# Patient Record
Sex: FEMALE | Race: WHITE | NOT HISPANIC OR LATINO | Employment: OTHER | URBAN - METROPOLITAN AREA
[De-identification: names, ages, dates, MRNs, and addresses within clinical notes are randomized per-mention and may not be internally consistent; named-entity substitution may affect disease eponyms.]

---

## 2020-07-19 ENCOUNTER — APPOINTMENT (OUTPATIENT)
Dept: CT IMAGING | Facility: HOSPITAL | Age: 75
End: 2020-07-19

## 2020-07-19 ENCOUNTER — HOSPITAL ENCOUNTER (EMERGENCY)
Facility: HOSPITAL | Age: 75
Discharge: HOME | End: 2020-07-19
Attending: EMERGENCY MEDICINE | Admitting: EMERGENCY MEDICINE

## 2020-07-19 ENCOUNTER — APPOINTMENT (OUTPATIENT)
Dept: GENERAL RADIOLOGY | Facility: HOSPITAL | Age: 75
End: 2020-07-19

## 2020-07-19 VITALS
OXYGEN SATURATION: 99 % | BODY MASS INDEX: 53.99 KG/M2 | RESPIRATION RATE: 18 BRPM | WEIGHT: 240 LBS | HEART RATE: 75 BPM | DIASTOLIC BLOOD PRESSURE: 61 MMHG | SYSTOLIC BLOOD PRESSURE: 123 MMHG | HEIGHT: 56 IN | TEMPERATURE: 97.6 F
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
## 2020-07-19 DIAGNOSIS — S40.011A CONTUSION OF RIGHT SHOULDER, INITIAL ENCOUNTER: ICD-10-CM

## 2020-07-19 DIAGNOSIS — S16.1XXA CERVICAL STRAIN, ACUTE, INITIAL ENCOUNTER: ICD-10-CM

## 2020-07-19 PROCEDURE — 72125 CT NECK SPINE W/O DYE: CPT

## 2020-07-19 PROCEDURE — 99283 EMERGENCY DEPT VISIT LOW MDM: CPT

## 2020-07-19 PROCEDURE — 73030 X-RAY EXAM OF SHOULDER: CPT

## 2020-07-19 RX ORDER — LIDOCAINE 50 MG/G
1 PATCH TOPICAL EVERY 24 HOURS
Qty: 15 EACH | Refills: 0 | Status: SHIPPED | OUTPATIENT
Start: 2020-07-19 | End: 2021-06-25

## 2020-07-19 NOTE — ED PROVIDER NOTES
EMERGENCY DEPARTMENT ENCOUNTER    Room Number:  22/22  Date of encounter:  7/19/2020  PCP: Kary Wu MD  Historian: Patient      HPI:  Chief Complaint: Fall out of bed right shoulder pain neck pain    A complete HPI/ROS/PMH/PSH/SH/FH are unobtainable due to: N/A    Context: Masha Chou is a 75 y.o. female who presents to the ED c/o states she was having really good dreams that she went to roll over in bed rolled out of the bed landed on the floor.  She struck her head but did not really have a headache but she does have some pain in the lower part of her neck and her right shoulder.  She reports that she is able to use the right arm.  She denies any numbness or tingling.  States she has had no loss of consciousness.  She had no blood from her ears or nose.  She has really no other complaints.  Palpation in the right trapezius area does seem to increase her pain.      PAST MEDICAL HISTORY  Active Ambulatory Problems     Diagnosis Date Noted   • No Active Ambulatory Problems     Resolved Ambulatory Problems     Diagnosis Date Noted   • No Resolved Ambulatory Problems     Past Medical History:   Diagnosis Date   • Asthma    • Cancer (CMS/HCC)    • Diabetes mellitus (CMS/HCC)    • Hypertension    • Pulmonary hypertension (CMS/HCC)          PAST SURGICAL HISTORY  Past Surgical History:   Procedure Laterality Date   • BREAST SURGERY     • CHOLECYSTECTOMY     • HYSTERECTOMY     • JOINT REPLACEMENT      RIGHT KNEE   • KELOID EXCISION           FAMILY HISTORY  History reviewed. No pertinent family history.      SOCIAL HISTORY  Social History     Socioeconomic History   • Marital status:      Spouse name: Not on file   • Number of children: Not on file   • Years of education: Not on file   • Highest education level: Not on file   Tobacco Use   • Smoking status: Never Smoker   • Smokeless tobacco: Never Used   Substance and Sexual Activity   • Alcohol use: Yes     Comment: SOCIALLY   • Drug use: Never          ALLERGIES  Penicillins        REVIEW OF SYSTEMS  Review of Systems     All systems reviewed and negative except for those discussed in HPI.       PHYSICAL EXAM    I have reviewed the triage vital signs and nursing notes.    ED Triage Vitals [07/19/20 0826]   Temp Heart Rate Resp BP SpO2   97.6 °F (36.4 °C) 75 18 105/84 99 %      Temp src Heart Rate Source Patient Position BP Location FiO2 (%)   Infrared Monitor Sitting Left arm --       Physical Exam  GENERAL: Warm pink dry well developed.  Appears in no acute distress.  HENT: Nares patent  EYES: No scleral icterus  CV: Regular rhythm, regular rate  RESPIRATORY: Normal effort.  No audible wheezes, rales or rhonchi  ABDOMEN: Soft, nontender  MUSCULOSKELETAL: No deformities.  Tenderness in the right posterior shoulder.  Clavicle is not tender.  There is no ecchymosis abrasions or evidence of direct trauma.  She had mild tenderness at the base of the C-spine there is no step-off no crepitus.  She is able to get up turn around and sit on the bed by herself  NEURO: Alert, moves all extremities, follows commands  SKIN: Warm, dry, no rash visualized        LAB RESULTS  No results found for this or any previous visit (from the past 24 hour(s)).    Ordered the above labs and independently reviewed the results.        RADIOLOGY  Xr Shoulder 2+ View Right    Result Date: 7/19/2020   EXAMINATION: XR SHOULDER 2 VIEWS RIGHT - 07/19/2020  INDICATION: Fall from bed this morning, right shoulder pain.  COMPARISON: NONE  FINDINGS: 2 views right shoulder reveal extensive osteophyte formation seen of the humeral head. There are degenerative changes seen in the acromioclavicular joint. Cortex is intact. Joint spaces are preserved. No fracture or dislocation.         Degenerative changes seen within the right shoulder with no evidence of fracture or dislocation.  DICTATED:   07/19/2020 EDITED/ls :   07/19/2020  This report was finalized on 7/19/2020 3:42 PM by Dr. Shay  MD [place].      Ct Cervical Spine Without Contrast    Result Date: 7/19/2020  EXAMINATION: CT CERVICAL SPINE WO CONTRAST - 07/19/2020  INDICATION: Fall from bed this morning, neck pain.  TECHNIQUE: Multiple axial CT imaging is obtained of the cervical spine without the administration of intravenous contrast.  The radiation dose reduction device was turned on for each scan per the ALARA (As Low as Reasonably Achievable) protocol.  COMPARISON: NONE  FINDINGS: There are multilevel degenerative changes identified throughout the cervical spine with some pleural thickening identified posteriorly and medially within the right lung apex. The vertebral body height is preserved. Odontoid is intact. Lateral masses are well aligned. Facets are well aligned. Mild anterior spurring and some disc space narrowing at the C5/C6 and C6/C7 levels. No fracture or dislocation.      Mild multilevel degenerative changes seen within the cervical spine with no evidence of fracture or dislocation.  DICTATED:   07/19/2020 EDITED/ls :   07/19/2020  This report was finalized on 7/19/2020 3:42 PM by Dr. Laurita Gurrola MD.        I ordered and reviewed the above noted radiographic studies.    I viewed images of right shoulder x-ray which showed no acute findings per my independent interpretation.  See radiologist's dictation for official interpretation.        PROCEDURES    Procedures      MEDICATIONS GIVEN IN ER    Medications - No data to display      PROGRESS, DATA ANALYSIS, CONSULTS, AND MEDICAL DECISION MAKING    All labs have been independently reviewed by me.  All radiology studies have been reviewed by me and the radiologist dictating the report.   EKG's have been independently viewed and interpreted by me.      Differential diagnoses: Contusion, fracture           X-rays and CT were negative.  Discussed with the patient.  Should be given some medications for discomfort.  Return here for problems.      AS OF 17:18 VITALS:    BP -  123/61  HR - 75  TEMP - 97.6 °F (36.4 °C) (Infrared)  O2 SATS - 99%        DIAGNOSIS  Final diagnoses:   Fall, initial encounter   Cervical strain, acute, initial encounter   Contusion of right shoulder, initial encounter         DISPOSITION  DISCHARGE    Patient discharged in stable condition.    Reviewed implications of results, diagnosis, meds, responsibility to follow up, warning signs and symptoms of possible worsening, potential complications and reasons to return to ER.    Patient/Family voiced understanding of above instructions.    Discussed plan for discharge, as there is no emergent indication for admission.  Pt/family is agreeable and understands need for follow up and possible repeat testing.  Pt/family is aware that discharge does not mean that nothing is wrong but that it indicates no emergency is currently present that requires admission and they must continue care with follow-up as given below or with a physician of their choice.     FOLLOW-UP  Kary Wu MD  [address] [facility] [address] [phone]               Medication List      New Prescriptions    diclofenac 50 MG EC tablet  Commonly known as:  VOLTAREN  Take 1 tablet by mouth 3 (Three) Times a Day.     lidocaine 5 %  Commonly known as:  LIDODERM  Place 1 patch on the skin as directed by provider Daily. Remove & Discard   patch within 12 hours or as directed by Eugene Crews PA  07/19/20 6582

## 2021-03-11 ENCOUNTER — APPOINTMENT (OUTPATIENT)
Dept: GENERAL RADIOLOGY | Facility: HOSPITAL | Age: 76
End: 2021-03-11

## 2021-03-11 ENCOUNTER — HOSPITAL ENCOUNTER (EMERGENCY)
Facility: HOSPITAL | Age: 76
Discharge: HOME | End: 2021-03-11
Attending: EMERGENCY MEDICINE | Admitting: EMERGENCY MEDICINE

## 2021-03-11 VITALS
TEMPERATURE: 98.1 F | SYSTOLIC BLOOD PRESSURE: 146 MMHG | BODY MASS INDEX: 52.19 KG/M2 | RESPIRATION RATE: 18 BRPM | DIASTOLIC BLOOD PRESSURE: 60 MMHG | HEART RATE: 65 BPM | HEIGHT: 56 IN | OXYGEN SATURATION: 100 % | WEIGHT: 232 LBS

## 2021-03-11 DIAGNOSIS — R06.00 DYSPNEA, UNSPECIFIED TYPE: Primary | ICD-10-CM

## 2021-03-11 DIAGNOSIS — J31.0 RHINITIS, UNSPECIFIED TYPE: ICD-10-CM

## 2021-03-11 DIAGNOSIS — D72.829 LEUKOCYTOSIS, UNSPECIFIED TYPE: ICD-10-CM

## 2021-03-11 LAB
ALBUMIN SERPL-MCNC: 4 G/DL (ref 3.5–5.2)
ALBUMIN/GLOB SERPL: 1.5 G/DL
ALP SERPL-CCNC: 131 U/L (ref 39–117)
ALT SERPL W P-5'-P-CCNC: 31 U/L (ref 1–33)
ANION GAP SERPL CALCULATED.3IONS-SCNC: 6 MMOL/L (ref 5–15)
AST SERPL-CCNC: 15 U/L (ref 1–32)
BASOPHILS # BLD AUTO: 0.06 10*3/MM3 (ref 0–0.2)
BASOPHILS NFR BLD AUTO: 0.5 % (ref 0–1.5)
BILIRUB SERPL-MCNC: 0.3 MG/DL (ref 0–1.2)
BILIRUB UR QL STRIP: NEGATIVE
BUN SERPL-MCNC: 29 MG/DL (ref 8–23)
BUN/CREAT SERPL: 27.6 (ref 7–25)
CALCIUM SPEC-SCNC: 9.3 MG/DL (ref 8.6–10.5)
CHLORIDE SERPL-SCNC: 101 MMOL/L (ref 98–107)
CLARITY UR: CLEAR
CO2 SERPL-SCNC: 33 MMOL/L (ref 22–29)
COLOR UR: YELLOW
CREAT SERPL-MCNC: 1.05 MG/DL (ref 0.57–1)
DEPRECATED RDW RBC AUTO: 46.5 FL (ref 37–54)
EOSINOPHIL # BLD AUTO: 0.07 10*3/MM3 (ref 0–0.4)
EOSINOPHIL NFR BLD AUTO: 0.6 % (ref 0.3–6.2)
ERYTHROCYTE [DISTWIDTH] IN BLOOD BY AUTOMATED COUNT: 13.6 % (ref 12.3–15.4)
GFR SERPL CREATININE-BSD FRML MDRD: 51 ML/MIN/1.73
GLOBULIN UR ELPH-MCNC: 2.6 GM/DL
GLUCOSE SERPL-MCNC: 187 MG/DL (ref 65–99)
GLUCOSE UR STRIP-MCNC: ABNORMAL MG/DL
HCT VFR BLD AUTO: 41.8 % (ref 34–46.6)
HGB BLD-MCNC: 12.6 G/DL (ref 12–15.9)
HGB UR QL STRIP.AUTO: NEGATIVE
HOLD SPECIMEN: NORMAL
IMM GRANULOCYTES # BLD AUTO: 0.16 10*3/MM3 (ref 0–0.05)
IMM GRANULOCYTES NFR BLD AUTO: 1.4 % (ref 0–0.5)
KETONES UR QL STRIP: NEGATIVE
LEUKOCYTE ESTERASE UR QL STRIP.AUTO: NEGATIVE
LYMPHOCYTES # BLD AUTO: 2.29 10*3/MM3 (ref 0.7–3.1)
LYMPHOCYTES NFR BLD AUTO: 20.7 % (ref 19.6–45.3)
MCH RBC QN AUTO: 28.1 PG (ref 26.6–33)
MCHC RBC AUTO-ENTMCNC: 30.1 G/DL (ref 31.5–35.7)
MCV RBC AUTO: 93.1 FL (ref 79–97)
MONOCYTES # BLD AUTO: 0.87 10*3/MM3 (ref 0.1–0.9)
MONOCYTES NFR BLD AUTO: 7.9 % (ref 5–12)
NEUTROPHILS NFR BLD AUTO: 68.9 % (ref 42.7–76)
NEUTROPHILS NFR BLD AUTO: 7.61 10*3/MM3 (ref 1.7–7)
NITRITE UR QL STRIP: NEGATIVE
NRBC BLD AUTO-RTO: 0 /100 WBC (ref 0–0.2)
NT-PROBNP SERPL-MCNC: 254.5 PG/ML (ref 0–900)
PH UR STRIP.AUTO: <=5 [PH] (ref 5–8)
PLATELET # BLD AUTO: 192 10*3/MM3 (ref 140–450)
PMV BLD AUTO: 12.1 FL (ref 6–12)
POTASSIUM SERPL-SCNC: 5.1 MMOL/L (ref 3.5–5.2)
PROCALCITONIN SERPL-MCNC: 0.07 NG/ML (ref 0–0.25)
PROT SERPL-MCNC: 6.6 G/DL (ref 6–8.5)
PROT UR QL STRIP: NEGATIVE
QT INTERVAL: 410 MS
QTC INTERVAL: 419 MS
RBC # BLD AUTO: 4.49 10*6/MM3 (ref 3.77–5.28)
SODIUM SERPL-SCNC: 140 MMOL/L (ref 136–145)
SP GR UR STRIP: 1.02 (ref 1–1.03)
TROPONIN T SERPL-MCNC: <0.01 NG/ML (ref 0–0.03)
UROBILINOGEN UR QL STRIP: ABNORMAL
WBC # BLD AUTO: 11.06 10*3/MM3 (ref 3.4–10.8)
WHOLE BLOOD HOLD SPECIMEN: NORMAL
WHOLE BLOOD HOLD SPECIMEN: NORMAL

## 2021-03-11 PROCEDURE — 71045 X-RAY EXAM CHEST 1 VIEW: CPT

## 2021-03-11 PROCEDURE — 93005 ELECTROCARDIOGRAM TRACING: CPT | Performed by: EMERGENCY MEDICINE

## 2021-03-11 PROCEDURE — 99284 EMERGENCY DEPT VISIT MOD MDM: CPT

## 2021-03-11 PROCEDURE — 84145 PROCALCITONIN (PCT): CPT | Performed by: EMERGENCY MEDICINE

## 2021-03-11 PROCEDURE — 80053 COMPREHEN METABOLIC PANEL: CPT | Performed by: EMERGENCY MEDICINE

## 2021-03-11 PROCEDURE — 84484 ASSAY OF TROPONIN QUANT: CPT | Performed by: EMERGENCY MEDICINE

## 2021-03-11 PROCEDURE — 85025 COMPLETE CBC W/AUTO DIFF WBC: CPT | Performed by: EMERGENCY MEDICINE

## 2021-03-11 PROCEDURE — 83880 ASSAY OF NATRIURETIC PEPTIDE: CPT | Performed by: EMERGENCY MEDICINE

## 2021-03-11 PROCEDURE — 81003 URINALYSIS AUTO W/O SCOPE: CPT | Performed by: EMERGENCY MEDICINE

## 2021-03-11 RX ORDER — LOPERAMIDE HYDROCHLORIDE 2 MG/1
1 TABLET ORAL EVERY 4 HOURS PRN
COMMUNITY

## 2021-03-11 RX ORDER — CEFDINIR 300 MG/1
300 CAPSULE ORAL 2 TIMES DAILY
COMMUNITY
End: 2021-06-25

## 2021-03-11 RX ORDER — SODIUM CHLORIDE 0.9 % (FLUSH) 0.9 %
10 SYRINGE (ML) INJECTION AS NEEDED
Status: DISCONTINUED | OUTPATIENT
Start: 2021-03-11 | End: 2021-03-11 | Stop reason: HOSPADM

## 2021-03-11 RX ORDER — ASPIRIN 81 MG/1
81 TABLET ORAL DAILY
COMMUNITY

## 2021-03-11 RX ORDER — ESCITALOPRAM OXALATE 20 MG/1
20 TABLET ORAL DAILY
COMMUNITY
End: 2021-06-25

## 2021-03-11 RX ORDER — INSULIN GLARGINE 100 [IU]/ML
40 INJECTION, SOLUTION SUBCUTANEOUS NIGHTLY
Status: ON HOLD | COMMUNITY
End: 2023-03-06 | Stop reason: SDUPTHER

## 2021-03-11 RX ORDER — VERAPAMIL HYDROCHLORIDE 240 MG/1
240 TABLET, FILM COATED, EXTENDED RELEASE ORAL DAILY
COMMUNITY

## 2021-03-11 NOTE — ED PROVIDER NOTES
Subjective   This is a pleasant 75-year-old female who is followed by Ghazala Wu at [facility] and her cardiologist at [facility].  Her diabetologist is at .  She is accompanied by her daughter who she lives with.  Their baseline she has fairly well-controlled diabetes.  She gets around generally at her daughter's house with a cane or walker or wheelchair or Rollator depending on how she is feeling.  She has been on chronic home oxygen for many years and uses CPAP at night and has underlying pulmonary hypertension but no history of heart failure or coronary artery disease, though her daughter says she has been borderline for congestive heart failure in the past.  She just moved in with her daughter about 3 weeks ago as her health is generally been declining.  She just had a steroid injection in her right thumb about 3 days ago and her sugars are starting to elevate.  She was started on cefdinir as well as over-the-counter oral antihistamine and Flonase nasal spray about 3 to 4 days ago for upper respiratory congestion.  She has had her first Covid shot.  She has had no known Covid exposure.    She presents today to the ER here with her daughter for a dry cough that is been present for several days as well as runny nose intermittently mixed with congestion.  She has a history of what sounds like seasonal allergies in the past.  Besides the above-mentioned changes she has had no other changes in her medications.  She does have modest shortness of breath but most of this is chronic is if she goes more than a few steps she generally gets short of breath.    She has had no fever but occasionally has chills.  She has had no vomiting or diarrhea.  She has had increased urinary frequency and occasional incontinence which she has had in the past but is becoming more frequent.  The patient herself is a fair historian but defers to the daughter for answering many questions.        Other systems reviewed and are  negative except as noted above.          Review of Systems   All other systems reviewed and are negative.      Past Medical History:   Diagnosis Date   • Asthma    • Cancer (CMS/HCC)     BILATERAL BREAST    • Diabetes mellitus (CMS/HCC)    • Hypertension    • Pulmonary hypertension (CMS/HCC)        Allergies   Allergen Reactions   • Penicillins Rash       Past Surgical History:   Procedure Laterality Date   • BREAST SURGERY     • CHOLECYSTECTOMY     • HYSTERECTOMY     • JOINT REPLACEMENT      RIGHT KNEE   • KELOID EXCISION         History reviewed. No pertinent family history.    Social History     Socioeconomic History   • Marital status:      Spouse name: Not on file   • Number of children: Not on file   • Years of education: Not on file   • Highest education level: Not on file   Tobacco Use   • Smoking status: Never Smoker   • Smokeless tobacco: Never Used   Substance and Sexual Activity   • Alcohol use: Yes     Comment: SOCIALLY   • Drug use: Never           Objective   Physical Exam  Vitals and nursing note reviewed. Exam conducted with a chaperone present.   Constitutional:       Comments: This is an older female in no acute distress she is alert and oriented speaks in complete sentences has no respiratory distress.  She is obese.  She is a fair historian.   HENT:      Head: Normocephalic and atraumatic.      Right Ear: External ear normal.      Left Ear: External ear normal.      Nose: Nose normal.      Mouth/Throat:      Mouth: Mucous membranes are moist.      Pharynx: Oropharynx is clear.   Eyes:      Extraocular Movements: Extraocular movements intact.      Conjunctiva/sclera: Conjunctivae normal.      Pupils: Pupils are equal, round, and reactive to light.   Cardiovascular:      Rate and Rhythm: Normal rate and regular rhythm.      Pulses: Normal pulses.      Heart sounds: Normal heart sounds.   Pulmonary:      Effort: Pulmonary effort is normal.      Breath sounds: Normal breath sounds.    Abdominal:      Comments: Obese soft and nontender without organomegaly, masses, or guarding.   Musculoskeletal:         General: Normal range of motion.      Cervical back: Normal range of motion.      Comments: She is a thumb spica Velcro on the right hand.  She has good pulses neurovascular intact in both upper and lower extremities she has no peripheral edema.   Skin:     General: Skin is warm and dry.      Capillary Refill: Capillary refill takes less than 2 seconds.   Neurological:      General: No focal deficit present.      Mental Status: She is oriented to person, place, and time.      Comments: Mild memory issues.  Face is symmetric, voice is strong, tongue is midline.  Vision, hearing, and speech are preserved.  She moves upper and lower extremities symmetrically.  She has just mild generalized weakness.         Procedures           ED Course           Recent Results (from the past 24 hour(s))   ECG 12 Lead    Collection Time: 03/11/21  3:06 PM   Result Value Ref Range    QT Interval 410 ms    QTC Interval 419 ms   Comprehensive Metabolic Panel    Collection Time: 03/11/21  3:47 PM    Specimen: Blood   Result Value Ref Range    Glucose 187 (H) 65 - 99 mg/dL    BUN 29 (H) 8 - 23 mg/dL    Creatinine 1.05 (H) 0.57 - 1.00 mg/dL    Sodium 140 136 - 145 mmol/L    Potassium 5.1 3.5 - 5.2 mmol/L    Chloride 101 98 - 107 mmol/L    CO2 33.0 (H) 22.0 - 29.0 mmol/L    Calcium 9.3 8.6 - 10.5 mg/dL    Total Protein 6.6 6.0 - 8.5 g/dL    Albumin 4.00 3.50 - 5.20 g/dL    ALT (SGPT) 31 1 - 33 U/L    AST (SGOT) 15 1 - 32 U/L    Alkaline Phosphatase 131 (H) 39 - 117 U/L    Total Bilirubin 0.3 0.0 - 1.2 mg/dL    eGFR Non African Amer 51 (L) >60 mL/min/1.73    Globulin 2.6 gm/dL    A/G Ratio 1.5 g/dL    BUN/Creatinine Ratio 27.6 (H) 7.0 - 25.0    Anion Gap 6.0 5.0 - 15.0 mmol/L   BNP    Collection Time: 03/11/21  3:47 PM    Specimen: Blood   Result Value Ref Range    proBNP 254.5 0.0 - 900.0 pg/mL   Troponin    Collection  Time: 03/11/21  3:47 PM    Specimen: Blood   Result Value Ref Range    Troponin T <0.010 0.000 - 0.030 ng/mL   Light Blue Top    Collection Time: 03/11/21  3:47 PM   Result Value Ref Range    Extra Tube hold for add-on    Green Top (Gel)    Collection Time: 03/11/21  3:47 PM   Result Value Ref Range    Extra Tube Hold for add-ons.    Lavender Top    Collection Time: 03/11/21  3:47 PM   Result Value Ref Range    Extra Tube hold for add-on    Gold Top - SST    Collection Time: 03/11/21  3:47 PM   Result Value Ref Range    Extra Tube Hold for add-ons.    Gray Top - Ice    Collection Time: 03/11/21  3:47 PM   Result Value Ref Range    Extra Tube Hold for add-ons.    CBC Auto Differential    Collection Time: 03/11/21  3:47 PM    Specimen: Blood   Result Value Ref Range    WBC 11.06 (H) 3.40 - 10.80 10*3/mm3    RBC 4.49 3.77 - 5.28 10*6/mm3    Hemoglobin 12.6 12.0 - 15.9 g/dL    Hematocrit 41.8 34.0 - 46.6 %    MCV 93.1 79.0 - 97.0 fL    MCH 28.1 26.6 - 33.0 pg    MCHC 30.1 (L) 31.5 - 35.7 g/dL    RDW 13.6 12.3 - 15.4 %    RDW-SD 46.5 37.0 - 54.0 fl    MPV 12.1 (H) 6.0 - 12.0 fL    Platelets 192 140 - 450 10*3/mm3    Neutrophil % 68.9 42.7 - 76.0 %    Lymphocyte % 20.7 19.6 - 45.3 %    Monocyte % 7.9 5.0 - 12.0 %    Eosinophil % 0.6 0.3 - 6.2 %    Basophil % 0.5 0.0 - 1.5 %    Immature Grans % 1.4 (H) 0.0 - 0.5 %    Neutrophils, Absolute 7.61 (H) 1.70 - 7.00 10*3/mm3    Lymphocytes, Absolute 2.29 0.70 - 3.10 10*3/mm3    Monocytes, Absolute 0.87 0.10 - 0.90 10*3/mm3    Eosinophils, Absolute 0.07 0.00 - 0.40 10*3/mm3    Basophils, Absolute 0.06 0.00 - 0.20 10*3/mm3    Immature Grans, Absolute 0.16 (H) 0.00 - 0.05 10*3/mm3    nRBC 0.0 0.0 - 0.2 /100 WBC   Procalcitonin    Collection Time: 03/11/21  3:47 PM    Specimen: Blood   Result Value Ref Range    Procalcitonin 0.07 0.00 - 0.25 ng/mL   Urinalysis With Culture If Indicated - Urine, Clean Catch    Collection Time: 03/11/21  4:43 PM    Specimen: Urine, Clean Catch   Result  "Value Ref Range    Color, UA Yellow Yellow, Straw    Appearance, UA Clear Clear    pH, UA <=5.0 5.0 - 8.0    Specific Gravity, UA 1.020 1.001 - 1.030    Glucose,  mg/dL (Trace) (A) Negative    Ketones, UA Negative Negative    Bilirubin, UA Negative Negative    Blood, UA Negative Negative    Protein, UA Negative Negative    Leuk Esterase, UA Negative Negative    Nitrite, UA Negative Negative    Urobilinogen, UA 0.2 E.U./dL 0.2 - 1.0 E.U./dL     Note: In addition to lab results from this visit, the labs listed above may include labs taken at another facility or during a different encounter within the last 24 hours. Please correlate lab times with ED admission and discharge times for further clarification of the services performed during this visit.    XR Chest 1 View   Final Result   Mild cardiac enlargement, otherwise without evidence of   acute cardiopulmonary abnormality.       This report was finalized on 3/11/2021 4:16 PM by Sd Townsend.            Vitals:    03/11/21 1459 03/11/21 1530   BP: 122/65 111/41   Pulse: 64 63   Resp: 18    Temp: 98.2 °F (36.8 °C)    TempSrc: Oral    SpO2: 94% 100%   Weight: 105 kg (232 lb)    Height: 142.2 cm (56\")      Medications   sodium chloride 0.9 % flush 10 mL (has no administration in time range)     ECG/EMG Results (last 24 hours)     Procedure Component Value Units Date/Time    ECG 12 Lead [[id number]] Collected: 03/11/21 1506     Updated: 03/11/21 1532     QT Interval 410 ms      QTC Interval 419 ms     Narrative:      Test Reason : SOA Protocol  Blood Pressure : **/** mmHG  Vent. Rate : 063 BPM     Atrial Rate : 063 BPM     P-R Int : 174 ms          QRS Dur : 078 ms      QT Int : 410 ms       P-R-T Axes : 035 020 100 degrees     QTc Int : 419 ms    Normal sinus rhythm  Low voltage QRS  Abnormal QRS-T angle, consider primary T wave abnormality  Abnormal ECG  When compared with ECG of 18-MAR-2016 16:27,c/w previous 2016 ekg   tw amplututeds less o/w nsc  Confirmed " by FORREST MEJIA MD ([id number]) on 3/11/2021 3:32:31 PM    Referred By:  LUDA           Confirmed By:FORREST MEJIA MD        ECG 12 Lead   Final Result   Test Reason : SOA Protocol   Blood Pressure : **/** mmHG   Vent. Rate : 063 BPM     Atrial Rate : 063 BPM      P-R Int : 174 ms          QRS Dur : 078 ms       QT Int : 410 ms       P-R-T Axes : 035 020 100 degrees      QTc Int : 419 ms      Normal sinus rhythm   Low voltage QRS   Abnormal QRS-T angle, consider primary T wave abnormality   Abnormal ECG   When compared with ECG of 18-MAR-2016 16:27,c/w previous 2016 ekg    tw amplututeds less o/w nsc   Confirmed by FORREST MEJIA MD ([id number]) on 3/11/2021 3:32:31 PM      Referred By:  LUDA           Confirmed By:FORREST MEJIA MD                                        MDM  Number of Diagnoses or Management Options  Dyspnea, unspecified type  Leukocytosis, unspecified type  Rhinitis, unspecified type  Diagnosis management comments:       I reviewed all available studies at the bedside with the patient and her daughter.  She has had an uneventful ER course her labs are quite reassuring.  Cardiac markers are bland.  Her white count is little bit elevated but she had a recent site of steroids in her procalcitonin levels normal.  I suspect her dyspnea may be related to her size as well as underlying lung problems probably little pulmonary hypertension.  She also has some rhinitis and that may be allergic in nature as well.    At this point I do not think there is any other intervention that she needs and have her follow-up with her PCP for recheck and certainly return to the ER if worse in any way.    All are agreeable with the plan       Amount and/or Complexity of Data Reviewed  Clinical lab tests: reviewed  Tests in the radiology section of CPT®: reviewed  Tests in the medicine section of CPT®: reviewed  Decide to obtain previous medical records or to obtain history from someone other than the patient: yes        Final  diagnoses:   Dyspnea, unspecified type   Leukocytosis, unspecified type   Rhinitis, unspecified type            Brian Sheehan MD  03/11/21 1224

## 2021-04-01 ENCOUNTER — HOSPITAL ENCOUNTER (EMERGENCY)
Facility: HOSPITAL | Age: 76
Discharge: HOME | End: 2021-04-01
Attending: EMERGENCY MEDICINE | Admitting: EMERGENCY MEDICINE

## 2021-04-01 ENCOUNTER — APPOINTMENT (OUTPATIENT)
Dept: GENERAL RADIOLOGY | Facility: HOSPITAL | Age: 76
End: 2021-04-01

## 2021-04-01 ENCOUNTER — APPOINTMENT (OUTPATIENT)
Dept: CT IMAGING | Facility: HOSPITAL | Age: 76
End: 2021-04-01

## 2021-04-01 VITALS
DIASTOLIC BLOOD PRESSURE: 41 MMHG | SYSTOLIC BLOOD PRESSURE: 135 MMHG | BODY MASS INDEX: 55.56 KG/M2 | HEART RATE: 75 BPM | HEIGHT: 56 IN | RESPIRATION RATE: 20 BRPM | TEMPERATURE: 99 F | OXYGEN SATURATION: 100 % | WEIGHT: 247 LBS

## 2021-04-01 DIAGNOSIS — I27.21 PULMONARY ARTERIAL HYPERTENSION (HCC): ICD-10-CM

## 2021-04-01 DIAGNOSIS — J96.11 CHRONIC RESPIRATORY FAILURE WITH HYPOXIA, ON HOME OXYGEN THERAPY (HCC): ICD-10-CM

## 2021-04-01 DIAGNOSIS — J18.9 PNEUMONIA OF LEFT LOWER LOBE DUE TO INFECTIOUS ORGANISM: Primary | ICD-10-CM

## 2021-04-01 DIAGNOSIS — Z99.81 CHRONIC RESPIRATORY FAILURE WITH HYPOXIA, ON HOME OXYGEN THERAPY (HCC): ICD-10-CM

## 2021-04-01 DIAGNOSIS — Z86.39 HISTORY OF DIABETES MELLITUS: ICD-10-CM

## 2021-04-01 LAB
ALBUMIN SERPL-MCNC: 3.9 G/DL (ref 3.5–5.2)
ALBUMIN/GLOB SERPL: 1.4 G/DL
ALP SERPL-CCNC: 119 U/L (ref 39–117)
ALT SERPL W P-5'-P-CCNC: 17 U/L (ref 1–33)
ANION GAP SERPL CALCULATED.3IONS-SCNC: 8 MMOL/L (ref 5–15)
AST SERPL-CCNC: 15 U/L (ref 1–32)
BASOPHILS # BLD AUTO: 0.05 10*3/MM3 (ref 0–0.2)
BASOPHILS NFR BLD AUTO: 0.6 % (ref 0–1.5)
BILIRUB SERPL-MCNC: 0.5 MG/DL (ref 0–1.2)
BUN SERPL-MCNC: 21 MG/DL (ref 8–23)
BUN/CREAT SERPL: 17.2 (ref 7–25)
CALCIUM SPEC-SCNC: 8.7 MG/DL (ref 8.6–10.5)
CHLORIDE SERPL-SCNC: 101 MMOL/L (ref 98–107)
CO2 SERPL-SCNC: 31 MMOL/L (ref 22–29)
CREAT SERPL-MCNC: 1.22 MG/DL (ref 0.57–1)
DEPRECATED RDW RBC AUTO: 47.1 FL (ref 37–54)
EOSINOPHIL # BLD AUTO: 0.12 10*3/MM3 (ref 0–0.4)
EOSINOPHIL NFR BLD AUTO: 1.3 % (ref 0.3–6.2)
ERYTHROCYTE [DISTWIDTH] IN BLOOD BY AUTOMATED COUNT: 14.2 % (ref 12.3–15.4)
GFR SERPL CREATININE-BSD FRML MDRD: 43 ML/MIN/1.73
GLOBULIN UR ELPH-MCNC: 2.8 GM/DL
GLUCOSE SERPL-MCNC: 173 MG/DL (ref 65–99)
HCT VFR BLD AUTO: 41.3 % (ref 34–46.6)
HGB BLD-MCNC: 12.6 G/DL (ref 12–15.9)
HOLD SPECIMEN: NORMAL
IMM GRANULOCYTES # BLD AUTO: 0.05 10*3/MM3 (ref 0–0.05)
IMM GRANULOCYTES NFR BLD AUTO: 0.6 % (ref 0–0.5)
LIPASE SERPL-CCNC: 12 U/L (ref 13–60)
LYMPHOCYTES # BLD AUTO: 1.75 10*3/MM3 (ref 0.7–3.1)
LYMPHOCYTES NFR BLD AUTO: 19.5 % (ref 19.6–45.3)
MCH RBC QN AUTO: 27.8 PG (ref 26.6–33)
MCHC RBC AUTO-ENTMCNC: 30.5 G/DL (ref 31.5–35.7)
MCV RBC AUTO: 91.2 FL (ref 79–97)
MONOCYTES # BLD AUTO: 0.58 10*3/MM3 (ref 0.1–0.9)
MONOCYTES NFR BLD AUTO: 6.5 % (ref 5–12)
NEUTROPHILS NFR BLD AUTO: 6.42 10*3/MM3 (ref 1.7–7)
NEUTROPHILS NFR BLD AUTO: 71.5 % (ref 42.7–76)
NRBC BLD AUTO-RTO: 0 /100 WBC (ref 0–0.2)
NT-PROBNP SERPL-MCNC: 110.2 PG/ML (ref 0–900)
PLATELET # BLD AUTO: 172 10*3/MM3 (ref 140–450)
PMV BLD AUTO: 12.1 FL (ref 6–12)
POTASSIUM SERPL-SCNC: 4.7 MMOL/L (ref 3.5–5.2)
PROT SERPL-MCNC: 6.7 G/DL (ref 6–8.5)
RBC # BLD AUTO: 4.53 10*6/MM3 (ref 3.77–5.28)
SODIUM SERPL-SCNC: 140 MMOL/L (ref 136–145)
TROPONIN T SERPL-MCNC: <0.01 NG/ML (ref 0–0.03)
WBC # BLD AUTO: 8.97 10*3/MM3 (ref 3.4–10.8)
WHOLE BLOOD HOLD SPECIMEN: NORMAL
WHOLE BLOOD HOLD SPECIMEN: NORMAL

## 2021-04-01 PROCEDURE — 80053 COMPREHEN METABOLIC PANEL: CPT

## 2021-04-01 PROCEDURE — 83880 ASSAY OF NATRIURETIC PEPTIDE: CPT

## 2021-04-01 PROCEDURE — 99284 EMERGENCY DEPT VISIT MOD MDM: CPT

## 2021-04-01 PROCEDURE — 83690 ASSAY OF LIPASE: CPT

## 2021-04-01 PROCEDURE — U0004 COV-19 TEST NON-CDC HGH THRU: HCPCS | Performed by: EMERGENCY MEDICINE

## 2021-04-01 PROCEDURE — 93005 ELECTROCARDIOGRAM TRACING: CPT | Performed by: EMERGENCY MEDICINE

## 2021-04-01 PROCEDURE — 84484 ASSAY OF TROPONIN QUANT: CPT

## 2021-04-01 PROCEDURE — 71275 CT ANGIOGRAPHY CHEST: CPT

## 2021-04-01 PROCEDURE — 71045 X-RAY EXAM CHEST 1 VIEW: CPT

## 2021-04-01 PROCEDURE — 85025 COMPLETE CBC W/AUTO DIFF WBC: CPT

## 2021-04-01 PROCEDURE — 93005 ELECTROCARDIOGRAM TRACING: CPT

## 2021-04-01 PROCEDURE — 0 IOPAMIDOL PER 1 ML: Performed by: EMERGENCY MEDICINE

## 2021-04-01 PROCEDURE — C9803 HOPD COVID-19 SPEC COLLECT: HCPCS | Performed by: EMERGENCY MEDICINE

## 2021-04-01 RX ORDER — SODIUM CHLORIDE 0.9 % (FLUSH) 0.9 %
10 SYRINGE (ML) INJECTION AS NEEDED
Status: DISCONTINUED | OUTPATIENT
Start: 2021-04-01 | End: 2021-04-02 | Stop reason: HOSPADM

## 2021-04-01 RX ORDER — DOXYCYCLINE 100 MG/1
100 CAPSULE ORAL ONCE
Status: COMPLETED | OUTPATIENT
Start: 2021-04-01 | End: 2021-04-01

## 2021-04-01 RX ORDER — CEFDINIR 300 MG/1
300 CAPSULE ORAL EVERY 12 HOURS SCHEDULED
Status: COMPLETED | OUTPATIENT
Start: 2021-04-01 | End: 2021-04-01

## 2021-04-01 RX ORDER — CEFDINIR 300 MG/1
300 CAPSULE ORAL 2 TIMES DAILY
Qty: 14 CAPSULE | Refills: 0 | Status: SHIPPED | OUTPATIENT
Start: 2021-04-01 | End: 2021-04-08

## 2021-04-01 RX ORDER — DOXYCYCLINE 100 MG/1
100 CAPSULE ORAL 2 TIMES DAILY
Qty: 14 CAPSULE | Refills: 0 | Status: SHIPPED | OUTPATIENT
Start: 2021-04-01 | End: 2021-04-08

## 2021-04-01 RX ORDER — ASPIRIN 81 MG/1
324 TABLET, CHEWABLE ORAL ONCE
Status: DISCONTINUED | OUTPATIENT
Start: 2021-04-01 | End: 2021-04-02 | Stop reason: HOSPADM

## 2021-04-01 RX ADMIN — CEFDINIR 300 MG: 300 CAPSULE ORAL at 23:03

## 2021-04-01 RX ADMIN — DOXYCYCLINE 100 MG: 100 CAPSULE ORAL at 23:03

## 2021-04-01 RX ADMIN — IOPAMIDOL 70 ML: 755 INJECTION, SOLUTION INTRAVENOUS at 22:03

## 2021-04-02 LAB
QT INTERVAL: 378 MS
QT INTERVAL: 416 MS
QTC INTERVAL: 413 MS
QTC INTERVAL: 458 MS
SARS-COV-2 RNA NOSE QL NAA+PROBE: NOT DETECTED

## 2021-04-02 NOTE — ED PROVIDER NOTES
EMERGENCY DEPARTMENT ENCOUNTER      Pt Name: Masha Chou  MRN: [id number]  YOB: 1945  Date of evaluation: 4/1/2021  Provider: Brenden Mckeon MD    CHIEF COMPLAINT       Chief Complaint   Patient presents with   • Chest Pain   • Shortness of Breath         HISTORY OF PRESENT ILLNESS  (Location/Symptom, Timing/Onset, Context/Setting, Quality, Duration, Modifying Factors, Severity.)   Masha Chou is a 75 y.o. female who presents to the emergency department patient presents with constant sharp left lower chest wall pain that is worse with exertion along with dry cough and worsened from baseline shortness of breath over the course the past 3 days that is moderate in severity and worse with exertion without any associated fever or chills.  Patient wears 2 to 3 L of oxygen chronically at home secondary to history of pulmonary hypertension.  Denies any previous history of VTE.      Nursing notes were reviewed.    REVIEW OF SYSTEMS    (2-9 systems for level 4, 10 or more for level 5)   ROS:  General:  No fevers, no chills, no weakness  Cardiovascular: Left lower chest pain  Respiratory: Shortness of breath, cough  Gastrointestinal:  No pain, no nausea, no vomiting, no diarrhea  Musculoskeletal:  No muscle pain, no joint pain  Skin:  No rash, no easy bruising  Neurologic:  No speech problems, no headache, no extremity numbness, no extremity tingling, no extremity weakness  Psychiatric:  No anxiety  Genitourinary:  No dysuria, no hematuria    Except as noted above the remainder of the review of systems was reviewed and negative.       PAST MEDICAL HISTORY     Past Medical History:   Diagnosis Date   • Asthma    • Cancer (CMS/HCC)     BILATERAL BREAST    • Diabetes mellitus (CMS/HCC)    • Hypertension    • Pulmonary hypertension (CMS/HCC)          SURGICAL HISTORY       Past Surgical History:   Procedure Laterality Date   • BREAST SURGERY     • CHOLECYSTECTOMY     • HYSTERECTOMY     • JOINT REPLACEMENT       RIGHT KNEE   • KELOID EXCISION           CURRENT MEDICATIONS     No current facility-administered medications for this encounter.    Current Outpatient Medications:   •  ALBUTEROL IN, Inhale., Disp: , Rfl:   •  aspirin 81 MG EC tablet, Take 81 mg by mouth Daily., Disp: , Rfl:   •  cefdinir (OMNICEF) 300 MG capsule, Take 300 mg by mouth 2 (Two) Times a Day., Disp: , Rfl:   •  cefdinir (OMNICEF) 300 MG capsule, Take 1 capsule by mouth 2 (Two) Times a Day for 7 days., Disp: 14 capsule, Rfl: 0  •  diclofenac (VOLTAREN) 50 MG EC tablet, Take 1 tablet by mouth 3 (Three) Times a Day., Disp: 9 tablet, Rfl: 0  •  doxycycline (MONODOX) 100 MG capsule, Take 1 capsule by mouth 2 (Two) Times a Day for 7 days., Disp: 14 capsule, Rfl: 0  •  escitalopram (LEXAPRO) 20 MG tablet, Take 20 mg by mouth Daily., Disp: , Rfl:   •  FLUTICASONE PROPIONATE EX, Apply  topically., Disp: , Rfl:   •  insulin glargine (LANTUS, SEMGLEE) 100 UNIT/ML injection, Inject 40 Units under the skin into the appropriate area as directed Daily., Disp: , Rfl:   •  insulin lispro (humaLOG) 100 UNIT/ML injection, Inject  under the skin into the appropriate area as directed 3 (Three) Times a Day Before Meals., Disp: , Rfl:   •  lidocaine (LIDODERM) 5 %, Place 1 patch on the skin as directed by provider Daily. Remove & Discard patch within 12 hours or as directed by MD, Disp: 15 each, Rfl: 0  •  Loperamide HCl (IMODIUM A-D PO), Take  by mouth., Disp: , Rfl:   •  verapamil SR (CALAN-SR) 240 MG CR tablet, Take 240 mg by mouth Every Night., Disp: , Rfl:     ALLERGIES     Penicillins    FAMILY HISTORY     History reviewed. No pertinent family history.       SOCIAL HISTORY       Social History     Socioeconomic History   • Marital status:      Spouse name: Not on file   • Number of children: Not on file   • Years of education: Not on file   • Highest education level: Not on file   Tobacco Use   • Smoking status: Never Smoker   • Smokeless tobacco: Never Used    Substance and Sexual Activity   • Alcohol use: Yes     Comment: SOCIALLY   • Drug use: Never         PHYSICAL EXAM    (up to 7 for level 4, 8 or more for level 5)     Vitals:    04/01/21 2045 04/01/21 2100 04/01/21 2115 04/01/21 2230   BP:  140/54  135/41   BP Location:       Patient Position:       Pulse: 71 69 69 75   Resp:       Temp:       TempSrc:       SpO2: 100% 100% 94% 100%   Weight:       Height:           Physical Exam  General: Awake, alert, no acute distress.  HEENT: Conjunctiva normal.  Neck: Trachea midline.  Cardiac: Heart regular rate, rhythm, no murmurs, rubs, or gallops  Lungs: Lungs are clear to auscultation, there is no wheezing, rhonchi, or rales. There is no use of accessory muscles.  Chest wall: There is no tenderness to palpation over the chest wall or over ribs  Abdomen: Abdomen is soft, nontender, nondistended. There are no firm or pulsatile masses, no rebound rigidity or guarding.   Musculoskeletal: No deformity.  Neuro: Alert and oriented x 4.  Dermatology: Skin is warm and dry  Psych: Mentation is grossly normal, cognition is grossly normal. Affect is appropriate.        DIAGNOSTIC RESULTS     EKG: All EKG's are interpreted by the Emergency Department Physician who either signs or Co-signs this chart in the absence of a cardiologist.    ECG 1: Sinus rhythm rate of 72, no acute ST segment or T wave changes, normal intervals, no ectopy    ECG 2: Sinus rhythm rate of 73, no acute ST segment or T wave changes, normal intervals, no ectopy    RADIOLOGY:   Non-plain film images such as CT, Ultrasound and MRI are read by the radiologist. Plain radiographic images are visualized and preliminarily interpreted by the emergency physician with the below findings:      [x] Radiologist's Report Reviewed:  CT Angiogram Chest   Final Result   1. Negative for pulmonary embolus.      2. Patchy areas of consolidation/atelectasis in the lingula may represent pneumonia.      Signer Name: Mavis Manrique MD     Signed: 4/1/2021 10:16 PM    Workstation Name: [id number]     [facility]      XR Chest 1 View   Final Result   Mild cardiomegaly without acute cardiopulmonary process.      Signer Name: FORREST POP MD    Signed: 4/1/2021 8:43 PM    Workstation Name: [id number]     [facility]            LABS:    I have reviewed and interpreted all of the currently available lab results from this visit (if applicable):  Results for orders placed or performed during the hospital encounter of 04/01/21   Troponin    Specimen: Blood   Result Value Ref Range    Troponin T <0.010 0.000 - 0.030 ng/mL   Comprehensive Metabolic Panel    Specimen: Blood   Result Value Ref Range    Glucose 173 (H) 65 - 99 mg/dL    BUN 21 8 - 23 mg/dL    Creatinine 1.22 (H) 0.57 - 1.00 mg/dL    Sodium 140 136 - 145 mmol/L    Potassium 4.7 3.5 - 5.2 mmol/L    Chloride 101 98 - 107 mmol/L    CO2 31.0 (H) 22.0 - 29.0 mmol/L    Calcium 8.7 8.6 - 10.5 mg/dL    Total Protein 6.7 6.0 - 8.5 g/dL    Albumin 3.90 3.50 - 5.20 g/dL    ALT (SGPT) 17 1 - 33 U/L    AST (SGOT) 15 1 - 32 U/L    Alkaline Phosphatase 119 (H) 39 - 117 U/L    Total Bilirubin 0.5 0.0 - 1.2 mg/dL    eGFR Non African Amer 43 (L) >60 mL/min/1.73    Globulin 2.8 gm/dL    A/G Ratio 1.4 g/dL    BUN/Creatinine Ratio 17.2 7.0 - 25.0    Anion Gap 8.0 5.0 - 15.0 mmol/L   Lipase    Specimen: Blood   Result Value Ref Range    Lipase 12 (L) 13 - 60 U/L   BNP    Specimen: Blood   Result Value Ref Range    proBNP 110.2 0.0 - 900.0 pg/mL   Gray Top - Ice   Result Value Ref Range    Extra Tube Hold for add-ons.    CBC Auto Differential    Specimen: Blood   Result Value Ref Range    WBC 8.97 3.40 - 10.80 10*3/mm3    RBC 4.53 3.77 - 5.28 10*6/mm3    Hemoglobin 12.6 12.0 - 15.9 g/dL    Hematocrit 41.3 34.0 - 46.6 %    MCV 91.2 79.0 - 97.0 fL    MCH 27.8 26.6 - 33.0 pg    MCHC 30.5 (L) 31.5 - 35.7 g/dL    RDW 14.2 12.3 - 15.4 %    RDW-SD 47.1 37.0 - 54.0 fl    MPV 12.1 (H)  6.0 - 12.0 fL    Platelets 172 140 - 450 10*3/mm3    Neutrophil % 71.5 42.7 - 76.0 %    Lymphocyte % 19.5 (L) 19.6 - 45.3 %    Monocyte % 6.5 5.0 - 12.0 %    Eosinophil % 1.3 0.3 - 6.2 %    Basophil % 0.6 0.0 - 1.5 %    Immature Grans % 0.6 (H) 0.0 - 0.5 %    Neutrophils, Absolute 6.42 1.70 - 7.00 10*3/mm3    Lymphocytes, Absolute 1.75 0.70 - 3.10 10*3/mm3    Monocytes, Absolute 0.58 0.10 - 0.90 10*3/mm3    Eosinophils, Absolute 0.12 0.00 - 0.40 10*3/mm3    Basophils, Absolute 0.05 0.00 - 0.20 10*3/mm3    Immature Grans, Absolute 0.05 0.00 - 0.05 10*3/mm3    nRBC 0.0 0.0 - 0.2 /100 WBC   ECG 12 Lead   Result Value Ref Range    QT Interval 378 ms    QTC Interval 413 ms   ECG 12 Lead   Result Value Ref Range    QT Interval 416 ms    QTC Interval 458 ms   Light Blue Top   Result Value Ref Range    Extra Tube hold for add-on    Green Top (Gel)   Result Value Ref Range    Extra Tube Hold for add-ons.    Lavender Top   Result Value Ref Range    Extra Tube hold for add-on    Gold Top - SST   Result Value Ref Range    Extra Tube Hold for add-ons.         All other labs were within normal range or not returned as of this dictation.      EMERGENCY DEPARTMENT COURSE and DIFFERENTIAL DIAGNOSIS/MDM:   Vitals:    Vitals:    04/01/21 2045 04/01/21 2100 04/01/21 2115 04/01/21 2230   BP:  140/54  135/41   BP Location:       Patient Position:       Pulse: 71 69 69 75   Resp:       Temp:       TempSrc:       SpO2: 100% 100% 94% 100%   Weight:       Height:           ED Course as of Apr 02 0514   Thu Apr 01, 2021 2222 CT is consistent with PNA. Negative for PE, dissection, effusion, PTX. Will test for COVID and DC on abx. Pt stable on home O2.    [NS]      ED Course User Index  [NS] Brenden Mckeon MD       Patient well-appearing, stable, nontoxic throughout the duration of her emergency department stay.  CTA chest demonstrates no evidence of PE, pneumothorax, pericardial effusion, dissection.  There is evidence for pneumonia.   Patient is not systemically ill/septic.  Patient has had constant symptoms for 3 days and feel that her chest pain is explained by her pneumonia and not ACS.  ECG and troponin demonstrate no evidence of myocardial ischemia or injury but which I would expect to be abnormal with 3 days of symptoms.  Remainder of laboratory evaluation is unremarkable and patient will be discharged with a course of antibiotics.    I had a discussion with the patient/family regarding diagnosis, diagnostic results, treatment plan, and medications.  The patient/family indicated understanding of these instructions.  I spent adequate time at the bedside preceding discharge necessary to personally discuss the aftercare instructions, giving patient education, providing explanations of the results of our evaluations/findings, and my decision making to assure that the patient/family understand the plan of care.  Time was allotted to answer questions at that time and throughout the ED course.  Emphasis was placed on timely follow-up after discharge.  I also discussed the potential for the development of an acute emergent condition requiring further evaluation, admission, or even surgical intervention. I discussed that we found nothing during the visit today indicating the need for further workup, admission, or the presence of an unstable medical condition.  I encouraged the patient to return to the emergency department immediately for ANY concerns, worsening, new complaints, or if symptoms persist and unable to seek follow-up in a timely fashion.  The patient/family expressed understanding and agreement with this plan.  The patient will follow-up with their PCP in 1-2 days for reevaluation.       MEDICATIONS ADMINISTERED IN ED:  Medications   iopamidol (ISOVUE-370) 76 % injection 100 mL (70 mL Intravenous Given 4/1/21 2203)   cefdinir (OMNICEF) capsule 300 mg (300 mg Oral Given 4/1/21 2303)   doxycycline (MONODOX) capsule 100 mg (100 mg Oral  Given 4/1/21 4701)         FINAL IMPRESSION      1. Pneumonia of left lower lobe due to infectious organism    2. Chronic respiratory failure with hypoxia, on home oxygen therapy (CMS/[facility])    3. Pulmonary arterial hypertension (CMS/[facility])    4. History of diabetes mellitus          DISPOSITION/PLAN     ED Disposition     ED Disposition Condition Comment    Discharge Stable           Brenden Mckeon MD  Attending Emergency Physician               Brenden Mckeon MD  04/02/21 1898

## 2021-06-25 ENCOUNTER — APPOINTMENT (OUTPATIENT)
Dept: CARDIOLOGY | Facility: HOSPITAL | Age: 76
End: 2021-06-25

## 2021-06-25 ENCOUNTER — HOSPITAL ENCOUNTER (OUTPATIENT)
Facility: HOSPITAL | Age: 76
Setting detail: OBSERVATION
Discharge: HOME | End: 2021-06-27
Attending: EMERGENCY MEDICINE | Admitting: INTERNAL MEDICINE

## 2021-06-25 ENCOUNTER — APPOINTMENT (OUTPATIENT)
Dept: GENERAL RADIOLOGY | Facility: HOSPITAL | Age: 76
End: 2021-06-25

## 2021-06-25 DIAGNOSIS — E66.01 MORBID OBESITY (HCC): ICD-10-CM

## 2021-06-25 DIAGNOSIS — Z74.09 IMPAIRED FUNCTIONAL MOBILITY, BALANCE, GAIT, AND ENDURANCE: ICD-10-CM

## 2021-06-25 DIAGNOSIS — Z86.79 HISTORY OF PULMONARY HYPERTENSION: ICD-10-CM

## 2021-06-25 DIAGNOSIS — I20.0 UNSTABLE ANGINA (HCC): Primary | ICD-10-CM

## 2021-06-25 DIAGNOSIS — Z86.79 HISTORY OF HYPERTENSION: ICD-10-CM

## 2021-06-25 DIAGNOSIS — Z86.39 HISTORY OF DIABETES MELLITUS: ICD-10-CM

## 2021-06-25 PROBLEM — I50.9 HEART FAILURE (HCC): Status: ACTIVE | Noted: 2021-06-25

## 2021-06-25 PROBLEM — I27.20 PULMONARY HTN (HCC): Status: ACTIVE | Noted: 2021-06-25

## 2021-06-25 PROBLEM — E11.9 TYPE 2 DIABETES MELLITUS: Status: ACTIVE | Noted: 2021-06-25

## 2021-06-25 PROBLEM — R07.9 CHEST PAIN: Status: ACTIVE | Noted: 2021-06-25

## 2021-06-25 LAB
ALBUMIN SERPL-MCNC: 4 G/DL (ref 3.5–5.2)
ALBUMIN/GLOB SERPL: 1.4 G/DL
ALP SERPL-CCNC: 111 U/L (ref 39–117)
ALT SERPL W P-5'-P-CCNC: 14 U/L (ref 1–33)
ANION GAP SERPL CALCULATED.3IONS-SCNC: 9 MMOL/L (ref 5–15)
APTT PPP: 27.8 SECONDS (ref 50–95)
AST SERPL-CCNC: 19 U/L (ref 1–32)
BASOPHILS # BLD AUTO: 0.05 10*3/MM3 (ref 0–0.2)
BASOPHILS NFR BLD AUTO: 0.6 % (ref 0–1.5)
BILIRUB SERPL-MCNC: 0.6 MG/DL (ref 0–1.2)
BUN SERPL-MCNC: 23 MG/DL (ref 8–23)
BUN/CREAT SERPL: 21.1 (ref 7–25)
CALCIUM SPEC-SCNC: 9.3 MG/DL (ref 8.6–10.5)
CHLORIDE SERPL-SCNC: 100 MMOL/L (ref 98–107)
CO2 SERPL-SCNC: 31 MMOL/L (ref 22–29)
CREAT SERPL-MCNC: 1.09 MG/DL (ref 0.57–1)
DEPRECATED RDW RBC AUTO: 47.4 FL (ref 37–54)
EOSINOPHIL # BLD AUTO: 0.16 10*3/MM3 (ref 0–0.4)
EOSINOPHIL NFR BLD AUTO: 1.9 % (ref 0.3–6.2)
ERYTHROCYTE [DISTWIDTH] IN BLOOD BY AUTOMATED COUNT: 13.8 % (ref 12.3–15.4)
GFR SERPL CREATININE-BSD FRML MDRD: 49 ML/MIN/1.73
GLOBULIN UR ELPH-MCNC: 2.9 GM/DL
GLUCOSE BLDC GLUCOMTR-MCNC: 113 MG/DL (ref 70–130)
GLUCOSE BLDC GLUCOMTR-MCNC: 233 MG/DL (ref 70–130)
GLUCOSE BLDC GLUCOMTR-MCNC: 90 MG/DL (ref 70–130)
GLUCOSE SERPL-MCNC: 190 MG/DL (ref 65–99)
HCT VFR BLD AUTO: 42.8 % (ref 34–46.6)
HGB BLD-MCNC: 13.2 G/DL (ref 12–15.9)
HOLD SPECIMEN: NORMAL
IMM GRANULOCYTES # BLD AUTO: 0.09 10*3/MM3 (ref 0–0.05)
IMM GRANULOCYTES NFR BLD AUTO: 1.1 % (ref 0–0.5)
INR PPP: 0.97 (ref 0.85–1.16)
LIPASE SERPL-CCNC: 14 U/L (ref 13–60)
LYMPHOCYTES # BLD AUTO: 1.79 10*3/MM3 (ref 0.7–3.1)
LYMPHOCYTES NFR BLD AUTO: 21.7 % (ref 19.6–45.3)
MCH RBC QN AUTO: 28.9 PG (ref 26.6–33)
MCHC RBC AUTO-ENTMCNC: 30.8 G/DL (ref 31.5–35.7)
MCV RBC AUTO: 93.9 FL (ref 79–97)
MONOCYTES # BLD AUTO: 0.61 10*3/MM3 (ref 0.1–0.9)
MONOCYTES NFR BLD AUTO: 7.4 % (ref 5–12)
NEUTROPHILS NFR BLD AUTO: 5.53 10*3/MM3 (ref 1.7–7)
NEUTROPHILS NFR BLD AUTO: 67.3 % (ref 42.7–76)
NRBC BLD AUTO-RTO: 0 /100 WBC (ref 0–0.2)
NT-PROBNP SERPL-MCNC: 115.9 PG/ML (ref 0–1800)
PLATELET # BLD AUTO: 178 10*3/MM3 (ref 140–450)
PMV BLD AUTO: 11.6 FL (ref 6–12)
POTASSIUM SERPL-SCNC: 4.5 MMOL/L (ref 3.5–5.2)
PROT SERPL-MCNC: 6.9 G/DL (ref 6–8.5)
PROTHROMBIN TIME: 12.6 SECONDS (ref 11.4–14.4)
QT INTERVAL: 404 MS
QT INTERVAL: 432 MS
QT INTERVAL: 442 MS
QTC INTERVAL: 448 MS
QTC INTERVAL: 449 MS
QTC INTERVAL: 455 MS
RBC # BLD AUTO: 4.56 10*6/MM3 (ref 3.77–5.28)
SARS-COV-2 RNA RESP QL NAA+PROBE: NOT DETECTED
SODIUM SERPL-SCNC: 140 MMOL/L (ref 136–145)
TROPONIN T SERPL-MCNC: <0.01 NG/ML (ref 0–0.03)
UFH PPP CHRO-ACNC: 0.1 IU/ML (ref 0.3–0.7)
WBC # BLD AUTO: 8.23 10*3/MM3 (ref 3.4–10.8)
WHOLE BLOOD HOLD SPECIMEN: NORMAL

## 2021-06-25 PROCEDURE — 84484 ASSAY OF TROPONIN QUANT: CPT | Performed by: INTERNAL MEDICINE

## 2021-06-25 PROCEDURE — G0378 HOSPITAL OBSERVATION PER HR: HCPCS

## 2021-06-25 PROCEDURE — 96374 THER/PROPH/DIAG INJ IV PUSH: CPT

## 2021-06-25 PROCEDURE — 85730 THROMBOPLASTIN TIME PARTIAL: CPT | Performed by: EMERGENCY MEDICINE

## 2021-06-25 PROCEDURE — 25010000002 ENOXAPARIN PER 10 MG: Performed by: INTERNAL MEDICINE

## 2021-06-25 PROCEDURE — 94799 UNLISTED PULMONARY SVC/PX: CPT

## 2021-06-25 PROCEDURE — 82962 GLUCOSE BLOOD TEST: CPT

## 2021-06-25 PROCEDURE — 83690 ASSAY OF LIPASE: CPT

## 2021-06-25 PROCEDURE — 93005 ELECTROCARDIOGRAM TRACING: CPT | Performed by: EMERGENCY MEDICINE

## 2021-06-25 PROCEDURE — 85610 PROTHROMBIN TIME: CPT | Performed by: EMERGENCY MEDICINE

## 2021-06-25 PROCEDURE — 85520 HEPARIN ASSAY: CPT | Performed by: EMERGENCY MEDICINE

## 2021-06-25 PROCEDURE — 25010000002 SULFUR HEXAFLUORIDE MICROSPH 60.7-25 MG RECONSTITUTED SUSPENSION: Performed by: INTERNAL MEDICINE

## 2021-06-25 PROCEDURE — 93306 TTE W/DOPPLER COMPLETE: CPT | Performed by: INTERNAL MEDICINE

## 2021-06-25 PROCEDURE — 80053 COMPREHEN METABOLIC PANEL: CPT

## 2021-06-25 PROCEDURE — 71045 X-RAY EXAM CHEST 1 VIEW: CPT

## 2021-06-25 PROCEDURE — 93010 ELECTROCARDIOGRAM REPORT: CPT | Performed by: INTERNAL MEDICINE

## 2021-06-25 PROCEDURE — 99284 EMERGENCY DEPT VISIT MOD MDM: CPT

## 2021-06-25 PROCEDURE — 84484 ASSAY OF TROPONIN QUANT: CPT

## 2021-06-25 PROCEDURE — U0003 INFECTIOUS AGENT DETECTION BY NUCLEIC ACID (DNA OR RNA); SEVERE ACUTE RESPIRATORY SYNDROME CORONAVIRUS 2 (SARS-COV-2) (CORONAVIRUS DISEASE [COVID-19]), AMPLIFIED PROBE TECHNIQUE, MAKING USE OF HIGH THROUGHPUT TECHNOLOGIES AS DESCRIBED BY CMS-2020-01-R: HCPCS | Performed by: INTERNAL MEDICINE

## 2021-06-25 PROCEDURE — 94660 CPAP INITIATION&MGMT: CPT

## 2021-06-25 PROCEDURE — 84484 ASSAY OF TROPONIN QUANT: CPT | Performed by: EMERGENCY MEDICINE

## 2021-06-25 PROCEDURE — 93005 ELECTROCARDIOGRAM TRACING: CPT

## 2021-06-25 PROCEDURE — 25010000002 FUROSEMIDE PER 20 MG: Performed by: INTERNAL MEDICINE

## 2021-06-25 PROCEDURE — 96372 THER/PROPH/DIAG INJ SC/IM: CPT

## 2021-06-25 PROCEDURE — 93005 ELECTROCARDIOGRAM TRACING: CPT | Performed by: INTERNAL MEDICINE

## 2021-06-25 PROCEDURE — 63710000001 INSULIN DETEMIR PER 5 UNITS: Performed by: INTERNAL MEDICINE

## 2021-06-25 PROCEDURE — 93306 TTE W/DOPPLER COMPLETE: CPT

## 2021-06-25 PROCEDURE — 99220 PR INITIAL OBSERVATION CARE/DAY 70 MINUTES: CPT | Performed by: INTERNAL MEDICINE

## 2021-06-25 PROCEDURE — 85025 COMPLETE CBC W/AUTO DIFF WBC: CPT

## 2021-06-25 PROCEDURE — 83880 ASSAY OF NATRIURETIC PEPTIDE: CPT

## 2021-06-25 RX ORDER — BISACODYL 10 MG
10 SUPPOSITORY, RECTAL RECTAL DAILY PRN
Status: DISCONTINUED | OUTPATIENT
Start: 2021-06-25 | End: 2021-06-27 | Stop reason: HOSPADM

## 2021-06-25 RX ORDER — NYSTATIN 100000 U/G
1 CREAM TOPICAL DAILY PRN
COMMUNITY

## 2021-06-25 RX ORDER — DEXTROSE MONOHYDRATE 25 G/50ML
25 INJECTION, SOLUTION INTRAVENOUS
Status: DISCONTINUED | OUTPATIENT
Start: 2021-06-25 | End: 2021-06-27 | Stop reason: HOSPADM

## 2021-06-25 RX ORDER — FUROSEMIDE 10 MG/ML
40 INJECTION INTRAMUSCULAR; INTRAVENOUS ONCE
Status: COMPLETED | OUTPATIENT
Start: 2021-06-25 | End: 2021-06-25

## 2021-06-25 RX ORDER — POLYETHYLENE GLYCOL 3350 17 G/17G
17 POWDER, FOR SOLUTION ORAL DAILY PRN
Status: DISCONTINUED | OUTPATIENT
Start: 2021-06-25 | End: 2021-06-27 | Stop reason: HOSPADM

## 2021-06-25 RX ORDER — ACETAMINOPHEN 500 MG
500 TABLET ORAL EVERY OTHER DAY
COMMUNITY

## 2021-06-25 RX ORDER — ASPIRIN 81 MG/1
81 TABLET ORAL DAILY
Status: DISCONTINUED | OUTPATIENT
Start: 2021-06-26 | End: 2021-06-27 | Stop reason: HOSPADM

## 2021-06-25 RX ORDER — FLUTICASONE PROPIONATE 50 MCG
2 SPRAY, SUSPENSION (ML) NASAL DAILY
COMMUNITY

## 2021-06-25 RX ORDER — CITALOPRAM 40 MG/1
40 TABLET ORAL EVERY MORNING
COMMUNITY

## 2021-06-25 RX ORDER — ALBUTEROL SULFATE 90 UG/1
2 AEROSOL, METERED RESPIRATORY (INHALATION) EVERY 4 HOURS PRN
COMMUNITY

## 2021-06-25 RX ORDER — SODIUM CHLORIDE 0.9 % (FLUSH) 0.9 %
10 SYRINGE (ML) INJECTION EVERY 12 HOURS SCHEDULED
Status: DISCONTINUED | OUTPATIENT
Start: 2021-06-25 | End: 2021-06-27 | Stop reason: HOSPADM

## 2021-06-25 RX ORDER — GUAIFENESIN AND DEXTROMETHORPHAN HYDROBROMIDE 600; 30 MG/1; MG/1
1 TABLET, EXTENDED RELEASE ORAL 2 TIMES DAILY PRN
COMMUNITY

## 2021-06-25 RX ORDER — HEPARIN SODIUM 1000 [USP'U]/ML
60 INJECTION, SOLUTION INTRAVENOUS; SUBCUTANEOUS AS NEEDED
Status: DISCONTINUED | OUTPATIENT
Start: 2021-06-25 | End: 2021-06-25

## 2021-06-25 RX ORDER — NITROGLYCERIN 0.1MG/HR
1 PATCH, TRANSDERMAL 24 HOURS TRANSDERMAL DAILY
Status: DISCONTINUED | OUTPATIENT
Start: 2021-06-25 | End: 2021-06-27 | Stop reason: HOSPADM

## 2021-06-25 RX ORDER — NICOTINE POLACRILEX 4 MG
15 LOZENGE BUCCAL
Status: DISCONTINUED | OUTPATIENT
Start: 2021-06-25 | End: 2021-06-27 | Stop reason: HOSPADM

## 2021-06-25 RX ORDER — FUROSEMIDE 20 MG/1
20 TABLET ORAL DAILY PRN
COMMUNITY

## 2021-06-25 RX ORDER — MORPHINE SULFATE 2 MG/ML
2 INJECTION, SOLUTION INTRAMUSCULAR; INTRAVENOUS
Status: DISCONTINUED | OUTPATIENT
Start: 2021-06-25 | End: 2021-06-27 | Stop reason: HOSPADM

## 2021-06-25 RX ORDER — ASPIRIN 81 MG/1
324 TABLET, CHEWABLE ORAL ONCE
Status: DISCONTINUED | OUTPATIENT
Start: 2021-06-25 | End: 2021-06-26

## 2021-06-25 RX ORDER — SODIUM CHLORIDE 0.9 % (FLUSH) 0.9 %
10 SYRINGE (ML) INJECTION AS NEEDED
Status: DISCONTINUED | OUTPATIENT
Start: 2021-06-25 | End: 2021-06-27 | Stop reason: HOSPADM

## 2021-06-25 RX ORDER — ATORVASTATIN CALCIUM 20 MG/1
20 TABLET, FILM COATED ORAL DAILY
COMMUNITY

## 2021-06-25 RX ORDER — BISACODYL 5 MG/1
5 TABLET, DELAYED RELEASE ORAL DAILY PRN
Status: DISCONTINUED | OUTPATIENT
Start: 2021-06-25 | End: 2021-06-27 | Stop reason: HOSPADM

## 2021-06-25 RX ORDER — NITROGLYCERIN 0.4 MG/1
0.4 TABLET SUBLINGUAL
Status: DISCONTINUED | OUTPATIENT
Start: 2021-06-25 | End: 2021-06-27 | Stop reason: HOSPADM

## 2021-06-25 RX ORDER — LOPERAMIDE HYDROCHLORIDE 2 MG/1
2 CAPSULE ORAL 3 TIMES DAILY PRN
Status: DISCONTINUED | OUTPATIENT
Start: 2021-06-25 | End: 2021-06-27 | Stop reason: HOSPADM

## 2021-06-25 RX ORDER — NALOXONE HCL 0.4 MG/ML
0.4 VIAL (ML) INJECTION
Status: DISCONTINUED | OUTPATIENT
Start: 2021-06-25 | End: 2021-06-27 | Stop reason: HOSPADM

## 2021-06-25 RX ORDER — HEPARIN SODIUM 10000 [USP'U]/100ML
9 INJECTION, SOLUTION INTRAVENOUS
Status: DISCONTINUED | OUTPATIENT
Start: 2021-06-25 | End: 2021-06-25

## 2021-06-25 RX ORDER — ACETAMINOPHEN 650 MG/1
650 SUPPOSITORY RECTAL EVERY 4 HOURS PRN
Status: DISCONTINUED | OUTPATIENT
Start: 2021-06-25 | End: 2021-06-27 | Stop reason: HOSPADM

## 2021-06-25 RX ORDER — ONDANSETRON 2 MG/ML
4 INJECTION INTRAMUSCULAR; INTRAVENOUS EVERY 6 HOURS PRN
Status: DISCONTINUED | OUTPATIENT
Start: 2021-06-25 | End: 2021-06-27 | Stop reason: HOSPADM

## 2021-06-25 RX ORDER — HEPARIN SODIUM 1000 [USP'U]/ML
30 INJECTION, SOLUTION INTRAVENOUS; SUBCUTANEOUS AS NEEDED
Status: DISCONTINUED | OUTPATIENT
Start: 2021-06-25 | End: 2021-06-25

## 2021-06-25 RX ORDER — AMOXICILLIN 250 MG
2 CAPSULE ORAL 2 TIMES DAILY
Status: DISCONTINUED | OUTPATIENT
Start: 2021-06-25 | End: 2021-06-27 | Stop reason: HOSPADM

## 2021-06-25 RX ORDER — ACETAMINOPHEN 325 MG/1
650 TABLET ORAL EVERY 4 HOURS PRN
Status: DISCONTINUED | OUTPATIENT
Start: 2021-06-25 | End: 2021-06-27 | Stop reason: HOSPADM

## 2021-06-25 RX ORDER — ACETAMINOPHEN 160 MG/5ML
650 SOLUTION ORAL EVERY 4 HOURS PRN
Status: DISCONTINUED | OUTPATIENT
Start: 2021-06-25 | End: 2021-06-27 | Stop reason: HOSPADM

## 2021-06-25 RX ORDER — ONDANSETRON 4 MG/1
4 TABLET, FILM COATED ORAL EVERY 6 HOURS PRN
Status: DISCONTINUED | OUTPATIENT
Start: 2021-06-25 | End: 2021-06-27 | Stop reason: HOSPADM

## 2021-06-25 RX ORDER — ESCITALOPRAM OXALATE 10 MG/1
20 TABLET ORAL DAILY
Status: DISCONTINUED | OUTPATIENT
Start: 2021-06-26 | End: 2021-06-27 | Stop reason: HOSPADM

## 2021-06-25 RX ORDER — VERAPAMIL HYDROCHLORIDE 240 MG/1
240 TABLET, FILM COATED, EXTENDED RELEASE ORAL NIGHTLY
Status: DISCONTINUED | OUTPATIENT
Start: 2021-06-25 | End: 2021-06-27 | Stop reason: HOSPADM

## 2021-06-25 RX ORDER — LISINOPRIL 20 MG/1
40 TABLET ORAL DAILY
COMMUNITY
End: 2023-03-06 | Stop reason: HOSPADM

## 2021-06-25 RX ORDER — HEPARIN SODIUM 1000 [USP'U]/ML
4000 INJECTION, SOLUTION INTRAVENOUS; SUBCUTANEOUS ONCE
Status: DISCONTINUED | OUTPATIENT
Start: 2021-06-25 | End: 2021-06-25

## 2021-06-25 RX ADMIN — VERAPAMIL HYDROCHLORIDE 240 MG: 240 TABLET, FILM COATED, EXTENDED RELEASE ORAL at 21:04

## 2021-06-25 RX ADMIN — SODIUM CHLORIDE, PRESERVATIVE FREE 10 ML: 5 INJECTION INTRAVENOUS at 21:13

## 2021-06-25 RX ADMIN — INSULIN DETEMIR 25 UNITS: 100 INJECTION, SOLUTION SUBCUTANEOUS at 21:06

## 2021-06-25 RX ADMIN — NITROGLYCERIN 1 PATCH: 0.1 PATCH TRANSDERMAL at 18:45

## 2021-06-25 RX ADMIN — FUROSEMIDE 40 MG: 10 INJECTION INTRAMUSCULAR; INTRAVENOUS at 15:51

## 2021-06-25 RX ADMIN — SULFUR HEXAFLUORIDE 4 ML: KIT at 18:13

## 2021-06-25 RX ADMIN — LOPERAMIDE HYDROCHLORIDE 2 MG: 2 CAPSULE ORAL at 21:13

## 2021-06-25 RX ADMIN — ENOXAPARIN SODIUM 40 MG: 40 INJECTION SUBCUTANEOUS at 18:45

## 2021-06-26 LAB
BH CV ECHO MEAS - AO MAX PG (FULL): 2.6 MMHG
BH CV ECHO MEAS - AO MAX PG: 7 MMHG
BH CV ECHO MEAS - AO MEAN PG (FULL): 1.9 MMHG
BH CV ECHO MEAS - AO MEAN PG: 4 MMHG
BH CV ECHO MEAS - AO ROOT AREA (BSA CORRECTED): 1.6
BH CV ECHO MEAS - AO ROOT AREA: 7.6 CM^2
BH CV ECHO MEAS - AO ROOT DIAM: 3.1 CM
BH CV ECHO MEAS - AO V2 MAX: 127.6 CM/SEC
BH CV ECHO MEAS - AO V2 MEAN: 93.5 CM/SEC
BH CV ECHO MEAS - AO V2 VTI: 25.7 CM
BH CV ECHO MEAS - ASC AORTA: 3.2 CM
BH CV ECHO MEAS - AVA(I,A): 2.4 CM^2
BH CV ECHO MEAS - AVA(I,D): 2.4 CM^2
BH CV ECHO MEAS - AVA(V,A): 2.2 CM^2
BH CV ECHO MEAS - AVA(V,D): 2.2 CM^2
BH CV ECHO MEAS - BSA(HAYCOCK): 2.2 M^2
BH CV ECHO MEAS - BSA: 2 M^2
BH CV ECHO MEAS - BZI_BMI: 56 KILOGRAMS/M^2
BH CV ECHO MEAS - BZI_METRIC_HEIGHT: 142.2 CM
BH CV ECHO MEAS - BZI_METRIC_WEIGHT: 113.4 KG
BH CV ECHO MEAS - EDV(CUBED): 31.1 ML
BH CV ECHO MEAS - EDV(TEICH): 39.3 ML
BH CV ECHO MEAS - EF(CUBED): 63.2 %
BH CV ECHO MEAS - EF(TEICH): 56.2 %
BH CV ECHO MEAS - ESV(CUBED): 11.5 ML
BH CV ECHO MEAS - ESV(TEICH): 17.2 ML
BH CV ECHO MEAS - FS: 28.4 %
BH CV ECHO MEAS - IVS/LVPW: 1.1
BH CV ECHO MEAS - IVSD: 1.1 CM
BH CV ECHO MEAS - LA DIMENSION: 3.4 CM
BH CV ECHO MEAS - LA/AO: 1.1
BH CV ECHO MEAS - LAT PEAK E' VEL: 4.8 CM/SEC
BH CV ECHO MEAS - LATERAL E/E' RATIO: 16.1
BH CV ECHO MEAS - LV MASS(C)D: 97.7 GRAMS
BH CV ECHO MEAS - LV MASS(C)DI: 50.1 GRAMS/M^2
BH CV ECHO MEAS - LV MAX PG: 4.4 MMHG
BH CV ECHO MEAS - LV MEAN PG: 2.1 MMHG
BH CV ECHO MEAS - LV V1 MAX: 104.8 CM/SEC
BH CV ECHO MEAS - LV V1 MEAN: 64.7 CM/SEC
BH CV ECHO MEAS - LV V1 VTI: 22.8 CM
BH CV ECHO MEAS - LVIDD: 3.1 CM
BH CV ECHO MEAS - LVIDS: 2.3 CM
BH CV ECHO MEAS - LVOT AREA (M): 2.8 CM^2
BH CV ECHO MEAS - LVOT AREA: 2.7 CM^2
BH CV ECHO MEAS - LVOT DIAM: 1.9 CM
BH CV ECHO MEAS - LVPWD: 1 CM
BH CV ECHO MEAS - MED PEAK E' VEL: 4.9 CM/SEC
BH CV ECHO MEAS - MEDIAL E/E' RATIO: 15.7
BH CV ECHO MEAS - MV A MAX VEL: 116.4 CM/SEC
BH CV ECHO MEAS - MV DEC SLOPE: 326.9 CM/SEC^2
BH CV ECHO MEAS - MV DEC TIME: 0.24 SEC
BH CV ECHO MEAS - MV E MAX VEL: 77.1 CM/SEC
BH CV ECHO MEAS - MV E/A: 0.66
BH CV ECHO MEAS - PA ACC TIME: 0.16 SEC
BH CV ECHO MEAS - PA PR(ACCEL): 6.1 MMHG
BH CV ECHO MEAS - SI(AO): 99.6 ML/M^2
BH CV ECHO MEAS - SI(CUBED): 10.1 ML/M^2
BH CV ECHO MEAS - SI(LVOT): 31.5 ML/M^2
BH CV ECHO MEAS - SI(TEICH): 11.3 ML/M^2
BH CV ECHO MEAS - SV(AO): 194.5 ML
BH CV ECHO MEAS - SV(CUBED): 19.7 ML
BH CV ECHO MEAS - SV(LVOT): 61.5 ML
BH CV ECHO MEAS - SV(TEICH): 22.1 ML
BH CV ECHO MEASUREMENTS AVERAGE E/E' RATIO: 15.9
BH CV VAS BP LEFT ARM: NORMAL MMHG
BH CV XLRA - RV BASE: 3.9 CM
BH CV XLRA - RV LENGTH: 6.7 CM
BH CV XLRA - RV MID: 3.5 CM
BH CV XLRA - TDI S': 15 CM/SEC
GLUCOSE BLDC GLUCOMTR-MCNC: 146 MG/DL (ref 70–130)
GLUCOSE BLDC GLUCOMTR-MCNC: 177 MG/DL (ref 70–130)
GLUCOSE BLDC GLUCOMTR-MCNC: 189 MG/DL (ref 70–130)
GLUCOSE BLDC GLUCOMTR-MCNC: 251 MG/DL (ref 70–130)
LV EF 2D ECHO EST: 70 %

## 2021-06-26 PROCEDURE — 25010000002 FUROSEMIDE PER 20 MG: Performed by: INTERNAL MEDICINE

## 2021-06-26 PROCEDURE — 96376 TX/PRO/DX INJ SAME DRUG ADON: CPT

## 2021-06-26 PROCEDURE — 97530 THERAPEUTIC ACTIVITIES: CPT

## 2021-06-26 PROCEDURE — 97161 PT EVAL LOW COMPLEX 20 MIN: CPT

## 2021-06-26 PROCEDURE — 94660 CPAP INITIATION&MGMT: CPT

## 2021-06-26 PROCEDURE — 25010000002 ENOXAPARIN PER 10 MG: Performed by: INTERNAL MEDICINE

## 2021-06-26 PROCEDURE — 63710000001 INSULIN LISPRO (HUMAN) PER 5 UNITS: Performed by: INTERNAL MEDICINE

## 2021-06-26 PROCEDURE — 94799 UNLISTED PULMONARY SVC/PX: CPT

## 2021-06-26 PROCEDURE — 82962 GLUCOSE BLOOD TEST: CPT

## 2021-06-26 PROCEDURE — G0378 HOSPITAL OBSERVATION PER HR: HCPCS

## 2021-06-26 PROCEDURE — 63710000001 INSULIN DETEMIR PER 5 UNITS: Performed by: INTERNAL MEDICINE

## 2021-06-26 PROCEDURE — 96372 THER/PROPH/DIAG INJ SC/IM: CPT

## 2021-06-26 PROCEDURE — 99226 PR SBSQ OBSERVATION CARE/DAY 35 MINUTES: CPT | Performed by: INTERNAL MEDICINE

## 2021-06-26 RX ORDER — FUROSEMIDE 10 MG/ML
40 INJECTION INTRAMUSCULAR; INTRAVENOUS ONCE
Status: COMPLETED | OUTPATIENT
Start: 2021-06-26 | End: 2021-06-26

## 2021-06-26 RX ADMIN — INSULIN LISPRO 2 UNITS: 100 INJECTION, SOLUTION INTRAVENOUS; SUBCUTANEOUS at 12:15

## 2021-06-26 RX ADMIN — NITROGLYCERIN 1 PATCH: 0.1 PATCH TRANSDERMAL at 10:19

## 2021-06-26 RX ADMIN — INSULIN DETEMIR 25 UNITS: 100 INJECTION, SOLUTION SUBCUTANEOUS at 20:40

## 2021-06-26 RX ADMIN — ASPIRIN 81 MG: 81 TABLET, COATED ORAL at 10:22

## 2021-06-26 RX ADMIN — ENOXAPARIN SODIUM 40 MG: 40 INJECTION SUBCUTANEOUS at 10:24

## 2021-06-26 RX ADMIN — ACETAMINOPHEN 650 MG: 325 TABLET ORAL at 00:07

## 2021-06-26 RX ADMIN — INSULIN LISPRO 2 UNITS: 100 INJECTION, SOLUTION INTRAVENOUS; SUBCUTANEOUS at 18:01

## 2021-06-26 RX ADMIN — ENOXAPARIN SODIUM 40 MG: 40 INJECTION SUBCUTANEOUS at 20:33

## 2021-06-26 RX ADMIN — VERAPAMIL HYDROCHLORIDE 240 MG: 240 TABLET, FILM COATED, EXTENDED RELEASE ORAL at 20:33

## 2021-06-26 RX ADMIN — SODIUM CHLORIDE, PRESERVATIVE FREE 10 ML: 5 INJECTION INTRAVENOUS at 10:24

## 2021-06-26 RX ADMIN — ESCITALOPRAM OXALATE 20 MG: 10 TABLET ORAL at 10:22

## 2021-06-26 RX ADMIN — FUROSEMIDE 40 MG: 10 INJECTION INTRAMUSCULAR; INTRAVENOUS at 14:11

## 2021-06-26 RX ADMIN — ACETAMINOPHEN 650 MG: 325 TABLET ORAL at 10:22

## 2021-06-27 VITALS
SYSTOLIC BLOOD PRESSURE: 139 MMHG | HEIGHT: 56 IN | RESPIRATION RATE: 18 BRPM | OXYGEN SATURATION: 95 % | BODY MASS INDEX: 56.24 KG/M2 | WEIGHT: 250 LBS | DIASTOLIC BLOOD PRESSURE: 74 MMHG | TEMPERATURE: 98.8 F | HEART RATE: 70 BPM

## 2021-06-27 LAB
ANION GAP SERPL CALCULATED.3IONS-SCNC: 7 MMOL/L (ref 5–15)
BUN SERPL-MCNC: 26 MG/DL (ref 8–23)
BUN/CREAT SERPL: 24.8 (ref 7–25)
CALCIUM SPEC-SCNC: 8.7 MG/DL (ref 8.6–10.5)
CHLORIDE SERPL-SCNC: 97 MMOL/L (ref 98–107)
CO2 SERPL-SCNC: 30 MMOL/L (ref 22–29)
CREAT SERPL-MCNC: 1.05 MG/DL (ref 0.57–1)
GFR SERPL CREATININE-BSD FRML MDRD: 51 ML/MIN/1.73
GLUCOSE BLDC GLUCOMTR-MCNC: 153 MG/DL (ref 70–130)
GLUCOSE BLDC GLUCOMTR-MCNC: 254 MG/DL (ref 70–130)
GLUCOSE SERPL-MCNC: 179 MG/DL (ref 65–99)
MAGNESIUM SERPL-MCNC: 1.5 MG/DL (ref 1.6–2.4)
PHOSPHATE SERPL-MCNC: 4.4 MG/DL (ref 2.5–4.5)
POTASSIUM SERPL-SCNC: 4.3 MMOL/L (ref 3.5–5.2)
SODIUM SERPL-SCNC: 134 MMOL/L (ref 136–145)

## 2021-06-27 PROCEDURE — 63710000001 INSULIN LISPRO (HUMAN) PER 5 UNITS: Performed by: INTERNAL MEDICINE

## 2021-06-27 PROCEDURE — G0378 HOSPITAL OBSERVATION PER HR: HCPCS

## 2021-06-27 PROCEDURE — 97166 OT EVAL MOD COMPLEX 45 MIN: CPT

## 2021-06-27 PROCEDURE — 83735 ASSAY OF MAGNESIUM: CPT | Performed by: INTERNAL MEDICINE

## 2021-06-27 PROCEDURE — 94799 UNLISTED PULMONARY SVC/PX: CPT

## 2021-06-27 PROCEDURE — 97535 SELF CARE MNGMENT TRAINING: CPT

## 2021-06-27 PROCEDURE — 25010000002 MAGNESIUM SULFATE 2 GM/50ML SOLUTION: Performed by: NURSE PRACTITIONER

## 2021-06-27 PROCEDURE — 96372 THER/PROPH/DIAG INJ SC/IM: CPT

## 2021-06-27 PROCEDURE — 94660 CPAP INITIATION&MGMT: CPT

## 2021-06-27 PROCEDURE — 82962 GLUCOSE BLOOD TEST: CPT

## 2021-06-27 PROCEDURE — 80048 BASIC METABOLIC PNL TOTAL CA: CPT | Performed by: INTERNAL MEDICINE

## 2021-06-27 PROCEDURE — 99217 PR OBSERVATION CARE DISCHARGE MANAGEMENT: CPT | Performed by: NURSE PRACTITIONER

## 2021-06-27 PROCEDURE — 84100 ASSAY OF PHOSPHORUS: CPT | Performed by: INTERNAL MEDICINE

## 2021-06-27 PROCEDURE — 25010000002 ENOXAPARIN PER 10 MG: Performed by: INTERNAL MEDICINE

## 2021-06-27 RX ORDER — LISINOPRIL 10 MG/1
10 TABLET ORAL DAILY
Status: DISCONTINUED | OUTPATIENT
Start: 2021-06-27 | End: 2021-06-27 | Stop reason: HOSPADM

## 2021-06-27 RX ORDER — MAGNESIUM OXIDE 400 MG/1
400 TABLET ORAL 2 TIMES DAILY
Qty: 4 TABLET | Refills: 0
Start: 2021-06-27 | End: 2021-06-29

## 2021-06-27 RX ORDER — MAGNESIUM SULFATE HEPTAHYDRATE 40 MG/ML
2 INJECTION, SOLUTION INTRAVENOUS AS NEEDED
Status: DISCONTINUED | OUTPATIENT
Start: 2021-06-27 | End: 2021-06-27 | Stop reason: HOSPADM

## 2021-06-27 RX ORDER — MAGNESIUM SULFATE HEPTAHYDRATE 40 MG/ML
4 INJECTION, SOLUTION INTRAVENOUS AS NEEDED
Status: DISCONTINUED | OUTPATIENT
Start: 2021-06-27 | End: 2021-06-27 | Stop reason: HOSPADM

## 2021-06-27 RX ADMIN — SODIUM CHLORIDE, PRESERVATIVE FREE 10 ML: 5 INJECTION INTRAVENOUS at 08:36

## 2021-06-27 RX ADMIN — INSULIN LISPRO 2 UNITS: 100 INJECTION, SOLUTION INTRAVENOUS; SUBCUTANEOUS at 08:40

## 2021-06-27 RX ADMIN — ASPIRIN 81 MG: 81 TABLET, COATED ORAL at 08:35

## 2021-06-27 RX ADMIN — NITROGLYCERIN 1 PATCH: 0.1 PATCH TRANSDERMAL at 10:37

## 2021-06-27 RX ADMIN — ACETAMINOPHEN 650 MG: 325 TABLET ORAL at 00:08

## 2021-06-27 RX ADMIN — ENOXAPARIN SODIUM 40 MG: 40 INJECTION SUBCUTANEOUS at 08:36

## 2021-06-27 RX ADMIN — MAGNESIUM SULFATE HEPTAHYDRATE 2 G: 2 INJECTION, SOLUTION INTRAVENOUS at 08:36

## 2021-06-27 RX ADMIN — ESCITALOPRAM OXALATE 20 MG: 10 TABLET ORAL at 08:36

## 2021-06-27 RX ADMIN — LISINOPRIL 10 MG: 10 TABLET ORAL at 09:54

## 2021-06-28 ENCOUNTER — READMISSION MANAGEMENT (OUTPATIENT)
Dept: CALL CENTER | Facility: HOSPITAL | Age: 76
End: 2021-06-28

## 2021-06-28 NOTE — OUTREACH NOTE
Prep Survey      Responses   [facility] patient discharged from?  [place]   Is LACE score < 7 ?  No   Emergency Room discharge w/ pulse ox?  No   Eligibility  Readm Mgmt   Discharge diagnosis  chest pain  A/Chronic DHF   Does the patient have one of the following disease processes/diagnoses(primary or secondary)?  CHF   Does the patient have Home health ordered?  Yes   What is the [facility] health agency?   [facility]   Is there a DME ordered?  No   Prep survey completed?  Yes          Gaye Butterfield RN

## 2021-06-29 ENCOUNTER — READMISSION MANAGEMENT (OUTPATIENT)
Dept: CALL CENTER | Facility: HOSPITAL | Age: 76
End: 2021-06-29

## 2021-06-29 NOTE — OUTREACH NOTE
CHF Week 1 Survey      Responses   [facility] patient discharged from?  [place]   Does the patient have one of the following disease processes/diagnoses(primary or secondary)?  CHF   CHF Week 1 attempt successful?  Yes   Call start time  1624   Call end time  1630   Discharge diagnosis  chest pain  A/Chronic DHF   Is patient permission given to speak with other caregiver?  Yes   Person spoke with today (if not patient) and relationship  Machelle daughter    Meds reviewed with patient/caregiver?  Yes   Is the patient having any side effects they believe may be caused by any medication additions or changes?  No   Does the patient have all medications ordered at discharge?  Yes   Is the patient taking all medications as directed (includes completed medication regime)?  Yes   Does the patient have a primary care provider?   Yes   Does the patient have an appointment with their PCP within 7 days of discharge?  Greater than 7 days   Comments regarding PCP  Has a followup with Kary Wu on 7/28/2021   What is preventing the patient from scheduling follow up appointments within 7 days of discharge?  Earlier appointment not available   Nursing Interventions  Verified appointment date/time/provider   Has the patient kept scheduled appointments due by today?  N/A   [facility] agency[facility]    Has home health visited the patient within 72 hours of discharge?  Yes   Psychosocial issues?  No   Did the patient receive a copy of their discharge instructions?  Yes   Nursing interventions  Reviewed instructions with patient   What is the patient's perception of their health status since discharge?  Improving   Nursing interventions  Nurse provided patient education   Is the patient able to teach back Heart Failure diet management?  Yes   Is the patient able to teach back Heart Failure Zones?  Yes   Is the patient able to teach back signs and symptoms of worsening condition? (i.e. weight gain,  shortness of air, etc.)  Yes   If the patient is a current smoker, are they able to teach back resources for cessation?  Not a smoker   Is the patient/caregiver able to teach back the hierarchy of who to call/visit for symptoms/problems? PCP, Specialist, Home health nurse, Urgent Care, ED, 911  Yes    CHF Week 1 call completed?  Yes          Paul Caba RN

## 2021-07-01 ENCOUNTER — OFFICE VISIT (OUTPATIENT)
Dept: CARDIOLOGY | Facility: HOSPITAL | Age: 76
End: 2021-07-01

## 2021-07-01 ENCOUNTER — HOSPITAL ENCOUNTER (OUTPATIENT)
Dept: CARDIOLOGY | Facility: HOSPITAL | Age: 76
Discharge: HOME | End: 2021-07-01

## 2021-07-01 VITALS
SYSTOLIC BLOOD PRESSURE: 136 MMHG | BODY MASS INDEX: 54.75 KG/M2 | RESPIRATION RATE: 22 BRPM | HEART RATE: 62 BPM | DIASTOLIC BLOOD PRESSURE: 71 MMHG | HEIGHT: 56 IN | TEMPERATURE: 96.9 F | OXYGEN SATURATION: 98 % | WEIGHT: 243.38 LBS

## 2021-07-01 VITALS — WEIGHT: 243.37 LBS | HEIGHT: 56 IN | BODY MASS INDEX: 54.75 KG/M2

## 2021-07-01 DIAGNOSIS — J96.10 CHRONIC RESPIRATORY FAILURE, UNSPECIFIED WHETHER WITH HYPOXIA OR HYPERCAPNIA (HCC): ICD-10-CM

## 2021-07-01 DIAGNOSIS — E78.5 HYPERLIPIDEMIA, UNSPECIFIED HYPERLIPIDEMIA TYPE: ICD-10-CM

## 2021-07-01 DIAGNOSIS — E66.01 MORBID OBESITY WITH BMI OF 50.0-59.9, ADULT (HCC): ICD-10-CM

## 2021-07-01 DIAGNOSIS — Z79.4 TYPE 2 DIABETES MELLITUS WITH HYPERGLYCEMIA, WITH LONG-TERM CURRENT USE OF INSULIN (HCC): ICD-10-CM

## 2021-07-01 DIAGNOSIS — R07.9 CHEST PAIN, UNSPECIFIED TYPE: ICD-10-CM

## 2021-07-01 DIAGNOSIS — I10 ESSENTIAL HYPERTENSION: ICD-10-CM

## 2021-07-01 DIAGNOSIS — R07.9 CHEST PAIN, UNSPECIFIED TYPE: Primary | ICD-10-CM

## 2021-07-01 DIAGNOSIS — E11.65 TYPE 2 DIABETES MELLITUS WITH HYPERGLYCEMIA, WITH LONG-TERM CURRENT USE OF INSULIN (HCC): ICD-10-CM

## 2021-07-01 DIAGNOSIS — R06.09 DOE (DYSPNEA ON EXERTION): ICD-10-CM

## 2021-07-01 LAB
BH CV STRESS COMMENTS STAGE 1: NORMAL
BH CV STRESS DOSE REGADENOSON STAGE 1: 0.4
BH CV STRESS DURATION MIN STAGE 1: 1
BH CV STRESS DURATION MIN STAGE 2: 1
BH CV STRESS DURATION MIN STAGE 3: 1
BH CV STRESS DURATION MIN STAGE 4: 1
BH CV STRESS DURATION SEC STAGE 1: 0
BH CV STRESS DURATION SEC STAGE 2: 0
BH CV STRESS DURATION SEC STAGE 3: 0
BH CV STRESS DURATION SEC STAGE 4: 0
BH CV STRESS PROTOCOL 1: NORMAL
BH CV STRESS STAGE 1: 1
BH CV STRESS STAGE 2: 2
BH CV STRESS STAGE 3: 3
BH CV STRESS STAGE 4: 4
MAXIMAL PREDICTED HEART RATE: 145 BPM
STRESS TARGET HR: 123 BPM

## 2021-07-01 PROCEDURE — 99214 OFFICE O/P EST MOD 30 MIN: CPT | Performed by: NURSE PRACTITIONER

## 2021-07-02 ENCOUNTER — HOSPITAL ENCOUNTER (OUTPATIENT)
Dept: CARDIOLOGY | Facility: HOSPITAL | Age: 76
Discharge: HOME | End: 2021-07-02

## 2021-07-02 VITALS — WEIGHT: 242.51 LBS | BODY MASS INDEX: 54.55 KG/M2 | HEIGHT: 56 IN

## 2021-07-07 ENCOUNTER — READMISSION MANAGEMENT (OUTPATIENT)
Dept: CALL CENTER | Facility: HOSPITAL | Age: 76
End: 2021-07-07

## 2021-07-07 NOTE — OUTREACH NOTE
CHF Week 2 Survey      Responses   [facility] patient discharged from?  [place]   Does the patient have one of the following disease processes/diagnoses(primary or secondary)?  CHF   Week 2 attempt successful?  No   Unsuccessful attempts  Attempt 1          Nettie Phan RN

## 2021-07-09 ENCOUNTER — READMISSION MANAGEMENT (OUTPATIENT)
Dept: CALL CENTER | Facility: HOSPITAL | Age: 76
End: 2021-07-09

## 2021-07-09 NOTE — OUTREACH NOTE
CHF Week 2 Survey      Responses   [facility] patient discharged from?  [place]   Does the patient have one of the following disease processes/diagnoses(primary or secondary)?  CHF   Week 2 attempt successful?  No   Unsuccessful attempts  Attempt 2          Gypsy Santana RN

## 2021-07-19 ENCOUNTER — READMISSION MANAGEMENT (OUTPATIENT)
Dept: CALL CENTER | Facility: HOSPITAL | Age: 76
End: 2021-07-19

## 2021-07-19 NOTE — OUTREACH NOTE
CHF Week 3 Survey      Responses   [facility] patient discharged from?  [place]   Does the patient have one of the following disease processes/diagnoses(primary or secondary)?  CHF   Week 3 attempt successful?  Yes   Call start time  1331   Call end time  1337   Discharge diagnosis  chest pain  A/Chronic DHF   Is patient permission given to speak with other caregiver?  Yes   List who call center can speak with  Daughter   Person spoke with today (if not patient) and relationship  Machelle daughter    Meds reviewed with patient/caregiver?  Yes   Is the patient taking all medications as directed (includes completed medication regime)?  Yes   Has the patient kept scheduled appointments due by today?  Yes   Comments  Video visit with her PCP   Psychosocial issues?  No   What is the patient's perception of their health status since discharge?  Improving   Nursing interventions  Nurse provided patient education   Is the patient weighing daily?  Yes   Does the patient have scales?  Yes   Daily weight interventions  Education provided on importance of daily weight [4 pounds up--daughter states she will call MD]   Is the patient able to teach back Heart Failure diet management?  Yes   Is the patient able to teach back Heart Failure Zones?  Yes [Pt in yellow zone]   Is the patient able to teach back signs and symptoms of worsening condition? (i.e. weight gain, shortness of air, etc.)  Yes   Is the patient/caregiver able to teach back the hierarchy of who to call/visit for symptoms/problems? PCP, Specialist, Home health nurse, Urgent Care, ED, 911  Yes   Additional teach back comments  Daughter states pt eating candy bars for a snack and has bought alot of pringles. She is trying to keep these out of the house.    CHF Week 3 call completed?  Yes          Jayleen Jc RN

## 2021-07-29 ENCOUNTER — READMISSION MANAGEMENT (OUTPATIENT)
Dept: CALL CENTER | Facility: HOSPITAL | Age: 76
End: 2021-07-29

## 2021-07-29 NOTE — OUTREACH NOTE
CHF Week 4 Survey      Responses   [facility] patient discharged from?  [place]   Does the patient have one of the following disease processes/diagnoses(primary or secondary)?  CHF   Week 4 attempt successful?  Yes   Call start time  1726   Call end time  1730   Discharge diagnosis  chest pain  A/Chronic DHF   Person spoke with today (if not patient) and relationship  daughter, Machelle Sigala reviewed with patient/caregiver?  Yes   Is the patient having any side effects they believe may be caused by any medication additions or changes?  No   Is the patient taking all medications as directed (includes completed medication regime)?  Yes   Has the patient kept scheduled appointments due by today?  Yes   Is the patient still receiving Home Health Services?  No   Pulse Ox monitoring  None   Psychosocial issues?  No   What is the patient's perception of their health status since discharge?  Improving   Nursing interventions  Nurse provided patient education   Is the patient weighing daily?  Yes   Does the patient have scales?  Yes   Additional teach back comments  pt free of SOB, edema   Week 4 Call Completed?  Yes   Would the patient like one additional call?  No   Graduated  Yes   Is the patient interested in additional calls from an ambulatory ?  NOTE:  applies to high risk patients requiring additional follow-up.  No   Did the patient feel the follow up calls were helpful during their recovery period?  Yes   Was the number of calls appropriate?  Yes          Cari Solorio RN

## 2021-10-17 ENCOUNTER — HOSPITAL ENCOUNTER (EMERGENCY)
Facility: HOSPITAL | Age: 76
Discharge: HOME | End: 2021-10-17
Attending: EMERGENCY MEDICINE | Admitting: EMERGENCY MEDICINE

## 2021-10-17 VITALS
TEMPERATURE: 98.9 F | HEART RATE: 82 BPM | HEIGHT: 56 IN | SYSTOLIC BLOOD PRESSURE: 136 MMHG | BODY MASS INDEX: 54.89 KG/M2 | DIASTOLIC BLOOD PRESSURE: 66 MMHG | RESPIRATION RATE: 21 BRPM | WEIGHT: 244 LBS | OXYGEN SATURATION: 98 %

## 2021-10-17 DIAGNOSIS — T85.848A DENTAL IMPLANT PAIN, INITIAL ENCOUNTER: Primary | ICD-10-CM

## 2021-10-17 DIAGNOSIS — K04.7 DENTAL ABSCESS: ICD-10-CM

## 2021-10-17 LAB — GLUCOSE BLDC GLUCOMTR-MCNC: 128 MG/DL (ref 70–130)

## 2021-10-17 PROCEDURE — 99283 EMERGENCY DEPT VISIT LOW MDM: CPT

## 2021-10-17 PROCEDURE — 82962 GLUCOSE BLOOD TEST: CPT

## 2021-10-17 RX ORDER — DIPHENHYDRAMINE HCL 12.5MG/5ML
25 LIQUID (ML) ORAL ONCE
Status: COMPLETED | OUTPATIENT
Start: 2021-10-17 | End: 2021-10-17

## 2021-10-17 RX ORDER — LIDOCAINE HYDROCHLORIDE 20 MG/ML
10 SOLUTION OROPHARYNGEAL
Status: DISCONTINUED | OUTPATIENT
Start: 2021-10-17 | End: 2021-10-18 | Stop reason: HOSPADM

## 2021-10-17 RX ADMIN — LIDOCAINE HYDROCHLORIDE 10 ML: 20 SOLUTION ORAL; TOPICAL at 21:46

## 2021-10-17 RX ADMIN — DIPHENHYDRAMINE HYDROCHLORIDE 25 MG: 25 SOLUTION ORAL at 21:46

## 2021-10-18 NOTE — DISCHARGE INSTRUCTIONS
Symptomatic care is recommended. Take all medications as prescribed and instructed. Follow up with your primary care and dentist as directed or return to Emergency Department with worsening of symptoms.

## 2021-10-18 NOTE — ED PROVIDER NOTES
Subjective   Patient is a 76 year old female who present to the ED today with complaints of dental pain. Patient shares that she has posts for her dental implants and the post at her left lower gum line is painful. Patient was seen and evaluated earlier in the day today at the [facility] walk-in clinic where she was diagnosed with a dental abscess and initiated on antibiotics with instructions for close dental follow up. Patient reports she has had one dose of antibiotics but continues to be in pain. She shares nothing specific that makes her pain better or worse. She reports frustration as it is painful to wear her implants. No additional associated symptoms on exam.           Review of Systems   Constitutional: Negative.    HENT: Positive for dental problem.    Eyes: Negative.    Respiratory: Negative.    Cardiovascular: Negative.    Gastrointestinal: Negative.    Musculoskeletal: Negative.        Past Medical History:   Diagnosis Date   • Asthma    • Cancer (CMS/HCC)     BILATERAL BREAST    • COPD (chronic obstructive pulmonary disease) (CMS/HCC)    • Diabetes mellitus (CMS/HCC)    • Heart failure (CMS/HCC)    • Hypertension    • Pulmonary hypertension (CMS/HCC)        Allergies   Allergen Reactions   • Contrast Dye Seizure   • Penicillins Rash       Past Surgical History:   Procedure Laterality Date   • BREAST SURGERY     • CHOLECYSTECTOMY     • HYSTERECTOMY     • JOINT REPLACEMENT      RIGHT KNEE   • KELOID EXCISION         Family History   Problem Relation Age of Onset   • No Known Problems Mother    • No Known Problems Father    • No Known Problems Sister    • No Known Problems Maternal Grandmother    • No Known Problems Maternal Grandfather    • Cancer Paternal Grandmother    • No Known Problems Paternal Grandfather        Social History     Socioeconomic History   • Marital status:    Tobacco Use   • Smoking status: Never Smoker   • Smokeless tobacco: Never Used   Substance and Sexual Activity    • Alcohol use: Yes     Comment: SOCIALLY   • Drug use: Never   • Sexual activity: Defer           Objective   Physical Exam  Vitals and nursing note reviewed.   Constitutional:       General: She is not in acute distress.     Appearance: Normal appearance. She is not ill-appearing or toxic-appearing.   HENT:      Head: Normocephalic and atraumatic.      Nose: Nose normal.      Mouth/Throat:      Mouth: Mucous membranes are moist.      Dentition: Dental abscesses present.     Eyes:      Extraocular Movements: Extraocular movements intact.      Conjunctiva/sclera: Conjunctivae normal.   Cardiovascular:      Rate and Rhythm: Normal rate.   Pulmonary:      Effort: Pulmonary effort is normal. No respiratory distress.   Musculoskeletal:         General: Normal range of motion.      Cervical back: Normal range of motion.   Skin:     General: Skin is warm and dry.   Neurological:      General: No focal deficit present.      Mental Status: She is alert.   Psychiatric:         Mood and Affect: Mood normal.         Behavior: Behavior normal.         Thought Content: Thought content normal.         Judgment: Judgment normal.         Procedures           ED Course  ED Course as of 10/18/21 1357   Sun Oct 17, 2021   2216 POC blood glucose of 128.  Evaluated patient at bedside who demonstrated significant improvement in her dental pain following administration of tooth balls. [JG]   Mon Oct 18, 2021   1352 Patient presents to ED for evaluation of dental pain at site of post for dental implant. Evidence suspicious of dental abscess on physical exam. Patient already prescribed chlorhexidine rinse and antibiotic and has outpatient appointment with dentistry tomorrow. Requesting relief of pain in ED. Lidocaine tooth balls made with 1 application in ED with patient experiencing symptomatic improvement. Patient discharged home with remaining tooth balls and instructed on use and need to continue plan of care with antiseptic rinse,  antibiotics and follow up tomorrow with dentist.  Patient is afebrile, nontoxic appearing, vital signs stable and able to maintain O2 sats of 98% on baseline O2. Patient will be discharged home with symptomatic care and outpatient follow up to their primary care provider as recommended and dentistry as scheduled. Patient and family at bedside are agreeable to plan of care of outpatient follow up, provided clear return precautions and demonstrated understanding. [JG]      ED Course User Index  [JG] Marcus Quintana PA                                           MDM  Number of Diagnoses or Management Options  Dental abscess: established and worsening  Dental implant pain, initial encounter: established and worsening  Risk of Complications, Morbidity, and/or Mortality  Presenting problems: low  Diagnostic procedures: low  Management options: low    Patient Progress  Patient progress: improved      Final diagnoses:   Dental implant pain, initial encounter   Dental abscess       ED Disposition  ED Disposition     ED Disposition Condition Comment    Discharge Stable           Kary Wu MD  [address] [place], [place] [id number] [phone]    Call   As needed    Your Dentist as scheduled TOMORROW          [facility] Emergency Department  [address] [facility] [address] [phone]  Go to   If symptoms worsen         Medication List      No changes were made to your prescriptions during this visit.          Marcus Quintana PA  10/18/21 2410

## 2022-08-11 ENCOUNTER — HOSPITAL ENCOUNTER (EMERGENCY)
Facility: HOSPITAL | Age: 77
Discharge: HOME | End: 2022-08-11
Attending: EMERGENCY MEDICINE | Admitting: EMERGENCY MEDICINE

## 2022-08-11 ENCOUNTER — APPOINTMENT (OUTPATIENT)
Dept: GENERAL RADIOLOGY | Facility: HOSPITAL | Age: 77
End: 2022-08-11

## 2022-08-11 VITALS
WEIGHT: 244 LBS | OXYGEN SATURATION: 96 % | BODY MASS INDEX: 54.89 KG/M2 | HEIGHT: 56 IN | DIASTOLIC BLOOD PRESSURE: 61 MMHG | SYSTOLIC BLOOD PRESSURE: 123 MMHG | HEART RATE: 61 BPM | RESPIRATION RATE: 22 BRPM | TEMPERATURE: 98.4 F

## 2022-08-11 DIAGNOSIS — E11.65 HYPERGLYCEMIA DUE TO DIABETES MELLITUS: ICD-10-CM

## 2022-08-11 DIAGNOSIS — R06.02 SHORTNESS OF BREATH: Primary | ICD-10-CM

## 2022-08-11 DIAGNOSIS — N18.30 STAGE 3 CHRONIC KIDNEY DISEASE, UNSPECIFIED WHETHER STAGE 3A OR 3B CKD: ICD-10-CM

## 2022-08-11 DIAGNOSIS — Z20.822 CLOSE EXPOSURE TO COVID-19 VIRUS: ICD-10-CM

## 2022-08-11 LAB
ALBUMIN SERPL-MCNC: 4 G/DL (ref 3.5–5.2)
ALBUMIN/GLOB SERPL: 1.4 G/DL
ALP SERPL-CCNC: 121 U/L (ref 39–117)
ALT SERPL W P-5'-P-CCNC: 21 U/L (ref 1–33)
ANION GAP SERPL CALCULATED.3IONS-SCNC: 10 MMOL/L (ref 5–15)
AST SERPL-CCNC: 21 U/L (ref 1–32)
BASOPHILS # BLD AUTO: 0.06 10*3/MM3 (ref 0–0.2)
BASOPHILS NFR BLD AUTO: 0.6 % (ref 0–1.5)
BILIRUB SERPL-MCNC: 0.5 MG/DL (ref 0–1.2)
BUN SERPL-MCNC: 20 MG/DL (ref 8–23)
BUN/CREAT SERPL: 16.5 (ref 7–25)
CALCIUM SPEC-SCNC: 8.9 MG/DL (ref 8.6–10.5)
CHLORIDE SERPL-SCNC: 103 MMOL/L (ref 98–107)
CO2 SERPL-SCNC: 28 MMOL/L (ref 22–29)
CREAT SERPL-MCNC: 1.21 MG/DL (ref 0.57–1)
DEPRECATED RDW RBC AUTO: 45 FL (ref 37–54)
EGFRCR SERPLBLD CKD-EPI 2021: 46.5 ML/MIN/1.73
EOSINOPHIL # BLD AUTO: 0.11 10*3/MM3 (ref 0–0.4)
EOSINOPHIL NFR BLD AUTO: 1.1 % (ref 0.3–6.2)
ERYTHROCYTE [DISTWIDTH] IN BLOOD BY AUTOMATED COUNT: 13.5 % (ref 12.3–15.4)
FLUAV RNA RESP QL NAA+PROBE: NOT DETECTED
FLUBV RNA RESP QL NAA+PROBE: NOT DETECTED
GLOBULIN UR ELPH-MCNC: 2.8 GM/DL
GLUCOSE BLDC GLUCOMTR-MCNC: 181 MG/DL (ref 70–130)
GLUCOSE SERPL-MCNC: 230 MG/DL (ref 65–99)
HCT VFR BLD AUTO: 40.8 % (ref 34–46.6)
HGB BLD-MCNC: 12.9 G/DL (ref 12–15.9)
HOLD SPECIMEN: NORMAL
HOLD SPECIMEN: NORMAL
IMM GRANULOCYTES # BLD AUTO: 0.11 10*3/MM3 (ref 0–0.05)
IMM GRANULOCYTES NFR BLD AUTO: 1.1 % (ref 0–0.5)
LYMPHOCYTES # BLD AUTO: 2.04 10*3/MM3 (ref 0.7–3.1)
LYMPHOCYTES NFR BLD AUTO: 20.8 % (ref 19.6–45.3)
MCH RBC QN AUTO: 28.8 PG (ref 26.6–33)
MCHC RBC AUTO-ENTMCNC: 31.6 G/DL (ref 31.5–35.7)
MCV RBC AUTO: 91.1 FL (ref 79–97)
MONOCYTES # BLD AUTO: 0.77 10*3/MM3 (ref 0.1–0.9)
MONOCYTES NFR BLD AUTO: 7.9 % (ref 5–12)
NEUTROPHILS NFR BLD AUTO: 6.7 10*3/MM3 (ref 1.7–7)
NEUTROPHILS NFR BLD AUTO: 68.5 % (ref 42.7–76)
NRBC BLD AUTO-RTO: 0 /100 WBC (ref 0–0.2)
NT-PROBNP SERPL-MCNC: 108.5 PG/ML (ref 0–1800)
PLATELET # BLD AUTO: 218 10*3/MM3 (ref 140–450)
PMV BLD AUTO: 11.6 FL (ref 6–12)
POTASSIUM SERPL-SCNC: 4.9 MMOL/L (ref 3.5–5.2)
PROT SERPL-MCNC: 6.8 G/DL (ref 6–8.5)
RBC # BLD AUTO: 4.48 10*6/MM3 (ref 3.77–5.28)
SARS-COV-2 RNA RESP QL NAA+PROBE: NOT DETECTED
SODIUM SERPL-SCNC: 141 MMOL/L (ref 136–145)
TROPONIN T SERPL-MCNC: <0.01 NG/ML (ref 0–0.03)
WBC NRBC COR # BLD: 9.79 10*3/MM3 (ref 3.4–10.8)
WHOLE BLOOD HOLD COAG: NORMAL
WHOLE BLOOD HOLD SPECIMEN: NORMAL

## 2022-08-11 PROCEDURE — 99284 EMERGENCY DEPT VISIT MOD MDM: CPT

## 2022-08-11 PROCEDURE — 80053 COMPREHEN METABOLIC PANEL: CPT | Performed by: EMERGENCY MEDICINE

## 2022-08-11 PROCEDURE — 87636 SARSCOV2 & INF A&B AMP PRB: CPT | Performed by: EMERGENCY MEDICINE

## 2022-08-11 PROCEDURE — 84484 ASSAY OF TROPONIN QUANT: CPT | Performed by: EMERGENCY MEDICINE

## 2022-08-11 PROCEDURE — 82962 GLUCOSE BLOOD TEST: CPT

## 2022-08-11 PROCEDURE — 85025 COMPLETE CBC W/AUTO DIFF WBC: CPT | Performed by: EMERGENCY MEDICINE

## 2022-08-11 PROCEDURE — 93005 ELECTROCARDIOGRAM TRACING: CPT | Performed by: EMERGENCY MEDICINE

## 2022-08-11 PROCEDURE — 71045 X-RAY EXAM CHEST 1 VIEW: CPT

## 2022-08-11 PROCEDURE — 83880 ASSAY OF NATRIURETIC PEPTIDE: CPT | Performed by: EMERGENCY MEDICINE

## 2022-08-11 PROCEDURE — 36415 COLL VENOUS BLD VENIPUNCTURE: CPT

## 2022-08-11 RX ORDER — SODIUM CHLORIDE 0.9 % (FLUSH) 0.9 %
10 SYRINGE (ML) INJECTION AS NEEDED
Status: DISCONTINUED | OUTPATIENT
Start: 2022-08-11 | End: 2022-08-12 | Stop reason: HOSPADM

## 2022-08-12 LAB — HOLD SPECIMEN: NORMAL

## 2022-08-15 LAB
QT INTERVAL: 404 MS
QTC INTERVAL: 436 MS

## 2022-08-16 NOTE — ED PROVIDER NOTES
[facility]    EMERGENCY DEPARTMENT ENCOUNTER      Pt Name: Masha Chou  MRN: [id number]  YOB: 1945  Date of evaluation: 8/11/2022  Provider: TAPAN Rangel    CHIEF COMPLAINT       Chief Complaint   Patient presents with   • Shortness of Breath         HISTORY OF PRESENT ILLNESS  (Location/Symptom, Timing/Onset, Context/Setting, Quality, Duration, Modifying Factors, Severity.)   Masha Chou is a 77 y.o. female who presents to the emergency department with complaints of a headache and exposure to COVID-19. Patient reports that she lives with her daughter who is positive for COVID-19. She reports that today she was tired and her blood sugar was elevated in addition to experiencing a headache. She took extra insulin to help control her blood sugar. She shares that she is on 2L of oxygen via nasal cannula at baseline and uses more with exertion. Patient is concerned she may have COVID-19 and presents to the ED for further evaluation.       HPI   Nursing notes were reviewed.    REVIEW OF SYSTEMS    (2-9 systems for level 4, 10 or more for level 5)   Review of Systems   Constitutional: Positive for activity change and fatigue. Negative for chills and fever.   Respiratory: Positive for shortness of breath. Negative for cough and chest tightness.    Cardiovascular: Negative.    Gastrointestinal: Negative.    Neurological: Positive for headaches.        All systems reviewed and negative except for those discussed in HPI.   PAST MEDICAL HISTORY     Past Medical History:   Diagnosis Date   • Asthma    • Cancer (CMS/HCC)     BILATERAL BREAST    • COPD (chronic obstructive pulmonary disease) (CMS/HCC)    • Diabetes mellitus (CMS/HCC)    • Heart failure (CMS/HCC)    • Hypertension    • Pulmonary hypertension (CMS/HCC)          SURGICAL HISTORY       Past Surgical History:   Procedure Laterality Date   • BREAST SURGERY     • CHOLECYSTECTOMY     • HYSTERECTOMY     • JOINT REPLACEMENT      RIGHT KNEE   • KELOID  EXCISION           CURRENT MEDICATIONS     No current facility-administered medications for this encounter.    Current Outpatient Medications:   •  acetaminophen (TYLENOL) 500 MG tablet, Take 500 mg by mouth Every Other Day. For arthritic pain, Disp: , Rfl:   •  albuterol sulfate  (90 Base) MCG/ACT inhaler, Inhale 2 puffs Every 4 (Four) Hours As Needed for Wheezing or Shortness of Air., Disp: , Rfl:   •  aspirin 81 MG EC tablet, Take 81 mg by mouth Daily., Disp: , Rfl:   •  atorvastatin (LIPITOR) 20 MG tablet, Take 20 mg by mouth Every Night., Disp: , Rfl:   •  citalopram (CeleXA) 40 MG tablet, Take 40 mg by mouth Every Morning., Disp: , Rfl:   •  fluticasone (FLONASE) 50 MCG/ACT nasal spray, 2 sprays into the nostril(s) as directed by provider Daily., Disp: , Rfl:   •  furosemide (LASIX) 20 MG tablet, Take 20 mg by mouth Daily As Needed., Disp: , Rfl:   •  guaifenesin-dextromethorphan (MUCINEX DM)  MG tablet sustained-release 12 hour tablet, Take 1 tablet by mouth 2 (Two) Times a Day As Needed., Disp: , Rfl:   •  insulin glargine (LANTUS, SEMGLEE) 100 UNIT/ML injection, Inject 40 Units under the skin into the appropriate area as directed Every Night., Disp: , Rfl:   •  insulin lispro (humaLOG) 100 UNIT/ML injection, Inject  under the skin into the appropriate area as directed 3 (Three) Times a Day Before Meals. Sliding scale Carb corrections, Disp: , Rfl:   •  lisinopril (PRINIVIL,ZESTRIL) 20 MG tablet, Take 20 mg by mouth Daily., Disp: , Rfl:   •  loperamide (Imodium A-D) 2 MG tablet, Take 1 tablet by mouth Every 4 (Four) Hours As Needed (Diarrhea)., Disp: , Rfl:   •  magnesium oxide (MAGOX) 400 (241.3 Mg) MG tablet tablet, Take 400 mg by mouth 2 (Two) Times a Day., Disp: , Rfl:   •  nystatin (MYCOSTATIN) 502605 UNIT/GM cream, Apply 1 application topically to the appropriate area as directed Daily As Needed. Apply to skin folds for yeast, Disp: , Rfl:   •  verapamil SR (CALAN-SR) 240 MG CR tablet,  Take 240 mg by mouth Every Night., Disp: , Rfl:     ALLERGIES     Contrast dye and Penicillins    FAMILY HISTORY       Family History   Problem Relation Age of Onset   • No Known Problems Mother    • No Known Problems Father    • No Known Problems Sister    • No Known Problems Maternal Grandmother    • No Known Problems Maternal Grandfather    • Cancer Paternal Grandmother    • No Known Problems Paternal Grandfather           SOCIAL HISTORY       Social History     Socioeconomic History   • Marital status:    Tobacco Use   • Smoking status: Never Smoker   • Smokeless tobacco: Never Used   Substance and Sexual Activity   • Alcohol use: Yes     Comment: SOCIALLY   • Drug use: Never   • Sexual activity: Defer         PHYSICAL EXAM    (up to 7 for level 4, 8 or more for level 5)   Physical Exam  Vitals and nursing note reviewed.   Constitutional:       General: She is not in acute distress.     Appearance: Normal appearance. She is well-developed. She is not ill-appearing or toxic-appearing.   HENT:      Head: Normocephalic and atraumatic.      Nose: Nose normal.      Mouth/Throat:      Mouth: Mucous membranes are moist.   Eyes:      Extraocular Movements: Extraocular movements intact.   Cardiovascular:      Rate and Rhythm: Normal rate and regular rhythm.   Pulmonary:      Effort: Pulmonary effort is normal. No respiratory distress.      Breath sounds: Normal breath sounds.   Abdominal:      General: There is no distension.   Musculoskeletal:         General: Normal range of motion.      Cervical back: Normal range of motion.   Skin:     General: Skin is warm and dry.   Neurological:      General: No focal deficit present.      Mental Status: She is alert.   Psychiatric:         Behavior: Behavior normal.         Thought Content: Thought content normal.         Judgment: Judgment normal.          DIAGNOSTIC RESULTS     EKG: All EKGs are interpreted by the Emergency Department Physician who either signs or  Co-signs this chart in the absence of a cardiologist.    ECG 12 Lead   Final Result   Test Reason : SOA Protocol   Blood Pressure :   */*   mmHG   Vent. Rate :  70 BPM     Atrial Rate :  70 BPM      P-R Int : 180 ms          QRS Dur :  78 ms       QT Int : 404 ms       P-R-T Axes :  50  14  43 degrees      QTc Int : 436 ms      Normal sinus rhythm   Low voltage QRS   Borderline ECG   When compared with ECG of 25-JUN-2021 15:09,   No significant change was found   Confirmed by FAITH NAYLOR MD ([id number]) on 8/15/2022 9:37:37 AM      Referred By: EDMD           Confirmed By: FAITH NAYLOR MD          RADIOLOGY:   Non-plain film images such as CT, Ultrasound and MRI are read by the radiologist. Plain radiographic images are visualized and preliminarily interpreted by the emergency physician with the below findings:      [x] Radiologist's Report Reviewed:  XR Chest 1 View   Final Result   There is dense opacification of the left mid and lower lung fields,   somewhat nonspecific for airspace disease or atelectasis, with probable   moderate left-sided pleural effusion. There is no distinct pneumothorax.   The heart appears enlarged.       This report was finalized on 8/11/2022 10:02 PM by Sd Townsend.                ED BEDSIDE ULTRASOUND:   Performed by ED Physician - none    LABS:    I have reviewed and interpreted all of the currently available lab results from this visit (if applicable):  Results for orders placed or performed during the hospital encounter of 08/11/22   COVID-19 and FLU A/B PCR - Swab, Nasopharynx    Specimen: Nasopharynx; Swab   Result Value Ref Range    COVID19 Not Detected Not Detected - Ref. Range    Influenza A PCR Not Detected Not Detected    Influenza B PCR Not Detected Not Detected   Comprehensive Metabolic Panel    Specimen: Blood   Result Value Ref Range    Glucose 230 (H) 65 - 99 mg/dL    BUN 20 8 - 23 mg/dL    Creatinine 1.21 (H) 0.57 - 1.00 mg/dL    Sodium 141 136 - 145 mmol/L     Potassium 4.9 3.5 - 5.2 mmol/L    Chloride 103 98 - 107 mmol/L    CO2 28.0 22.0 - 29.0 mmol/L    Calcium 8.9 8.6 - 10.5 mg/dL    Total Protein 6.8 6.0 - 8.5 g/dL    Albumin 4.00 3.50 - 5.20 g/dL    ALT (SGPT) 21 1 - 33 U/L    AST (SGOT) 21 1 - 32 U/L    Alkaline Phosphatase 121 (H) 39 - 117 U/L    Total Bilirubin 0.5 0.0 - 1.2 mg/dL    Globulin 2.8 gm/dL    A/G Ratio 1.4 g/dL    BUN/Creatinine Ratio 16.5 7.0 - 25.0    Anion Gap 10.0 5.0 - 15.0 mmol/L    eGFR 46.5 (L) >60.0 mL/min/1.73   BNP    Specimen: Blood   Result Value Ref Range    proBNP 108.5 0.0 - 1,800.0 pg/mL   Troponin    Specimen: Blood   Result Value Ref Range    Troponin T <0.010 0.000 - 0.030 ng/mL   CBC Auto Differential    Specimen: Blood   Result Value Ref Range    WBC 9.79 3.40 - 10.80 10*3/mm3    RBC 4.48 3.77 - 5.28 10*6/mm3    Hemoglobin 12.9 12.0 - 15.9 g/dL    Hematocrit 40.8 34.0 - 46.6 %    MCV 91.1 79.0 - 97.0 fL    MCH 28.8 26.6 - 33.0 pg    MCHC 31.6 31.5 - 35.7 g/dL    RDW 13.5 12.3 - 15.4 %    RDW-SD 45.0 37.0 - 54.0 fl    MPV 11.6 6.0 - 12.0 fL    Platelets 218 140 - 450 10*3/mm3    Neutrophil % 68.5 42.7 - 76.0 %    Lymphocyte % 20.8 19.6 - 45.3 %    Monocyte % 7.9 5.0 - 12.0 %    Eosinophil % 1.1 0.3 - 6.2 %    Basophil % 0.6 0.0 - 1.5 %    Immature Grans % 1.1 (H) 0.0 - 0.5 %    Neutrophils, Absolute 6.70 1.70 - 7.00 10*3/mm3    Lymphocytes, Absolute 2.04 0.70 - 3.10 10*3/mm3    Monocytes, Absolute 0.77 0.10 - 0.90 10*3/mm3    Eosinophils, Absolute 0.11 0.00 - 0.40 10*3/mm3    Basophils, Absolute 0.06 0.00 - 0.20 10*3/mm3    Immature Grans, Absolute 0.11 (H) 0.00 - 0.05 10*3/mm3    nRBC 0.0 0.0 - 0.2 /100 WBC   POC Glucose Once    Specimen: Blood   Result Value Ref Range    Glucose 181 (H) 70 - 130 mg/dL   ECG 12 Lead   Result Value Ref Range    QT Interval 404 ms    QTC Interval 436 ms   Green Top (Gel)   Result Value Ref Range    Extra Tube Hold for add-ons.    Lavender Top   Result Value Ref Range    Extra Tube hold for add-on     Gold Top - SST   Result Value Ref Range    Extra Tube Hold for add-ons.    Gray Top   Result Value Ref Range    Extra Tube Hold for add-ons.    Light Blue Top   Result Value Ref Range    Extra Tube Hold for add-ons.         All other labs were within normal range or not returned as of this dictation.      EMERGENCY DEPARTMENT COURSE and DIFFERENTIAL DIAGNOSIS/MDM:   Vitals:    Vitals:    08/11/22 2242 08/11/22 2251 08/11/22 2300 08/11/22 2306   BP: 123/40  123/61    BP Location:       Patient Position:       Pulse: 63 61     Resp:       Temp:       TempSrc:       SpO2: 95% 95% 96% 96%   Weight:       Height:           ED Course as of 08/16/22 1844   Thu Aug 11, 2022   2256 In summary this is a 77 year old female who presents to the ED with shortness of breath after exposure to COVID-19. No acute or emergent findings demonstrated on physical exam patient on baseline O2 with saturation of 96%. Labs obtained and reviewed demonstrate no acute or emergent abnormalities.  Nasopharyngeal swab negative for COVID-19 and Influenza. Imaging of chest demonstrated no acute cardiopulmonary process.  EKG without evidence of acute ischemic changes.  At time of discharge disposition patient is afebrile, nontoxic appearing, vital signs stable and able to maintain O2 sats of 96% on room air. Patient will be discharged home with symptomatic care and outpatient follow up.  [JG]      ED Course User Index  [JG] Marcus Quintana PA         MDM  Number of Diagnoses or Management Options  Close exposure to COVID-19 virus: new, needed workup  Hyperglycemia due to diabetes mellitus (HCC): new, needed workup  Shortness of breath: new, needed workup  Stage 3 chronic kidney disease, unspecified whether stage 3a or 3b CKD (HCC): minor     Amount and/or Complexity of Data Reviewed  Clinical lab tests: reviewed  Tests in the radiology section of CPT®: reviewed    Risk of Complications, Morbidity, and/or Mortality  Presenting problems:  low  Diagnostic procedures: low  Management options: low    Patient Progress  Patient progress: stable       I had a discussion with the patient/family regarding diagnosis, diagnostic results, treatment plan, and medications.  The patient/family indicated understanding of these instructions.  I spent adequate time at the bedside preceding discharge necessary to personally discuss the aftercare instructions, giving patient education, providing explanations of the results of our evaluations/findings, and my decision making to assure that the patient/family understand the plan of care.  Time was allotted to answer questions at that time and throughout the ED course.  Emphasis was placed on timely follow-up after discharge.  I also discussed the potential for the development of an acute emergent condition requiring further evaluation, admission, or even surgical intervention. I discussed that we found nothing during the visit today indicating the need for further workup, admission, or the presence of an unstable medical condition.  I encouraged the patient to return to the emergency department immediately for ANY concerns, worsening, new complaints, or if symptoms persist and unable to seek follow-up in a timely fashion.  The patient/family expressed understanding and agreement with this plan.  The patient will follow-up with primary care for reevaluation.       MEDICATIONS ADMINISTERED IN ED:  Medications - No data to display    PROCEDURES:  Procedures          CRITICAL CARE TIME    Total Critical Care time was 0 minutes, excluding separately reportable procedures.   There was a high probability of clinically significant/life threatening deterioration in the patient's condition which required my urgent intervention.      FINAL IMPRESSION      1. Shortness of breath    2. Close exposure to COVID-19 virus    3. Hyperglycemia due to diabetes mellitus (HCC)    4. Stage 3 chronic kidney disease, unspecified whether stage 3a  or 3b CKD (HCC)          DISPOSITION/PLAN     ED Disposition     ED Disposition   Discharge    Condition   Stable    Comment   PT D/C TO POV VIA WHEELCHAIR. PT IN NO OBVIOUS DISTRESS AT THIS TIME.              PATIENT REFERRED TO:  Kary Wu MD  [address] [facility] [address] [phone]    Call   As needed for follow-up with your primary care    [facility] Emergency Department  [address] [facility] [address] [phone]  Go to   If symptoms worsen      DISCHARGE MEDICATIONS:     Medication List      CONTINUE taking these medications    acetaminophen 500 MG tablet  Commonly known as: TYLENOL     albuterol sulfate  (90 Base) MCG/ACT inhaler  Commonly known as: PROVENTIL HFA;VENTOLIN HFA;PROAIR HFA     aspirin 81 MG EC tablet     atorvastatin 20 MG tablet  Commonly known as: LIPITOR     citalopram 40 MG tablet  Commonly known as: CeleXA     fluticasone 50 MCG/ACT nasal spray  Commonly known as: FLONASE     furosemide 20 MG tablet  Commonly known as: LASIX     guaifenesin-dextromethorphan  MG tablet sustained-release 12 hour tablet     Imodium A-D 2 MG tablet  Generic drug: loperamide     insulin glargine 100 UNIT/ML injection  Commonly known as: LANTUS, SEMGLEE     insulin lispro 100 UNIT/ML injection  Commonly known as: humaLOG     lisinopril 20 MG tablet  Commonly known as: PRINIVIL,ZESTRIL     magnesium oxide 400 (241.3 Mg) MG tablet tablet  Commonly known as: MAGOX     nystatin 705222 UNIT/GM cream  Commonly known as: MYCOSTATIN     verapamil  MG CR tablet  Commonly known as: CALAN-SR            Comment: Please note this report has been produced using speech recognition software.      TAPAN Rangel Jason C, PA  08/16/22 8731

## 2023-03-04 ENCOUNTER — APPOINTMENT (OUTPATIENT)
Dept: GENERAL RADIOLOGY | Facility: HOSPITAL | Age: 78
End: 2023-03-04
Payer: MEDICARE

## 2023-03-04 ENCOUNTER — HOSPITAL ENCOUNTER (OUTPATIENT)
Facility: HOSPITAL | Age: 78
LOS: 2 days | Discharge: HOME | End: 2023-03-06
Attending: EMERGENCY MEDICINE | Admitting: FAMILY MEDICINE
Payer: MEDICARE

## 2023-03-04 DIAGNOSIS — J96.21 ACUTE ON CHRONIC RESPIRATORY FAILURE WITH HYPOXIA: ICD-10-CM

## 2023-03-04 DIAGNOSIS — D72.829 LEUKOCYTOSIS, UNSPECIFIED TYPE: ICD-10-CM

## 2023-03-04 DIAGNOSIS — J44.1 COPD EXACERBATION: Primary | ICD-10-CM

## 2023-03-04 DIAGNOSIS — E87.5 HYPERKALEMIA: ICD-10-CM

## 2023-03-04 PROBLEM — J96.11 CHRONIC RESPIRATORY FAILURE WITH HYPOXIA (HCC): Status: ACTIVE | Noted: 2023-03-04

## 2023-03-04 LAB
ALBUMIN SERPL-MCNC: 4.1 G/DL (ref 3.5–5.2)
ALBUMIN/GLOB SERPL: 1.4 G/DL
ALP SERPL-CCNC: 102 U/L (ref 39–117)
ALT SERPL W P-5'-P-CCNC: 16 U/L (ref 1–33)
ANION GAP SERPL CALCULATED.3IONS-SCNC: 10 MMOL/L (ref 5–15)
ANION GAP SERPL CALCULATED.3IONS-SCNC: 11 MMOL/L (ref 5–15)
AST SERPL-CCNC: 16 U/L (ref 1–32)
BASOPHILS # BLD MANUAL: 0 10*3/MM3 (ref 0–0.2)
BASOPHILS NFR BLD MANUAL: 0 % (ref 0–1.5)
BILIRUB SERPL-MCNC: 0.3 MG/DL (ref 0–1.2)
BILIRUB UR QL STRIP: NEGATIVE
BUN SERPL-MCNC: 34 MG/DL (ref 8–23)
BUN SERPL-MCNC: 35 MG/DL (ref 8–23)
BUN/CREAT SERPL: 29.8 (ref 7–25)
BUN/CREAT SERPL: 31.3 (ref 7–25)
CALCIUM SPEC-SCNC: 8.5 MG/DL (ref 8.6–10.5)
CALCIUM SPEC-SCNC: 9.2 MG/DL (ref 8.6–10.5)
CHLORIDE SERPL-SCNC: 101 MMOL/L (ref 98–107)
CHLORIDE SERPL-SCNC: 103 MMOL/L (ref 98–107)
CLARITY UR: CLEAR
CO2 SERPL-SCNC: 26 MMOL/L (ref 22–29)
CO2 SERPL-SCNC: 27 MMOL/L (ref 22–29)
COLOR UR: YELLOW
CREAT SERPL-MCNC: 1.12 MG/DL (ref 0.57–1)
CREAT SERPL-MCNC: 1.14 MG/DL (ref 0.57–1)
DEPRECATED RDW RBC AUTO: 49.4 FL (ref 37–54)
EGFRCR SERPLBLD CKD-EPI 2021: 50 ML/MIN/1.73
EGFRCR SERPLBLD CKD-EPI 2021: 51.1 ML/MIN/1.73
EOSINOPHIL # BLD MANUAL: 0 10*3/MM3 (ref 0–0.4)
EOSINOPHIL NFR BLD MANUAL: 0 % (ref 0.3–6.2)
ERYTHROCYTE [DISTWIDTH] IN BLOOD BY AUTOMATED COUNT: 14.5 % (ref 12.3–15.4)
FLUAV RNA RESP QL NAA+PROBE: NOT DETECTED
FLUBV RNA RESP QL NAA+PROBE: NOT DETECTED
GEN 5 2HR TROPONIN T REFLEX: 10 NG/L
GLOBULIN UR ELPH-MCNC: 3 GM/DL
GLUCOSE BLDC GLUCOMTR-MCNC: 139 MG/DL (ref 70–130)
GLUCOSE BLDC GLUCOMTR-MCNC: 254 MG/DL (ref 70–130)
GLUCOSE BLDC GLUCOMTR-MCNC: 321 MG/DL (ref 70–130)
GLUCOSE BLDC GLUCOMTR-MCNC: 84 MG/DL (ref 70–130)
GLUCOSE SERPL-MCNC: 111 MG/DL (ref 65–99)
GLUCOSE SERPL-MCNC: 112 MG/DL (ref 65–99)
GLUCOSE UR STRIP-MCNC: NEGATIVE MG/DL
HCT VFR BLD AUTO: 44.8 % (ref 34–46.6)
HGB BLD-MCNC: 13.5 G/DL (ref 12–15.9)
HGB UR QL STRIP.AUTO: NEGATIVE
KETONES UR QL STRIP: NEGATIVE
LEUKOCYTE ESTERASE UR QL STRIP.AUTO: NEGATIVE
LYMPHOCYTES # BLD MANUAL: 2.65 10*3/MM3 (ref 0.7–3.1)
LYMPHOCYTES NFR BLD MANUAL: 4 % (ref 5–12)
MCH RBC QN AUTO: 27.9 PG (ref 26.6–33)
MCHC RBC AUTO-ENTMCNC: 30.1 G/DL (ref 31.5–35.7)
MCV RBC AUTO: 92.6 FL (ref 79–97)
MONOCYTES # BLD: 0.53 10*3/MM3 (ref 0.1–0.9)
MYELOCYTES NFR BLD MANUAL: 1 % (ref 0–0)
NEUTROPHILS # BLD AUTO: 9.95 10*3/MM3 (ref 1.7–7)
NEUTROPHILS NFR BLD MANUAL: 74 % (ref 42.7–76)
NEUTS BAND NFR BLD MANUAL: 1 % (ref 0–5)
NITRITE UR QL STRIP: NEGATIVE
NT-PROBNP SERPL-MCNC: 114.8 PG/ML (ref 0–1800)
OVALOCYTES BLD QL SMEAR: ABNORMAL
PH UR STRIP.AUTO: <=5 [PH] (ref 5–8)
PLAT MORPH BLD: NORMAL
PLATELET # BLD AUTO: 185 10*3/MM3 (ref 140–450)
PMV BLD AUTO: 12 FL (ref 6–12)
POTASSIUM SERPL-SCNC: 5.2 MMOL/L (ref 3.5–5.2)
POTASSIUM SERPL-SCNC: 5.6 MMOL/L (ref 3.5–5.2)
PROCALCITONIN SERPL-MCNC: 0.08 NG/ML (ref 0–0.25)
PROT SERPL-MCNC: 7.1 G/DL (ref 6–8.5)
PROT UR QL STRIP: NEGATIVE
QT INTERVAL: 432 MS
QTC INTERVAL: 420 MS
RBC # BLD AUTO: 4.84 10*6/MM3 (ref 3.77–5.28)
SARS-COV-2 RNA RESP QL NAA+PROBE: NOT DETECTED
SODIUM SERPL-SCNC: 139 MMOL/L (ref 136–145)
SODIUM SERPL-SCNC: 139 MMOL/L (ref 136–145)
SP GR UR STRIP: 1.02 (ref 1–1.03)
TROPONIN T DELTA: -1 NG/L
TROPONIN T SERPL HS-MCNC: 11 NG/L
UROBILINOGEN UR QL STRIP: NORMAL
VARIANT LYMPHS NFR BLD MANUAL: 15 % (ref 19.6–45.3)
VARIANT LYMPHS NFR BLD MANUAL: 5 % (ref 0–5)
WBC MORPH BLD: NORMAL
WBC NRBC COR # BLD: 13.26 10*3/MM3 (ref 3.4–10.8)

## 2023-03-04 PROCEDURE — 63710000001 INSULIN DETEMIR PER 5 UNITS: Performed by: INTERNAL MEDICINE

## 2023-03-04 PROCEDURE — 94799 UNLISTED PULMONARY SVC/PX: CPT

## 2023-03-04 PROCEDURE — 99222 1ST HOSP IP/OBS MODERATE 55: CPT | Performed by: INTERNAL MEDICINE

## 2023-03-04 PROCEDURE — 25010000002 METHYLPREDNISOLONE PER 125 MG: Performed by: EMERGENCY MEDICINE

## 2023-03-04 PROCEDURE — 97165 OT EVAL LOW COMPLEX 30 MIN: CPT

## 2023-03-04 PROCEDURE — 94640 AIRWAY INHALATION TREATMENT: CPT

## 2023-03-04 PROCEDURE — 94761 N-INVAS EAR/PLS OXIMETRY MLT: CPT

## 2023-03-04 PROCEDURE — 25010000002 ONDANSETRON PER 1 MG: Performed by: EMERGENCY MEDICINE

## 2023-03-04 PROCEDURE — 25010000002 ENOXAPARIN PER 10 MG: Performed by: INTERNAL MEDICINE

## 2023-03-04 PROCEDURE — 87636 SARSCOV2 & INF A&B AMP PRB: CPT | Performed by: EMERGENCY MEDICINE

## 2023-03-04 PROCEDURE — 82962 GLUCOSE BLOOD TEST: CPT

## 2023-03-04 PROCEDURE — 71045 X-RAY EXAM CHEST 1 VIEW: CPT

## 2023-03-04 PROCEDURE — 85025 COMPLETE CBC W/AUTO DIFF WBC: CPT | Performed by: EMERGENCY MEDICINE

## 2023-03-04 PROCEDURE — 94664 DEMO&/EVAL PT USE INHALER: CPT

## 2023-03-04 PROCEDURE — 63710000001 INSULIN LISPRO (HUMAN) PER 5 UNITS: Performed by: INTERNAL MEDICINE

## 2023-03-04 PROCEDURE — 81003 URINALYSIS AUTO W/O SCOPE: CPT | Performed by: EMERGENCY MEDICINE

## 2023-03-04 PROCEDURE — 84484 ASSAY OF TROPONIN QUANT: CPT | Performed by: EMERGENCY MEDICINE

## 2023-03-04 PROCEDURE — 99285 EMERGENCY DEPT VISIT HI MDM: CPT

## 2023-03-04 PROCEDURE — P9612 CATHETERIZE FOR URINE SPEC: HCPCS

## 2023-03-04 PROCEDURE — 25010000002 KETOROLAC TROMETHAMINE PER 15 MG: Performed by: EMERGENCY MEDICINE

## 2023-03-04 PROCEDURE — 83880 ASSAY OF NATRIURETIC PEPTIDE: CPT | Performed by: EMERGENCY MEDICINE

## 2023-03-04 PROCEDURE — 97161 PT EVAL LOW COMPLEX 20 MIN: CPT | Performed by: PHYSICAL THERAPIST

## 2023-03-04 PROCEDURE — 63710000001 INSULIN REGULAR HUMAN PER 5 UNITS: Performed by: EMERGENCY MEDICINE

## 2023-03-04 PROCEDURE — 96375 TX/PRO/DX INJ NEW DRUG ADDON: CPT

## 2023-03-04 PROCEDURE — 84145 PROCALCITONIN (PCT): CPT | Performed by: INTERNAL MEDICINE

## 2023-03-04 PROCEDURE — 85007 BL SMEAR W/DIFF WBC COUNT: CPT | Performed by: EMERGENCY MEDICINE

## 2023-03-04 PROCEDURE — 36415 COLL VENOUS BLD VENIPUNCTURE: CPT

## 2023-03-04 PROCEDURE — 97535 SELF CARE MNGMENT TRAINING: CPT

## 2023-03-04 PROCEDURE — 93005 ELECTROCARDIOGRAM TRACING: CPT | Performed by: EMERGENCY MEDICINE

## 2023-03-04 PROCEDURE — 96372 THER/PROPH/DIAG INJ SC/IM: CPT

## 2023-03-04 PROCEDURE — 80053 COMPREHEN METABOLIC PANEL: CPT | Performed by: EMERGENCY MEDICINE

## 2023-03-04 PROCEDURE — 96374 THER/PROPH/DIAG INJ IV PUSH: CPT

## 2023-03-04 RX ORDER — SODIUM CHLORIDE 9 MG/ML
40 INJECTION, SOLUTION INTRAVENOUS AS NEEDED
Status: DISCONTINUED | OUTPATIENT
Start: 2023-03-04 | End: 2023-03-06 | Stop reason: HOSPADM

## 2023-03-04 RX ORDER — SODIUM CHLORIDE 0.9 % (FLUSH) 0.9 %
10 SYRINGE (ML) INJECTION AS NEEDED
Status: DISCONTINUED | OUTPATIENT
Start: 2023-03-04 | End: 2023-03-06 | Stop reason: HOSPADM

## 2023-03-04 RX ORDER — MONTELUKAST SODIUM 4 MG/1
1 TABLET, CHEWABLE ORAL DAILY
COMMUNITY

## 2023-03-04 RX ORDER — ONDANSETRON 2 MG/ML
4 INJECTION INTRAMUSCULAR; INTRAVENOUS ONCE
Status: COMPLETED | OUTPATIENT
Start: 2023-03-04 | End: 2023-03-04

## 2023-03-04 RX ORDER — FLUTICASONE PROPIONATE 50 MCG
2 SPRAY, SUSPENSION (ML) NASAL DAILY
Status: DISCONTINUED | OUTPATIENT
Start: 2023-03-04 | End: 2023-03-06 | Stop reason: HOSPADM

## 2023-03-04 RX ORDER — ACETAMINOPHEN 160 MG/5ML
650 SOLUTION ORAL EVERY 4 HOURS PRN
Status: DISCONTINUED | OUTPATIENT
Start: 2023-03-04 | End: 2023-03-06 | Stop reason: HOSPADM

## 2023-03-04 RX ORDER — BISACODYL 10 MG
10 SUPPOSITORY, RECTAL RECTAL DAILY PRN
Status: DISCONTINUED | OUTPATIENT
Start: 2023-03-04 | End: 2023-03-06 | Stop reason: HOSPADM

## 2023-03-04 RX ORDER — IBUPROFEN 600 MG/1
1 TABLET ORAL
Status: DISCONTINUED | OUTPATIENT
Start: 2023-03-04 | End: 2023-03-06 | Stop reason: HOSPADM

## 2023-03-04 RX ORDER — AMOXICILLIN 250 MG
2 CAPSULE ORAL 2 TIMES DAILY
Status: DISCONTINUED | OUTPATIENT
Start: 2023-03-04 | End: 2023-03-06 | Stop reason: HOSPADM

## 2023-03-04 RX ORDER — PREDNISONE 20 MG/1
40 TABLET ORAL
Status: DISCONTINUED | OUTPATIENT
Start: 2023-03-05 | End: 2023-03-05

## 2023-03-04 RX ORDER — DONEPEZIL HYDROCHLORIDE 10 MG/1
10 TABLET, FILM COATED ORAL DAILY
COMMUNITY

## 2023-03-04 RX ORDER — ATORVASTATIN CALCIUM 20 MG/1
20 TABLET, FILM COATED ORAL NIGHTLY
Status: DISCONTINUED | OUTPATIENT
Start: 2023-03-04 | End: 2023-03-06 | Stop reason: HOSPADM

## 2023-03-04 RX ORDER — ENOXAPARIN SODIUM 100 MG/ML
40 INJECTION SUBCUTANEOUS EVERY 12 HOURS
Status: DISCONTINUED | OUTPATIENT
Start: 2023-03-04 | End: 2023-03-06 | Stop reason: HOSPADM

## 2023-03-04 RX ORDER — INSULIN LISPRO 100 [IU]/ML
0-9 INJECTION, SOLUTION INTRAVENOUS; SUBCUTANEOUS
Status: DISCONTINUED | OUTPATIENT
Start: 2023-03-04 | End: 2023-03-06 | Stop reason: HOSPADM

## 2023-03-04 RX ORDER — ACETAMINOPHEN 650 MG/1
650 SUPPOSITORY RECTAL EVERY 4 HOURS PRN
Status: DISCONTINUED | OUTPATIENT
Start: 2023-03-04 | End: 2023-03-06 | Stop reason: HOSPADM

## 2023-03-04 RX ORDER — DEXTROSE MONOHYDRATE 25 G/50ML
50 INJECTION, SOLUTION INTRAVENOUS ONCE
Status: COMPLETED | OUTPATIENT
Start: 2023-03-04 | End: 2023-03-04

## 2023-03-04 RX ORDER — IPRATROPIUM BROMIDE AND ALBUTEROL SULFATE 2.5; .5 MG/3ML; MG/3ML
3 SOLUTION RESPIRATORY (INHALATION) ONCE
Status: COMPLETED | OUTPATIENT
Start: 2023-03-04 | End: 2023-03-04

## 2023-03-04 RX ORDER — POLYETHYLENE GLYCOL 3350 17 G/17G
17 POWDER, FOR SOLUTION ORAL DAILY PRN
Status: DISCONTINUED | OUTPATIENT
Start: 2023-03-04 | End: 2023-03-06 | Stop reason: HOSPADM

## 2023-03-04 RX ORDER — ACETAMINOPHEN 325 MG/1
650 TABLET ORAL EVERY 4 HOURS PRN
Status: DISCONTINUED | OUTPATIENT
Start: 2023-03-04 | End: 2023-03-06 | Stop reason: HOSPADM

## 2023-03-04 RX ORDER — BUPROPION HYDROCHLORIDE 150 MG/1
150 TABLET ORAL DAILY
COMMUNITY

## 2023-03-04 RX ORDER — IPRATROPIUM BROMIDE AND ALBUTEROL SULFATE 2.5; .5 MG/3ML; MG/3ML
3 SOLUTION RESPIRATORY (INHALATION)
Status: DISCONTINUED | OUTPATIENT
Start: 2023-03-04 | End: 2023-03-06 | Stop reason: HOSPADM

## 2023-03-04 RX ORDER — ASPIRIN 81 MG/1
81 TABLET ORAL DAILY
Status: DISCONTINUED | OUTPATIENT
Start: 2023-03-05 | End: 2023-03-06 | Stop reason: HOSPADM

## 2023-03-04 RX ORDER — ALBUTEROL SULFATE 2.5 MG/3ML
10 SOLUTION RESPIRATORY (INHALATION) ONCE
Status: COMPLETED | OUTPATIENT
Start: 2023-03-04 | End: 2023-03-04

## 2023-03-04 RX ORDER — DEXTROSE MONOHYDRATE 25 G/50ML
25 INJECTION, SOLUTION INTRAVENOUS
Status: DISCONTINUED | OUTPATIENT
Start: 2023-03-04 | End: 2023-03-06 | Stop reason: HOSPADM

## 2023-03-04 RX ORDER — METHYLPREDNISOLONE SODIUM SUCCINATE 125 MG/2ML
125 INJECTION, POWDER, LYOPHILIZED, FOR SOLUTION INTRAMUSCULAR; INTRAVENOUS ONCE
Status: COMPLETED | OUTPATIENT
Start: 2023-03-04 | End: 2023-03-04

## 2023-03-04 RX ORDER — VERAPAMIL HYDROCHLORIDE 240 MG/1
240 TABLET, FILM COATED, EXTENDED RELEASE ORAL NIGHTLY
Status: DISCONTINUED | OUTPATIENT
Start: 2023-03-04 | End: 2023-03-06 | Stop reason: HOSPADM

## 2023-03-04 RX ORDER — BUPROPION HYDROCHLORIDE 150 MG/1
150 TABLET ORAL DAILY
Status: DISCONTINUED | OUTPATIENT
Start: 2023-03-05 | End: 2023-03-06 | Stop reason: HOSPADM

## 2023-03-04 RX ORDER — MONTELUKAST SODIUM 4 MG/1
1 TABLET, CHEWABLE ORAL DAILY
Status: DISCONTINUED | OUTPATIENT
Start: 2023-03-04 | End: 2023-03-06 | Stop reason: HOSPADM

## 2023-03-04 RX ORDER — DONEPEZIL HYDROCHLORIDE 10 MG/1
10 TABLET, FILM COATED ORAL DAILY
Status: DISCONTINUED | OUTPATIENT
Start: 2023-03-05 | End: 2023-03-06 | Stop reason: HOSPADM

## 2023-03-04 RX ORDER — SODIUM CHLORIDE 0.9 % (FLUSH) 0.9 %
10 SYRINGE (ML) INJECTION EVERY 12 HOURS SCHEDULED
Status: DISCONTINUED | OUTPATIENT
Start: 2023-03-04 | End: 2023-03-06 | Stop reason: HOSPADM

## 2023-03-04 RX ORDER — BISACODYL 5 MG/1
5 TABLET, DELAYED RELEASE ORAL DAILY PRN
Status: DISCONTINUED | OUTPATIENT
Start: 2023-03-04 | End: 2023-03-06 | Stop reason: HOSPADM

## 2023-03-04 RX ORDER — KETOROLAC TROMETHAMINE 15 MG/ML
15 INJECTION, SOLUTION INTRAMUSCULAR; INTRAVENOUS ONCE
Status: COMPLETED | OUTPATIENT
Start: 2023-03-04 | End: 2023-03-04

## 2023-03-04 RX ORDER — NICOTINE POLACRILEX 4 MG
15 LOZENGE BUCCAL
Status: DISCONTINUED | OUTPATIENT
Start: 2023-03-04 | End: 2023-03-06 | Stop reason: HOSPADM

## 2023-03-04 RX ORDER — CITALOPRAM 40 MG/1
40 TABLET ORAL EVERY MORNING
Status: DISCONTINUED | OUTPATIENT
Start: 2023-03-04 | End: 2023-03-06 | Stop reason: HOSPADM

## 2023-03-04 RX ORDER — CHOLESTYRAMINE LIGHT 4 G/5.7G
1 POWDER, FOR SUSPENSION ORAL DAILY
Status: DISCONTINUED | OUTPATIENT
Start: 2023-03-04 | End: 2023-03-04 | Stop reason: ALTCHOICE

## 2023-03-04 RX ADMIN — CITALOPRAM 40 MG: 40 TABLET ORAL at 12:45

## 2023-03-04 RX ADMIN — SODIUM ZIRCONIUM CYCLOSILICATE 10 G: 10 POWDER, FOR SUSPENSION ORAL at 08:19

## 2023-03-04 RX ADMIN — METHYLPREDNISOLONE SODIUM SUCCINATE 125 MG: 125 INJECTION, POWDER, FOR SOLUTION INTRAMUSCULAR; INTRAVENOUS at 07:10

## 2023-03-04 RX ADMIN — INSULIN LISPRO 7 UNITS: 100 INJECTION, SOLUTION INTRAVENOUS; SUBCUTANEOUS at 16:47

## 2023-03-04 RX ADMIN — INSULIN HUMAN 6 UNITS: 100 INJECTION, SOLUTION PARENTERAL at 08:19

## 2023-03-04 RX ADMIN — ACETAMINOPHEN 325MG 650 MG: 325 TABLET ORAL at 20:58

## 2023-03-04 RX ADMIN — ALBUTEROL SULFATE 10 MG: 2.5 SOLUTION RESPIRATORY (INHALATION) at 08:37

## 2023-03-04 RX ADMIN — IPRATROPIUM BROMIDE AND ALBUTEROL SULFATE 3 ML: 2.5; .5 SOLUTION RESPIRATORY (INHALATION) at 19:34

## 2023-03-04 RX ADMIN — ENOXAPARIN SODIUM 40 MG: 40 INJECTION SUBCUTANEOUS at 12:45

## 2023-03-04 RX ADMIN — ATORVASTATIN CALCIUM 20 MG: 20 TABLET, FILM COATED ORAL at 20:47

## 2023-03-04 RX ADMIN — SODIUM CHLORIDE 1000 ML: 9 INJECTION, SOLUTION INTRAVENOUS at 07:09

## 2023-03-04 RX ADMIN — ONDANSETRON 4 MG: 2 INJECTION INTRAMUSCULAR; INTRAVENOUS at 07:09

## 2023-03-04 RX ADMIN — Medication 10 ML: at 20:48

## 2023-03-04 RX ADMIN — ACETAMINOPHEN 325MG 650 MG: 325 TABLET ORAL at 12:45

## 2023-03-04 RX ADMIN — DEXTROSE MONOHYDRATE 50 ML: 25 INJECTION, SOLUTION INTRAVENOUS at 08:19

## 2023-03-04 RX ADMIN — SENNOSIDES AND DOCUSATE SODIUM 2 TABLET: 50; 8.6 TABLET ORAL at 20:47

## 2023-03-04 RX ADMIN — IPRATROPIUM BROMIDE AND ALBUTEROL SULFATE 3 ML: 2.5; .5 SOLUTION RESPIRATORY (INHALATION) at 14:00

## 2023-03-04 RX ADMIN — IPRATROPIUM BROMIDE AND ALBUTEROL SULFATE 3 ML: 2.5; .5 SOLUTION RESPIRATORY (INHALATION) at 06:50

## 2023-03-04 RX ADMIN — Medication 10 ML: at 12:47

## 2023-03-04 RX ADMIN — INSULIN LISPRO 6 UNITS: 100 INJECTION, SOLUTION INTRAVENOUS; SUBCUTANEOUS at 20:58

## 2023-03-04 RX ADMIN — KETOROLAC TROMETHAMINE 15 MG: 15 INJECTION, SOLUTION INTRAMUSCULAR; INTRAVENOUS at 07:09

## 2023-03-04 RX ADMIN — COLESTIPOL HYDROCHLORIDE 1 G: 1 TABLET, FILM COATED ORAL at 17:42

## 2023-03-04 RX ADMIN — INSULIN DETEMIR 20 UNITS: 100 INJECTION, SOLUTION SUBCUTANEOUS at 20:48

## 2023-03-04 RX ADMIN — VERAPAMIL HYDROCHLORIDE 240 MG: 240 TABLET, FILM COATED, EXTENDED RELEASE ORAL at 20:46

## 2023-03-04 NOTE — THERAPY DISCHARGE NOTE
Acute Care - Occupational Therapy Discharge  [facility]    Patient Name: Masha Chou  : 1945    MRN: [id number]                              Today's Date: 3/4/2023       Admit Date: 3/4/2023    Visit Dx:     ICD-10-CM ICD-9-CM   1. COPD exacerbation (HCC)  J44.1 491.21   2. Acute on chronic respiratory failure with hypoxia (HCC)  J96.21 518.84     799.02   3. Hyperkalemia  E87.5 276.7   4. Leukocytosis, unspecified type  D72.829 288.60     Patient Active Problem List   Diagnosis   • Chest pain   • Type 2 diabetes mellitus (HCC)   • Pulmonary HTN (HCC)   • Heart failure (HCC)   • COPD exacerbation (HCC)     Past Medical History:   Diagnosis Date   • Asthma    • Cancer (HCC)     BILATERAL BREAST    • COPD (chronic obstructive pulmonary disease) (HCC)    • Diabetes mellitus (HCC)    • Heart failure (HCC)    • Hypertension    • Pulmonary hypertension (HCC)      Past Surgical History:   Procedure Laterality Date   • BREAST SURGERY     • CHOLECYSTECTOMY     • HYSTERECTOMY     • JOINT REPLACEMENT      RIGHT KNEE   • KELOID EXCISION        General Information     Row Name 23 1320          OT Time and Intention    Document Type discharge evaluation/summary  -AC     Mode of Treatment occupational therapy  -AC     Row Name 23 132          General Information    Patient Profile Reviewed yes  -AC     Prior Level of Function independent:;all household mobility;feeding;grooming;min assist:;dressing;mod assist:;bathing  dtr assists with LBD and bathing, uses RW, stand alone cane to ambulate  -AC     Existing Precautions/Restrictions oxygen therapy device and L/min  [place]  -AC     Barriers to Rehab previous functional deficit;hearing deficit  -AC     Row Name 23 1320          Occupational Profile    Environmental Supports and Barriers (Occupational Profile) tub shower with shower chair  -AC     Row Name 23 1320          Living Environment    People in Home child(more), adult  dtr  -AC     Row Name  03/04/23 1320          Home Main Entrance    Number of Stairs, Main Entrance three  -AC     Stair Railings, Main Entrance railing on left side (ascending)  -     Row Name 03/04/23 1320          Stairs Within Home, Primary    Number of Stairs, Within Home, Primary none  -AC     Stair Railings, Within Home, Primary none  -AC     Row Name 03/04/23 1320          Cognition    Orientation Status (Cognition) oriented x 3  -AC     Row Name 03/04/23 1320          Safety Issues, Functional Mobility    Impairments Affecting Function (Mobility) endurance/activity tolerance  -           User Key  (r) = Recorded By, (t) = Taken By, (c) = Cosigned By    Initials Name Provider Type     Misty Coleman OT Occupational Therapist               Mobility/ADL's     Row Name 03/04/23 1320          Bed Mobility    Comment, (Bed Mobility) UIC pre/post tx  -     Row Name 03/04/23 1320          Transfers    Transfers sit-stand transfer  -     Row Name 03/04/23 1320          Sit-Stand Transfer    Sit-Stand [place] (Transfers) modified independence  -     Assistive Device (Sit-Stand Transfers) walker, front-wheeled  -     Row Name 03/04/23 1320          Functional Mobility    Functional Mobility- Ind. Level conditional independence  -     Functional Mobility- Device walker, front-wheeled  -AC     Functional Mobility-Distance (Feet) 40  -     Row Name 03/04/23 1320          Activities of Daily Living    BADL Assessment/Intervention lower body dressing;upper body dressing  -     Row Name 03/04/23 1320          Lower Body Dressing Assessment/Training    [place] Level (Lower Body Dressing) doff;don;socks;modified independence  -     Assistive Devices (Lower Body Dressing) sock-aid  -     Position (Lower Body Dressing) unsupported sitting  -     Comment, (Lower Body Dressing) able to slip shoes/ socks off with feet - issued  shoe horn and sock aide, has reacher, able to don footie socks and shoes with AE  -AC      [facility] Name 03/04/23 1320          Upper Body Dressing Assessment/Training    [place] Level (Upper Body Dressing) don;front opening garment;independent  -AC     Position (Upper Body Dressing) unsupported sitting  -           User Key  (r) = Recorded By, (t) = Taken By, (c) = Cosigned By    Initials Name Provider Type    Misty Del Castillo, OT Occupational Therapist               Obj/Interventions     Row Name 03/04/23 1432          Sensory Assessment (Somatosensory)    Sensory Assessment (Somatosensory) UE sensation intact  -AC     Row Name 03/04/23 1432          Vision Assessment/Intervention    Visual Impairment/Limitations WFL  -[facility] Name 03/04/23 1432          Range of Motion Comprehensive    General Range of Motion bilateral upper extremity ROM WNL  -AC     Row Name 03/04/23 1432          Strength Comprehensive (MMT)    Comment, General Manual Muscle Testing (MMT) Assessment BUE grossly 4/5  -           User Key  (r) = Recorded By, (t) = Taken By, (c) = Cosigned By    Initials Name Provider Type    Misty Del Castillo, OT Occupational Therapist               Goals/Plan    No documentation.                Clinical Impression     Row Name 03/04/23 1320          Pain Assessment    Pretreatment Pain Rating 0/10 - no pain  -     Posttreatment Pain Rating 0/10 - no pain  -AC     Row Name 03/04/23 1320          Plan of Care Review    Plan of Care Reviewed With patient  -     Outcome Evaluation Pt issued AE for LBD and pt able to don shoes and socks with AE.  Pt on 3LO2 with activity and O2 sat remained 96-97% after activity. Pt is at baseline with self care and mobility.  No further skilled OT indicated. Recommend return home with assist.  -AC     Row Name 03/04/23 1320          Therapy Assessment/Plan (OT)    Criteria for Skilled Therapeutic Interventions Met (OT) no;does not meet criteria for skilled intervention  -AC     Row Name 03/04/23 1320          Therapy Plan Review/Discharge Plan (OT)     Anticipated Discharge Disposition (OT) home with assist  -AC     Row Name 03/04/23 1320          Vital Signs    Pre Systolic BP Rehab 131  -AC     Pre Treatment Diastolic BP 68  -AC     Pretreatment Heart Rate (beats/min) 94  -AC     Posttreatment Heart Rate (beats/min) 104  -AC     Pre SpO2 (%) 97  -AC     O2 Delivery Pre Treatment supplemental O2  -AC     O2 Delivery Intra Treatment supplemental O2  -AC     Post SpO2 (%) 97  -AC     O2 Delivery Post Treatment supplemental O2  -AC     Pre Patient Position Sitting  -AC     Post Patient Position Sitting  -AC     Row Name 03/04/23 1320          Positioning and Restraints    Pre-Treatment Position sitting in chair/recliner  -AC     Post Treatment Position chair  -AC     In Chair notified nsg;reclined;call light within reach;encouraged to call for assist  -AC           User Key  (r) = Recorded By, (t) = Taken By, (c) = Cosigned By    Initials Name Provider Type    Misty Del Castillo OT Occupational Therapist               Outcome Measures     Row Name 03/04/23 1438          How much help from another is currently needed...    Putting on and taking off regular lower body clothing? 4  use of AE  -AC     Bathing (including washing, rinsing, and drying) 3  -AC     Toileting (which includes using toilet bed pan or urinal) 4  -AC     Putting on and taking off regular upper body clothing 4  -AC     Taking care of personal grooming (such as brushing teeth) 4  -AC     Eating meals 4  -AC     AM-PAC 6 Clicks Score (OT) 23  -AC     Row Name 03/04/23 1438          Functional Assessment    Outcome Measure Options AM-PAC 6 Clicks Daily Activity (OT)  -AC           User Key  (r) = Recorded By, (t) = Taken By, (c) = Cosigned By    Initials Name Provider Type    Misty Del Castillo OT Occupational Therapist              Occupational Therapy Education     Title: PT OT SLP Therapies (In Progress)     Topic: Occupational Therapy (In Progress)     Point: ADL training (Done)     Description:    Instruct learner(s) on proper safety adaptation and remediation techniques during self care or transfers.   Instruct in proper use of assistive devices.              Learning Progress Summary           Patient Acceptance, E,TB,D, VU,DU by  at 3/4/2023 5603    Comment: use of AE for LBD                   Point: Home exercise program (Not Started)     Description:   Instruct learner(s) on appropriate technique for monitoring, assisting and/or progressing therapeutic exercises/activities.              Learner Progress:  Not documented in this visit.          Point: Precautions (Not Started)     Description:   Instruct learner(s) on prescribed precautions during self-care and functional transfers.              Learner Progress:  Not documented in this visit.          Point: Body mechanics (Not Started)     Description:   Instruct learner(s) on proper positioning and spine alignment during self-care, functional mobility activities and/or exercises.              Learner Progress:  Not documented in this visit.                      User Key     Initials Effective Dates Name Provider Type Discipline     02/03/23 -  Misty Coleman, OT Occupational Therapist OT              OT Recommendation and Plan     Plan of Care Review  Plan of Care Reviewed With: patient  Outcome Evaluation: Pt issued AE for LBD and pt able to don shoes and socks with AE.  Pt on 3LO2 with activity and O2 sat remained 96-97% after activity. Pt is at baseline with self care and mobility.  No further skilled OT indicated. Recommend return home with assist.  Plan of Care Reviewed With: patient  Outcome Evaluation: Pt issued AE for LBD and pt able to don shoes and socks with AE.  Pt on 3LO2 with activity and O2 sat remained 96-97% after activity. Pt is at baseline with self care and mobility.  No further skilled OT indicated. Recommend return home with assist.     Time Calculation:    Time Calculation- OT     Row Name 03/04/23 1320             Time  Calculation- OT    OT Start Time 1320  -AC      OT Received On 03/04/23  -AC         Timed Charges    95091 - OT Self Care/Mgmt Minutes 10  -AC         Untimed Charges    OT Eval/Re-eval Minutes 45  -AC         Total Minutes    Timed Charges Total Minutes 10  -AC      Untimed Charges Total Minutes 45  -AC       Total Minutes 55  -AC            User Key  (r) = Recorded By, (t) = Taken By, (c) = Cosigned By    Initials Name Provider Type     Misty Coleman, OT Occupational Therapist              Therapy Charges for Today     Code Description Service Date Service Provider Modifiers Qty    [id number]  OT SELF CARE/MGMT/TRAIN EA 15 MIN 3/4/2023 Misty Coleman, OT GO 1    [id number]  OT EVAL LOW COMPLEXITY 3 3/4/2023 Misty Coleman, OT GO 1             OT Discharge Summary  Anticipated Discharge Disposition (OT): home with assist    Misty Coleman OT  3/4/2023

## 2023-03-04 NOTE — PLAN OF CARE
Goal Outcome Evaluation:  Plan of Care Reviewed With: patient           Outcome Evaluation: Pt issued AE for LBD and pt able to don shoes and socks with AE.  Pt on 3LO2 with activity and O2 sat remained 96-97% after activity. Pt is at baseline with self care and mobility.  No further skilled OT indicated. Recommend return home with assist.

## 2023-03-04 NOTE — PLAN OF CARE
Goal Outcome Evaluation:  Plan of Care Reviewed With: patient        Progress: no change  Outcome Evaluation: VSS patient denies any pain and is resting between care. Patient is on 3L NC currently. Her baseline is 2 to 3 L at home. Will continue current plan of care.
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
## 2023-03-04 NOTE — PLAN OF CARE
Goal Outcome Evaluation:  Plan of Care Reviewed With: patient           Outcome Evaluation: Pt demonstrates independence with transfers and 100 feet of gait using rw.  O2 on 3L at 94% post gait.  Pt reports she is at baseline and has no concerns re: mobility at d/c.  Recommend return home with assist.  PT will sign off.
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
## 2023-03-04 NOTE — THERAPY DISCHARGE NOTE
Patient Name: Masha Chou  : 1945    MRN: [id number]                              Today's Date: 3/4/2023       Admit Date: 3/4/2023    Visit Dx:     ICD-10-CM ICD-9-CM   1. COPD exacerbation (HCC)  J44.1 491.21   2. Acute on chronic respiratory failure with hypoxia (HCC)  J96.21 518.84     799.02   3. Hyperkalemia  E87.5 276.7   4. Leukocytosis, unspecified type  D72.829 288.60     Patient Active Problem List   Diagnosis   • Chest pain   • Type 2 diabetes mellitus (HCC)   • Pulmonary HTN (HCC)   • Heart failure (HCC)   • COPD exacerbation (HCC)     Past Medical History:   Diagnosis Date   • Asthma    • Cancer (HCC)     BILATERAL BREAST    • COPD (chronic obstructive pulmonary disease) (HCC)    • Diabetes mellitus (HCC)    • Heart failure (HCC)    • Hypertension    • Pulmonary hypertension (HCC)      Past Surgical History:   Procedure Laterality Date   • BREAST SURGERY     • CHOLECYSTECTOMY     • HYSTERECTOMY     • JOINT REPLACEMENT      RIGHT KNEE   • KELOID EXCISION        General Information     Row Name 23 1511          Physical Therapy Time and Intention    Document Type discharge evaluation/summary  -LM     Mode of Treatment individual therapy;physical therapy  -LM     Row Name 23 1511          General Information    Patient Profile Reviewed yes  -LM     Prior Level of Function independent:;all household mobility;feeding;grooming;min assist:;dressing;mod assist:;bathing  -LM     Existing Precautions/Restrictions oxygen therapy device and L/min  -LM     Barriers to Rehab previous functional deficit  -LM     Row Name 23 1511          Living Environment    People in Home child(more), adult  Dtr  -LM     Row Name 23 1511          Home Main Entrance    Number of Stairs, Main Entrance three  -LM     Stair Railings, Main Entrance railing on left side (ascending)  -LM     Row Name 23 1511          Stairs Within Home, Primary    Number of Stairs, Within Home, Primary none  -LM      Row Name 03/04/23 1511          Cognition    Orientation Status (Cognition) oriented x 3  -LM           User Key  (r) = Recorded By, (t) = Taken By, (c) = Cosigned By    Initials Name Provider Type    LM Jing Tyler, PT Physical Therapist               Mobility     Row Name 03/04/23 1515          Bed Mobility    Comment, (Bed Mobility) Sitting up in chair at initial and end of eval, but states no issues with bed mobility.  -LM     Row Name 03/04/23 1515          Sit-Stand Transfer    Sit-Stand Lee (Transfers) modified independence  -LM     Assistive Device (Sit-Stand Transfers) walker, front-wheeled  -LM     Row Name 03/04/23 1515          Gait/Stairs (Locomotion)    Lee Level (Gait) modified independence  -LM     Assistive Device (Gait) walker, front-wheeled  -LM     Distance in Feet (Gait) 100  -LM     Deviations/Abnormal Patterns (Gait) gait speed decreased  -LM     Comment, (Gait/Stairs) No unsteadiness or gait abnormalities noted.  Ambulated on 3L O2 - O2 at 94% post gait.  Pt reports she is at baseline and doesn't feel she needs skilled PT at this time.  -LM           User Key  (r) = Recorded By, (t) = Taken By, (c) = Cosigned By    Initials Name Provider Type    Jing Vance, PARRIS Physical Therapist               Obj/Interventions     Row Name 03/04/23 1516          Range of Motion Comprehensive    General Range of Motion bilateral lower extremity ROM WFL  -LM     Row Name 03/04/23 1516          Strength Comprehensive (MMT)    Comment, General Manual Muscle Testing (MMT) Assessment BLEs grossly 4+/5 throughout  -LM     Row Name 03/04/23 1516          Balance    Balance Assessment sitting static balance;standing static balance;standing dynamic balance  -LM     Static Sitting Balance independent  -LM     Position, Sitting Balance unsupported;sitting in chair  -LM     Static Standing Balance modified independence  -LM     Dynamic Standing Balance modified independence  -LM      Position/Device Used, Standing Balance supported  -LM     Row Name 03/04/23 1516          Sensory Assessment (Somatosensory)    Sensory Assessment (Somatosensory) LE sensation intact  -LM           User Key  (r) = Recorded By, (t) = Taken By, (c) = Cosigned By    Initials Name Provider Type    LM Jing Tyler, PT Physical Therapist               Goals/Plan    No documentation.                Clinical Impression     [facility] Name 03/04/23 1517          Pain    Pretreatment Pain Rating 0/10 - no pain  -LM     Posttreatment Pain Rating 0/10 - no pain  -LM     Row Name 03/04/23 1517          Plan of Care Review    Plan of Care Reviewed With patient  -LM     Outcome Evaluation Pt demonstrates independence with transfers and 100 feet of gait using rw.  O2 on 3L at 94% post gait.  Pt reports she is at baseline and has no concerns re: mobility at d/c.  Recommend return home with assist.  PT will sign off.  -[facility] Name 03/04/23 1517          Therapy Assessment/Plan (PT)    Criteria for Skilled Interventions Met (PT) no;no problems identified which require skilled intervention  -LM     Therapy Frequency (PT) evaluation only  -LM     Row Name 03/04/23 1517          Vital Signs    Pre Systolic BP Rehab 124  -LM     Pre Treatment Diastolic BP 66  -LM     Pretreatment Heart Rate (beats/min) 92  -LM     Posttreatment Heart Rate (beats/min) 92  -LM     Pre SpO2 (%) 96  -LM     O2 Delivery Pre Treatment supplemental O2  -LM     Intra SpO2 (%) 94  -LM     O2 Delivery Intra Treatment supplemental O2  -LM     Post SpO2 (%) 96  -LM     O2 Delivery Post Treatment supplemental O2  -LM     Pre Patient Position Sitting  -LM     Intra Patient Position Sitting  -LM     Post Patient Position Sitting  -LM     Row Name 03/04/23 1517          Positioning and Restraints    Pre-Treatment Position sitting in chair/recliner  -LM     Post Treatment Position chair  -LM     In Chair reclined;call light within reach;encouraged to call for  assist;notified nsg  -           User Key  (r) = Recorded By, (t) = Taken By, (c) = Cosigned By    Initials Name Provider Type    LM Jing Tyler PT Physical Therapist               Outcome Measures     Row Name 03/04/23 1519          How much help from another person do you currently need...    Turning from your back to your side while in flat bed without using bedrails? 4  -LM     Moving from lying on back to sitting on the side of a flat bed without bedrails? 4  -LM     Moving to and from a bed to a chair (including a wheelchair)? 4  -LM     Standing up from a chair using your arms (e.g., wheelchair, bedside chair)? 4  -LM     Climbing 3-5 steps with a railing? 4  -LM     To walk in hospital room? 4  -LM     AM-PAC 6 Clicks Score (PT) 24  -LM     Highest level of mobility 8 --> Walked 250 feet or more  -     Row Name 03/04/23 1519 03/04/23 1438       Functional Assessment    Outcome Measure Options AM-PAC 6 Clicks Basic Mobility (PT)  -LM AM-PAC 6 Clicks Daily Activity (OT)  -AC          User Key  (r) = Recorded By, (t) = Taken By, (c) = Cosigned By    Initials Name Provider Type    AC Misty Coleman, OT Occupational Therapist     Jing Tyler PT Physical Therapist              Physical Therapy Education     Title: PT OT SLP Therapies (In Progress)     Topic: Physical Therapy (Done)     Point: Mobility training (Done)     Learning Progress Summary           Patient Acceptance, E, VU,DU by  at 3/4/2023 1520                   Point: Precautions (Done)     Learning Progress Summary           Patient Acceptance, E, VU,DU by  at 3/4/2023 1520                               User Key     Initials Effective Dates Name Provider Type Discipline     06/16/21 -  Jing Tyler PT Physical Therapist PT              PT Recommendation and Plan     Plan of Care Reviewed With: patient  Outcome Evaluation: Pt demonstrates independence with transfers and 100 feet of gait using rw.  O2 on 3L at 94% post gait.  Pt  reports she is at baseline and has no concerns re: mobility at d/c.  Recommend return home with assist.  PT will sign off.     Time Calculation:    PT Charges     Row Name 03/04/23 1520             Time Calculation    Start Time 1448  -LM      PT Received On 03/04/23  -LM         Untimed Charges    PT Eval/Re-eval Minutes 33  -LM         Total Minutes    Untimed Charges Total Minutes 33  -LM       Total Minutes 33  -LM            User Key  (r) = Recorded By, (t) = Taken By, (c) = Cosigned By    Initials Name Provider Type    LM Jing Tyler, PT Physical Therapist              Therapy Charges for Today     Code Description Service Date Service Provider Modifiers Qty    [id number] HC PT EVAL LOW COMPLEXITY 3 3/4/2023 Jing Tyler, PT GP 1          PT G-Codes  Outcome Measure Options: AM-PAC 6 Clicks Basic Mobility (PT)  AM-PAC 6 Clicks Score (PT): 24  AM-PAC 6 Clicks Score (OT): 23    PT Discharge Summary  Anticipated Discharge Disposition (PT): home with assist    Jing Tyler PT  3/4/2023

## 2023-03-05 LAB
ANION GAP SERPL CALCULATED.3IONS-SCNC: 12 MMOL/L (ref 5–15)
ANION GAP SERPL CALCULATED.3IONS-SCNC: 9 MMOL/L (ref 5–15)
BUN SERPL-MCNC: 41 MG/DL (ref 8–23)
BUN SERPL-MCNC: 43 MG/DL (ref 8–23)
BUN/CREAT SERPL: 33.9 (ref 7–25)
BUN/CREAT SERPL: 34.1 (ref 7–25)
CALCIUM SPEC-SCNC: 8.7 MG/DL (ref 8.6–10.5)
CALCIUM SPEC-SCNC: 8.9 MG/DL (ref 8.6–10.5)
CHLORIDE SERPL-SCNC: 100 MMOL/L (ref 98–107)
CHLORIDE SERPL-SCNC: 102 MMOL/L (ref 98–107)
CO2 SERPL-SCNC: 24 MMOL/L (ref 22–29)
CO2 SERPL-SCNC: 25 MMOL/L (ref 22–29)
CREAT SERPL-MCNC: 1.21 MG/DL (ref 0.57–1)
CREAT SERPL-MCNC: 1.26 MG/DL (ref 0.57–1)
EGFRCR SERPLBLD CKD-EPI 2021: 44.3 ML/MIN/1.73
EGFRCR SERPLBLD CKD-EPI 2021: 46.5 ML/MIN/1.73
GLUCOSE BLDC GLUCOMTR-MCNC: 140 MG/DL (ref 70–130)
GLUCOSE BLDC GLUCOMTR-MCNC: 148 MG/DL (ref 70–130)
GLUCOSE BLDC GLUCOMTR-MCNC: 159 MG/DL (ref 70–130)
GLUCOSE BLDC GLUCOMTR-MCNC: 171 MG/DL (ref 70–130)
GLUCOSE SERPL-MCNC: 152 MG/DL (ref 65–99)
GLUCOSE SERPL-MCNC: 178 MG/DL (ref 65–99)
POTASSIUM SERPL-SCNC: 5.6 MMOL/L (ref 3.5–5.2)
POTASSIUM SERPL-SCNC: 6 MMOL/L (ref 3.5–5.2)
SODIUM SERPL-SCNC: 134 MMOL/L (ref 136–145)
SODIUM SERPL-SCNC: 138 MMOL/L (ref 136–145)

## 2023-03-05 PROCEDURE — 94799 UNLISTED PULMONARY SVC/PX: CPT

## 2023-03-05 PROCEDURE — 63710000001 INSULIN DETEMIR PER 5 UNITS: Performed by: INTERNAL MEDICINE

## 2023-03-05 PROCEDURE — 63710000001 INSULIN LISPRO (HUMAN) PER 5 UNITS: Performed by: INTERNAL MEDICINE

## 2023-03-05 PROCEDURE — 94660 CPAP INITIATION&MGMT: CPT

## 2023-03-05 PROCEDURE — 96372 THER/PROPH/DIAG INJ SC/IM: CPT

## 2023-03-05 PROCEDURE — 99231 SBSQ HOSP IP/OBS SF/LOW 25: CPT | Performed by: FAMILY MEDICINE

## 2023-03-05 PROCEDURE — 94664 DEMO&/EVAL PT USE INHALER: CPT

## 2023-03-05 PROCEDURE — 94761 N-INVAS EAR/PLS OXIMETRY MLT: CPT

## 2023-03-05 PROCEDURE — 25010000002 ENOXAPARIN PER 10 MG: Performed by: INTERNAL MEDICINE

## 2023-03-05 PROCEDURE — 80048 BASIC METABOLIC PNL TOTAL CA: CPT | Performed by: INTERNAL MEDICINE

## 2023-03-05 PROCEDURE — 82962 GLUCOSE BLOOD TEST: CPT

## 2023-03-05 PROCEDURE — 80048 BASIC METABOLIC PNL TOTAL CA: CPT | Performed by: FAMILY MEDICINE

## 2023-03-05 RX ORDER — NYSTATIN 100000 U/G
1 CREAM TOPICAL DAILY PRN
Status: DISCONTINUED | OUTPATIENT
Start: 2023-03-05 | End: 2023-03-06 | Stop reason: HOSPADM

## 2023-03-05 RX ADMIN — CITALOPRAM 40 MG: 40 TABLET ORAL at 08:47

## 2023-03-05 RX ADMIN — VERAPAMIL HYDROCHLORIDE 240 MG: 240 TABLET, FILM COATED, EXTENDED RELEASE ORAL at 20:45

## 2023-03-05 RX ADMIN — ACETAMINOPHEN 325MG 650 MG: 325 TABLET ORAL at 08:47

## 2023-03-05 RX ADMIN — ASPIRIN 81 MG: 81 TABLET, COATED ORAL at 08:47

## 2023-03-05 RX ADMIN — IPRATROPIUM BROMIDE AND ALBUTEROL SULFATE 3 ML: 2.5; .5 SOLUTION RESPIRATORY (INHALATION) at 01:13

## 2023-03-05 RX ADMIN — ENOXAPARIN SODIUM 40 MG: 40 INJECTION SUBCUTANEOUS at 11:52

## 2023-03-05 RX ADMIN — SENNOSIDES AND DOCUSATE SODIUM 2 TABLET: 50; 8.6 TABLET ORAL at 20:45

## 2023-03-05 RX ADMIN — INSULIN DETEMIR 20 UNITS: 100 INJECTION, SOLUTION SUBCUTANEOUS at 20:45

## 2023-03-05 RX ADMIN — FLUTICASONE PROPIONATE 2 SPRAY: 50 SPRAY, METERED NASAL at 11:52

## 2023-03-05 RX ADMIN — DONEPEZIL HYDROCHLORIDE 10 MG: 10 TABLET, FILM COATED ORAL at 08:47

## 2023-03-05 RX ADMIN — Medication 10 ML: at 08:49

## 2023-03-05 RX ADMIN — Medication 10 ML: at 20:45

## 2023-03-05 RX ADMIN — IPRATROPIUM BROMIDE AND ALBUTEROL SULFATE 3 ML: 2.5; .5 SOLUTION RESPIRATORY (INHALATION) at 13:27

## 2023-03-05 RX ADMIN — COLESTIPOL HYDROCHLORIDE 1 G: 1 TABLET, FILM COATED ORAL at 08:47

## 2023-03-05 RX ADMIN — BUPROPION HYDROCHLORIDE 150 MG: 150 TABLET, FILM COATED, EXTENDED RELEASE ORAL at 08:47

## 2023-03-05 RX ADMIN — SODIUM ZIRCONIUM CYCLOSILICATE 10 G: 10 POWDER, FOR SUSPENSION ORAL at 08:46

## 2023-03-05 RX ADMIN — IPRATROPIUM BROMIDE AND ALBUTEROL SULFATE 3 ML: 2.5; .5 SOLUTION RESPIRATORY (INHALATION) at 03:36

## 2023-03-05 RX ADMIN — IPRATROPIUM BROMIDE AND ALBUTEROL SULFATE 3 ML: 2.5; .5 SOLUTION RESPIRATORY (INHALATION) at 20:17

## 2023-03-05 RX ADMIN — NYSTATIN 1 APPLICATION: 100000 CREAM TOPICAL at 11:53

## 2023-03-05 RX ADMIN — ATORVASTATIN CALCIUM 20 MG: 20 TABLET, FILM COATED ORAL at 20:45

## 2023-03-05 RX ADMIN — INSULIN LISPRO 2 UNITS: 100 INJECTION, SOLUTION INTRAVENOUS; SUBCUTANEOUS at 11:52

## 2023-03-05 RX ADMIN — IPRATROPIUM BROMIDE AND ALBUTEROL SULFATE 3 ML: 2.5; .5 SOLUTION RESPIRATORY (INHALATION) at 09:11

## 2023-03-05 RX ADMIN — ENOXAPARIN SODIUM 40 MG: 40 INJECTION SUBCUTANEOUS at 00:53

## 2023-03-05 NOTE — PLAN OF CARE
Goal Outcome Evaluation:  Plan of Care Reviewed With: patient        Progress: no change  Outcome Evaluation: VSS patient is on 3 L NC. Her baseline is 2 to 3 L at home. Her potassuim was 6 this am. Lokelma given. Will be rechecked this afternoon. Will continue with current plan of care.
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
## 2023-03-06 ENCOUNTER — READMISSION MANAGEMENT (OUTPATIENT)
Dept: CALL CENTER | Facility: HOSPITAL | Age: 78
End: 2023-03-06
Payer: MEDICARE

## 2023-03-06 VITALS
DIASTOLIC BLOOD PRESSURE: 71 MMHG | TEMPERATURE: 97.2 F | SYSTOLIC BLOOD PRESSURE: 159 MMHG | HEART RATE: 66 BPM | WEIGHT: 235 LBS | BODY MASS INDEX: 52.86 KG/M2 | OXYGEN SATURATION: 96 % | HEIGHT: 56 IN | RESPIRATION RATE: 22 BRPM

## 2023-03-06 LAB
ANION GAP SERPL CALCULATED.3IONS-SCNC: 7 MMOL/L (ref 5–15)
BASOPHILS # BLD AUTO: 0.05 10*3/MM3 (ref 0–0.2)
BASOPHILS NFR BLD AUTO: 0.4 % (ref 0–1.5)
BUN SERPL-MCNC: 35 MG/DL (ref 8–23)
BUN/CREAT SERPL: 31.8 (ref 7–25)
CALCIUM SPEC-SCNC: 8.9 MG/DL (ref 8.6–10.5)
CHLORIDE SERPL-SCNC: 101 MMOL/L (ref 98–107)
CO2 SERPL-SCNC: 28 MMOL/L (ref 22–29)
CREAT SERPL-MCNC: 1.1 MG/DL (ref 0.57–1)
DEPRECATED RDW RBC AUTO: 51.5 FL (ref 37–54)
EGFRCR SERPLBLD CKD-EPI 2021: 52.2 ML/MIN/1.73
EOSINOPHIL # BLD AUTO: 0.14 10*3/MM3 (ref 0–0.4)
EOSINOPHIL NFR BLD AUTO: 1.1 % (ref 0.3–6.2)
ERYTHROCYTE [DISTWIDTH] IN BLOOD BY AUTOMATED COUNT: 14.9 % (ref 12.3–15.4)
GLUCOSE BLDC GLUCOMTR-MCNC: 90 MG/DL (ref 70–130)
GLUCOSE SERPL-MCNC: 99 MG/DL (ref 65–99)
HCT VFR BLD AUTO: 40.6 % (ref 34–46.6)
HGB BLD-MCNC: 12.3 G/DL (ref 12–15.9)
IMM GRANULOCYTES # BLD AUTO: 0.16 10*3/MM3 (ref 0–0.05)
IMM GRANULOCYTES NFR BLD AUTO: 1.3 % (ref 0–0.5)
LYMPHOCYTES # BLD AUTO: 2.54 10*3/MM3 (ref 0.7–3.1)
LYMPHOCYTES NFR BLD AUTO: 20.7 % (ref 19.6–45.3)
MCH RBC QN AUTO: 28.2 PG (ref 26.6–33)
MCHC RBC AUTO-ENTMCNC: 30.3 G/DL (ref 31.5–35.7)
MCV RBC AUTO: 93.1 FL (ref 79–97)
MONOCYTES # BLD AUTO: 1.02 10*3/MM3 (ref 0.1–0.9)
MONOCYTES NFR BLD AUTO: 8.3 % (ref 5–12)
NEUTROPHILS NFR BLD AUTO: 68.2 % (ref 42.7–76)
NEUTROPHILS NFR BLD AUTO: 8.36 10*3/MM3 (ref 1.7–7)
NRBC BLD AUTO-RTO: 0 /100 WBC (ref 0–0.2)
PLATELET # BLD AUTO: 170 10*3/MM3 (ref 140–450)
PMV BLD AUTO: 11.6 FL (ref 6–12)
POTASSIUM SERPL-SCNC: 5.8 MMOL/L (ref 3.5–5.2)
RBC # BLD AUTO: 4.36 10*6/MM3 (ref 3.77–5.28)
SODIUM SERPL-SCNC: 136 MMOL/L (ref 136–145)
WBC NRBC COR # BLD: 12.27 10*3/MM3 (ref 3.4–10.8)

## 2023-03-06 PROCEDURE — 82962 GLUCOSE BLOOD TEST: CPT

## 2023-03-06 PROCEDURE — 99239 HOSP IP/OBS DSCHRG MGMT >30: CPT | Performed by: FAMILY MEDICINE

## 2023-03-06 PROCEDURE — 80048 BASIC METABOLIC PNL TOTAL CA: CPT | Performed by: FAMILY MEDICINE

## 2023-03-06 PROCEDURE — 94799 UNLISTED PULMONARY SVC/PX: CPT

## 2023-03-06 PROCEDURE — 85025 COMPLETE CBC W/AUTO DIFF WBC: CPT | Performed by: FAMILY MEDICINE

## 2023-03-06 PROCEDURE — 94761 N-INVAS EAR/PLS OXIMETRY MLT: CPT

## 2023-03-06 PROCEDURE — 94664 DEMO&/EVAL PT USE INHALER: CPT

## 2023-03-06 PROCEDURE — G0378 HOSPITAL OBSERVATION PER HR: HCPCS

## 2023-03-06 RX ORDER — INSULIN GLARGINE 100 [IU]/ML
20 INJECTION, SOLUTION SUBCUTANEOUS NIGHTLY
Refills: 12
Start: 2023-03-06

## 2023-03-06 RX ADMIN — Medication 10 ML: at 08:43

## 2023-03-06 RX ADMIN — DONEPEZIL HYDROCHLORIDE 10 MG: 10 TABLET, FILM COATED ORAL at 08:40

## 2023-03-06 RX ADMIN — ASPIRIN 81 MG: 81 TABLET, COATED ORAL at 08:42

## 2023-03-06 RX ADMIN — BUPROPION HYDROCHLORIDE 150 MG: 150 TABLET, FILM COATED, EXTENDED RELEASE ORAL at 08:40

## 2023-03-06 RX ADMIN — SENNOSIDES AND DOCUSATE SODIUM 2 TABLET: 50; 8.6 TABLET ORAL at 08:40

## 2023-03-06 RX ADMIN — IPRATROPIUM BROMIDE AND ALBUTEROL SULFATE 3 ML: 2.5; .5 SOLUTION RESPIRATORY (INHALATION) at 07:56

## 2023-03-06 RX ADMIN — COLESTIPOL HYDROCHLORIDE 1 G: 1 TABLET, FILM COATED ORAL at 08:40

## 2023-03-06 RX ADMIN — FLUTICASONE PROPIONATE 2 SPRAY: 50 SPRAY, METERED NASAL at 08:40

## 2023-03-06 RX ADMIN — CITALOPRAM 40 MG: 40 TABLET ORAL at 08:40

## 2023-03-06 RX ADMIN — IPRATROPIUM BROMIDE AND ALBUTEROL SULFATE 3 ML: 2.5; .5 SOLUTION RESPIRATORY (INHALATION) at 02:31

## 2023-03-06 NOTE — CASE MANAGEMENT/SOCIAL WORK
Discharge Planning Assessment  [facility]     Patient Name: Masha Chou  MRN: [id number]  Today's Date: 3/6/2023    Admit Date: 3/4/2023    Plan: home   Discharge Needs Assessment     Row Name 03/06/23 0839       Living Environment    People in Home child(more), adult    Name(s) of People in Home Machelle jim    Current Living Arrangements home    Primary Care Provided by self;child(more)       Discharge Needs Assessment    Readmission Within the Last 30 Days no previous admission in last 30 days    Equipment Currently Used at Home cane, straight;oxygen  3 L NC continuous through [employer]    Concerns to be Addressed discharge planning;basic needs               Discharge Plan     Row Name 03/06/23 0840       Plan    Plan home    Patient/Family in Agreement with Plan yes    Plan Comments I met with Ms Chou at the bedside. She lives with her daughter in [facility]. She is independent with self care except bathing where she needs assistance and she needs assistance with all activities of daily living. She is independent with mobility with use of a straight cane. PT and OT have been consulted and recommend home with assist. She anticipates returning home after this hospitalization and her daughter will transport. [employer] brought the patient a portable transport tank to the bedside and more tanks that she was told are on her front porch. She states that her electricity is back on. CM will follow.    Final Discharge Disposition Code 01 - home or self-care              Continued Care and Services - Admitted Since 3/4/2023    Coordination has not been started for this encounter.       Expected Discharge Date and Time     Expected Discharge Date Expected Discharge Time    Mar 6, 2023          Demographic Summary     Row Name 03/06/23 0838       General Information    General Information Comments I confirmed that Kary Wu is Ms Chou's PCP and she has [employer] Medicare for insurance                Functional Status     Row Name 03/06/23 0838       Functional Status, IADL    Medications assistive person    Meal Preparation assistive person    Housekeeping completely dependent    Laundry completely dependent    Shopping completely dependent               Psychosocial    No documentation.                Abuse/Neglect    No documentation.                Legal    No documentation.                Substance Abuse    No documentation.                Patient Forms    No documentation.                   Xochitl Villegas RN

## 2023-03-06 NOTE — DISCHARGE INSTR - APPOINTMENTS
You have been scheduled for a hospital follow up with [facility] for 4/12/2023 @ 8:15 am with PHIL Lynn MD.
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
## 2023-03-06 NOTE — CONSULTS
"  Referring Provider: MD Durga  Reason for Consultation: AECOPD    Subjective .   Education: NN spoke with pt at BS.  Pt alert and able to answer questions appropriately.  Pt O2 sat  94% on  3 L currently, home O2 use 2-3 L.  Pt reports the ability to ambulate unclear distance at baseline before experiencing SOB.  Pt states use of rescue inhaler 7 times weekly, relief of SOB within ~2 mins.  Patient is up to date on COVID, flu and PNA vaccines. She has never smoked.  Pt reports no issues at this time with medications or transportation for appointments.  Pt reports no previous hx of formal COPD teaching, no understanding of action plan, or PA.  Stop light report, NN contact information, instructions for accessing [facility] and list of educational videos given to pt.  \"A Patient's Guide to COPD\" booklet left at BS. 1800QUITNOW reference sheet discussed and given to patient at BS.  Benefits and various levels of pulmonary rehab explained.   COPD education completed in the form of explanation, handouts, and videos.  No new concerns or questions voiced at this time.  NN will continue to follow as needed.     Age: 77 y.o.  Sex: female  Smoker Status: Never  Pulmonologist: PHIL Lynn MD  FEV1 (PFT): NA  Home O2: 2-3L    Objective     SpO2 SpO2: 96 % (03/06/23 0840)  Device Device (Oxygen Therapy): nasal cannula (03/06/23 0840)  Flow Flow (L/min): 3 (03/06/23 0840)  Incentive Spirometer    IS Predicted Level (mL)     Number of Repetitions     Level Incentive Spirometer (mL)    Patient Tolerance     Inhaler Treatment Status    Treatment Route        Home Medications:  Medications Prior to Admission   Medication Sig Dispense Refill Last Dose   • acetaminophen (TYLENOL) 500 MG tablet Take 1 tablet by mouth Every Other Day. For arthritic pain   Past Week   • albuterol sulfate  (90 Base) MCG/ACT inhaler Inhale 2 puffs Every 4 (Four) Hours As Needed for Wheezing or Shortness of Air.   Past Month   • aspirin 81 MG EC tablet Take " 1 tablet by mouth Daily.   3/4/2023   • atorvastatin (LIPITOR) 20 MG tablet Take 1 tablet by mouth Daily.   3/3/2023   • buPROPion XL (WELLBUTRIN XL) 150 MG 24 hr tablet Take 1 tablet by mouth Daily.   3/3/2023   • citalopram (CeleXA) 40 MG tablet Take 1 tablet by mouth Every Morning.   3/4/2023   • colestipol (COLESTID) 1 g tablet Take 1 tablet by mouth Daily.   3/3/2023   • donepezil (ARICEPT) 10 MG tablet Take 1 tablet by mouth Daily.   3/3/2023   • fluticasone (FLONASE) 50 MCG/ACT nasal spray 2 sprays into the nostril(s) as directed by provider Daily.   3/3/2023   • guaifenesin-dextromethorphan (MUCINEX DM)  MG tablet sustained-release 12 hour tablet Take 1 tablet by mouth 2 (Two) Times a Day As Needed.   Past Week   • lisinopril (PRINIVIL,ZESTRIL) 20 MG tablet Take 2 tablets by mouth Daily.   3/3/2023   • loperamide (IMODIUM A-D) 2 MG tablet Take 1 tablet by mouth Every 4 (Four) Hours As Needed (Diarrhea).   Past Week   • nystatin (MYCOSTATIN) 178789 UNIT/GM cream Apply 1 application topically to the appropriate area as directed Daily As Needed. Apply to skin folds for yeast   Past Week   • verapamil SR (CALAN-SR) 240 MG CR tablet Take 1 tablet by mouth Daily.   3/3/2023   • [DISCONTINUED] insulin glargine (LANTUS, SEMGLEE) 100 UNIT/ML injection Inject 40 Units under the skin into the appropriate area as directed Every Night.   3/3/2023   • furosemide (LASIX) 20 MG tablet Take 1 tablet by mouth Daily As Needed.   More than a month   • insulin lispro (humaLOG) 100 UNIT/ML injection Inject  under the skin into the appropriate area as directed 3 (Three) Times a Day Before Meals. Sliding scale Carb corrections      • magnesium oxide (MAGOX) 400 (241.3 Mg) MG tablet tablet Take 1 tablet by mouth 2 (Two) Times a Day.   More than a month       Discussion: Per current GOLD Standards, please consider: Rescue in place, No LAMA/LABA/ICS in place, Outpatient PFT, Rehab as appropriate, Palliative Care  consult    Patient has been scheduled for a hospital follow up with [facility] for 4/12/2023 @ 8:15 am with PHIL Lynn MD.          Felicia Lozoya RN

## 2023-03-07 NOTE — OUTREACH NOTE
Prep Survey    Flowsheet Row Responses   [facility] patient discharged from? [facility]   Is LACE score < 7 ? No   Eligibility Readm Mgmt   Discharge diagnosis COPD   Does the patient have one of the following disease processes/diagnoses(primary or secondary)? COPD   Does the patient have Home health ordered? No   Is there a DME ordered? No   Prep survey completed? Yes          LEXI CRISTOBAL - Registered Nurse

## 2023-03-09 ENCOUNTER — READMISSION MANAGEMENT (OUTPATIENT)
Dept: CALL CENTER | Facility: HOSPITAL | Age: 78
End: 2023-03-09
Payer: MEDICARE

## 2023-03-09 NOTE — OUTREACH NOTE
COPD/PN Week 1 Survey    Flowsheet Row Responses   [facility] patient discharged from? [facility]   Does the patient have one of the following disease processes/diagnoses(primary or secondary)? COPD   Week 1 attempt successful? Yes   Call start time 1523   Call end time 1525   Discharge diagnosis COPD   Person spoke with today (if not patient) and relationship Daughter   Meds reviewed with patient/caregiver? Yes   Is the patient having any side effects they believe may be caused by any medication additions or changes? No   Does the patient have all medications ordered at discharge? Yes   Is the patient taking all medications as directed (includes completed medication regime)? Yes   Does the patient have a primary care provider?  Yes   Does the patient have an appointment with their PCP or specialist within 7 days of discharge? Yes   Comments regarding PCP 3/13/23   Has the patient kept scheduled appointments due by today? N/A   Has home health visited the patient within 72 hours of discharge? N/A   Pulse Ox monitoring Intermittent   Pulse Ox device source Patient   O2 Sat comments Can not find it at the moment   O2 Sat: education provided Sat levels   Psychosocial issues? No   Did the patient receive a copy of their discharge instructions? Yes   What is the patient's perception of their health status since discharge? Improving   Is the patient/caregiver able to teach back the hierarchy of who to call/visit for symptoms/problems? PCP, Specialist, Home health nurse, Urgent Care, ED, 911 Yes   Patient reports what zone on this call? Green Zone   Green Zone Reports doing well, Breathing without shortness of breath   Week 1 call completed? Yes          Mary Beth HAMILTON - Registered Nurse

## 2023-08-29 ENCOUNTER — APPOINTMENT (OUTPATIENT)
Dept: GENERAL RADIOLOGY | Facility: HOSPITAL | Age: 78
End: 2023-08-29
Payer: MEDICARE

## 2023-08-29 ENCOUNTER — HOSPITAL ENCOUNTER (EMERGENCY)
Facility: HOSPITAL | Age: 78
Discharge: HOME | End: 2023-08-29
Attending: EMERGENCY MEDICINE
Payer: MEDICARE

## 2023-08-29 ENCOUNTER — APPOINTMENT (OUTPATIENT)
Dept: CT IMAGING | Facility: HOSPITAL | Age: 78
End: 2023-08-29
Payer: MEDICARE

## 2023-08-29 VITALS
BODY MASS INDEX: 52.42 KG/M2 | SYSTOLIC BLOOD PRESSURE: 178 MMHG | OXYGEN SATURATION: 92 % | WEIGHT: 233 LBS | HEIGHT: 56 IN | RESPIRATION RATE: 22 BRPM | HEART RATE: 83 BPM | DIASTOLIC BLOOD PRESSURE: 80 MMHG | TEMPERATURE: 98.2 F

## 2023-08-29 DIAGNOSIS — M79.672 LEFT FOOT PAIN: Primary | ICD-10-CM

## 2023-08-29 DIAGNOSIS — R51.9 ACUTE NONINTRACTABLE HEADACHE, UNSPECIFIED HEADACHE TYPE: ICD-10-CM

## 2023-08-29 DIAGNOSIS — Z65.8 SOCIAL DISCORD: ICD-10-CM

## 2023-08-29 LAB
ANION GAP SERPL CALCULATED.3IONS-SCNC: 10 MMOL/L (ref 5–15)
BASOPHILS # BLD AUTO: 0.04 10*3/MM3 (ref 0–0.2)
BASOPHILS NFR BLD AUTO: 0.6 % (ref 0–1.5)
BILIRUB UR QL STRIP: NEGATIVE
BUN SERPL-MCNC: 15 MG/DL (ref 8–23)
BUN/CREAT SERPL: 13.2 (ref 7–25)
CALCIUM SPEC-SCNC: 9.2 MG/DL (ref 8.6–10.5)
CHLORIDE SERPL-SCNC: 103 MMOL/L (ref 98–107)
CLARITY UR: CLEAR
CO2 SERPL-SCNC: 29 MMOL/L (ref 22–29)
COLOR UR: YELLOW
CREAT SERPL-MCNC: 1.14 MG/DL (ref 0.57–1)
DEPRECATED RDW RBC AUTO: 45.5 FL (ref 37–54)
EGFRCR SERPLBLD CKD-EPI 2021: 49.7 ML/MIN/1.73
EOSINOPHIL # BLD AUTO: 0.13 10*3/MM3 (ref 0–0.4)
EOSINOPHIL NFR BLD AUTO: 1.9 % (ref 0.3–6.2)
ERYTHROCYTE [DISTWIDTH] IN BLOOD BY AUTOMATED COUNT: 13.7 % (ref 12.3–15.4)
GLUCOSE BLDC GLUCOMTR-MCNC: 113 MG/DL (ref 70–130)
GLUCOSE BLDC GLUCOMTR-MCNC: 96 MG/DL (ref 70–130)
GLUCOSE SERPL-MCNC: 117 MG/DL (ref 65–99)
GLUCOSE UR STRIP-MCNC: NEGATIVE MG/DL
HCT VFR BLD AUTO: 42.3 % (ref 34–46.6)
HGB BLD-MCNC: 12.8 G/DL (ref 12–15.9)
HGB UR QL STRIP.AUTO: NEGATIVE
IMM GRANULOCYTES # BLD AUTO: 0.02 10*3/MM3 (ref 0–0.05)
IMM GRANULOCYTES NFR BLD AUTO: 0.3 % (ref 0–0.5)
KETONES UR QL STRIP: ABNORMAL
LEUKOCYTE ESTERASE UR QL STRIP.AUTO: NEGATIVE
LYMPHOCYTES # BLD AUTO: 1.81 10*3/MM3 (ref 0.7–3.1)
LYMPHOCYTES NFR BLD AUTO: 26.1 % (ref 19.6–45.3)
MCH RBC QN AUTO: 27.6 PG (ref 26.6–33)
MCHC RBC AUTO-ENTMCNC: 30.3 G/DL (ref 31.5–35.7)
MCV RBC AUTO: 91.2 FL (ref 79–97)
MONOCYTES # BLD AUTO: 0.58 10*3/MM3 (ref 0.1–0.9)
MONOCYTES NFR BLD AUTO: 8.4 % (ref 5–12)
NEUTROPHILS NFR BLD AUTO: 4.35 10*3/MM3 (ref 1.7–7)
NEUTROPHILS NFR BLD AUTO: 62.7 % (ref 42.7–76)
NITRITE UR QL STRIP: NEGATIVE
NRBC BLD AUTO-RTO: 0 /100 WBC (ref 0–0.2)
PH UR STRIP.AUTO: 5.5 [PH] (ref 5–8)
PLATELET # BLD AUTO: 192 10*3/MM3 (ref 140–450)
PMV BLD AUTO: 11.6 FL (ref 6–12)
POTASSIUM SERPL-SCNC: 4.4 MMOL/L (ref 3.5–5.2)
PROT UR QL STRIP: ABNORMAL
RBC # BLD AUTO: 4.64 10*6/MM3 (ref 3.77–5.28)
SODIUM SERPL-SCNC: 142 MMOL/L (ref 136–145)
SP GR UR STRIP: 1.03 (ref 1–1.03)
UROBILINOGEN UR QL STRIP: ABNORMAL
WBC NRBC COR # BLD: 6.93 10*3/MM3 (ref 3.4–10.8)

## 2023-08-29 PROCEDURE — 73630 X-RAY EXAM OF FOOT: CPT

## 2023-08-29 PROCEDURE — 70450 CT HEAD/BRAIN W/O DYE: CPT

## 2023-08-29 PROCEDURE — 82948 REAGENT STRIP/BLOOD GLUCOSE: CPT

## 2023-08-29 PROCEDURE — 80048 BASIC METABOLIC PNL TOTAL CA: CPT | Performed by: EMERGENCY MEDICINE

## 2023-08-29 PROCEDURE — 36415 COLL VENOUS BLD VENIPUNCTURE: CPT

## 2023-08-29 PROCEDURE — 99284 EMERGENCY DEPT VISIT MOD MDM: CPT

## 2023-08-29 PROCEDURE — 81003 URINALYSIS AUTO W/O SCOPE: CPT | Performed by: EMERGENCY MEDICINE

## 2023-08-29 PROCEDURE — 85025 COMPLETE CBC W/AUTO DIFF WBC: CPT | Performed by: EMERGENCY MEDICINE

## 2023-08-29 RX ORDER — ACETAMINOPHEN 325 MG/1
650 TABLET ORAL ONCE
Status: DISCONTINUED | OUTPATIENT
Start: 2023-08-29 | End: 2023-08-30 | Stop reason: HOSPADM
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
## 2023-08-30 NOTE — DISCHARGE INSTRUCTIONS
Take Tylenol as needed for your foot pain.  Return if any concerns.  Call your primary care provider in the morning to arrange follow-up.
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
## 2024-01-07 ENCOUNTER — NURSE TRIAGE (OUTPATIENT)
Dept: CALL CENTER | Facility: HOSPITAL | Age: 79
End: 2024-01-07
Payer: MEDICARE
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
## 2024-02-12 ENCOUNTER — APPOINTMENT (OUTPATIENT)
Dept: GENERAL RADIOLOGY | Facility: HOSPITAL | Age: 79
End: 2024-02-12
Payer: MEDICARE

## 2024-02-12 ENCOUNTER — HOSPITAL ENCOUNTER (EMERGENCY)
Facility: HOSPITAL | Age: 79
Discharge: HOME | End: 2024-02-13
Attending: EMERGENCY MEDICINE | Admitting: EMERGENCY MEDICINE
Payer: MEDICARE

## 2024-02-12 VITALS
DIASTOLIC BLOOD PRESSURE: 75 MMHG | WEIGHT: 233 LBS | HEIGHT: 56 IN | HEART RATE: 89 BPM | OXYGEN SATURATION: 95 % | TEMPERATURE: 99 F | RESPIRATION RATE: 18 BRPM | SYSTOLIC BLOOD PRESSURE: 182 MMHG | BODY MASS INDEX: 52.42 KG/M2

## 2024-02-12 DIAGNOSIS — R60.0 LOWER EXTREMITY EDEMA: Primary | ICD-10-CM

## 2024-02-12 LAB
ALBUMIN SERPL-MCNC: 4 G/DL (ref 3.5–5.2)
ALBUMIN/GLOB SERPL: 1.5 G/DL
ALP SERPL-CCNC: 106 U/L (ref 39–117)
ALT SERPL W P-5'-P-CCNC: 18 U/L (ref 1–33)
ANION GAP SERPL CALCULATED.3IONS-SCNC: 10 MMOL/L (ref 5–15)
AST SERPL-CCNC: 25 U/L (ref 1–32)
BASOPHILS # BLD AUTO: 0.04 10*3/MM3 (ref 0–0.2)
BASOPHILS NFR BLD AUTO: 0.5 % (ref 0–1.5)
BILIRUB SERPL-MCNC: 0.5 MG/DL (ref 0–1.2)
BUN SERPL-MCNC: 17 MG/DL (ref 8–23)
BUN/CREAT SERPL: 15 (ref 7–25)
CALCIUM SPEC-SCNC: 8.8 MG/DL (ref 8.6–10.5)
CHLORIDE SERPL-SCNC: 99 MMOL/L (ref 98–107)
CO2 SERPL-SCNC: 30 MMOL/L (ref 22–29)
CREAT SERPL-MCNC: 1.13 MG/DL (ref 0.57–1)
D DIMER PPP FEU-MCNC: 0.57 MCGFEU/ML (ref 0–0.77)
DEPRECATED RDW RBC AUTO: 45.5 FL (ref 37–54)
EGFRCR SERPLBLD CKD-EPI 2021: 50.2 ML/MIN/1.73
EOSINOPHIL # BLD AUTO: 0.11 10*3/MM3 (ref 0–0.4)
EOSINOPHIL NFR BLD AUTO: 1.5 % (ref 0.3–6.2)
ERYTHROCYTE [DISTWIDTH] IN BLOOD BY AUTOMATED COUNT: 13.4 % (ref 12.3–15.4)
GLOBULIN UR ELPH-MCNC: 2.7 GM/DL
GLUCOSE SERPL-MCNC: 236 MG/DL (ref 65–99)
HCT VFR BLD AUTO: 40.5 % (ref 34–46.6)
HGB BLD-MCNC: 12.3 G/DL (ref 12–15.9)
IMM GRANULOCYTES # BLD AUTO: 0.07 10*3/MM3 (ref 0–0.05)
IMM GRANULOCYTES NFR BLD AUTO: 0.9 % (ref 0–0.5)
LYMPHOCYTES # BLD AUTO: 1.87 10*3/MM3 (ref 0.7–3.1)
LYMPHOCYTES NFR BLD AUTO: 25 % (ref 19.6–45.3)
MCH RBC QN AUTO: 28.1 PG (ref 26.6–33)
MCHC RBC AUTO-ENTMCNC: 30.4 G/DL (ref 31.5–35.7)
MCV RBC AUTO: 92.5 FL (ref 79–97)
MONOCYTES # BLD AUTO: 0.55 10*3/MM3 (ref 0.1–0.9)
MONOCYTES NFR BLD AUTO: 7.3 % (ref 5–12)
NEUTROPHILS NFR BLD AUTO: 4.85 10*3/MM3 (ref 1.7–7)
NEUTROPHILS NFR BLD AUTO: 64.8 % (ref 42.7–76)
NRBC BLD AUTO-RTO: 0 /100 WBC (ref 0–0.2)
NT-PROBNP SERPL-MCNC: 142.9 PG/ML (ref 0–1800)
PLATELET # BLD AUTO: 173 10*3/MM3 (ref 140–450)
PMV BLD AUTO: 11.7 FL (ref 6–12)
POTASSIUM SERPL-SCNC: 4.8 MMOL/L (ref 3.5–5.2)
PROT SERPL-MCNC: 6.7 G/DL (ref 6–8.5)
QT INTERVAL: 396 MS
QTC INTERVAL: 476 MS
RBC # BLD AUTO: 4.38 10*6/MM3 (ref 3.77–5.28)
SODIUM SERPL-SCNC: 139 MMOL/L (ref 136–145)
TROPONIN T SERPL HS-MCNC: 10 NG/L
WBC NRBC COR # BLD AUTO: 7.49 10*3/MM3 (ref 3.4–10.8)

## 2024-02-12 PROCEDURE — 84484 ASSAY OF TROPONIN QUANT: CPT | Performed by: EMERGENCY MEDICINE

## 2024-02-12 PROCEDURE — 83880 ASSAY OF NATRIURETIC PEPTIDE: CPT | Performed by: EMERGENCY MEDICINE

## 2024-02-12 PROCEDURE — 85379 FIBRIN DEGRADATION QUANT: CPT | Performed by: EMERGENCY MEDICINE

## 2024-02-12 PROCEDURE — 80053 COMPREHEN METABOLIC PANEL: CPT | Performed by: EMERGENCY MEDICINE

## 2024-02-12 PROCEDURE — 71045 X-RAY EXAM CHEST 1 VIEW: CPT

## 2024-02-12 PROCEDURE — 99284 EMERGENCY DEPT VISIT MOD MDM: CPT

## 2024-02-12 PROCEDURE — 93005 ELECTROCARDIOGRAM TRACING: CPT | Performed by: EMERGENCY MEDICINE

## 2024-02-12 PROCEDURE — 85025 COMPLETE CBC W/AUTO DIFF WBC: CPT | Performed by: EMERGENCY MEDICINE

## 2024-02-12 RX ORDER — FUROSEMIDE 20 MG/1
20 TABLET ORAL DAILY
Qty: 5 TABLET | Refills: 0 | Status: SHIPPED | OUTPATIENT
Start: 2024-02-12 | End: 2024-02-17

## 2024-06-02 ENCOUNTER — APPOINTMENT (OUTPATIENT)
Dept: GENERAL RADIOLOGY | Facility: HOSPITAL | Age: 79
End: 2024-06-02
Payer: MEDICARE

## 2024-06-02 ENCOUNTER — HOSPITAL ENCOUNTER (EMERGENCY)
Facility: HOSPITAL | Age: 79
Discharge: HOME | End: 2024-06-03
Attending: EMERGENCY MEDICINE | Admitting: EMERGENCY MEDICINE
Payer: MEDICARE

## 2024-06-02 DIAGNOSIS — Z86.39 HISTORY OF DIABETES MELLITUS: ICD-10-CM

## 2024-06-02 DIAGNOSIS — M79.674 GREAT TOE PAIN, RIGHT: Primary | ICD-10-CM

## 2024-06-02 LAB
BASOPHILS # BLD AUTO: 0.06 10*3/MM3 (ref 0–0.2)
BASOPHILS NFR BLD AUTO: 0.7 % (ref 0–1.5)
DEPRECATED RDW RBC AUTO: 47.5 FL (ref 37–54)
EOSINOPHIL # BLD AUTO: 0.14 10*3/MM3 (ref 0–0.4)
EOSINOPHIL NFR BLD AUTO: 1.7 % (ref 0.3–6.2)
ERYTHROCYTE [DISTWIDTH] IN BLOOD BY AUTOMATED COUNT: 14.2 % (ref 12.3–15.4)
GLUCOSE BLDC GLUCOMTR-MCNC: 298 MG/DL (ref 70–130)
HCT VFR BLD AUTO: 39.4 % (ref 34–46.6)
HGB BLD-MCNC: 12 G/DL (ref 12–15.9)
IMM GRANULOCYTES # BLD AUTO: 0.11 10*3/MM3 (ref 0–0.05)
IMM GRANULOCYTES NFR BLD AUTO: 1.4 % (ref 0–0.5)
LYMPHOCYTES # BLD AUTO: 1.91 10*3/MM3 (ref 0.7–3.1)
LYMPHOCYTES NFR BLD AUTO: 23.5 % (ref 19.6–45.3)
MCH RBC QN AUTO: 27.8 PG (ref 26.6–33)
MCHC RBC AUTO-ENTMCNC: 30.5 G/DL (ref 31.5–35.7)
MCV RBC AUTO: 91.2 FL (ref 79–97)
MONOCYTES # BLD AUTO: 0.65 10*3/MM3 (ref 0.1–0.9)
MONOCYTES NFR BLD AUTO: 8 % (ref 5–12)
NEUTROPHILS NFR BLD AUTO: 5.25 10*3/MM3 (ref 1.7–7)
NEUTROPHILS NFR BLD AUTO: 64.7 % (ref 42.7–76)
NRBC BLD AUTO-RTO: 0 /100 WBC (ref 0–0.2)
PLATELET # BLD AUTO: 181 10*3/MM3 (ref 140–450)
PMV BLD AUTO: 11.7 FL (ref 6–12)
RBC # BLD AUTO: 4.32 10*6/MM3 (ref 3.77–5.28)
WBC NRBC COR # BLD AUTO: 8.12 10*3/MM3 (ref 3.4–10.8)

## 2024-06-02 PROCEDURE — 83605 ASSAY OF LACTIC ACID: CPT | Performed by: PHYSICIAN ASSISTANT

## 2024-06-02 PROCEDURE — 73660 X-RAY EXAM OF TOE(S): CPT

## 2024-06-02 PROCEDURE — 82948 REAGENT STRIP/BLOOD GLUCOSE: CPT

## 2024-06-02 PROCEDURE — 85025 COMPLETE CBC W/AUTO DIFF WBC: CPT | Performed by: PHYSICIAN ASSISTANT

## 2024-06-02 PROCEDURE — 80053 COMPREHEN METABOLIC PANEL: CPT | Performed by: PHYSICIAN ASSISTANT

## 2024-06-02 PROCEDURE — 99283 EMERGENCY DEPT VISIT LOW MDM: CPT

## 2024-06-02 PROCEDURE — 36415 COLL VENOUS BLD VENIPUNCTURE: CPT

## 2024-06-03 VITALS
WEIGHT: 150 LBS | HEIGHT: 56 IN | TEMPERATURE: 98.7 F | DIASTOLIC BLOOD PRESSURE: 75 MMHG | HEART RATE: 79 BPM | RESPIRATION RATE: 20 BRPM | OXYGEN SATURATION: 98 % | BODY MASS INDEX: 33.74 KG/M2 | SYSTOLIC BLOOD PRESSURE: 138 MMHG

## 2024-06-03 LAB
ALBUMIN SERPL-MCNC: 3.8 G/DL (ref 3.5–5.2)
ALBUMIN/GLOB SERPL: 1.3 G/DL
ALP SERPL-CCNC: 108 U/L (ref 39–117)
ALT SERPL W P-5'-P-CCNC: 24 U/L (ref 1–33)
ANION GAP SERPL CALCULATED.3IONS-SCNC: 9 MMOL/L (ref 5–15)
AST SERPL-CCNC: 33 U/L (ref 1–32)
BILIRUB SERPL-MCNC: 0.4 MG/DL (ref 0–1.2)
BUN SERPL-MCNC: 16 MG/DL (ref 8–23)
BUN/CREAT SERPL: 15.8 (ref 7–25)
CALCIUM SPEC-SCNC: 8.8 MG/DL (ref 8.6–10.5)
CHLORIDE SERPL-SCNC: 97 MMOL/L (ref 98–107)
CO2 SERPL-SCNC: 32 MMOL/L (ref 22–29)
CREAT SERPL-MCNC: 1.01 MG/DL (ref 0.57–1)
D-LACTATE SERPL-SCNC: 1.8 MMOL/L (ref 0.5–2)
EGFRCR SERPLBLD CKD-EPI 2021: 57.1 ML/MIN/1.73
GLOBULIN UR ELPH-MCNC: 3 GM/DL
GLUCOSE SERPL-MCNC: 250 MG/DL (ref 65–99)
POTASSIUM SERPL-SCNC: 4.7 MMOL/L (ref 3.5–5.2)
PROT SERPL-MCNC: 6.8 G/DL (ref 6–8.5)
SODIUM SERPL-SCNC: 138 MMOL/L (ref 136–145)

## 2024-06-03 NOTE — ED PROVIDER NOTES
EMERGENCY DEPARTMENT ENCOUNTER    Pt Name: Masha Chou  MRN: [id number]  Pt :   1945  Room Number:    Date of encounter:  2024  PCP: Kary Wu MD  ED Provider: TAPAN Rangel    Historian: Patient    HPI:  Chief Complaint: Right Great toe pain and swelling    Context: Masha Chou is a 79 y.o. female who presents to the ED accompanied by her family with reports of pain and swelling in her right great toe. Patient with recent toe infection on 2024 and was seen by her primacy care physician complaining of tenderness, warmth, and erythema at the distal tip and medial aspect of her right great toe x 3 days reported toenail removed recently. Patient was placed on course of antibiotics for 5 days. Patient completed antibiotics as prescribed and has been placing OTC bacitracin to her daughter daily and applying a Band-Aid.  Patient and daughter were concerned tonight when they observed the toe under the light at home and thought it looked purple.  No additional complaints on exam.  HPI     REVIEW OF SYSTEMS  A chief complaint appropriate review of systems was completed and is negative except as noted in the HPI.     PAST MEDICAL HISTORY  Past Medical History:   Diagnosis Date    Asthma     Cancer     BILATERAL BREAST     COPD (chronic obstructive pulmonary disease)     Diabetes mellitus     Heart failure     Hypertension     Pulmonary hypertension        PAST SURGICAL HISTORY  Past Surgical History:   Procedure Laterality Date    BREAST SURGERY      CHOLECYSTECTOMY      HYSTERECTOMY      JOINT REPLACEMENT      RIGHT KNEE    KELOID EXCISION         FAMILY HISTORY  Family History   Problem Relation Age of Onset    No Known Problems Mother     No Known Problems Father     No Known Problems Sister     No Known Problems Maternal Grandmother     No Known Problems Maternal Grandfather     Cancer Paternal Grandmother     No Known Problems Paternal Grandfather        SOCIAL HISTORY  Social  History     Socioeconomic History    Marital status:    Tobacco Use    Smoking status: Never    Smokeless tobacco: Never   Vaping Use    Vaping status: Never Used   Substance and Sexual Activity    Alcohol use: Yes     Comment: SOCIALLY    Drug use: Never    Sexual activity: Defer       ALLERGIES  Contrast dye (echo or unknown ct/mr), Fluoxetine, Mushroom, Mushroom extract complex, Doxycycline, Bupropion, Levofloxacin, Penicillins, and Prednisone & diphenhydramine    PHYSICAL EXAM  Physical Exam  Vitals and nursing note reviewed.   Constitutional:       General: She is not in acute distress.     Appearance: Normal appearance. She is not ill-appearing or toxic-appearing.   HENT:      Head: Normocephalic and atraumatic.      Nose: Nose normal.      Mouth/Throat:      Mouth: Mucous membranes are moist.   Eyes:      Extraocular Movements: Extraocular movements intact.   Cardiovascular:      Rate and Rhythm: Normal rate.      Pulses: Normal pulses.   Pulmonary:      Effort: Pulmonary effort is normal.   Musculoskeletal:         General: Normal range of motion.      Cervical back: Normal range of motion and neck supple.        Feet:       Comments: Tenderness to great toe, no signs or symptoms of infection, see clinical photograph uploaded below for further documentation   Skin:     General: Skin is warm and dry.   Neurological:      Mental Status: She is alert. Mental status is at baseline.   Psychiatric:         Mood and Affect: Mood normal.         Behavior: Behavior normal.           LAB RESULTS  Results for orders placed or performed during the hospital encounter of 06/02/24   Comprehensive Metabolic Panel    Specimen: Arm, Right; Blood   Result Value Ref Range    Glucose 250 (H) 65 - 99 mg/dL    BUN 16 8 - 23 mg/dL    Creatinine 1.01 (H) 0.57 - 1.00 mg/dL    Sodium 138 136 - 145 mmol/L    Potassium 4.7 3.5 - 5.2 mmol/L    Chloride 97 (L) 98 - 107 mmol/L    CO2 32.0 (H) 22.0 - 29.0 mmol/L    Calcium 8.8 8.6 -  10.5 mg/dL    Total Protein 6.8 6.0 - 8.5 g/dL    Albumin 3.8 3.5 - 5.2 g/dL    ALT (SGPT) 24 1 - 33 U/L    AST (SGOT) 33 (H) 1 - 32 U/L    Alkaline Phosphatase 108 39 - 117 U/L    Total Bilirubin 0.4 0.0 - 1.2 mg/dL    Globulin 3.0 gm/dL    A/G Ratio 1.3 g/dL    BUN/Creatinine Ratio 15.8 7.0 - 25.0    Anion Gap 9.0 5.0 - 15.0 mmol/L    eGFR 57.1 (L) >60.0 mL/min/1.73   Lactic Acid, Plasma    Specimen: Arm, Right; Blood   Result Value Ref Range    Lactate 1.8 0.5 - 2.0 mmol/L   CBC Auto Differential    Specimen: Arm, Right; Blood   Result Value Ref Range    WBC 8.12 3.40 - 10.80 10*3/mm3    RBC 4.32 3.77 - 5.28 10*6/mm3    Hemoglobin 12.0 12.0 - 15.9 g/dL    Hematocrit 39.4 34.0 - 46.6 %    MCV 91.2 79.0 - 97.0 fL    MCH 27.8 26.6 - 33.0 pg    MCHC 30.5 (L) 31.5 - 35.7 g/dL    RDW 14.2 12.3 - 15.4 %    RDW-SD 47.5 37.0 - 54.0 fl    MPV 11.7 6.0 - 12.0 fL    Platelets 181 140 - 450 10*3/mm3    Neutrophil % 64.7 42.7 - 76.0 %    Lymphocyte % 23.5 19.6 - 45.3 %    Monocyte % 8.0 5.0 - 12.0 %    Eosinophil % 1.7 0.3 - 6.2 %    Basophil % 0.7 0.0 - 1.5 %    Immature Grans % 1.4 (H) 0.0 - 0.5 %    Neutrophils, Absolute 5.25 1.70 - 7.00 10*3/mm3    Lymphocytes, Absolute 1.91 0.70 - 3.10 10*3/mm3    Monocytes, Absolute 0.65 0.10 - 0.90 10*3/mm3    Eosinophils, Absolute 0.14 0.00 - 0.40 10*3/mm3    Basophils, Absolute 0.06 0.00 - 0.20 10*3/mm3    Immature Grans, Absolute 0.11 (H) 0.00 - 0.05 10*3/mm3    nRBC 0.0 0.0 - 0.2 /100 WBC   POC Glucose Once    Specimen: Blood   Result Value Ref Range    Glucose 298 (H) 70 - 130 mg/dL       If labs were ordered, I independently reviewed the results and considered them in treating the patient.    RADIOLOGY  XR Toe 2+ View Right   Final Result   Impression:   No acute osseous abnormality. Mild diffuse soft tissue swelling is present. No suspicious periostitis.         Electronically Signed: Daniela Upton MD     6/2/2024 10:51 PM EDT     Workstation ID: [id number]        [x] Radiologist's  Report Reviewed:  I ordered and independently interpreted the above noted radiographic studies.  See radiologist's dictation for official interpretation.      PROCEDURES    Procedures    No orders to display       MEDICATIONS GIVEN IN ER    Medications - No data to display    MEDICAL DECISION MAKING, PROGRESS, and CONSULTS   Medical Decision Making  71-year-old nontoxic-appearing female presents ED for evaluation of right great toe pain and swelling.  Recent diagnosis of right toe cellulitis.  No acute or emergent findings demonstrated on physical exam.  No signs or symptoms concerning for infection.  Neurovascularly intact.  Patient with strong dorsalis pedis and posterior tibialis pulses.  Labs without acute or emergent findings.  Imaging of right great toe demonstrated no acute bony abnormalities.  Patient instructed on continued symptomatic care, outpatient follow-up to primary care.  Return precautions provided with demonstrated understanding.    Amount and/or Complexity of Data Reviewed  Independent Historian: caregiver     Details: HPI and review of systems with information provided from caregiver daughter at bedside.  External Data Reviewed: notes.     Details: Personally reviewed primary care visit from 05/18/2024 where patient presented with her daughter with complaints of tenderness, warmth, and erythema at the distal tip and medial aspect of her right great toe x 3 days; toenail removed recently. She denied fever, purulent drainage, nausea, and vomiting. Patient is a diabetic.   Seen by : EVE RODRÍGUEZ, APRN  [address] [place], [place], [id number], [facility] 05/19/2024 12:04:50  Labs: ordered. Decision-making details documented in ED Course.  Radiology: ordered and independent interpretation performed. Decision-making details documented in ED Course.        All labs have been independently reviewed by me.  All radiology studies have been interpreted by me and the  radiologist dictating the report.  All EKG's have been independently interpreted by me as well as and overseeing attending physician.    [] Discussed with radiology regarding test interpretation:    Discussion below represents my analysis of pertinent findings related to patient's condition, differential diagnosis, treatment plan and final disposition.    Differential diagnosis:  The differential diagnosis associated with the patient's presentation includes: Contusion, arthritis, fracture, dislocation, tendon/ligamentous injury, cellulitis, osteomyelitis.      Additional sources  Discussed/ obtained information from independent historians:   [] Spouse  [] Parent  [x] Family member  [] Friend  [] EMS   [] Other:  External (non-ED) record review:   [] Inpatient record:   [] Office record:   [] Outpatient record:   [] Prior Outpatient labs:   [] Prior Outpatient radiology:   [x] Primary Care record:   [] Outside ED record:   [] Other:   Patient's care impacted by:   [x] Diabetes  [x] Hypertension  [] Hyperlipidemia  [] Hypothyroidism   [] Coronary Artery Disease   [x] COPD   [] Cancer   [] Obesity  [] GERD   [] Tobacco Abuse   [] Substance Abuse    [x] Anxiety   [] Depression   [x] Other: Chronic respiratory failure with hypoxia, heart failure, pulmonary hypertension  Care significantly affected by Social Determinants of Health (housing and economic circumstances, unemployment)    [] Yes     [x] No   If yes, Patient's care significantly limited by  Social Determinants of Health including:   [] Inadequate housing   [] Low income   [] Alcoholism and drug addiction in family   [] Problems related to primary support group   [] Unemployment   [] Problems related to employment   [] Other Social Determinants of Health:     Shared decision making: I had a discussion with the patient/family regarding diagnosis, diagnostic results, treatment plan, and medications.  The patient/family indicated understanding of these  instructions.  I spent adequate time at the bedside preceding discharge necessary to personally discuss the aftercare instructions, giving patient education, providing explanations of the results of our evaluations/findings, and my decision making to assure that the patient/family understand the plan of care.  Time was allotted to answer questions at that time and throughout the ED course.  Emphasis was placed on timely follow-up after discharge.  I also discussed the potential for the development of an acute emergent condition requiring further evaluation, admission, or even surgical intervention. I discussed that we found nothing during the visit today indicating the need for further workup, admission, or the presence of an unstable medical condition.  I encouraged the patient to return to the emergency department immediately for ANY concerns, worsening, new complaints, or if symptoms persist and unable to seek follow-up in a timely fashion.  The patient/family expressed understanding and agreement with this plan.  The patient will follow-up with primary care for reevaluation.      Orders placed during this visit:  Orders Placed This Encounter   Procedures    XR Toe 2+ View Right    Comprehensive Metabolic Panel    Lactic Acid, Plasma    CBC Auto Differential    POC Glucose Once    CBC & Differential     ED Course:    ED Course as of 06/03/24 0033   Sun Jun 02, 2024   2301 Vitals and Telemetry tracing was reviewed and directly interpreted by myself demonstrating blood pressure 139/66, afebrile, heart rate of 90 respirations of 20 breaths/min and oxygen saturation 91% on baseline oxygen via nasal cannula [JG]   2301 BP: 139/66 [JG]   2302 Temp: 98.7 °F (37.1 °C) [JG]   2302 Heart Rate: 98 [JG]   2302 Resp: 20 [JG]   2302 SpO2: 91 % [JG]   2302 XR Toe personally interpreted by myself and with official read provided by radiology demonstrates  no acute osseous abnormality. Mild diffuse soft tissue swelling is present.  No suspicious periostitis. [JG]   Mon Jun 03, 2024   0008 Personally reviewed findings of ER workup with patient and family at bedside demonstrating no acute findings.  Discussed need for close follow-up with her primary care.  Patient and family agreeable to plan of care, given return precautions and showed understanding. [JG]   0013 Labs studies were reviewed and directly interpreted by myself and demonstrated no acute findings, glucose 250 remainder of labs consistent with prior results.  Lactate within normal limits.  WBC normal. [JG]      ED Course User Index  [JG] Marcus Quintana PA          DIAGNOSIS  Final diagnoses:   Great toe pain, right   History of diabetes mellitus       DISPOSITION    ED Disposition       ED Disposition   Discharge    Condition   Stable    Comment   --               Please note that portions of this document were completed with voice recognition software.        Marcus Quintana PA  06/03/24 0034

## 2024-11-20 ENCOUNTER — TRANSCRIBE ORDERS (OUTPATIENT)
Dept: HOME HEALTH SERVICES | Facility: HOME HEALTHCARE | Age: 79
End: 2024-11-20
Payer: MEDICARE

## 2024-11-20 DIAGNOSIS — E11.65 TYPE 2 DIABETES MELLITUS WITH HYPERGLYCEMIA, UNSPECIFIED WHETHER LONG TERM INSULIN USE: Primary | ICD-10-CM

## 2025-01-12 ENCOUNTER — APPOINTMENT (OUTPATIENT)
Dept: GENERAL RADIOLOGY | Facility: HOSPITAL | Age: 80
End: 2025-01-12
Payer: MEDICARE

## 2025-01-12 ENCOUNTER — HOSPITAL ENCOUNTER (OUTPATIENT)
Facility: HOSPITAL | Age: 80
Setting detail: OBSERVATION
Discharge: NURSING, REHAB OR LONG-TERM CARE | End: 2025-01-29
Attending: EMERGENCY MEDICINE | Admitting: INTERNAL MEDICINE
Payer: MEDICARE

## 2025-01-12 DIAGNOSIS — R29.6 FALLS: Primary | ICD-10-CM

## 2025-01-12 DIAGNOSIS — N30.00 ACUTE CYSTITIS WITHOUT HEMATURIA: ICD-10-CM

## 2025-01-12 PROBLEM — M25.562 LEFT KNEE PAIN: Status: ACTIVE | Noted: 2025-01-12

## 2025-01-12 PROBLEM — N39.0 ACUTE UTI (URINARY TRACT INFECTION): Status: ACTIVE | Noted: 2025-01-12

## 2025-01-12 PROBLEM — M17.12 OSTEOARTHRITIS OF LEFT KNEE: Status: ACTIVE | Noted: 2025-01-12

## 2025-01-12 LAB
ALBUMIN SERPL-MCNC: 4 G/DL (ref 3.5–5.2)
ALBUMIN/GLOB SERPL: 1.7 G/DL
ALP SERPL-CCNC: 83 U/L (ref 39–117)
ALT SERPL W P-5'-P-CCNC: 22 U/L (ref 1–33)
ANION GAP SERPL CALCULATED.3IONS-SCNC: 12 MMOL/L (ref 5–15)
AST SERPL-CCNC: 19 U/L (ref 1–32)
BACTERIA UR QL AUTO: ABNORMAL /HPF
BASOPHILS # BLD AUTO: 0.04 10*3/MM3 (ref 0–0.2)
BASOPHILS NFR BLD AUTO: 0.4 % (ref 0–1.5)
BILIRUB SERPL-MCNC: 0.5 MG/DL (ref 0–1.2)
BILIRUB UR QL STRIP: NEGATIVE
BUN SERPL-MCNC: 31 MG/DL (ref 8–23)
BUN/CREAT SERPL: 26.7 (ref 7–25)
CALCIUM SPEC-SCNC: 9.7 MG/DL (ref 8.6–10.5)
CHLORIDE SERPL-SCNC: 100 MMOL/L (ref 98–107)
CLARITY UR: CLEAR
CO2 SERPL-SCNC: 27 MMOL/L (ref 22–29)
COLOR UR: YELLOW
CREAT SERPL-MCNC: 1.16 MG/DL (ref 0.57–1)
DEPRECATED RDW RBC AUTO: 45.6 FL (ref 37–54)
EGFRCR SERPLBLD CKD-EPI 2021: 48.4 ML/MIN/1.73
EOSINOPHIL # BLD AUTO: 0.09 10*3/MM3 (ref 0–0.4)
EOSINOPHIL NFR BLD AUTO: 1 % (ref 0.3–6.2)
ERYTHROCYTE [DISTWIDTH] IN BLOOD BY AUTOMATED COUNT: 13.3 % (ref 12.3–15.4)
GLOBULIN UR ELPH-MCNC: 2.3 GM/DL
GLUCOSE SERPL-MCNC: 220 MG/DL (ref 65–99)
GLUCOSE UR STRIP-MCNC: NEGATIVE MG/DL
HCT VFR BLD AUTO: 41.1 % (ref 34–46.6)
HGB BLD-MCNC: 13 G/DL (ref 12–15.9)
HGB UR QL STRIP.AUTO: NEGATIVE
HYALINE CASTS UR QL AUTO: ABNORMAL /LPF
IMM GRANULOCYTES # BLD AUTO: 0.13 10*3/MM3 (ref 0–0.05)
IMM GRANULOCYTES NFR BLD AUTO: 1.4 % (ref 0–0.5)
KETONES UR QL STRIP: ABNORMAL
LEUKOCYTE ESTERASE UR QL STRIP.AUTO: ABNORMAL
LYMPHOCYTES # BLD AUTO: 2.32 10*3/MM3 (ref 0.7–3.1)
LYMPHOCYTES NFR BLD AUTO: 25 % (ref 19.6–45.3)
MAGNESIUM SERPL-MCNC: 1.7 MG/DL (ref 1.6–2.4)
MCH RBC QN AUTO: 29.6 PG (ref 26.6–33)
MCHC RBC AUTO-ENTMCNC: 31.6 G/DL (ref 31.5–35.7)
MCV RBC AUTO: 93.6 FL (ref 79–97)
MONOCYTES # BLD AUTO: 0.71 10*3/MM3 (ref 0.1–0.9)
MONOCYTES NFR BLD AUTO: 7.7 % (ref 5–12)
NEUTROPHILS NFR BLD AUTO: 5.98 10*3/MM3 (ref 1.7–7)
NEUTROPHILS NFR BLD AUTO: 64.5 % (ref 42.7–76)
NITRITE UR QL STRIP: POSITIVE
NRBC BLD AUTO-RTO: 0 /100 WBC (ref 0–0.2)
PH UR STRIP.AUTO: <=5 [PH] (ref 5–8)
PLATELET # BLD AUTO: 183 10*3/MM3 (ref 140–450)
PMV BLD AUTO: 11.9 FL (ref 6–12)
POTASSIUM SERPL-SCNC: 5.4 MMOL/L (ref 3.5–5.2)
PROT SERPL-MCNC: 6.3 G/DL (ref 6–8.5)
PROT UR QL STRIP: NEGATIVE
QT INTERVAL: 410 MS
QTC INTERVAL: 467 MS
RBC # BLD AUTO: 4.39 10*6/MM3 (ref 3.77–5.28)
RBC # UR STRIP: ABNORMAL /HPF
REF LAB TEST METHOD: ABNORMAL
SODIUM SERPL-SCNC: 139 MMOL/L (ref 136–145)
SP GR UR STRIP: 1.02 (ref 1–1.03)
SQUAMOUS #/AREA URNS HPF: ABNORMAL /HPF
UROBILINOGEN UR QL STRIP: ABNORMAL
WBC # UR STRIP: ABNORMAL /HPF
WBC NRBC COR # BLD AUTO: 9.27 10*3/MM3 (ref 3.4–10.8)

## 2025-01-12 PROCEDURE — 81001 URINALYSIS AUTO W/SCOPE: CPT | Performed by: PHYSICIAN ASSISTANT

## 2025-01-12 PROCEDURE — G0378 HOSPITAL OBSERVATION PER HR: HCPCS

## 2025-01-12 PROCEDURE — 85025 COMPLETE CBC W/AUTO DIFF WBC: CPT | Performed by: PHYSICIAN ASSISTANT

## 2025-01-12 PROCEDURE — 83735 ASSAY OF MAGNESIUM: CPT | Performed by: PHYSICIAN ASSISTANT

## 2025-01-12 PROCEDURE — 99285 EMERGENCY DEPT VISIT HI MDM: CPT

## 2025-01-12 PROCEDURE — 36415 COLL VENOUS BLD VENIPUNCTURE: CPT

## 2025-01-12 PROCEDURE — 71045 X-RAY EXAM CHEST 1 VIEW: CPT

## 2025-01-12 PROCEDURE — 99223 1ST HOSP IP/OBS HIGH 75: CPT | Performed by: NURSE PRACTITIONER

## 2025-01-12 PROCEDURE — 73560 X-RAY EXAM OF KNEE 1 OR 2: CPT

## 2025-01-12 PROCEDURE — 93005 ELECTROCARDIOGRAM TRACING: CPT | Performed by: PHYSICIAN ASSISTANT

## 2025-01-12 PROCEDURE — 80053 COMPREHEN METABOLIC PANEL: CPT | Performed by: PHYSICIAN ASSISTANT

## 2025-01-12 RX ORDER — CEFUROXIME AXETIL 250 MG/1
500 TABLET ORAL ONCE
Status: COMPLETED | OUTPATIENT
Start: 2025-01-12 | End: 2025-01-12

## 2025-01-12 RX ORDER — ATORVASTATIN CALCIUM 20 MG/1
20 TABLET, FILM COATED ORAL DAILY
Status: DISCONTINUED | OUTPATIENT
Start: 2025-01-13 | End: 2025-01-29 | Stop reason: HOSPADM

## 2025-01-12 RX ORDER — HEPARIN SODIUM 5000 [USP'U]/ML
5000 INJECTION, SOLUTION INTRAVENOUS; SUBCUTANEOUS EVERY 12 HOURS SCHEDULED
Status: DISCONTINUED | OUTPATIENT
Start: 2025-01-13 | End: 2025-01-29 | Stop reason: HOSPADM

## 2025-01-12 RX ORDER — IPRATROPIUM BROMIDE AND ALBUTEROL SULFATE 2.5; .5 MG/3ML; MG/3ML
3 SOLUTION RESPIRATORY (INHALATION) EVERY 4 HOURS PRN
Status: DISCONTINUED | OUTPATIENT
Start: 2025-01-12 | End: 2025-01-14

## 2025-01-12 RX ORDER — DEXTROSE MONOHYDRATE 25 G/50ML
25 INJECTION, SOLUTION INTRAVENOUS
Status: DISCONTINUED | OUTPATIENT
Start: 2025-01-12 | End: 2025-01-29 | Stop reason: HOSPADM

## 2025-01-12 RX ORDER — SODIUM CHLORIDE 0.9 % (FLUSH) 0.9 %
10 SYRINGE (ML) INJECTION EVERY 12 HOURS SCHEDULED
Status: DISCONTINUED | OUTPATIENT
Start: 2025-01-13 | End: 2025-01-29 | Stop reason: HOSPADM

## 2025-01-12 RX ORDER — AMOXICILLIN 250 MG
2 CAPSULE ORAL 2 TIMES DAILY PRN
Status: DISCONTINUED | OUTPATIENT
Start: 2025-01-12 | End: 2025-01-25

## 2025-01-12 RX ORDER — NITROGLYCERIN 0.4 MG/1
0.4 TABLET SUBLINGUAL
Status: DISCONTINUED | OUTPATIENT
Start: 2025-01-12 | End: 2025-01-29 | Stop reason: HOSPADM

## 2025-01-12 RX ORDER — ALBUTEROL SULFATE 0.83 MG/ML
2.5 SOLUTION RESPIRATORY (INHALATION) EVERY 4 HOURS PRN
Status: DISCONTINUED | OUTPATIENT
Start: 2025-01-12 | End: 2025-01-29 | Stop reason: HOSPADM

## 2025-01-12 RX ORDER — ACETAMINOPHEN 325 MG/1
650 TABLET ORAL EVERY 4 HOURS PRN
Status: DISCONTINUED | OUTPATIENT
Start: 2025-01-12 | End: 2025-01-29 | Stop reason: HOSPADM

## 2025-01-12 RX ORDER — BISACODYL 10 MG
10 SUPPOSITORY, RECTAL RECTAL DAILY PRN
Status: DISCONTINUED | OUTPATIENT
Start: 2025-01-12 | End: 2025-01-25

## 2025-01-12 RX ORDER — CHOLESTYRAMINE LIGHT 4 G/5.7G
1 POWDER, FOR SUSPENSION ORAL DAILY
Status: DISCONTINUED | OUTPATIENT
Start: 2025-01-13 | End: 2025-01-29 | Stop reason: HOSPADM

## 2025-01-12 RX ORDER — SODIUM CHLORIDE 0.9 % (FLUSH) 0.9 %
10 SYRINGE (ML) INJECTION AS NEEDED
Status: DISCONTINUED | OUTPATIENT
Start: 2025-01-12 | End: 2025-01-29 | Stop reason: HOSPADM

## 2025-01-12 RX ORDER — ACETAMINOPHEN 650 MG/1
650 SUPPOSITORY RECTAL EVERY 4 HOURS PRN
Status: DISCONTINUED | OUTPATIENT
Start: 2025-01-12 | End: 2025-01-29 | Stop reason: HOSPADM

## 2025-01-12 RX ORDER — IBUPROFEN 600 MG/1
1 TABLET ORAL
Status: DISCONTINUED | OUTPATIENT
Start: 2025-01-12 | End: 2025-01-29 | Stop reason: HOSPADM

## 2025-01-12 RX ORDER — ACETAMINOPHEN 160 MG/5ML
650 SOLUTION ORAL EVERY 4 HOURS PRN
Status: DISCONTINUED | OUTPATIENT
Start: 2025-01-12 | End: 2025-01-29 | Stop reason: HOSPADM

## 2025-01-12 RX ORDER — VERAPAMIL HYDROCHLORIDE 240 MG/1
240 TABLET, FILM COATED, EXTENDED RELEASE ORAL DAILY
Status: DISCONTINUED | OUTPATIENT
Start: 2025-01-13 | End: 2025-01-29 | Stop reason: HOSPADM

## 2025-01-12 RX ORDER — BISACODYL 5 MG/1
5 TABLET, DELAYED RELEASE ORAL DAILY PRN
Status: DISCONTINUED | OUTPATIENT
Start: 2025-01-12 | End: 2025-01-25

## 2025-01-12 RX ORDER — BUPROPION HYDROCHLORIDE 150 MG/1
150 TABLET ORAL DAILY
Status: DISCONTINUED | OUTPATIENT
Start: 2025-01-13 | End: 2025-01-29 | Stop reason: HOSPADM

## 2025-01-12 RX ORDER — ASPIRIN 81 MG/1
81 TABLET ORAL DAILY
Status: DISCONTINUED | OUTPATIENT
Start: 2025-01-13 | End: 2025-01-29 | Stop reason: HOSPADM

## 2025-01-12 RX ORDER — CEFUROXIME AXETIL 250 MG/1
500 TABLET ORAL EVERY 12 HOURS SCHEDULED
Status: DISCONTINUED | OUTPATIENT
Start: 2025-01-13 | End: 2025-01-17

## 2025-01-12 RX ORDER — FLUTICASONE PROPIONATE 50 MCG
2 SPRAY, SUSPENSION (ML) NASAL DAILY
Status: DISCONTINUED | OUTPATIENT
Start: 2025-01-13 | End: 2025-01-29 | Stop reason: HOSPADM

## 2025-01-12 RX ORDER — SODIUM CHLORIDE 9 MG/ML
40 INJECTION, SOLUTION INTRAVENOUS AS NEEDED
Status: DISCONTINUED | OUTPATIENT
Start: 2025-01-12 | End: 2025-01-29 | Stop reason: HOSPADM

## 2025-01-12 RX ORDER — POLYETHYLENE GLYCOL 3350 17 G/17G
17 POWDER, FOR SOLUTION ORAL DAILY PRN
Status: DISCONTINUED | OUTPATIENT
Start: 2025-01-12 | End: 2025-01-25

## 2025-01-12 RX ORDER — DONEPEZIL HYDROCHLORIDE 10 MG/1
10 TABLET, FILM COATED ORAL DAILY
Status: DISCONTINUED | OUTPATIENT
Start: 2025-01-13 | End: 2025-01-29 | Stop reason: HOSPADM

## 2025-01-12 RX ORDER — INSULIN LISPRO 100 [IU]/ML
2-7 INJECTION, SOLUTION INTRAVENOUS; SUBCUTANEOUS
Status: DISCONTINUED | OUTPATIENT
Start: 2025-01-13 | End: 2025-01-19

## 2025-01-12 RX ORDER — NICOTINE POLACRILEX 4 MG
15 LOZENGE BUCCAL
Status: DISCONTINUED | OUTPATIENT
Start: 2025-01-12 | End: 2025-01-29 | Stop reason: HOSPADM

## 2025-01-12 RX ORDER — CITALOPRAM HYDROBROMIDE 40 MG/1
40 TABLET ORAL EVERY MORNING
Status: DISCONTINUED | OUTPATIENT
Start: 2025-01-13 | End: 2025-01-29 | Stop reason: HOSPADM

## 2025-01-12 RX ADMIN — CEFUROXIME AXETIL 500 MG: 250 TABLET, FILM COATED ORAL at 22:09

## 2025-01-12 NOTE — LETTER
EMS Transport Request  For use at [facility], [place], [place], [place], and [place] only   Patient Name: Masha Chou : 1945   Weight:109 kg (240 lb) Pick-up Location: [facility] BLS/ALS: BLS/ALS: BLS   Insurance: [email or web address] MEDICARE REPLACEMENT Auth End Date:    Pre-Cert #: D/C Summary complete:    Destination: Other [facility]   Contact Precautions: None   Equipment (O2, Fluids, etc.): None   Arrive By Date/Time: 25 Stretcher/WC: Stretcher   CM Requesting: AKIRA Delgado (Kay) Ext: [phone]   Notes/Medical Necessity: Generalized weakness, fall risk and poor sitting balance     ______________________________________________________________________    *Only 2 patient bags OR 1 carry-on size bag are permitted.  Wheelchairs and walkers CANNOT transported with the patient. Acknowledge: Yes

## 2025-01-13 LAB
ANION GAP SERPL CALCULATED.3IONS-SCNC: 7 MMOL/L (ref 5–15)
BACTERIA UR QL AUTO: ABNORMAL /HPF
BASOPHILS # BLD AUTO: 0.07 10*3/MM3 (ref 0–0.2)
BASOPHILS NFR BLD AUTO: 0.8 % (ref 0–1.5)
BILIRUB UR QL STRIP: NEGATIVE
BUN SERPL-MCNC: 31 MG/DL (ref 8–23)
BUN/CREAT SERPL: 25.2 (ref 7–25)
CALCIUM SPEC-SCNC: 9.4 MG/DL (ref 8.6–10.5)
CHLORIDE SERPL-SCNC: 105 MMOL/L (ref 98–107)
CLARITY UR: CLEAR
CO2 SERPL-SCNC: 32 MMOL/L (ref 22–29)
COLOR UR: YELLOW
CREAT SERPL-MCNC: 1.23 MG/DL (ref 0.57–1)
DEPRECATED RDW RBC AUTO: 47.5 FL (ref 37–54)
EGFRCR SERPLBLD CKD-EPI 2021: 45.1 ML/MIN/1.73
EOSINOPHIL # BLD AUTO: 0.16 10*3/MM3 (ref 0–0.4)
EOSINOPHIL NFR BLD AUTO: 1.8 % (ref 0.3–6.2)
ERYTHROCYTE [DISTWIDTH] IN BLOOD BY AUTOMATED COUNT: 13.4 % (ref 12.3–15.4)
GLUCOSE BLDC GLUCOMTR-MCNC: 126 MG/DL (ref 70–130)
GLUCOSE BLDC GLUCOMTR-MCNC: 151 MG/DL (ref 70–130)
GLUCOSE BLDC GLUCOMTR-MCNC: 151 MG/DL (ref 70–130)
GLUCOSE BLDC GLUCOMTR-MCNC: 247 MG/DL (ref 70–130)
GLUCOSE BLDC GLUCOMTR-MCNC: 327 MG/DL (ref 70–130)
GLUCOSE SERPL-MCNC: 158 MG/DL (ref 65–99)
GLUCOSE UR STRIP-MCNC: ABNORMAL MG/DL
HBA1C MFR BLD: 9 % (ref 4.8–5.6)
HCT VFR BLD AUTO: 40.3 % (ref 34–46.6)
HGB BLD-MCNC: 12.3 G/DL (ref 12–15.9)
HGB UR QL STRIP.AUTO: NEGATIVE
HYALINE CASTS UR QL AUTO: ABNORMAL /LPF
IMM GRANULOCYTES # BLD AUTO: 0.14 10*3/MM3 (ref 0–0.05)
IMM GRANULOCYTES NFR BLD AUTO: 1.6 % (ref 0–0.5)
KETONES UR QL STRIP: NEGATIVE
LEUKOCYTE ESTERASE UR QL STRIP.AUTO: ABNORMAL
LYMPHOCYTES # BLD AUTO: 2.99 10*3/MM3 (ref 0.7–3.1)
LYMPHOCYTES NFR BLD AUTO: 34.3 % (ref 19.6–45.3)
MAGNESIUM SERPL-MCNC: 1.9 MG/DL (ref 1.6–2.4)
MCH RBC QN AUTO: 29.1 PG (ref 26.6–33)
MCHC RBC AUTO-ENTMCNC: 30.5 G/DL (ref 31.5–35.7)
MCV RBC AUTO: 95.5 FL (ref 79–97)
MONOCYTES # BLD AUTO: 0.82 10*3/MM3 (ref 0.1–0.9)
MONOCYTES NFR BLD AUTO: 9.4 % (ref 5–12)
NEUTROPHILS NFR BLD AUTO: 4.54 10*3/MM3 (ref 1.7–7)
NEUTROPHILS NFR BLD AUTO: 52.1 % (ref 42.7–76)
NITRITE UR QL STRIP: NEGATIVE
NRBC BLD AUTO-RTO: 0 /100 WBC (ref 0–0.2)
PH UR STRIP.AUTO: 5.5 [PH] (ref 5–8)
PLATELET # BLD AUTO: 172 10*3/MM3 (ref 140–450)
PMV BLD AUTO: 11.5 FL (ref 6–12)
POTASSIUM SERPL-SCNC: 5.1 MMOL/L (ref 3.5–5.2)
PROT UR QL STRIP: NEGATIVE
RBC # BLD AUTO: 4.22 10*6/MM3 (ref 3.77–5.28)
RBC # UR STRIP: ABNORMAL /HPF
REF LAB TEST METHOD: ABNORMAL
SODIUM SERPL-SCNC: 144 MMOL/L (ref 136–145)
SP GR UR STRIP: 1.02 (ref 1–1.03)
SQUAMOUS #/AREA URNS HPF: ABNORMAL /HPF
UROBILINOGEN UR QL STRIP: ABNORMAL
WBC # UR STRIP: ABNORMAL /HPF
WBC NRBC COR # BLD AUTO: 8.72 10*3/MM3 (ref 3.4–10.8)

## 2025-01-13 PROCEDURE — 85025 COMPLETE CBC W/AUTO DIFF WBC: CPT | Performed by: NURSE PRACTITIONER

## 2025-01-13 PROCEDURE — 99232 SBSQ HOSP IP/OBS MODERATE 35: CPT | Performed by: STUDENT IN AN ORGANIZED HEALTH CARE EDUCATION/TRAINING PROGRAM

## 2025-01-13 PROCEDURE — 63710000001 INSULIN LISPRO (HUMAN) PER 5 UNITS: Performed by: NURSE PRACTITIONER

## 2025-01-13 PROCEDURE — 82948 REAGENT STRIP/BLOOD GLUCOSE: CPT

## 2025-01-13 PROCEDURE — 83036 HEMOGLOBIN GLYCOSYLATED A1C: CPT | Performed by: NURSE PRACTITIONER

## 2025-01-13 PROCEDURE — 96372 THER/PROPH/DIAG INJ SC/IM: CPT

## 2025-01-13 PROCEDURE — 80048 BASIC METABOLIC PNL TOTAL CA: CPT | Performed by: NURSE PRACTITIONER

## 2025-01-13 PROCEDURE — 25010000002 HEPARIN (PORCINE) PER 1000 UNITS: Performed by: NURSE PRACTITIONER

## 2025-01-13 PROCEDURE — 83735 ASSAY OF MAGNESIUM: CPT | Performed by: NURSE PRACTITIONER

## 2025-01-13 PROCEDURE — 81001 URINALYSIS AUTO W/SCOPE: CPT | Performed by: NURSE PRACTITIONER

## 2025-01-13 PROCEDURE — 97166 OT EVAL MOD COMPLEX 45 MIN: CPT

## 2025-01-13 PROCEDURE — 63710000001 INSULIN GLARGINE PER 5 UNITS: Performed by: NURSE PRACTITIONER

## 2025-01-13 PROCEDURE — 97161 PT EVAL LOW COMPLEX 20 MIN: CPT

## 2025-01-13 PROCEDURE — G0378 HOSPITAL OBSERVATION PER HR: HCPCS

## 2025-01-13 RX ADMIN — FLUTICASONE PROPIONATE 2 SPRAY: 50 SPRAY, METERED NASAL at 08:14

## 2025-01-13 RX ADMIN — CEFUROXIME AXETIL 500 MG: 250 TABLET ORAL at 08:25

## 2025-01-13 RX ADMIN — VERAPAMIL HYDROCHLORIDE 240 MG: 240 TABLET, FILM COATED, EXTENDED RELEASE ORAL at 08:16

## 2025-01-13 RX ADMIN — INSULIN GLARGINE 10 UNITS: 100 INJECTION, SOLUTION SUBCUTANEOUS at 21:06

## 2025-01-13 RX ADMIN — ATORVASTATIN CALCIUM 20 MG: 20 TABLET, FILM COATED ORAL at 08:18

## 2025-01-13 RX ADMIN — INSULIN LISPRO 2 UNITS: 100 INJECTION, SOLUTION INTRAVENOUS; SUBCUTANEOUS at 17:20

## 2025-01-13 RX ADMIN — HEPARIN SODIUM 5000 UNITS: 5000 INJECTION INTRAVENOUS; SUBCUTANEOUS at 08:15

## 2025-01-13 RX ADMIN — SODIUM ZIRCONIUM CYCLOSILICATE 10 G: 10 POWDER, FOR SUSPENSION ORAL at 00:55

## 2025-01-13 RX ADMIN — ASPIRIN 81 MG: 81 TABLET, COATED ORAL at 08:17

## 2025-01-13 RX ADMIN — BUPROPION HYDROCHLORIDE 150 MG: 150 TABLET, EXTENDED RELEASE ORAL at 08:17

## 2025-01-13 RX ADMIN — INSULIN LISPRO 5 UNITS: 100 INJECTION, SOLUTION INTRAVENOUS; SUBCUTANEOUS at 20:57

## 2025-01-13 RX ADMIN — INSULIN GLARGINE 10 UNITS: 100 INJECTION, SOLUTION SUBCUTANEOUS at 00:55

## 2025-01-13 RX ADMIN — MAGNESIUM OXIDE TAB 400 MG (241.3 MG ELEMENTAL MG) 400 MG: 400 (241.3 MG) TAB at 20:57

## 2025-01-13 RX ADMIN — DONEPEZIL HYDROCHLORIDE 10 MG: 10 TABLET, FILM COATED ORAL at 08:17

## 2025-01-13 RX ADMIN — CEFUROXIME AXETIL 500 MG: 250 TABLET ORAL at 20:57

## 2025-01-13 RX ADMIN — CITALOPRAM HYDROBROMIDE 40 MG: 40 TABLET ORAL at 08:18

## 2025-01-13 RX ADMIN — HEPARIN SODIUM 5000 UNITS: 5000 INJECTION INTRAVENOUS; SUBCUTANEOUS at 20:57

## 2025-01-13 RX ADMIN — INSULIN LISPRO 2 UNITS: 100 INJECTION, SOLUTION INTRAVENOUS; SUBCUTANEOUS at 12:14

## 2025-01-13 RX ADMIN — MAGNESIUM OXIDE TAB 400 MG (241.3 MG ELEMENTAL MG) 400 MG: 400 (241.3 MG) TAB at 08:17

## 2025-01-13 RX ADMIN — CHOLESTYRAMINE 4 G: 4 POWDER, FOR SUSPENSION ORAL at 08:16

## 2025-01-13 NOTE — DISCHARGE PLACEMENT REQUEST
"Masha Chou N (79 y.o. Female)   Lillian Marley CM   [phone]  Looking for short term rehab      Date of Birth   1945    Social Security Number       Address   [address] [address]    Home Phone   [phone]    MRN   [id number]       Caodaism   Jewish    Marital Status                               Admission Date   1/12/25    Admission Type   Emergency    Admitting Provider   Christine Guevara MD    Attending Provider   Christine Guevara MD    Department, Room/Bed   [facility] 3H, S375/1       Discharge Date       Discharge Disposition       Discharge Destination                                 Attending Provider: Christine Guevara MD    Allergies: Contrast Dye (Echo Or Unknown Ct/mr), Shellfish-derived Products, Fluoxetine, Mushroom, Mushroom Extract Complex (Do Not Select), Doxycycline, Sulfamethoxazole-trimethoprim, Bupropion, Levofloxacin, Penicillins, Prednisone & Diphenhydramine    Isolation: None   Infection: None   Code Status: CPR    Ht: 142.2 cm (56\")   Wt: 109 kg (240 lb)    Admission Cmt: None   Principal Problem: Falls [R29.6]                   Active Insurance as of 1/12/2025       Primary Coverage       Payor Plan Insurance Group Employer/Plan Group    ANTHEM MEDICARE REPLACEMENT ANTHEM MED ADV HMO [id number]       Payor Plan Address Payor Plan Phone Number Payor Plan Fax Number Effective Dates    [address] [phone]  1/1/2025 - None Entered    [facility] [id number]         Subscriber Name Subscriber Birth Date Member ID       MASHA CHOU N 1945 [id number]                     Emergency Contacts        (Rel.) Home Phone Work Phone Mobile Phone    ELIDAABIELTIMOLIALEVAR SINGH (Daughter) [phone] -- [phone]                 History & Physical        Rosanna Mckeon, APRN at 01/12/25 2206       Attestation signed by Eugene James MD at 01/13/25 0120    I have " reviewed this documentation and agree.                      [facility] Medicine Services  HISTORY AND PHYSICAL    Patient Name: Masha Chou  : 1945  MRN: [id number]  Primary Care Physician: Kary Wu MD  Date of admission: 2025    Subjective  Subjective     Chief Complaint:  Left knee pain    HPI:  Masha Chou is a 79 y.o. female with history of T2DM, HTN, CHF, COPD, breast cancer, pulmonary hypertension, presents to the ED after having a near fall.  Patient reports that she has been experiencing some left knee pain for about a week.  She states that while walking sometimes her left leg will give out but she is able to catch herself on the wall or furniture before she actually falls.  She also reports that she has been experiencing dysuria and urinary frequency for approximately 1 week.  She has dyspnea with exertion, mild cough and wheezing.  No fevers, chills, chest pain, nausea, vomiting, abdominal pain, diarrhea, focal weakness, difficulty with speech, headaches, syncope, or any other complaints at this time.  Of note patient does live with her daughter and the daughter's boyfriend.  They are moving out of their current home and will be staying with friends on the couch for approximately 1 week before getting their new apartment.    UA consistent with UTI.  Chest x-ray shows cardiomegaly and mild interstitial pulmonary edema.  X-ray left knee shows mild to moderate tricompartmental osteoarthritis.  Patient was started on cefuroxime mean in the ED, and is being admitted to the hospitalist for further evaluation and management.    Review of Systems   Constitutional: Negative.    HENT: Negative.     Eyes: Negative.    Respiratory:  Positive for cough, shortness of breath and wheezing.    Cardiovascular: Negative.    Gastrointestinal: Negative.    Endocrine: Negative.    Genitourinary:  Positive for dysuria and frequency. Negative for difficulty urinating and  hematuria.   Musculoskeletal:  Negative for back pain and neck pain.        Left knee pain   Skin: Negative.    Allergic/Immunologic: Negative.    Neurological: Negative.    Hematological: Negative.    Psychiatric/Behavioral: Negative.                  Personal History     Past Medical History:   Diagnosis Date    Asthma     Cancer     BILATERAL BREAST     COPD (chronic obstructive pulmonary disease)     Diabetes mellitus     Heart failure     Hypertension     Pulmonary hypertension              Past Surgical History:   Procedure Laterality Date    BREAST SURGERY      CHOLECYSTECTOMY      HYSTERECTOMY      JOINT REPLACEMENT      RIGHT KNEE    KELOID EXCISION         Family History:  family history includes Cancer in her paternal grandmother; No Known Problems in her father, maternal grandfather, maternal grandmother, mother, paternal grandfather, and sister.     Social History:  reports that she has never smoked. She has never used smokeless tobacco. She reports current alcohol use. She reports that she does not use drugs.  Social History     Social History Narrative    Caffeine: 1 cup of coffee daily        Medications:  acetaminophen, albuterol sulfate HFA, aspirin, atorvastatin, buPROPion XL, citalopram, colestipol, donepezil, fluticasone, furosemide, guaifenesin-dextromethorphan 600-30 mg, insulin glargine, insulin lispro, loperamide, magnesium oxide, nystatin, and verapamil SR    Allergies   Allergen Reactions    Contrast Dye (Echo Or Unknown Ct/Mr) Seizure    Shellfish-Derived Products Nausea And Vomiting    Fluoxetine Hives, Other (See Comments) and Unknown (See Comments)     Other reaction(s): aching    Mushroom Rash    Mushroom Extract Complex (Do Not Select) Rash    Doxycycline Diarrhea    Sulfamethoxazole-Trimethoprim Unknown - Low Severity    Bupropion Rash    Levofloxacin Other (See Comments), Rash and Unknown (See Comments)     Bone Aches    Penicillins Rash     Has tolerated cefdinir    Prednisone &  Diphenhydramine Unknown (See Comments)       Objective  Objective     Vital Signs:   Temp:  [99 °F (37.2 °C)] 99 °F (37.2 °C)  Heart Rate:  [93] 93  Resp:  [20] 20  BP: (153)/(73) 153/73  Flow (L/min) (Oxygen Therapy):  [2] 2    Physical Exam   Constitutional: Awake, alert, resting in bed, family at bedside  Eyes: PERRLA, sclerae anicteric, no conjunctival injection  HENT: NCAT, mucous membranes moist  Neck: Supple, no thyromegaly, no lymphadenopathy, trachea midline  Respiratory: Clear to auscultation bilaterally, diminished in the bases, nonlabored respirations   Cardiovascular: RRR, no murmurs, rubs, or gallops, palpable pedal pulses bilaterally  Gastrointestinal: Positive bowel sounds, soft, nontender, nondistended  Musculoskeletal: No bilateral ankle edema, no clubbing or cyanosis to extremities  Psychiatric: Appropriate affect, cooperative  Neurologic: Oriented x 3, strength symmetric in all extremities, Cranial Nerves grossly intact to confrontation, speech clear  Skin: No rashes      Result Review:  I have personally reviewed the results from the time of this admission to 1/12/2025 23:00 EST and agree with these findings:  [x]  Laboratory list / accordion  []  Microbiology  [x]  Radiology  [x]  EKG/Telemetry   []  Cardiology/Vascular   []  Pathology  [x]  Old records  []  Other:  Most notable findings include:     LAB RESULTS:      Lab 01/12/25 2037   WBC 9.27   HEMOGLOBIN 13.0   HEMATOCRIT 41.1   PLATELETS 183   NEUTROS ABS 5.98   IMMATURE GRANS (ABS) 0.13*   LYMPHS ABS 2.32   MONOS ABS 0.71   EOS ABS 0.09   MCV 93.6         Lab 01/12/25 2037   SODIUM 139   POTASSIUM 5.4*   CHLORIDE 100   CO2 27.0   ANION GAP 12.0   BUN 31*   CREATININE 1.16*   EGFR 48.4*   GLUCOSE 220*   CALCIUM 9.7   MAGNESIUM 1.7         Lab 01/12/25 2037   TOTAL PROTEIN 6.3   ALBUMIN 4.0   GLOBULIN 2.3   ALT (SGPT) 22   AST (SGOT) 19   BILIRUBIN 0.5   ALK PHOS 83                     Brief Urine Lab Results  (Last result in the past  365 days)        Color   Clarity   Blood   Leuk Est   Nitrite   Protein   CREAT   Urine HCG        01/12/25 2059 Yellow   Clear   Negative   Trace   Positive   Negative                 Microbiology Results (last 10 days)       ** No results found for the last 240 hours. **            XR Chest 1 View    Result Date: 1/12/2025  XR CHEST 1 VW Date of Exam: 1/12/2025 8:30 PM EST Indication: cough Comparison: 2/12/2024. Findings: The heart appears enlarged. There is mild interstitial pulmonary edema. No pneumothorax or pleural effusion. No focal lung consolidation. Stable prominent left-sided epicardial fat.     Impression: Impression: Cardiomegaly and mild interstitial pulmonary edema. Electronically Signed: Chidi Hooker MD  1/12/2025 9:14 PM EST  Workstation ID: [id number]    XR Knee 1 or 2 View Left    Result Date: 1/12/2025  XR KNEE 1 OR 2 VW LEFT Date of Exam: 1/12/2025 8:30 PM EST Indication: falls Comparison: None available. Findings: There is moderate medial and lateral compartment and mild patellofemoral osteophyte formation. No acute fracture or dislocation. There is mild tricompartmental joint space narrowing. No erosions. No evidence of joint effusion.     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 Electronically Signed: Chidi Hooker MD  1/12/2025 9:14 PM EST  Workstation ID: [id number]     Results for orders placed during the hospital encounter of 06/25/21    Adult Transthoracic Echo Complete W/ Cont if Necessary Per Protocol    Interpretation Summary  · Estimated left ventricular EF = 70% Left ventricular ejection fraction appears to be 66 - 70%. Left ventricular systolic function is normal.  · Left ventricular diastolic function is consistent with (grade I) impaired relaxation.  · Left ventricular wall thickness is consistent with hypertrophy. Sigmoid-shaped ventricular septum is present.  · LVOT turbulence without gradient.      Assessment & Plan  Assessment & Plan        Falls    Type 2 diabetes mellitus    COPD exacerbation    Left knee pain    Osteoarthritis of left knee    Acute UTI (urinary tract infection)    Masha Chou is a 79 y.o. female with history of T2DM, HTN, CHF, COPD, breast cancer, pulmonary hypertension, presents to the ED after having a near fall.      Assessment and Plan:    Left knee pain  Osteoarthritis  Generalized weakness  -- X-ray left knee shows mild to moderate tricompartmental osteoarthritis  -- Fall precautions  -- PT/OT consult  -- Consult case management  -- Tylenol and Voltaren for pain    Hyperkalemia  Hypomagnesemia  -- K+ 5.4, Mg 1.7  -- Replace Mg per protocol  -- Lokelma x 1 dose  -- BMP, Mg in a.m.    Acute UTI (POA)  -- UA: Ketones trace, nitrite positive, leukocytes trace, WBC 3-5, bacteria 4+, squamous 3-6  -- Urine culture  -- Ceftin    CKD  -- creatinine 1.16  -- baseline creatinine ~ 1.1-1.2   -- bmp in the am    COPD  JUAN F  -- CPAP nightly  -- DuoNebs    T2DM  -- A1c in the a.m.  -- FSBG with SSI  -- Lantus 10 units nightly    DVT prophylaxis: Heparin      CODE STATUS:    Level Of Support Discussed With: Patient  Code Status (Patient has no pulse and is not breathing): CPR (Attempt to Resuscitate)  Medical Interventions (Patient has pulse or is breathing): Full Support      Expected Discharge  TBD  Expected discharge date/ time has not been documented.      This note has been completed as part of a split-shared workflow.     Signature: Electronically signed by SUNITA Cox, 01/12/25, 11:00 PM EST.                   Electronically signed by Eugene James MD at 01/13/25 0120       Vital Signs (last day)       Date/Time Temp Temp src Pulse Resp BP Patient Position SpO2    01/13/25 0726 98.1 (36.7) Oral 75 18 139/65 Lying 98    01/13/25 0356 97.1 (36.2) Oral 71 18 147/58 Lying 95    01/12/25 2305 98.4 (36.9) Oral -- 20 165/84 Sitting --    01/12/25 1957 99 (37.2) Oral 93 20 153/73 Sitting 90          Current  Facility-Administered Medications   Medication Dose Route Frequency Provider Last Rate Last Admin    acetaminophen (TYLENOL) tablet 650 mg  650 mg Oral Q4H PRN Rosanna Mckeon APRN        Or    acetaminophen (TYLENOL) 160 MG/5ML oral solution 650 mg  650 mg Oral Q4H PRN Rosanna Mckeon APRN        Or    acetaminophen (TYLENOL) suppository 650 mg  650 mg Rectal Q4H PRN Rosanna Mckeon, APRN        albuterol (PROVENTIL) nebulizer solution 0.083% 2.5 mg/3mL  2.5 mg Nebulization Q4H PRN Rosanna Mckeon, APRN        aspirin EC tablet 81 mg  81 mg Oral Daily Rosanna Mckeon APRN   81 mg at 01/13/25 0817    atorvastatin (LIPITOR) tablet 20 mg  20 mg Oral Daily Rosanna Mckeon, APRN   20 mg at 01/13/25 0818    sennosides-docusate (PERICOLACE) 8.6-50 MG per tablet 2 tablet  2 tablet Oral BID PRN Rosanna Mckeon APRN        And    polyethylene glycol (MIRALAX) packet 17 g  17 g Oral Daily PRN Rosanna Mckeon APRN        And    bisacodyl (DULCOLAX) EC tablet 5 mg  5 mg Oral Daily PRN Rosanna Mckeon APRN        And    bisacodyl (DULCOLAX) suppository 10 mg  10 mg Rectal Daily PRN Rosanna Mckeon, APRN        buPROPion XL (WELLBUTRIN XL) 24 hr tablet 150 mg  150 mg Oral Daily Rosanna Mckeon APRN   150 mg at 01/13/25 0817    Calcium Replacement - Follow Nurse / BPA Driven Protocol   Not Applicable PRN Rosanna Mckeon, APRN        cefuroxime (CEFTIN) tablet 500 mg  500 mg Oral Q12H Rosanna Mckeon APRN   500 mg at 01/13/25 0825    cholestyramine light packet 4 g  1 packet Oral Daily Rosanna Mckeon, APRN   4 g at 01/13/25 0816    citalopram (CeleXA) tablet 40 mg  40 mg Oral QAM Rosanna Mckeon APRN   40 mg at 01/13/25 0818    dextrose (D50W) (25 g/50 mL) IV injection 25 g  25 g Intravenous Q15 Min PRN Rosanna Mckeon, APRRONIT        dextrose (GLUTOSE) oral gel 15 g  15 g Oral Q15 Min PRN Rosanna Mckeon, SUNITA        Diclofenac Sodium  (VOLTAREN) 1 % gel 2 g  2 g Topical 4x Daily PRN Rosanna Mckeon APRN        donepezil (ARICEPT) tablet 10 mg  10 mg Oral Daily Rosanna Mckeon APRN   10 mg at 01/13/25 0817    fluticasone (FLONASE) 50 MCG/ACT nasal spray 2 spray  2 spray Nasal Daily Rosanna Mckeon APRN   2 spray at 01/13/25 0814    glucagon (GLUCAGEN) injection 1 mg  1 mg Intramuscular Q15 Min PRN Rosanna Mckeon APRN        heparin (porcine) 5000 UNIT/ML injection 5,000 Units  5,000 Units Subcutaneous Q12H Rosanna Mckeon APRN   5,000 Units at 01/13/25 0815    insulin glargine (LANTUS, SEMGLEE) injection 10 Units  10 Units Subcutaneous Nightly Rosanna Mckeon APRN   10 Units at 01/13/25 0055    Insulin Lispro (humaLOG) injection 2-7 Units  2-7 Units Subcutaneous 4x Daily AC & at Bedtime Rosanna Mckeon APRN        ipratropium-albuterol (DUO-NEB) nebulizer solution 3 mL  3 mL Nebulization Q4H PRN Rosanna Mckeon APRN        magnesium oxide (MAG-OX) tablet 400 mg  400 mg Oral BID Rosanna Mckeon APRN   400 mg at 01/13/25 0817    Magnesium Standard Dose Replacement - Follow Nurse / BPA Driven Protocol   Not Applicable PRN Rosanna Mckeon APRN        nitroglycerin (NITROSTAT) SL tablet 0.4 mg  0.4 mg Sublingual Q5 Min PRN Rosanna Mckeon APRN        Phosphorus Replacement - Follow Nurse / BPA Driven Protocol   Not Applicable PRN Rosanna Mckeon APRN        Potassium Replacement - Follow Nurse / BPA Driven Protocol   Not Applicable PRN Rosanna Mckeon APRN        sodium chloride 0.9 % flush 10 mL  10 mL Intravenous Q12H Rosanna Mckeon APRN        sodium chloride 0.9 % flush 10 mL  10 mL Intravenous PRN Rosanna Mckeon APRN        sodium chloride 0.9 % infusion 40 mL  40 mL Intravenous PRN Mckeon, Rosanna W, APRN        verapamil SR (CALAN-SR) CR tablet 240 mg  240 mg Oral Daily Rosanna Mckeon, APRN   240 mg at 01/13/25 0816     Physician Progress Notes  (last 24 hours)  Notes from 01/12/25 0946 through 01/13/25 0946   No notes of this type exist for this encounter.       Physical Therapy Notes (most recent note)    No notes exist for this encounter.       Occupational Therapy Notes (most recent note)    No notes exist for this encounter.

## 2025-01-13 NOTE — PLAN OF CARE
Goal Outcome Evaluation:  Plan of Care Reviewed With: patient           Outcome Evaluation: Patient presents with deficits in endurance, balance, and strength with LLE pain. She was able to ambulate 30' CGA with SPC while on 2L O2, limited by fatigue. IPPT is indicated to address current deficits. Recommend D/C to SNF when medically appropriate.    Anticipated Discharge Disposition (PT): skilled nursing facility

## 2025-01-13 NOTE — PLAN OF CARE
Goal Outcome Evaluation:  Plan of Care Reviewed With: patient        Progress: no change  Outcome Evaluation: Pt presents below her functional baseline with deficits including generalized weakness, impaired balance, decreased activity tolerance, and impaired ADLs warranting OT services. Pt performed bed mobility with stand by assist and functional transfers with CGA using straight cane. Rec SNF at [place] for best functional outcomes.    Anticipated Discharge Disposition (OT): skilled nursing facility

## 2025-01-13 NOTE — THERAPY EVALUATION
Patient Name: Masha Chou  : 1945    MRN: [id number]                              Today's Date: 2025       Admit Date: 2025    Visit Dx:     ICD-10-CM ICD-9-CM   1. Falls  R29.6 V15.88   2. Acute cystitis without hematuria  N30.00 595.0     Patient Active Problem List   Diagnosis    Chest pain    Type 2 diabetes mellitus    Pulmonary HTN    Heart failure    COPD exacerbation    Chronic respiratory failure with hypoxia    Falls    Left knee pain    Osteoarthritis of left knee    Acute UTI (urinary tract infection)     Past Medical History:   Diagnosis Date    Asthma     Cancer     BILATERAL BREAST     COPD (chronic obstructive pulmonary disease)     Diabetes mellitus     Heart failure     Hypertension     Pulmonary hypertension      Past Surgical History:   Procedure Laterality Date    BREAST SURGERY      CHOLECYSTECTOMY      HYSTERECTOMY      JOINT REPLACEMENT      RIGHT KNEE    KELOID EXCISION        General Information       Row Name 25 1035          Physical Therapy Time and Intention    Document Type evaluation  -CK     Mode of Treatment physical therapy;individual therapy  -CK       Row Name 25 1036          General Information    Patient Profile Reviewed yes  -CK     Prior Level of Function independent:;all household mobility;mod assist:;ADL's  Dwayne with SPC, daughter assists with ADLs, wears 2L O2 baseline, denies falls  -CK     Existing Precautions/Restrictions fall;oxygen therapy device and L/min;other (see comments)  LLE pain  -CK     Barriers to Rehab medically complex;previous functional deficit  -CK       Row Name 25 103          Living Environment    People in Home child(more), adult  -CK       Row Name 25 1035          Home Main Entrance    Number of Stairs, Main Entrance two;other (see comments)  1+1  -CK     Stair Railings, Main Entrance railing on left side (ascending);other (see comments)  L handrail only on first step  -CK       Row Name 25 1032           Stairs Within Home, Primary    Stairs, Within Home, Primary patient currently in the process of moving, she is unsure of what her home setup will be at discharge. But typically has 1+1 steps as above  -CK     Number of Stairs, Within Home, Primary none  -CK       Row Name 01/13/25 1035          Cognition    Orientation Status (Cognition) oriented x 3;verbal cues/prompts needed for orientation;other (see comments)  did not know year, but able to see it on TV board and state  -CK       Row Name 01/13/25 1035          Safety Issues/Impairments Affecting Functional Mobility    Safety Issues Affecting Function (Mobility) awareness of need for assistance;insight into deficits/self-awareness;safety precaution awareness;safety precautions follow-through/compliance;sequencing abilities  -CK     Impairments Affecting Function (Mobility) balance;endurance/activity tolerance;pain;shortness of breath;strength  -CK               User Key  (r) = Recorded By, (t) = Taken By, (c) = Cosigned By      Initials Name Provider Type    CK Loretta Webber, PT Physical Therapist                   Mobility       Row Name 01/13/25 1041          Bed Mobility    Bed Mobility supine-sit  -CK     Supine-Sit [place] (Bed Mobility) standby assist  -CK     Assistive Device (Bed Mobility) head of bed elevated;bed rails  -CK       Row Name 01/13/25 1041          Sit-Stand Transfer    Sit-Stand [place] (Transfers) contact guard  -CK     Assistive Device (Sit-Stand Transfers) cane, straight  -CK       Row Name 01/13/25 1041          Gait/Stairs (Locomotion)    [place] Level (Gait) contact guard;1 person assist;1 person to manage equipment;verbal cues  -CK     Assistive Device (Gait) cane, straight  -CK     Patient was able to Ambulate yes  -CK     Distance in Feet (Gait) 30  -CK     Deviations/Abnormal Patterns (Gait) bilateral deviations;base of support, wide;leighann decreased;gait speed decreased;stride length decreased  -CK      Bilateral Gait Deviations heel strike decreased  -CK     Comment, (Gait/Stairs) Patient ambulated brief distance on 2L O2 with step through gait pattern. Educated her on using cane on R side with a painful LLE which patient did try briefly with cues for sequencing then switched back to her L side per her baseline. She had no overt LOB or buckling, but was limited in distance by fatigue and utilized chair ride back to room.  -CK               User Key  (r) = Recorded By, (t) = Taken By, (c) = Cosigned By      Initials Name Provider Type    CK Loretta Webber PT Physical Therapist                   Obj/Interventions       Row Name 01/13/25 1048          Range of Motion Comprehensive    General Range of Motion bilateral lower extremity ROM WFL  -CK     Comment, General Range of Motion limited by body habitus, but WFL  -CK       Row Name 01/13/25 1048          Strength Comprehensive (MMT)    General Manual Muscle Testing (MMT) Assessment lower extremity strength deficits identified  -CK     Comment, General Manual Muscle Testing (MMT) Assessment BLE grossly 4/5  -CK       Row Name 01/13/25 1048          Balance    Balance Assessment sitting static balance;standing static balance;standing dynamic balance  -CK     Static Sitting Balance standby assist  -CK     Position, Sitting Balance unsupported;sitting edge of bed  -CK     Static Standing Balance contact guard  -CK     Dynamic Standing Balance contact guard  -CK     Position/Device Used, Standing Balance supported;cane, straight  -CK     Comment, Balance no overt LOB  -CK       Row Name 01/13/25 1048          Sensory Assessment (Somatosensory)    Sensory Assessment (Somatosensory) LE sensation intact  -CK               User Key  (r) = Recorded By, (t) = Taken By, (c) = Cosigned By      Initials Name Provider Type    CK Loretta Webber PT Physical Therapist                   Goals/Plan       Row Name 01/13/25 1055          Bed Mobility Goal 1 (PT)     Activity/Assistive Device (Bed Mobility Goal 1, PT) sit to supine/supine to sit  -CK     [place] Level/Cues Needed (Bed Mobility Goal 1, PT) independent  -CK     Time Frame (Bed Mobility Goal 1, PT) short term goal (STG);3 days  -CK     Progress/Outcomes (Bed Mobility Goal 1, PT) new goal  -CK       Row Name 01/13/25 1052          Transfer Goal 1 (PT)    Activity/Assistive Device (Transfer Goal 1, PT) sit-to-stand/stand-to-sit;bed-to-chair/chair-to-bed  -CK     [place] Level/Cues Needed (Transfer Goal 1, PT) standby assist  -CK     Time Frame (Transfer Goal 1, PT) long term goal (LTG);10 days  -CK     Progress/Outcome (Transfer Goal 1, PT) new goal  -CK       Row Name 01/13/25 1054          Gait Training Goal 1 (PT)    Activity/Assistive Device (Gait Training Goal 1, PT) gait (walking locomotion);assistive device use;cane, straight  -CK     [place] Level (Gait Training Goal 1, PT) contact guard required  -CK     Distance (Gait Training Goal 1, PT) 100'  -CK     Time Frame (Gait Training Goal 1, PT) long term goal (LTG);10 days  -CK     Progress/Outcome (Gait Training Goal 1, PT) new goal  -CK       Row Name 01/13/25 4069          Stairs Goal 1 (PT)    Activity/Assistive Device (Stairs Goal 1, PT) ascending stairs;descending stairs;using handrail, left;cane, straight  -CK     [place] Level/Cues Needed (Stairs Goal 1, PT) contact guard required  -CK     Number of Stairs (Stairs Goal 1, PT) 1+1  -CK     Time Frame (Stairs Goal 1, PT) long term goal (LTG);10 days  -CK     Progress/Outcome (Stairs Goal 1, PT) new goal  -CK       Row Name 01/13/25 1057          Therapy Assessment/Plan (PT)    Planned Therapy Interventions (PT) balance training;bed mobility training;gait training;home exercise program;neuromuscular re-education;transfer training;stretching;strengthening;stair training;ROM (range of motion);postural re-education;patient/family education  -CK               User Key  (r) = Recorded By, (t)  = Taken By, (c) = Cosigned By      Initials Name Provider Type    CK Loretta Webber, PT Physical Therapist                   Clinical Impression       Row Name 01/13/25 1049          Pain    Pain Location extremity  -CK     Pain Side/Orientation left;lower  -CK     Pain Management Interventions exercise or physical activity utilized;nursing notified  -CK     Response to Pain Interventions activity participation with tolerable pain  -CK     Additional Documentation Pain Scale: FACES Pre/Post-Treatment (Group)  -CK       Row Name 01/13/25 1049          Pain Scale: FACES Pre/Post-Treatment    Pain: FACES Scale, Pretreatment 2-->hurts little bit  -CK     Posttreatment Pain Rating 2-->hurts little bit  -CK       Row Name 01/13/25 1049          Plan of Care Review    Plan of Care Reviewed With patient  -CK     Outcome Evaluation Patient presents with deficits in endurance, balance, and strength with LLE pain. She was able to ambulate 30' CGA with SPC while on 2L O2, limited by fatigue. IPPT is indicated to address current deficits. Recommend D/C to SNF when medically appropriate.  -CK       Row Name 01/13/25 1049          Therapy Assessment/Plan (PT)    Patient/Family Therapy Goals Statement (PT) feel better  -CK     Rehab Potential (PT) good  -CK     Criteria for Skilled Interventions Met (PT) yes;meets criteria;skilled treatment is necessary  -CK     Therapy Frequency (PT) daily  -CK     Predicted Duration of Therapy Intervention (PT) 1 week  -CK       Row Name 01/13/25 1049          Vital Signs    Pre Systolic BP Rehab 139  -CK     Pre Treatment Diastolic BP 65  -CK     Pretreatment Heart Rate (beats/min) 90  -CK     Posttreatment Heart Rate (beats/min) 90  -CK     Pre SpO2 (%) 100  -CK     O2 Delivery Pre Treatment room air  -CK     O2 Delivery Intra Treatment room air  -CK     Post SpO2 (%) 97  -CK     O2 Delivery Post Treatment room air  -CK     Pre Patient Position Supine  -CK     Post Patient Position Sitting   -CK       Row Name 01/13/25 1049          Positioning and Restraints    Pre-Treatment Position in bed  -CK     Post Treatment Position chair  -CK     In Chair reclined;call light within reach;encouraged to call for assist;exit alarm on;waffle cushion;notified nsg  -CK               User Key  (r) = Recorded By, (t) = Taken By, (c) = Cosigned By      Initials Name Provider Type    CK Loretta Webber, PARRIS Physical Therapist                   Outcome Measures       Row Name 01/13/25 1056 01/13/25 0817       How much help from another person do you currently need...    Turning from your back to your side while in flat bed without using bedrails? 3  -CK 3  -LH    Moving from lying on back to sitting on the side of a flat bed without bedrails? 3  -CK 3  -LH    Moving to and from a bed to a chair (including a wheelchair)? 3  -CK 3  -LH    Standing up from a chair using your arms (e.g., wheelchair, bedside chair)? 3  -CK 3  -LH    Climbing 3-5 steps with a railing? 2  -CK 2  -LH    To walk in hospital room? 3  -CK 3  -LH    AM-PAC 6 Clicks Score (PT) 17  -CK 17  -LH    Highest Level of Mobility Goal 5 --> Static standing  -CK 5 --> Static standing  -LH      Row Name 01/12/25 2313          How much help from another person do you currently need...    Turning from your back to your side while in flat bed without using bedrails? 3  -BS     Moving from lying on back to sitting on the side of a flat bed without bedrails? 3  -BS     Moving to and from a bed to a chair (including a wheelchair)? 3  -BS     Standing up from a chair using your arms (e.g., wheelchair, bedside chair)? 3  -BS     Climbing 3-5 steps with a railing? 2  -BS     To walk in hospital room? 3  -BS     AM-PAC 6 Clicks Score (PT) 17  -BS     Highest Level of Mobility Goal 5 --> Static standing  -BS       Row Name 01/13/25 1057 01/13/25 1056       Functional Assessment    Outcome Measure Options AM-PAC 6 Clicks Daily Activity (OT)  -AN AM-PAC 6 Clicks Basic  Mobility (PT)  -              User Key  (r) = Recorded By, (t) = Taken By, (c) = Cosigned By      Initials Name Provider Type    BS Mena Lim, RN Registered Nurse    Anne Marie Dale OT Occupational Therapist    Loretta Gunn, PT Physical Therapist    Ruchi Ramirez RN Registered Nurse                                 Physical Therapy Education       Title: PT OT SLP Therapies (In Progress)       Topic: Physical Therapy (In Progress)       Point: Mobility training (Not Started)       Learner Progress:  Not documented in this visit.              Point: Home exercise program (Done)       Learning Progress Summary            Patient Acceptance, E, VU by CK at 1/13/2025 1056                      Point: Body mechanics (Done)       Learning Progress Summary            Patient Acceptance, E, VU by  at 1/13/2025 1056                      Point: Precautions (Done)       Learning Progress Summary            Patient Acceptance, E, VU by  at 1/13/2025 1056                                      User Key       Initials Effective Dates Name Provider Type Discipline     02/06/24 -  Loretta Webber, PT Physical Therapist PT                  PT Recommendation and Plan  Planned Therapy Interventions (PT): balance training, bed mobility training, gait training, home exercise program, neuromuscular re-education, transfer training, stretching, strengthening, stair training, ROM (range of motion), postural re-education, patient/family education  Outcome Evaluation: Patient presents with deficits in endurance, balance, and strength with LLE pain. She was able to ambulate 30' CGA with SPC while on 2L O2, limited by fatigue. IPPT is indicated to address current deficits. Recommend D/C to SNF when medically appropriate.     Time Calculation:   PT Evaluation Complexity  History, PT Evaluation Complexity: 3 or more personal factors and/or comorbidities  Examination of Body Systems (PT Eval Complexity): total of 3 or  more elements  Clinical Presentation (PT Evaluation Complexity): stable  Clinical Decision Making (PT Evaluation Complexity): low complexity  Overall Complexity (PT Evaluation Complexity): low complexity     PT Charges       Row Name 01/13/25 1057             Time Calculation    Start Time 0923  -CK      PT Received On 01/13/25  -CK      PT Goal Re-Cert Due Date 01/23/25  -CK         Untimed Charges    PT Eval/Re-eval Minutes 49  -CK         Total Minutes    Untimed Charges Total Minutes 49  -CK       Total Minutes 49  -CK                User Key  (r) = Recorded By, (t) = Taken By, (c) = Cosigned By      Initials Name Provider Type    CK Loretta Webber, PT Physical Therapist                  Therapy Charges for Today       Code Description Service Date Service Provider Modifiers Qty    [id number] HC PT EVAL LOW COMPLEXITY 4 1/13/2025 Loretta Webber, PT GP 1            PT G-Codes  Outcome Measure Options: AM-PAC 6 Clicks Daily Activity (OT)  AM-PAC 6 Clicks Score (PT): 17  AM-PAC 6 Clicks Score (OT): 15  PT Discharge Summary  Anticipated Discharge Disposition (PT): skilled nursing facility    Loretta Webber PT  1/13/2025

## 2025-01-13 NOTE — ED PROVIDER NOTES
"Subjective  History of Present Illness:    Chief Complaint: Left leg pain, falls and near falls.  History of Present Illness: Approximate family due to left leg pain, and left leg \"giving out\" she has had multiple falls and near falls over the last few days because of this left leg pain and weakness.  She has significant history including heart failure, hypertension, pulmonary edema, chronic respiratory failure hypoxia on oxygen, COPD.  Patient's daughter reports that currently the patient is living with her and her  but they are moving out, and will be staying with friends until they can find a new place, she asked social work because the patient needs a place to stay for several days until they can get an apartment.  Onset: Gradual  Duration: Several days  Exacerbating / Alleviating factors: Worse with activity  Associated symptoms: Weakness, fall      Nurses Notes reviewed and agree, including vitals, allergies, social history and prior medical history.     REVIEW OF SYSTEMS: All systems reviewed and not pertinent unless noted.    Review of Systems   Musculoskeletal:         Left leg pain   Neurological:  Positive for weakness.   All other systems reviewed and are negative.      Past Medical History:   Diagnosis Date    Asthma     Cancer     BILATERAL BREAST     COPD (chronic obstructive pulmonary disease)     Diabetes mellitus     Heart failure     Hypertension     Pulmonary hypertension        Allergies:    Contrast dye (echo or unknown ct/mr), Shellfish-derived products, Fluoxetine, Mushroom, Mushroom extract complex (do not select), Doxycycline, Sulfamethoxazole-trimethoprim, Bupropion, Levofloxacin, Penicillins, and Prednisone & diphenhydramine      Past Surgical History:   Procedure Laterality Date    BREAST SURGERY      CHOLECYSTECTOMY      HYSTERECTOMY      JOINT REPLACEMENT      RIGHT KNEE    KELOID EXCISION           Social History     Socioeconomic History    Marital status:    Tobacco " "Use    Smoking status: Never    Smokeless tobacco: Never   Vaping Use    Vaping status: Never Used   Substance and Sexual Activity    Alcohol use: Yes     Comment: SOCIALLY    Drug use: Never    Sexual activity: Defer         Family History   Problem Relation Age of Onset    No Known Problems Mother     No Known Problems Father     No Known Problems Sister     No Known Problems Maternal Grandmother     No Known Problems Maternal Grandfather     Cancer Paternal Grandmother     No Known Problems Paternal Grandfather        Objective  Physical Exam:  /84 (BP Location: Right arm, Patient Position: Sitting)   Pulse 93   Temp 98.4 °F (36.9 °C) (Oral)   Resp 20   Ht 142.2 cm (56\")   Wt 109 kg (240 lb)   SpO2 90%   BMI 53.81 kg/m²      Physical Exam  Vitals and nursing note reviewed.   Constitutional:       Appearance: She is well-developed.   HENT:      Head: Normocephalic and atraumatic.   Cardiovascular:      Rate and Rhythm: Normal rate and regular rhythm.   Pulmonary:      Effort: Pulmonary effort is normal.      Breath sounds: Normal breath sounds.   Abdominal:      Palpations: Abdomen is soft.   Musculoskeletal:         General: Tenderness present. No deformity or signs of injury. Normal range of motion.      Cervical back: Normal range of motion and neck supple.   Skin:     General: Skin is warm and dry.   Neurological:      Mental Status: She is alert and oriented to person, place, and time.      Deep Tendon Reflexes: Reflexes are normal and symmetric.           Procedures    ED Course:    Chest x-ray interpretation by me: No acute cardiopulmonary abnormality    Left knee x-ray interpretation by me: No acute fracture or dislocation    ED Course as of 01/13/25 0346   Sun Jan 12, 2025 2056 Review of previous  non ED visits, prior labs, prior imaging, available notes from prior evaluations or visits with specialists, medication list, allergies, past medical history, past surgical history  Review of " recent office visit on 12/4/2024 with Kary Wu, [CS]   2121 Leukocytes, UA(!): Trace [CS]   2121 Nitrite, UA(!): Positive [CS]   2121 Bacteria, UA(!): 4+ [CS]   2121 WBC, UA(!): 3-5  Review of previous  non ED visits, prior labs, prior imaging, available notes from prior evaluations or visits with specialists, medication list, allergies, past medical history, past surgical history  Nitrite, bacteria leukocytes and white blood cells in urinalysis [CS]   2129 Twelve-lead ECG independently interpreted by myself demonstrates normal sinus rhythm, no ST segment elevation or depression.  Normal axis.  Right bundle branch block. [KB]   2137 Message sent to the hospitalist for admission [CS]   2147 Patient accepted for admission [CS]      ED Course User Index  [CS] Rubén Ragsdale Jr., ARACELY  [KB] Reggie Lebron MD       Lab Results (last 24 hours)       Procedure Component Value Units Date/Time    CBC Auto Differential [[id number]]  (Abnormal) Collected: 01/12/25 2037    Specimen: Blood Updated: 01/12/25 2048     WBC 9.27 10*3/mm3      RBC 4.39 10*6/mm3      Hemoglobin 13.0 g/dL      Hematocrit 41.1 %      MCV 93.6 fL      MCH 29.6 pg      MCHC 31.6 g/dL      RDW 13.3 %      RDW-SD 45.6 fl      MPV 11.9 fL      Platelets 183 10*3/mm3      Neutrophil % 64.5 %      Lymphocyte % 25.0 %      Monocyte % 7.7 %      Eosinophil % 1.0 %      Basophil % 0.4 %      Immature Grans % 1.4 %      Neutrophils, Absolute 5.98 10*3/mm3      Lymphocytes, Absolute 2.32 10*3/mm3      Monocytes, Absolute 0.71 10*3/mm3      Eosinophils, Absolute 0.09 10*3/mm3      Basophils, Absolute 0.04 10*3/mm3      Immature Grans, Absolute 0.13 10*3/mm3      nRBC 0.0 /100 WBC     Comprehensive Metabolic Panel [[id number]]  (Abnormal) Collected: 01/12/25 2037    Specimen: Blood Updated: 01/12/25 2105     Glucose 220 mg/dL      BUN 31 mg/dL      Creatinine 1.16 mg/dL      Sodium 139 mmol/L      Potassium 5.4 mmol/L      Comment: Slight hemolysis detected  by analyzer. Result may be falsely elevated.        Chloride 100 mmol/L      CO2 27.0 mmol/L      Calcium 9.7 mg/dL      Total Protein 6.3 g/dL      Albumin 4.0 g/dL      ALT (SGPT) 22 U/L      AST (SGOT) 19 U/L      Alkaline Phosphatase 83 U/L      Total Bilirubin 0.5 mg/dL      Globulin 2.3 gm/dL      Comment: Calculated Result        A/G Ratio 1.7 g/dL      BUN/Creatinine Ratio 26.7     Anion Gap 12.0 mmol/L      eGFR 48.4 mL/min/1.73     Narrative:      GFR Categories in Chronic Kidney Disease (CKD)      GFR Category          GFR (mL/min/1.73)    Interpretation  G1                     90 or greater         Normal or high (1)  G2                      60-89                Mild decrease (1)  G3a                   45-59                Mild to moderate decrease  G3b                   30-44                Moderate to severe decrease  G4                    15-29                Severe decrease  G5                    14 or less           Kidney failure          (1)In the absence of evidence of kidney disease, neither GFR category G1 or G2 fulfill the criteria for CKD.    eGFR calculation 2021 CKD-EPI creatinine equation, which does not include race as a factor    Magnesium [[id number]]  (Normal) Collected: 01/12/25 2037    Specimen: Blood Updated: 01/12/25 2105     Magnesium 1.7 mg/dL     Urinalysis With Culture If Indicated - Urine, Clean Catch [[id number]]  (Abnormal) Collected: 01/12/25 2059    Specimen: Urine, Clean Catch Updated: 01/12/25 2117     Color, UA Yellow     Appearance, UA Clear     pH, UA <=5.0     Specific Gravity, UA 1.021     Glucose, UA Negative     Ketones, UA Trace     Bilirubin, UA Negative     Blood, UA Negative     Protein, UA Negative     Leuk Esterase, UA Trace     Nitrite, UA Positive     Urobilinogen, UA 1.0 E.U./dL    Narrative:      In absence of clinical symptoms, the presence of pyuria, bacteria, and/or nitrites on the urinalysis result does not correlate with infection.    Urinalysis,  Microscopic Only - Urine, Clean Catch [[id number]]  (Abnormal) Collected: 01/12/25 2059    Specimen: Urine, Clean Catch Updated: 01/12/25 2117     RBC, UA 0-2 /HPF      WBC, UA 3-5 /HPF      Comment: Urine culture not indicated.        Bacteria, UA 4+ /HPF      Squamous Epithelial Cells, UA 3-6 /HPF      Hyaline Casts, UA 0-6 /LPF      Methodology Automated Microscopy    POC Glucose Once [[id number]]  (Abnormal) Collected: 01/13/25 0051    Specimen: Blood Updated: 01/13/25 0053     Glucose 247 mg/dL              XR Chest 1 View    Result Date: 1/12/2025  XR CHEST 1 VW Date of Exam: 1/12/2025 8:30 PM EST Indication: cough Comparison: 2/12/2024. Findings: The heart appears enlarged. There is mild interstitial pulmonary edema. No pneumothorax or pleural effusion. No focal lung consolidation. Stable prominent left-sided epicardial fat.     Impression: Impression: Cardiomegaly and mild interstitial pulmonary edema. Electronically Signed: Chidi Hooker MD  1/12/2025 9:14 PM EST  Workstation ID: [id number]    XR Knee 1 or 2 View Left    Result Date: 1/12/2025  XR KNEE 1 OR 2 VW LEFT Date of Exam: 1/12/2025 8:30 PM EST Indication: falls Comparison: None available. Findings: There is moderate medial and lateral compartment and mild patellofemoral osteophyte formation. No acute fracture or dislocation. There is mild tricompartmental joint space narrowing. No erosions. No evidence of joint effusion.     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 Electronically Signed: Chidi Hooker MD  1/12/2025 9:14 PM EST  Workstation ID: [id number]                                                                   Medical Decision Making  Patient Presentation 79-year-old female presented with near falls and multiple falls, leg weakness, and fatigue.    DDX urinary tract infection, dehydration, electrolyte abnormality, falls, weakness, left leg injury, osteoarthritis, pneumonia, viral illness    Data Review/ Non ED  Records /Analysis/Ordering unique tests  Review of previous  non ED visits, prior labs, prior imaging, available notes from prior evaluations or visits with specialists, medication list, allergies, past medical history, past surgical history        Independent Review Studies  I Personally reviewed all laboratory studies performed in the emergency department     Intervention/Re-evaluation no acute intervention on reevaluation patient remained stable    Independent Clinician no consultation    Risk Stratification tools/clinical decision rules patient presented with weakness, multiple falls at home, left leg pain, fatigue, cough and congestion, patient's daughter is caregiver, and states that she is becoming progressively weaker, having multiple falls and near falls and has been difficult to care for her at home safely.  Labs are drawn, chest x-ray and x-ray of the left extremity were performed, patient was found to have a urinary tract infection, mild acute kidney insufficiency, as well as hyperkalemia.  She was admitted for further care    Shared Decision Making discussed this with patient and family they were    Disposition patient stable for admission    Problems Addressed:  Acute cystitis without hematuria: complicated acute illness or injury  Falls: complicated acute illness or injury    Amount and/or Complexity of Data Reviewed  Independent Historian: caregiver  External Data Reviewed: labs, radiology and notes.  Labs: ordered. Decision-making details documented in ED Course.  Radiology: ordered and independent interpretation performed. Decision-making details documented in ED Course.  ECG/medicine tests: ordered.    Risk  Prescription drug management.  Decision regarding hospitalization.          Final diagnoses:   Falls   Acute cystitis without hematuria           Disposition admission       Rubén Ragsdale Jr., ARACELY  01/13/25 0346

## 2025-01-13 NOTE — THERAPY EVALUATION
Patient Name: Masha Chou  : 1945    MRN: [id number]                              Today's Date: 2025       Admit Date: 2025    Visit Dx:     ICD-10-CM ICD-9-CM   1. Falls  R29.6 V15.88   2. Acute cystitis without hematuria  N30.00 595.0     Patient Active Problem List   Diagnosis    Chest pain    Type 2 diabetes mellitus    Pulmonary HTN    Heart failure    COPD exacerbation    Chronic respiratory failure with hypoxia    Falls    Left knee pain    Osteoarthritis of left knee    Acute UTI (urinary tract infection)     Past Medical History:   Diagnosis Date    Asthma     Cancer     BILATERAL BREAST     COPD (chronic obstructive pulmonary disease)     Diabetes mellitus     Heart failure     Hypertension     Pulmonary hypertension      Past Surgical History:   Procedure Laterality Date    BREAST SURGERY      CHOLECYSTECTOMY      HYSTERECTOMY      JOINT REPLACEMENT      RIGHT KNEE    KELOID EXCISION        General Information       Row Name 25 1030          OT Time and Intention    Document Type evaluation  -AN     Mode of Treatment occupational therapy  -AN       Row Name 25 1030          General Information    Patient Profile Reviewed yes  -AN     Prior Level of Function independent:;all household mobility;transfer;min assist:;ADL's;bathing  ambulates using a cane at baseline, near falls  -AN     Existing Precautions/Restrictions fall  -AN     Barriers to Rehab medically complex;previous functional deficit  -AN       Row Name 25 1030          Occupational Profile    Environmental Supports and Barriers (Occupational Profile) owns cane  -AN       Row Name 25 1030          Living Environment    People in Home child(more), adult  -AN       Row Name 25 1030          Home Main Entrance    Number of Stairs, Main Entrance one  -AN     Stair Railings, Main Entrance railings safe and in good condition  -AN       Row Name 25 1030          Stairs Within Home, Primary     Number of Stairs, Within Home, Primary none  -AN       Row Name 01/13/25 1030          Cognition    Orientation Status (Cognition) oriented x 3  -AN       Row Name 01/13/25 1030          Safety Issues/Impairments Affecting Functional Mobility    Safety Issues Affecting Function (Mobility) safety precaution awareness;safety precautions follow-through/compliance;judgment;insight into deficits/self-awareness;awareness of need for assistance;problem-solving  -AN     Impairments Affecting Function (Mobility) balance;cognition;endurance/activity tolerance;pain;strength  -AN     Cognitive Impairments, Mobility Safety/Performance awareness, need for assistance;insight into deficits/self-awareness;problem-solving/reasoning;safety precaution awareness;safety precaution follow-through;judgment  -AN               User Key  (r) = Recorded By, (t) = Taken By, (c) = Cosigned By      Initials Name Provider Type    Anne Marie Dale OT Occupational Therapist                     Mobility/ADL's       Row Name 01/13/25 1040          Bed Mobility    Bed Mobility supine-sit  -AN     Supine-Sit [place] (Bed Mobility) verbal cues;standby assist  -AN     Assistive Device (Bed Mobility) head of bed elevated  -AN       Row Name 01/13/25 1040          Transfers    Transfers sit-stand transfer;stand-sit transfer  -AN       [facility] Name 01/13/25 1040          Sit-Stand Transfer    Sit-Stand [place] (Transfers) verbal cues;contact guard  -AN     Assistive Device (Sit-Stand Transfers) cane, straight  -AN       [facility] Name 01/13/25 1040          Stand-Sit Transfer    Stand-Sit [place] (Transfers) verbal cues;contact guard  -AN     Assistive Device (Stand-Sit Transfers) cane, straight  -AN       [facility] Name 01/13/25 1040          Functional Mobility    Functional Mobility- Ind. Level not tested  -AN       Row Name 01/13/25 1040          Activities of Daily Living    BADL Assessment/Intervention upper body dressing;lower body dressing  -AN        Row Name 01/13/25 1040          Upper Body Dressing Assessment/Training    [place] Level (Upper Body Dressing) don;pajama/robe;set up  -AN     Position (Upper Body Dressing) edge of bed sitting  -AN       Row Name 01/13/25 1040          Lower Body Dressing Assessment/Training    [place] Level (Lower Body Dressing) don;pants/bottoms;socks;maximum assist (25% patient effort)  -AN     Position (Lower Body Dressing) edge of bed sitting  -AN               User Key  (r) = Recorded By, (t) = Taken By, (c) = Cosigned By      Initials Name Provider Type    AN Anne Marie Cid OT Occupational Therapist                   Obj/Interventions       Row Name 01/13/25 1043          Sensory Assessment (Somatosensory)    Sensory Assessment (Somatosensory) UE sensation intact  -AN       [facility] Name 01/13/25 1043          Vision Assessment/Intervention    Visual Impairment/Limitations WFL  -AN       [facility] Name 01/13/25 1043          Range of Motion Comprehensive    General Range of Motion bilateral upper extremity ROM WFL  -AN       Row Name 01/13/25 1043          Strength Comprehensive (MMT)    General Manual Muscle Testing (MMT) Assessment lower extremity strength deficits identified  -AN     Comment, General Manual Muscle Testing (MMT) Assessment BLE weakness observed with mobility  -AN       Row Name 01/13/25 1043          Motor Skills    Motor Skills functional endurance  -AN     Functional Endurance decreased functional endurance  -AN       [facility] Name 01/13/25 1043          Balance    Balance Assessment sitting static balance;sitting dynamic balance;sit to stand dynamic balance;standing static balance  -AN     Static Sitting Balance standby assist  -AN     Dynamic Sitting Balance standby assist  -AN     Position, Sitting Balance sitting edge of bed  -AN     Sit to Stand Dynamic Balance verbal cues;contact guard  -AN     Static Standing Balance verbal cues;contact guard  -AN     Position/Device Used, Standing Balance  cane, straight  -AN     Balance Interventions standing;sit to stand;supported;static;minimal challenge;occupation based/functional task  -AN               User Key  (r) = Recorded By, (t) = Taken By, (c) = Cosigned By      Initials Name Provider Type    AN Anne Marie Cid, OT Occupational Therapist                   Goals/Plan       Row Name 01/13/25 1056          Transfer Goal 1 (OT)    Activity/Assistive Device (Transfer Goal 1, OT) sit-to-stand/stand-to-sit;toilet;cane, straight  -AN     [place] Level/Cues Needed (Transfer Goal 1, OT) standby assist  -AN     Time Frame (Transfer Goal 1, OT) long term goal (LTG);1 week  -AN       Row Name 01/13/25 1056          Dressing Goal 1 (OT)    Activity/Device (Dressing Goal 1, OT) lower body dressing  -AN     [place]/Cues Needed (Dressing Goal 1, OT) minimum assist (75% or more patient effort)  -AN     Time Frame (Dressing Goal 1, OT) long term goal (LTG);1 week  -AN       [facility] Name 01/13/25 1056          Toileting Goal 1 (OT)    Activity/Device (Toileting Goal 1, OT) adjust/manage clothing;perform perineal hygiene;commode  -AN     [place] Level/Cues Needed (Toileting Goal 1, OT) minimum assist (75% or more patient effort)  -AN     Time Frame (Toileting Goal 1, OT) long term goal (LTG);1 week  -AN       Row Name 01/13/25 1056          Grooming Goal 1 (OT)    Activity/Device (Grooming Goal 1, OT) oral care;wash face, hands  -AN     [place] (Grooming Goal 1, OT) standby assist  -AN     Time Frame (Grooming Goal 1, OT) short term goal (STG);3 days  -AN       Row Name 01/13/25 1056          Therapy Assessment/Plan (OT)    Planned Therapy Interventions (OT) activity tolerance training;adaptive equipment training;BADL retraining;functional balance retraining;occupation/activity based interventions;patient/caregiver education/training;strengthening exercise;transfer/mobility retraining  -AN               User Key  (r) = Recorded By, (t) = Taken By, (c) =  Cosigned By      Initials Name Provider Type    AN Anne Marie Cid, REMY Occupational Therapist                   Clinical Impression       Row Name 01/13/25 1052          Pain Assessment    Pretreatment Pain Rating 0/10 - no pain  -AN     Posttreatment Pain Rating 0/10 - no pain  -AN       Row Name 01/13/25 1052          Plan of Care Review    Plan of Care Reviewed With patient  -AN     Progress no change  -AN     Outcome Evaluation Pt presents below her functional baseline with deficits including generalized weakness, impaired balance, decreased activity tolerance, and impaired ADLs warranting OT services. Pt performed bed mobility with stand by assist and functional transfers with CGA using straight cane. Rec SNF at [place] for best functional outcomes.  -AN       Row Name 01/13/25 1052          Therapy Assessment/Plan (OT)    Patient/Family Therapy Goal Statement (OT) Return to PLOF  -AN     Rehab Potential (OT) good  -AN     Criteria for Skilled Therapeutic Interventions Met (OT) yes;skilled treatment is necessary  -AN     Therapy Frequency (OT) daily  -AN     Predicted Duration of Therapy Intervention (OT) 7 days  -AN       Row Name 01/13/25 1052          Therapy Plan Review/Discharge Plan (OT)    Anticipated Discharge Disposition (OT) skilled nursing facility  -AN       Row Name 01/13/25 1052          Vital Signs    Pre SpO2 (%) 95  -AN     O2 Delivery Pre Treatment nasal cannula  -AN     O2 Delivery Intra Treatment nasal cannula  -AN     Post SpO2 (%) 95  -AN     O2 Delivery Post Treatment nasal cannula  -AN     Pre Patient Position Supine  -AN     Intra Patient Position Standing  -AN     Post Patient Position Sitting  -AN       [facility] Name 01/13/25 1052          Positioning and Restraints    Pre-Treatment Position in bed  -AN     Post Treatment Position bed  -AN     In Bed notified nsg;sitting EOB;call light within reach;encouraged to call for assist;with PT  -AN               User Key  (r) = Recorded By, (t) =  Taken By, (c) = Cosigned By      Initials Name Provider Type    Anne Marie Dale, REMY Occupational Therapist                   Outcome Measures       Row Name 01/13/25 1057          How much help from another is currently needed...    Putting on and taking off regular lower body clothing? 2  -AN     Bathing (including washing, rinsing, and drying) 2  -AN     Toileting (which includes using toilet bed pan or urinal) 2  -AN     Putting on and taking off regular upper body clothing 3  -AN     Taking care of personal grooming (such as brushing teeth) 3  -AN     Eating meals 3  -AN     AM-PAC 6 Clicks Score (OT) 15  -AN       Row Name 01/13/25 1056 01/13/25 0817       How much help from another person do you currently need...    Turning from your back to your side while in flat bed without using bedrails? 3  -CK 3  -LH    Moving from lying on back to sitting on the side of a flat bed without bedrails? 3  -CK 3  -LH    Moving to and from a bed to a chair (including a wheelchair)? 3  -CK 3  -LH    Standing up from a chair using your arms (e.g., wheelchair, bedside chair)? 3  -CK 3  -LH    Climbing 3-5 steps with a railing? 2  -CK 2  -LH    To walk in hospital room? 3  -CK 3  -LH    AM-PAC 6 Clicks Score (PT) 17  -CK 17  -LH    Highest Level of Mobility Goal 5 --> Static standing  -CK 5 --> Static standing  -LH      Row Name 01/12/25 0072          How much help from another person do you currently need...    Turning from your back to your side while in flat bed without using bedrails? 3  -BS     Moving from lying on back to sitting on the side of a flat bed without bedrails? 3  -BS     Moving to and from a bed to a chair (including a wheelchair)? 3  -BS     Standing up from a chair using your arms (e.g., wheelchair, bedside chair)? 3  -BS     Climbing 3-5 steps with a railing? 2  -BS     To walk in hospital room? 3  -BS     AM-PAC 6 Clicks Score (PT) 17  -BS     Highest Level of Mobility Goal 5 --> Static standing  -BS        Row Name 01/13/25 1057 01/13/25 1056       Functional Assessment    Outcome Measure Options AM-PAC 6 Clicks Daily Activity (OT)  -AN AM-PAC 6 Clicks Basic Mobility (PT)  -CK              User Key  (r) = Recorded By, (t) = Taken By, (c) = Cosigned By      Initials Name Provider Type    Mena Kaiser, RN Registered Nurse    Anne Marie Dale OT Occupational Therapist    Loretta Gunn PT Physical Therapist    Ruchi Ramirez, CHARO Registered Nurse                    Occupational Therapy Education       Title: PT OT SLP Therapies (In Progress)       Topic: Occupational Therapy (In Progress)       Point: ADL training (Done)       Description:   Instruct learner(s) on proper safety adaptation and remediation techniques during self care or transfers.   Instruct in proper use of assistive devices.                  Learning Progress Summary            Patient Acceptance, E, VU by AN at 1/13/2025 1058                      Point: Home exercise program (Not Started)       Description:   Instruct learner(s) on appropriate technique for monitoring, assisting and/or progressing therapeutic exercises/activities.                  Learner Progress:  Not documented in this visit.              Point: Precautions (Done)       Description:   Instruct learner(s) on prescribed precautions during self-care and functional transfers.                  Learning Progress Summary            Patient Acceptance, E, VU by AN at 1/13/2025 1058                      Point: Body mechanics (Done)       Description:   Instruct learner(s) on proper positioning and spine alignment during self-care, functional mobility activities and/or exercises.                  Learning Progress Summary            Patient Acceptance, E, VU by AN at 1/13/2025 1058                                      User Key       Initials Effective Dates Name Provider Type Discipline     09/21/21 -  Anne Marie Cid OT Occupational Therapist OT                  OT  Recommendation and Plan  Planned Therapy Interventions (OT): activity tolerance training, adaptive equipment training, BADL retraining, functional balance retraining, occupation/activity based interventions, patient/caregiver education/training, strengthening exercise, transfer/mobility retraining  Therapy Frequency (OT): daily  Plan of Care Review  Plan of Care Reviewed With: patient  Progress: no change  Outcome Evaluation: Pt presents below her functional baseline with deficits including generalized weakness, impaired balance, decreased activity tolerance, and impaired ADLs warranting OT services. Pt performed bed mobility with stand by assist and functional transfers with CGA using straight cane. Rec SNF at [place] for best functional outcomes.     Time Calculation:   Evaluation Complexity (OT)  Review Occupational Profile/Medical/Therapy History Complexity: expanded/moderate complexity  Assessment, Occupational Performance/Identification of Deficit Complexity: 3-5 performance deficits  Clinical Decision Making Complexity (OT): detailed assessment/moderate complexity  Overall Complexity of Evaluation (OT): moderate complexity     Time Calculation- OT       Row Name 01/13/25 1058             Time Calculation- OT    OT Start Time 0925  -AN      OT Received On 01/13/25  -AN      OT Goal Re-Cert Due Date 01/23/25  -AN         Untimed Charges    OT Eval/Re-eval Minutes 46  -AN         Total Minutes    Untimed Charges Total Minutes 46  -AN       Total Minutes 46  -AN                User Key  (r) = Recorded By, (t) = Taken By, (c) = Cosigned By      Initials Name Provider Type    Anne Marie Dale OT Occupational Therapist                  Therapy Charges for Today       Code Description Service Date Service Provider Modifiers Qty    [id number]  OT EVAL MOD COMPLEXITY 4 1/13/2025 Anne Marie Cid OT GO 1                 Anne Marie Cid OT  1/13/2025

## 2025-01-13 NOTE — PROGRESS NOTES
[facility] Medicine Services  PROGRESS NOTE    Patient Name: Masha Chou  : 1945  MRN: [id number]    Date of Admission: 2025  Primary Care Physician: Kary Wu MD    Subjective   Subjective     CC:  Left knee pain    HPI:  Patient reports she is feeling well this morning.  Pain is under good control.  She reports that she felt good when working with physical therapy.      Objective   Objective     Vital Signs:   Temp:  [97.1 °F (36.2 °C)-99 °F (37.2 °C)] 98.2 °F (36.8 °C)  Heart Rate:  [71-93] 91  Resp:  [18-20] 18  BP: (120-165)/(45-84) 120/45  Flow (L/min) (Oxygen Therapy):  [2] 2     Physical Exam  Constitutional:       General: She is not in acute distress.  Cardiovascular:      Rate and Rhythm: Normal rate and regular rhythm.      Heart sounds: Normal heart sounds.   Pulmonary:      Effort: Pulmonary effort is normal. No respiratory distress.      Breath sounds: Normal breath sounds.   Abdominal:      Palpations: Abdomen is soft.      Tenderness: There is no abdominal tenderness.   Musculoskeletal:      Right lower leg: No edema.      Left lower leg: No edema.   Neurological:      General: No focal deficit present.      Mental Status: She is alert. Mental status is at baseline.   Psychiatric:         Mood and Affect: Mood normal.         Thought Content: Thought content normal.          Results Reviewed:  LAB RESULTS:      Lab 25  0646 25   WBC 8.72 9.27   HEMOGLOBIN 12.3 13.0   HEMATOCRIT 40.3 41.1   PLATELETS 172 183   NEUTROS ABS 4.54 5.98   IMMATURE GRANS (ABS) 0.14* 0.13*   LYMPHS ABS 2.99 2.32   MONOS ABS 0.82 0.71   EOS ABS 0.16 0.09   MCV 95.5 93.6         Lab 25  0646 25   SODIUM 144 139   POTASSIUM 5.1 5.4*   CHLORIDE 105 100   CO2 32.0* 27.0   ANION GAP 7.0 12.0   BUN 31* 31*   CREATININE 1.23* 1.16*   EGFR 45.1* 48.4*   GLUCOSE 158* 220*   CALCIUM 9.4 9.7   MAGNESIUM 1.9 1.7   HEMOGLOBIN A1C 9.00*  --           Lab 01/12/25 2037   TOTAL PROTEIN 6.3   ALBUMIN 4.0   GLOBULIN 2.3   ALT (SGPT) 22   AST (SGOT) 19   BILIRUBIN 0.5   ALK PHOS 83                     Brief Urine Lab Results  (Last result in the past 365 days)        Color   Clarity   Blood   Leuk Est   Nitrite   Protein   CREAT   Urine HCG        01/13/25 0722 Yellow   Clear   Negative   Trace   Negative   Negative                   Microbiology Results Abnormal       None            XR Chest 1 View    Result Date: 1/12/2025  XR CHEST 1 VW Date of Exam: 1/12/2025 8:30 PM EST Indication: cough Comparison: 2/12/2024. Findings: The heart appears enlarged. There is mild interstitial pulmonary edema. No pneumothorax or pleural effusion. No focal lung consolidation. Stable prominent left-sided epicardial fat.     Impression: Impression: Cardiomegaly and mild interstitial pulmonary edema. Electronically Signed: Chidi Hooker MD  1/12/2025 9:14 PM EST  Workstation ID: [id number]    XR Knee 1 or 2 View Left    Result Date: 1/12/2025  XR KNEE 1 OR 2 VW LEFT Date of Exam: 1/12/2025 8:30 PM EST Indication: falls Comparison: None available. Findings: There is moderate medial and lateral compartment and mild patellofemoral osteophyte formation. No acute fracture or dislocation. There is mild tricompartmental joint space narrowing. No erosions. No evidence of joint effusion.     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 Electronically Signed: Chidi Hooker MD  1/12/2025 9:14 PM EST  Workstation ID: [id number]     Results for orders placed during the hospital encounter of 06/25/21    Adult Transthoracic Echo Complete W/ Cont if Necessary Per Protocol    Interpretation Summary  · Estimated left ventricular EF = 70% Left ventricular ejection fraction appears to be 66 - 70%. Left ventricular systolic function is normal.  · Left ventricular diastolic function is consistent with (grade I) impaired relaxation.  · Left ventricular wall thickness is  consistent with hypertrophy. Sigmoid-shaped ventricular septum is present.  · LVOT turbulence without gradient.      Current medications:  Scheduled Meds:aspirin, 81 mg, Oral, Daily  atorvastatin, 20 mg, Oral, Daily  buPROPion XL, 150 mg, Oral, Daily  cefuroxime, 500 mg, Oral, Q12H  cholestyramine light, 1 packet, Oral, Daily  citalopram, 40 mg, Oral, QAM  donepezil, 10 mg, Oral, Daily  fluticasone, 2 spray, Nasal, Daily  heparin (porcine), 5,000 Units, Subcutaneous, Q12H  insulin glargine, 10 Units, Subcutaneous, Nightly  insulin lispro, 2-7 Units, Subcutaneous, 4x Daily AC & at Bedtime  magnesium oxide, 400 mg, Oral, BID  sodium chloride, 10 mL, Intravenous, Q12H  verapamil SR, 240 mg, Oral, Daily      Continuous Infusions:   PRN Meds:.  acetaminophen **OR** acetaminophen **OR** acetaminophen    albuterol    senna-docusate sodium **AND** polyethylene glycol **AND** bisacodyl **AND** bisacodyl    Calcium Replacement - Follow Nurse / BPA Driven Protocol    dextrose    dextrose    Diclofenac Sodium    glucagon (human recombinant)    ipratropium-albuterol    Magnesium Standard Dose Replacement - Follow Nurse / BPA Driven Protocol    nitroglycerin    Phosphorus Replacement - Follow Nurse / BPA Driven Protocol    Potassium Replacement - Follow Nurse / BPA Driven Protocol    sodium chloride    sodium chloride    Assessment & Plan   Assessment & Plan     Active Hospital Problems    Diagnosis  POA    **Falls [R29.6]  Not Applicable    Left knee pain [M25.562]  Unknown    Osteoarthritis of left knee [M17.12]  Unknown    Acute UTI (urinary tract infection) [N39.0]  Unknown    COPD exacerbation [J44.1]  Yes    Type 2 diabetes mellitus [E11.9]  Yes      Resolved Hospital Problems   No resolved problems to display.        Brief Hospital Course to date:  Masha Chou is a 79 y.o. female with type 2 diabetes, hypertension, COPD, breast cancer, pulmonary hypertension, diastolic CHF who presented to the ED after having a near  fall.    Left knee pain  Osteoarthritis  Generalized weakness  -- X-ray left knee shows mild to moderate tricompartmental osteoarthritis  -- Fall precautions  -- PT/OT consult  -- Consult case management  -- Tylenol and Voltaren for pain     Hyperkalemia-resolved  Hypomagnesemia-resolved  -- K+ 5.4, Mg 1.7  -- Replace Mg per protocol  -- s/p Lokelma x 1 dose  -- BMP, Mg in a.m.     Acute UTI (POA)  -- UA: Ketones trace, nitrite positive, leukocytes trace, WBC 3-5, bacteria 4+, squamous 3-6  -- Urine culture  -- Ceftin     CKD  -- baseline creatinine ~ 1.1-1.2   -- bmp in the am     COPD  JUAN F  -- CPAP nightly  -- DuoNebs     T2DM  -- A1c in the a.m.  -- FSBG with SSI  -- Lantus 10 units nightly    Expected Discharge Location and Transportation: TBD  Expected Discharge   Expected Discharge Date: 1/17/2025; Expected Discharge Time:      VTE Prophylaxis:  Pharmacologic VTE prophylaxis orders are present.         AM-PAC 6 Clicks Score (PT): 17 (01/13/25 1056)    CODE STATUS:   Code Status and Medical Interventions: CPR (Attempt to Resuscitate); Full Support   Ordered at: 01/12/25 1049     Level Of Support Discussed With:    Patient     Code Status (Patient has no pulse and is not breathing):    CPR (Attempt to Resuscitate)     Medical Interventions (Patient has pulse or is breathing):    Full Support       Christine Guevara MD  01/13/25

## 2025-01-13 NOTE — H&P
[facility] Medicine Services  HISTORY AND PHYSICAL    Patient Name: Masha Chou  : 1945  MRN: [id number]  Primary Care Physician: Kary Wu MD  Date of admission: 2025    Subjective   Subjective     Chief Complaint:  Left knee pain    HPI:  Masha Chou is a 79 y.o. female with history of T2DM, HTN, CHF, COPD, breast cancer, pulmonary hypertension, presents to the ED after having a near fall.  Patient reports that she has been experiencing some left knee pain for about a week.  She states that while walking sometimes her left leg will give out but she is able to catch herself on the wall or furniture before she actually falls.  She also reports that she has been experiencing dysuria and urinary frequency for approximately 1 week.  She has dyspnea with exertion, mild cough and wheezing.  No fevers, chills, chest pain, nausea, vomiting, abdominal pain, diarrhea, focal weakness, difficulty with speech, headaches, syncope, or any other complaints at this time.  Of note patient does live with her daughter and the daughter's boyfriend.  They are moving out of their current home and will be staying with friends on the couch for approximately 1 week before getting their new apartment.    UA consistent with UTI.  Chest x-ray shows cardiomegaly and mild interstitial pulmonary edema.  X-ray left knee shows mild to moderate tricompartmental osteoarthritis.  Patient was started on cefuroxime mean in the ED, and is being admitted to the hospitalist for further evaluation and management.    Review of Systems   Constitutional: Negative.    HENT: Negative.     Eyes: Negative.    Respiratory:  Positive for cough, shortness of breath and wheezing.    Cardiovascular: Negative.    Gastrointestinal: Negative.    Endocrine: Negative.    Genitourinary:  Positive for dysuria and frequency. Negative for difficulty urinating and hematuria.   Musculoskeletal:  Negative for back pain and  neck pain.        Left knee pain   Skin: Negative.    Allergic/Immunologic: Negative.    Neurological: Negative.    Hematological: Negative.    Psychiatric/Behavioral: Negative.                  Personal History     Past Medical History:   Diagnosis Date    Asthma     Cancer     BILATERAL BREAST     COPD (chronic obstructive pulmonary disease)     Diabetes mellitus     Heart failure     Hypertension     Pulmonary hypertension              Past Surgical History:   Procedure Laterality Date    BREAST SURGERY      CHOLECYSTECTOMY      HYSTERECTOMY      JOINT REPLACEMENT      RIGHT KNEE    KELOID EXCISION         Family History:  family history includes Cancer in her paternal grandmother; No Known Problems in her father, maternal grandfather, maternal grandmother, mother, paternal grandfather, and sister.     Social History:  reports that she has never smoked. She has never used smokeless tobacco. She reports current alcohol use. She reports that she does not use drugs.  Social History     Social History Narrative    Caffeine: 1 cup of coffee daily        Medications:  acetaminophen, albuterol sulfate HFA, aspirin, atorvastatin, buPROPion XL, citalopram, colestipol, donepezil, fluticasone, furosemide, guaifenesin-dextromethorphan 600-30 mg, insulin glargine, insulin lispro, loperamide, magnesium oxide, nystatin, and verapamil SR    Allergies   Allergen Reactions    Contrast Dye (Echo Or Unknown Ct/Mr) Seizure    Shellfish-Derived Products Nausea And Vomiting    Fluoxetine Hives, Other (See Comments) and Unknown (See Comments)     Other reaction(s): aching    Mushroom Rash    Mushroom Extract Complex (Do Not Select) Rash    Doxycycline Diarrhea    Sulfamethoxazole-Trimethoprim Unknown - Low Severity    Bupropion Rash    Levofloxacin Other (See Comments), Rash and Unknown (See Comments)     Bone Aches    Penicillins Rash     Has tolerated cefdinir    Prednisone & Diphenhydramine Unknown (See Comments)       Objective    Objective     Vital Signs:   Temp:  [99 °F (37.2 °C)] 99 °F (37.2 °C)  Heart Rate:  [93] 93  Resp:  [20] 20  BP: (153)/(73) 153/73  Flow (L/min) (Oxygen Therapy):  [2] 2    Physical Exam   Constitutional: Awake, alert, resting in bed, family at bedside  Eyes: PERRLA, sclerae anicteric, no conjunctival injection  HENT: NCAT, mucous membranes moist  Neck: Supple, no thyromegaly, no lymphadenopathy, trachea midline  Respiratory: Clear to auscultation bilaterally, diminished in the bases, nonlabored respirations   Cardiovascular: RRR, no murmurs, rubs, or gallops, palpable pedal pulses bilaterally  Gastrointestinal: Positive bowel sounds, soft, nontender, nondistended  Musculoskeletal: No bilateral ankle edema, no clubbing or cyanosis to extremities  Psychiatric: Appropriate affect, cooperative  Neurologic: Oriented x 3, strength symmetric in all extremities, Cranial Nerves grossly intact to confrontation, speech clear  Skin: No rashes      Result Review:  I have personally reviewed the results from the time of this admission to 1/12/2025 23:00 EST and agree with these findings:  [x]  Laboratory list / accordion  []  Microbiology  [x]  Radiology  [x]  EKG/Telemetry   []  Cardiology/Vascular   []  Pathology  [x]  Old records  []  Other:  Most notable findings include:     LAB RESULTS:      Lab 01/12/25 2037   WBC 9.27   HEMOGLOBIN 13.0   HEMATOCRIT 41.1   PLATELETS 183   NEUTROS ABS 5.98   IMMATURE GRANS (ABS) 0.13*   LYMPHS ABS 2.32   MONOS ABS 0.71   EOS ABS 0.09   MCV 93.6         Lab 01/12/25 2037   SODIUM 139   POTASSIUM 5.4*   CHLORIDE 100   CO2 27.0   ANION GAP 12.0   BUN 31*   CREATININE 1.16*   EGFR 48.4*   GLUCOSE 220*   CALCIUM 9.7   MAGNESIUM 1.7         Lab 01/12/25 2037   TOTAL PROTEIN 6.3   ALBUMIN 4.0   GLOBULIN 2.3   ALT (SGPT) 22   AST (SGOT) 19   BILIRUBIN 0.5   ALK PHOS 83                     Brief Urine Lab Results  (Last result in the past 365 days)        Color   Clarity   Blood   Leuk Est    Nitrite   Protein   CREAT   Urine HCG        01/12/25 2059 Yellow   Clear   Negative   Trace   Positive   Negative                 Microbiology Results (last 10 days)       ** No results found for the last 240 hours. **            XR Chest 1 View    Result Date: 1/12/2025  XR CHEST 1 VW Date of Exam: 1/12/2025 8:30 PM EST Indication: cough Comparison: 2/12/2024. Findings: The heart appears enlarged. There is mild interstitial pulmonary edema. No pneumothorax or pleural effusion. No focal lung consolidation. Stable prominent left-sided epicardial fat.     Impression: Impression: Cardiomegaly and mild interstitial pulmonary edema. Electronically Signed: Chidi Hooker MD  1/12/2025 9:14 PM EST  Workstation ID: [id number]    XR Knee 1 or 2 View Left    Result Date: 1/12/2025  XR KNEE 1 OR 2 VW LEFT Date of Exam: 1/12/2025 8:30 PM EST Indication: falls Comparison: None available. Findings: There is moderate medial and lateral compartment and mild patellofemoral osteophyte formation. No acute fracture or dislocation. There is mild tricompartmental joint space narrowing. No erosions. No evidence of joint effusion.     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 Electronically Signed: Chidi Hooker MD  1/12/2025 9:14 PM EST  Workstation ID: [id number]     Results for orders placed during the hospital encounter of 06/25/21    Adult Transthoracic Echo Complete W/ Cont if Necessary Per Protocol    Interpretation Summary  · Estimated left ventricular EF = 70% Left ventricular ejection fraction appears to be 66 - 70%. Left ventricular systolic function is normal.  · Left ventricular diastolic function is consistent with (grade I) impaired relaxation.  · Left ventricular wall thickness is consistent with hypertrophy. Sigmoid-shaped ventricular septum is present.  · LVOT turbulence without gradient.      Assessment & Plan   Assessment & Plan       Falls    Type 2 diabetes mellitus    COPD exacerbation     Left knee pain    Osteoarthritis of left knee    Acute UTI (urinary tract infection)    Masha Chou is a 79 y.o. female with history of T2DM, HTN, CHF, COPD, breast cancer, pulmonary hypertension, presents to the ED after having a near fall.      Assessment and Plan:    Left knee pain  Osteoarthritis  Generalized weakness  -- X-ray left knee shows mild to moderate tricompartmental osteoarthritis  -- Fall precautions  -- PT/OT consult  -- Consult case management  -- Tylenol and Voltaren for pain    Hyperkalemia  Hypomagnesemia  -- K+ 5.4, Mg 1.7  -- Replace Mg per protocol  -- Lokelma x 1 dose  -- BMP, Mg in a.m.    Acute UTI (POA)  -- UA: Ketones trace, nitrite positive, leukocytes trace, WBC 3-5, bacteria 4+, squamous 3-6  -- Urine culture  -- Ceftin    CKD  -- creatinine 1.16  -- baseline creatinine ~ 1.1-1.2   -- bmp in the am    COPD  JUAN F  -- CPAP nightly  -- DuoNebs    T2DM  -- A1c in the a.m.  -- FSBG with SSI  -- Lantus 10 units nightly    DVT prophylaxis: Heparin      CODE STATUS:    Level Of Support Discussed With: Patient  Code Status (Patient has no pulse and is not breathing): CPR (Attempt to Resuscitate)  Medical Interventions (Patient has pulse or is breathing): Full Support      Expected Discharge  TBD  Expected discharge date/ time has not been documented.      This note has been completed as part of a split-shared workflow.     Signature: Electronically signed by SUNITA Cox, 01/12/25, 11:00 PM EST.

## 2025-01-13 NOTE — CASE MANAGEMENT/SOCIAL WORK
Discharge Planning Assessment   [facility]     Patient Name: Masha Chou  MRN: [id number]  Today's Date: 1/13/2025    Admit Date: 1/12/2025    Plan: [facility] [place]   Discharge Needs Assessment       Row Name 01/13/25 0938       Living Environment    People in Home child(more), adult    Name(s) of People in Home Machelle Irene (daughter/POA) [phone]    Current Living Arrangements home    Potentially Unsafe Housing Conditions none    In the past 12 months has the electric, gas, oil, or water company threatened to shut off services in your home? No    Primary Care Provided by self    Provides Primary Care For no one    Family Caregiver if Needed child(more), adult    Able to Return to Prior Arrangements yes       Resource/Environmental Concerns    Resource/Environmental Concerns none    Transportation Concerns none       Transportation Needs    In the past 12 months, has lack of transportation kept you from medical appointments or from getting medications? no    In the past 12 months, has lack of transportation kept you from meetings, work, or from getting things needed for daily living? No       Food Insecurity    Within the past 12 months, you worried that your food would run out before you got the money to buy more. Never true    Within the past 12 months, the food you bought just didn't last and you didn't have money to get more. Never true       Transition Planning    Patient/Family Anticipates Transition to inpatient rehabilitation facility    Patient/Family Anticipated Services at Transition     Transportation Anticipated family or friend will provide       Discharge Needs Assessment    Readmission Within the Last 30 Days no previous admission in last 30 days    Equipment Currently Used at Home cpap;cane, straight;grab bar;glucometer;oxygen    Concerns to be Addressed denies needs/concerns at this time    Do you want help finding or keeping work or a job? I do not need or want help    Do you  want help with school or training? For example, starting or completing job training or getting a high school diploma, GED or equivalent No    Anticipated Changes Related to Illness none    Equipment Needed After Discharge none    Outpatient/Agency/Support Group Needs skilled nursing facility    Discharge Facility/Level of Care Needs nursing facility, skilled    Provided Post Acute Provider List? Yes    Post Acute Provider List Inpatient Rehab    Provided Post Acute Provider Quality & Resource List? Yes    Post Acute Provider Quality and Resource List Inpatient Rehab    Delivered To Patient    Method of Delivery In person    Offered/Gave Vendor List yes    Patient's Choice of Community Agency(s) [facility]                   Discharge Plan       Row Name 01/13/25 0940       Plan    Plan [facility]    Patient/Family in Agreement with Plan yes    Plan Comments Spoke to patient at bedside. Lives Machelle Irene (daughter/POA) [phone] in [place]. Is independent with ADL's. No problems with [employer] Medicare or medications. Uses [facility]. Uses CPAP, straight cane Glucometer and oxygen for [employer]. PCP is Shara Wu MD. Plan is [facility] SNF. Daughter will transport. Referral faxed to Dixie at [facility] CM will continue to follow.    Final Discharge Disposition Code 03 - skilled nursing facility (SNF)                  Continued Care and Services - Admitted Since 1/12/2025    No active coordination exists for this encounter.          Demographic Summary       Row Name 01/13/25 0937       General Information    Admission Type observation    Arrived From emergency department    Referral Source admission list    Reason for Consult discharge planning    Preferred Language English       Contact Information    Permission Granted to Share Info With     Contact Information Obtained for                    Functional Status       Row Name 01/13/25 0937        Functional Status    Usual Activity Tolerance moderate    Current Activity Tolerance moderate       Physical Activity    On average, how many days per week do you engage in moderate to strenuous exercise (like a brisk walk)? 0 days    On average, how many minutes do you engage in exercise at this level? 0 min    Number of minutes of exercise per week 0       Functional Status, IADL    Medications independent    Meal Preparation independent    Housekeeping independent    Laundry independent    Shopping independent    If for any reason you need help with day-to-day activities such as bathing, preparing meals, shopping, managing finances, etc., do you get the help you need? I don't need any help       Mental Status    General Appearance WDL WDL       Mental Status Summary    Recent Changes in Mental Status/Cognitive Functioning no changes       Employment/    Employment Status retired                   Psychosocial    No documentation.                  Abuse/Neglect    No documentation.                  Legal    No documentation.                  Substance Abuse    No documentation.                  Patient Forms    No documentation.                     Josue Duffy RN

## 2025-01-13 NOTE — ED NOTES
" Masha Chou    Nursing Report ED to Floor:  Mental status: A&OX3  Ambulatory status: [facility]  Oxygen Therapy:  2L NC  Cardiac Rhythm: NSR  Admitted from: HOME  Safety Concerns:  NA  Social Issues: NA  ED Room #:  18    ED Nurse Phone Extension - [phone] or may call [phone].      HPI:   Chief Complaint   Patient presents with    Leg Pain       Past Medical History:  Past Medical History:   Diagnosis Date    Asthma     Cancer     BILATERAL BREAST     COPD (chronic obstructive pulmonary disease)     Diabetes mellitus     Heart failure     Hypertension     Pulmonary hypertension         Past Surgical History:  Past Surgical History:   Procedure Laterality Date    BREAST SURGERY      CHOLECYSTECTOMY      HYSTERECTOMY      JOINT REPLACEMENT      RIGHT KNEE    KELOID EXCISION          Admitting Doctor:   Eugene James MD    Consulting Provider(s):  Consults       No orders found from 12/14/2024 to 1/13/2025.             Admitting Diagnosis:   The primary encounter diagnosis was Falls. A diagnosis of Acute cystitis without hematuria was also pertinent to this visit.    Most Recent Vitals:   Vitals:    01/12/25 1957   BP: 153/73   BP Location: Left arm   Patient Position: Sitting   Pulse: 93   Resp: 20   Temp: 99 °F (37.2 °C)   TempSrc: Oral   SpO2: 90%   Weight: 109 kg (240 lb)   Height: 142.2 cm (56\")       Active LDAs/IV Access:   Lines, Drains & Airways       Active LDAs       None                    Labs (abnormal labs have a star):   Labs Reviewed   CBC WITH AUTO DIFFERENTIAL - Abnormal; Notable for the following components:       Result Value    Immature Grans % 1.4 (*)     Immature Grans, Absolute 0.13 (*)     All other components within normal limits   COMPREHENSIVE METABOLIC PANEL - Abnormal; Notable for the following components:    Glucose 220 (*)     BUN 31 (*)     Creatinine 1.16 (*)     Potassium 5.4 (*)     BUN/Creatinine Ratio 26.7 (*)     eGFR 48.4 (*)     All other components within normal limits    " Narrative:     GFR Categories in Chronic Kidney Disease (CKD)      GFR Category          GFR (mL/min/1.73)    Interpretation  G1                     90 or greater         Normal or high (1)  G2                      60-89                Mild decrease (1)  G3a                   45-59                Mild to moderate decrease  G3b                   30-44                Moderate to severe decrease  G4                    15-29                Severe decrease  G5                    14 or less           Kidney failure          (1)In the absence of evidence of kidney disease, neither GFR category G1 or G2 fulfill the criteria for CKD.    eGFR calculation 2021 CKD-EPI creatinine equation, which does not include race as a factor   URINALYSIS W/ CULTURE IF INDICATED - Abnormal; Notable for the following components:    Ketones, UA Trace (*)     Leuk Esterase, UA Trace (*)     Nitrite, UA Positive (*)     All other components within normal limits    Narrative:     In absence of clinical symptoms, the presence of pyuria, bacteria, and/or nitrites on the urinalysis result does not correlate with infection.   URINALYSIS, MICROSCOPIC ONLY - Abnormal; Notable for the following components:    WBC, UA 3-5 (*)     Bacteria, UA 4+ (*)     Squamous Epithelial Cells, UA 3-6 (*)     All other components within normal limits   MAGNESIUM - Normal       Meds Given in ED:   Medications   cefuroxime (CEFTIN) tablet 500 mg (500 mg Oral Given 1/12/25 2209)     No current facility-administered medications for this encounter.       Last NIH score:                                                          Dysphagia screening results:        [place] Coma Scale:  No data recorded     CIWA:        Restraint Type:            Isolation Status:  No active isolations

## 2025-01-13 NOTE — NURSING NOTE
Patient alert and oriented, vss, 2L of O2 and cpap at night. Patient used home cpap. Patient denies having pain.

## 2025-01-14 LAB
GLUCOSE BLDC GLUCOMTR-MCNC: 183 MG/DL (ref 70–130)
GLUCOSE BLDC GLUCOMTR-MCNC: 205 MG/DL (ref 70–130)
GLUCOSE BLDC GLUCOMTR-MCNC: 215 MG/DL (ref 70–130)
GLUCOSE BLDC GLUCOMTR-MCNC: 244 MG/DL (ref 70–130)
GLUCOSE BLDC GLUCOMTR-MCNC: 331 MG/DL (ref 70–130)

## 2025-01-14 PROCEDURE — 63710000001 INSULIN GLARGINE PER 5 UNITS: Performed by: NURSE PRACTITIONER

## 2025-01-14 PROCEDURE — 99232 SBSQ HOSP IP/OBS MODERATE 35: CPT | Performed by: STUDENT IN AN ORGANIZED HEALTH CARE EDUCATION/TRAINING PROGRAM

## 2025-01-14 PROCEDURE — 97110 THERAPEUTIC EXERCISES: CPT

## 2025-01-14 PROCEDURE — 63710000001 INSULIN LISPRO (HUMAN) PER 5 UNITS: Performed by: NURSE PRACTITIONER

## 2025-01-14 PROCEDURE — 25010000002 HEPARIN (PORCINE) PER 1000 UNITS: Performed by: NURSE PRACTITIONER

## 2025-01-14 PROCEDURE — G0378 HOSPITAL OBSERVATION PER HR: HCPCS

## 2025-01-14 PROCEDURE — 94799 UNLISTED PULMONARY SVC/PX: CPT

## 2025-01-14 PROCEDURE — 63710000001 ONDANSETRON ODT 4 MG TABLET DISPERSIBLE: Performed by: STUDENT IN AN ORGANIZED HEALTH CARE EDUCATION/TRAINING PROGRAM

## 2025-01-14 PROCEDURE — 96372 THER/PROPH/DIAG INJ SC/IM: CPT

## 2025-01-14 PROCEDURE — 82948 REAGENT STRIP/BLOOD GLUCOSE: CPT

## 2025-01-14 PROCEDURE — 94761 N-INVAS EAR/PLS OXIMETRY MLT: CPT

## 2025-01-14 PROCEDURE — 94660 CPAP INITIATION&MGMT: CPT

## 2025-01-14 PROCEDURE — 97116 GAIT TRAINING THERAPY: CPT

## 2025-01-14 RX ORDER — IPRATROPIUM BROMIDE AND ALBUTEROL SULFATE 2.5; .5 MG/3ML; MG/3ML
3 SOLUTION RESPIRATORY (INHALATION)
Status: DISCONTINUED | OUTPATIENT
Start: 2025-01-14 | End: 2025-01-18

## 2025-01-14 RX ORDER — ONDANSETRON 4 MG/1
4 TABLET, ORALLY DISINTEGRATING ORAL EVERY 6 HOURS PRN
Status: DISCONTINUED | OUTPATIENT
Start: 2025-01-14 | End: 2025-01-29 | Stop reason: HOSPADM

## 2025-01-14 RX ADMIN — CEFUROXIME AXETIL 500 MG: 250 TABLET ORAL at 21:07

## 2025-01-14 RX ADMIN — INSULIN LISPRO 2 UNITS: 100 INJECTION, SOLUTION INTRAVENOUS; SUBCUTANEOUS at 08:15

## 2025-01-14 RX ADMIN — INSULIN LISPRO 5 UNITS: 100 INJECTION, SOLUTION INTRAVENOUS; SUBCUTANEOUS at 21:07

## 2025-01-14 RX ADMIN — HEPARIN SODIUM 5000 UNITS: 5000 INJECTION INTRAVENOUS; SUBCUTANEOUS at 21:07

## 2025-01-14 RX ADMIN — DONEPEZIL HYDROCHLORIDE 10 MG: 10 TABLET, FILM COATED ORAL at 08:19

## 2025-01-14 RX ADMIN — INSULIN GLARGINE 10 UNITS: 100 INJECTION, SOLUTION SUBCUTANEOUS at 21:07

## 2025-01-14 RX ADMIN — CHOLESTYRAMINE 4 G: 4 POWDER, FOR SUSPENSION ORAL at 08:15

## 2025-01-14 RX ADMIN — CEFUROXIME AXETIL 500 MG: 250 TABLET ORAL at 08:18

## 2025-01-14 RX ADMIN — INSULIN LISPRO 3 UNITS: 100 INJECTION, SOLUTION INTRAVENOUS; SUBCUTANEOUS at 17:25

## 2025-01-14 RX ADMIN — BUPROPION HYDROCHLORIDE 150 MG: 150 TABLET, EXTENDED RELEASE ORAL at 08:19

## 2025-01-14 RX ADMIN — IPRATROPIUM BROMIDE AND ALBUTEROL SULFATE 3 ML: 2.5; .5 SOLUTION RESPIRATORY (INHALATION) at 21:36

## 2025-01-14 RX ADMIN — VERAPAMIL HYDROCHLORIDE 240 MG: 240 TABLET, FILM COATED, EXTENDED RELEASE ORAL at 08:19

## 2025-01-14 RX ADMIN — MAGNESIUM OXIDE TAB 400 MG (241.3 MG ELEMENTAL MG) 400 MG: 400 (241.3 MG) TAB at 08:20

## 2025-01-14 RX ADMIN — FLUTICASONE PROPIONATE 2 SPRAY: 50 SPRAY, METERED NASAL at 08:18

## 2025-01-14 RX ADMIN — HEPARIN SODIUM 5000 UNITS: 5000 INJECTION INTRAVENOUS; SUBCUTANEOUS at 08:16

## 2025-01-14 RX ADMIN — INSULIN LISPRO 3 UNITS: 100 INJECTION, SOLUTION INTRAVENOUS; SUBCUTANEOUS at 11:53

## 2025-01-14 RX ADMIN — IPRATROPIUM BROMIDE AND ALBUTEROL SULFATE 3 ML: 2.5; .5 SOLUTION RESPIRATORY (INHALATION) at 15:37

## 2025-01-14 RX ADMIN — ATORVASTATIN CALCIUM 20 MG: 20 TABLET, FILM COATED ORAL at 08:20

## 2025-01-14 RX ADMIN — MAGNESIUM OXIDE TAB 400 MG (241.3 MG ELEMENTAL MG) 400 MG: 400 (241.3 MG) TAB at 21:07

## 2025-01-14 RX ADMIN — CITALOPRAM HYDROBROMIDE 40 MG: 40 TABLET ORAL at 08:19

## 2025-01-14 RX ADMIN — ACETAMINOPHEN 650 MG: 325 TABLET ORAL at 17:25

## 2025-01-14 RX ADMIN — ONDANSETRON 4 MG: 4 TABLET, ORALLY DISINTEGRATING ORAL at 12:31

## 2025-01-14 RX ADMIN — ASPIRIN 81 MG: 81 TABLET, COATED ORAL at 08:19

## 2025-01-14 NOTE — PLAN OF CARE
Goal Outcome Evaluation:  Plan of Care Reviewed With: patient        Progress: no change  Outcome Evaluation: patient took 6 steps bed>recliner with min assist x1, mild unsteadiness and mutliple cues to keep walker close. Assist needed to control walker, furhter distance limited by nausea. Nursing notified and aware. Completed B LE exercises with kathleen for technique. Recommend SNF at D/C for best functional outcome.

## 2025-01-14 NOTE — PROGRESS NOTES
[facility] Medicine Services  PROGRESS NOTE    Patient Name: Masha Chou  : 1945  MRN: [id number]    Date of Admission: 2025  Primary Care Physician: Kary Wu MD    Subjective   Subjective     CC:  Left knee pain    HPI:  Patient reporting some cough this morning.  She reports this often happens when the weather changes and gets colder.  She reports she does use an albuterol inhaler at home.  She denies shortness of breath, sputum production, chest pain.      Objective   Objective     Vital Signs:   Temp:  [97.5 °F (36.4 °C)-98.2 °F (36.8 °C)] 97.8 °F (36.6 °C)  Heart Rate:  [72-87] 72  Resp:  [18] 18  BP: (119-143)/(50-76) 142/63  Flow (L/min) (Oxygen Therapy):  [2-3] 3     Physical Exam  Constitutional:       General: She is not in acute distress.  Cardiovascular:      Rate and Rhythm: Normal rate and regular rhythm.      Heart sounds: Normal heart sounds.   Pulmonary:      Effort: Pulmonary effort is normal. No respiratory distress.      Breath sounds: Normal breath sounds. No wheezing or rhonchi.   Abdominal:      Palpations: Abdomen is soft.      Tenderness: There is no abdominal tenderness.   Musculoskeletal:      Right lower leg: No edema.      Left lower leg: No edema.   Neurological:      General: No focal deficit present.      Mental Status: She is alert. Mental status is at baseline.   Psychiatric:         Mood and Affect: Mood normal.         Thought Content: Thought content normal.          Results Reviewed:  LAB RESULTS:      Lab 25  0646 25   WBC 8.72 9.27   HEMOGLOBIN 12.3 13.0   HEMATOCRIT 40.3 41.1   PLATELETS 172 183   NEUTROS ABS 4.54 5.98   IMMATURE GRANS (ABS) 0.14* 0.13*   LYMPHS ABS 2.99 2.32   MONOS ABS 0.82 0.71   EOS ABS 0.16 0.09   MCV 95.5 93.6         Lab 25  0646 25   SODIUM 144 139   POTASSIUM 5.1 5.4*   CHLORIDE 105 100   CO2 32.0* 27.0   ANION GAP 7.0 12.0   BUN 31* 31*   CREATININE 1.23*  1.16*   EGFR 45.1* 48.4*   GLUCOSE 158* 220*   CALCIUM 9.4 9.7   MAGNESIUM 1.9 1.7   HEMOGLOBIN A1C 9.00*  --          Lab 01/12/25 2037   TOTAL PROTEIN 6.3   ALBUMIN 4.0   GLOBULIN 2.3   ALT (SGPT) 22   AST (SGOT) 19   BILIRUBIN 0.5   ALK PHOS 83                     Brief Urine Lab Results  (Last result in the past 365 days)        Color   Clarity   Blood   Leuk Est   Nitrite   Protein   CREAT   Urine HCG        01/13/25 0722 Yellow   Clear   Negative   Trace   Negative   Negative                   Microbiology Results Abnormal       None            XR Chest 1 View    Result Date: 1/12/2025  XR CHEST 1 VW Date of Exam: 1/12/2025 8:30 PM EST Indication: cough Comparison: 2/12/2024. Findings: The heart appears enlarged. There is mild interstitial pulmonary edema. No pneumothorax or pleural effusion. No focal lung consolidation. Stable prominent left-sided epicardial fat.     Impression: Impression: Cardiomegaly and mild interstitial pulmonary edema. Electronically Signed: Chidi Hooker MD  1/12/2025 9:14 PM EST  Workstation ID: [id number]    XR Knee 1 or 2 View Left    Result Date: 1/12/2025  XR KNEE 1 OR 2 VW LEFT Date of Exam: 1/12/2025 8:30 PM EST Indication: falls Comparison: None available. Findings: There is moderate medial and lateral compartment and mild patellofemoral osteophyte formation. No acute fracture or dislocation. There is mild tricompartmental joint space narrowing. No erosions. No evidence of joint effusion.     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 Electronically Signed: Chidi Hooker MD  1/12/2025 9:14 PM EST  Workstation ID: [id number]     Results for orders placed during the hospital encounter of 06/25/21    Adult Transthoracic Echo Complete W/ Cont if Necessary Per Protocol    Interpretation Summary  · Estimated left ventricular EF = 70% Left ventricular ejection fraction appears to be 66 - 70%. Left ventricular systolic function is normal.  · Left  ventricular diastolic function is consistent with (grade I) impaired relaxation.  · Left ventricular wall thickness is consistent with hypertrophy. Sigmoid-shaped ventricular septum is present.  · LVOT turbulence without gradient.      Current medications:  Scheduled Meds:aspirin, 81 mg, Oral, Daily  atorvastatin, 20 mg, Oral, Daily  buPROPion XL, 150 mg, Oral, Daily  cefuroxime, 500 mg, Oral, Q12H  cholestyramine light, 1 packet, Oral, Daily  citalopram, 40 mg, Oral, QAM  donepezil, 10 mg, Oral, Daily  fluticasone, 2 spray, Nasal, Daily  heparin (porcine), 5,000 Units, Subcutaneous, Q12H  insulin glargine, 10 Units, Subcutaneous, Nightly  insulin lispro, 2-7 Units, Subcutaneous, 4x Daily AC & at Bedtime  ipratropium-albuterol, 3 mL, Nebulization, 4x Daily - RT  magnesium oxide, 400 mg, Oral, BID  sodium chloride, 10 mL, Intravenous, Q12H  verapamil SR, 240 mg, Oral, Daily      Continuous Infusions:   PRN Meds:.  acetaminophen **OR** acetaminophen **OR** acetaminophen    albuterol    senna-docusate sodium **AND** polyethylene glycol **AND** bisacodyl **AND** bisacodyl    Calcium Replacement - Follow Nurse / BPA Driven Protocol    dextrose    dextrose    Diclofenac Sodium    glucagon (human recombinant)    Magnesium Standard Dose Replacement - Follow Nurse / BPA Driven Protocol    nitroglycerin    ondansetron ODT    Phosphorus Replacement - Follow Nurse / BPA Driven Protocol    Potassium Replacement - Follow Nurse / BPA Driven Protocol    sodium chloride    sodium chloride    Assessment & Plan   Assessment & Plan     Active Hospital Problems    Diagnosis  POA    **Falls [R29.6]  Not Applicable    Left knee pain [M25.562]  Unknown    Osteoarthritis of left knee [M17.12]  Unknown    Acute UTI (urinary tract infection) [N39.0]  Unknown    COPD exacerbation [J44.1]  Yes    Type 2 diabetes mellitus [E11.9]  Yes      Resolved Hospital Problems   No resolved problems to display.        Brief Hospital Course to date:  Masha  RONIT Chou is a 79 y.o. female with type 2 diabetes, hypertension, COPD, breast cancer, pulmonary hypertension, diastolic CHF who presented to the ED after having a near fall.    Left knee pain  Osteoarthritis  Generalized weakness  -- X-ray left knee shows mild to moderate tricompartmental osteoarthritis  -- Fall precautions  -- PT/OT consult, recommending SNF  -- Consult case management  -- Tylenol and Voltaren for pain     Hyperkalemia-resolved  Hypomagnesemia-resolved  -- K+ 5.4, Mg 1.7  -- Replace Mg per protocol  -- s/p Lokelma x 1 dose  -- BMP, Mg in a.m.     Acute UTI (POA)  -- UA: Ketones trace, nitrite positive, leukocytes trace, WBC 3-5, bacteria 4+, squamous 3-6  -- Urine culture  -- Ceftin     CKD  -- baseline creatinine ~ 1.1-1.2   -- bmp in the am     COPD  JUAN F  Asthma  --Does not appear to be in acute exacerbation.  No sputum production and no wheezing  -- CPAP nightly  -- DuoNebs, will change from as needed to scheduled     T2DM  -- A1c in the a.m.  -- FSBG with SSI  -- Lantus 10 units nightly    Expected Discharge Location and Transportation: SNF  Expected Discharge   Expected Discharge Date: 1/17/2025; Expected Discharge Time:      VTE Prophylaxis:  Pharmacologic VTE prophylaxis orders are present.         AM-PAC 6 Clicks Score (PT): 17 (01/14/25 9680)    CODE STATUS:   Code Status and Medical Interventions: CPR (Attempt to Resuscitate); Full Support   Ordered at: 01/12/25 7805     Level Of Support Discussed With:    Patient     Code Status (Patient has no pulse and is not breathing):    CPR (Attempt to Resuscitate)     Medical Interventions (Patient has pulse or is breathing):    Full Support       Christine Guevara MD  01/14/25

## 2025-01-14 NOTE — PLAN OF CARE
Problem: Adult Inpatient Plan of Care  Goal: Absence of Hospital-Acquired Illness or Injury  Intervention: Identify and Manage Fall Risk  Recent Flowsheet Documentation  Taken 1/14/2025 0600 by Mena Lim RN  Safety Promotion/Fall Prevention:   activity supervised   assistive device/personal items within reach   clutter free environment maintained   elopement precautions   fall prevention program maintained   gait belt   lighting adjusted   mobility aid in reach   nonskid shoes/slippers when out of bed   room organization consistent   toileting scheduled   safety round/check completed  Taken 1/14/2025 0431 by Mena Lim RN  Safety Promotion/Fall Prevention:   activity supervised   assistive device/personal items within reach   clutter free environment maintained   elopement precautions   fall prevention program maintained   gait belt   lighting adjusted   mobility aid in reach   nonskid shoes/slippers when out of bed   room organization consistent   safety round/check completed   toileting scheduled  Taken 1/14/2025 0200 by Mena Lim RN  Safety Promotion/Fall Prevention:   activity supervised   assistive device/personal items within reach   clutter free environment maintained   elopement precautions   fall prevention program maintained   gait belt   lighting adjusted   mobility aid in reach   nonskid shoes/slippers when out of bed   room organization consistent   safety round/check completed   toileting scheduled  Taken 1/14/2025 0027 by Mena Lim RN  Safety Promotion/Fall Prevention:   activity supervised   assistive device/personal items within reach   clutter free environment maintained   elopement precautions   fall prevention program maintained   gait belt   lighting adjusted   mobility aid in reach   nonskid shoes/slippers when out of bed   room organization consistent   safety round/check completed   toileting scheduled  Taken 1/13/2025 2202 by Mena Lim RN  Safety Promotion/Fall  Prevention:   activity supervised   assistive device/personal items within reach   clutter free environment maintained   elopement precautions   fall prevention program maintained   gait belt   lighting adjusted   mobility aid in reach   nonskid shoes/slippers when out of bed   room organization consistent   safety round/check completed   toileting scheduled  Taken 1/13/2025 2032 by Mena Lim RN  Safety Promotion/Fall Prevention:   activity supervised   assistive device/personal items within reach   clutter free environment maintained   elopement precautions   fall prevention program maintained   gait belt   lighting adjusted   mobility aid in reach   nonskid shoes/slippers when out of bed   room organization consistent   safety round/check completed   toileting scheduled  Intervention: Prevent Skin Injury  Recent Flowsheet Documentation  Taken 1/14/2025 0600 by Mena Lim RN  Body Position: position maintained  Skin Protection:   incontinence pads utilized   silicone foam dressing in place   transparent dressing maintained  Taken 1/14/2025 0431 by Mena Lim RN  Body Position: position maintained  Skin Protection:   incontinence pads utilized   silicone foam dressing in place   transparent dressing maintained  Taken 1/14/2025 0200 by Mena Lim RN  Body Position: position maintained  Skin Protection:   incontinence pads utilized   silicone foam dressing in place   transparent dressing maintained  Taken 1/14/2025 0027 by Mena Lim RN  Body Position: position maintained  Skin Protection:   incontinence pads utilized   silicone foam dressing in place   transparent dressing maintained  Taken 1/13/2025 2202 by Mena Lim RN  Body Position: position maintained  Skin Protection:   incontinence pads utilized   silicone foam dressing in place   transparent dressing maintained  Taken 1/13/2025 2032 by Mena Lim RN  Body Position: position maintained  Skin Protection:   incontinence pads  utilized   silicone foam dressing in place   transparent dressing maintained  Intervention: Prevent and Manage VTE (Venous Thromboembolism) Risk  Recent Flowsheet Documentation  Taken 1/14/2025 0431 by Mena Lim RN  VTE Prevention/Management:   bilateral   SCDs (sequential compression devices) off  Taken 1/14/2025 0200 by Mena Lim RN  VTE Prevention/Management:   bilateral   SCDs (sequential compression devices) off  Taken 1/14/2025 0027 by Mena Lim RN  VTE Prevention/Management:   bilateral   SCDs (sequential compression devices) off  Taken 1/13/2025 2202 by Mena Lim RN  VTE Prevention/Management:   bilateral   SCDs (sequential compression devices) off  Taken 1/13/2025 2032 by Mena Lim RN  VTE Prevention/Management: (heprin)   bilateral   SCDs (sequential compression devices) off   other (see comments)  Intervention: Prevent Infection  Recent Flowsheet Documentation  Taken 1/14/2025 0600 by Mena Lim RN  Infection Prevention:   environmental surveillance performed   hand hygiene promoted   rest/sleep promoted   single patient room provided  Taken 1/14/2025 0431 by Mena Lim RN  Infection Prevention:   environmental surveillance performed   hand hygiene promoted   rest/sleep promoted   single patient room provided  Taken 1/14/2025 0200 by Mena Lim RN  Infection Prevention:   environmental surveillance performed   hand hygiene promoted   rest/sleep promoted   single patient room provided  Taken 1/14/2025 0027 by Mena Lim RN  Infection Prevention:   environmental surveillance performed   hand hygiene promoted   rest/sleep promoted   single patient room provided  Taken 1/13/2025 2202 by Mena Lim RN  Infection Prevention:   environmental surveillance performed   hand hygiene promoted   rest/sleep promoted   single patient room provided  Taken 1/13/2025 2032 by Mena Lim RN  Infection Prevention:   environmental surveillance performed   hand hygiene  promoted   rest/sleep promoted   single patient room provided  Goal: Optimal Comfort and Wellbeing  Intervention: Monitor Pain and Promote Comfort  Recent Flowsheet Documentation  Taken 1/13/2025 2202 by Mena Lim RN  Pain Management Interventions: quiet environment facilitated  Taken 1/13/2025 2032 by Mena Lim RN  Pain Management Interventions:   position adjusted   pillow support provided   quiet environment facilitated  Intervention: Provide Person-Centered Care  Recent Flowsheet Documentation  Taken 1/13/2025 2032 by Mena Lim RN  Trust Relationship/Rapport:   care explained   questions answered   questions encouraged     Problem: Comorbidity Management  Goal: Blood Glucose Level Within Target Range  Intervention: Monitor and Manage Glycemia  Recent Flowsheet Documentation  Taken 1/14/2025 0431 by Mena Lim RN  Medication Review/Management: medications reviewed  Goal: Maintenance of Heart Failure Symptom Control  Intervention: Maintain Heart Failure Management  Recent Flowsheet Documentation  Taken 1/14/2025 0431 by Mena Lim RN  Medication Review/Management: medications reviewed  Goal: Maintenance of Osteoarthritis Symptom Control  Intervention: Maintain Osteoarthritis Symptom Control  Recent Flowsheet Documentation  Taken 1/14/2025 0431 by Mena Lim RN  Activity Management: activity encouraged  Medication Review/Management: medications reviewed  Taken 1/14/2025 0200 by Mena Lim RN  Activity Management: activity encouraged  Taken 1/14/2025 0027 by Mena Lim RN  Activity Management: activity encouraged  Taken 1/13/2025 2202 by Mena Lim RN  Activity Management: activity encouraged  Taken 1/13/2025 2032 by Mena Lim RN  Activity Management: activity encouraged     Problem: Skin Injury Risk Increased  Goal: Skin Health and Integrity  Intervention: Optimize Skin Protection  Recent Flowsheet Documentation  Taken 1/14/2025 0600 by Mena Lim  RN  Pressure Reduction Techniques:   frequent weight shift encouraged   weight shift assistance provided   pressure points protected  Head of Bed (HOB) Positioning: [facility] [place] elevated  Pressure Reduction Devices:   foam padding utilized   positioning supports utilized   pressure-redistributing mattress utilized  Skin Protection:   incontinence pads utilized   silicone foam dressing in place   transparent dressing maintained  Taken 1/14/2025 0431 by Mena Lim RN  Activity Management: activity encouraged  Pressure Reduction Techniques:   frequent weight shift encouraged   pressure points protected   weight shift assistance provided  Head of Bed ([facility] [place]) Positioning: [facility] [place] elevated  Pressure Reduction Devices:   foam padding utilized   positioning supports utilized   pressure-redistributing mattress utilized  Skin Protection:   incontinence pads utilized   silicone foam dressing in place   transparent dressing maintained  Taken 1/14/2025 0200 by Mena Lim RN  Activity Management: activity encouraged  Pressure Reduction Techniques:   frequent weight shift encouraged   pressure points protected   weight shift assistance provided  Head of Bed ([facility] [place]) Positioning: [facility] [place] elevated  Pressure Reduction Devices:   foam padding utilized   pressure-redistributing mattress utilized  Skin Protection:   incontinence pads utilized   silicone foam dressing in place   transparent dressing maintained  Taken 1/14/2025 0027 by Mena Lim RN  Activity Management: activity encouraged  Pressure Reduction Techniques:   frequent weight shift encouraged   pressure points protected   weight shift assistance provided  Head of Bed ([facility] [place]) Positioning: [facility] [place] elevated  Pressure Reduction Devices:   foam padding utilized   pressure-redistributing mattress utilized  Skin Protection:   incontinence pads utilized   silicone foam dressing in place   transparent dressing maintained  Taken 1/13/2025 2202 by Mena Lim RN  Activity Management: activity  encouraged  Pressure Reduction Techniques:   frequent weight shift encouraged   pressure points protected   weight shift assistance provided  Head of Bed (HOB) Positioning: HOB elevated  Pressure Reduction Devices:   foam padding utilized   pressure-redistributing mattress utilized  Skin Protection:   incontinence pads utilized   silicone foam dressing in place   transparent dressing maintained  Taken 1/13/2025 2032 by Mena Lim RN  Activity Management: activity encouraged  Pressure Reduction Techniques:   frequent weight shift encouraged   weight shift assistance provided  Head of Bed (HOB) Positioning: HOB elevated  Pressure Reduction Devices:   pressure-redistributing mattress utilized   foam padding utilized  Skin Protection:   incontinence pads utilized   silicone foam dressing in place   transparent dressing maintained     Problem: Fall Injury Risk  Goal: Absence of Fall and Fall-Related Injury  Intervention: Identify and Manage Contributors  Recent Flowsheet Documentation  Taken 1/14/2025 0431 by Mena Lim RN  Medication Review/Management: medications reviewed  Intervention: Promote Injury-Free Environment  Recent Flowsheet Documentation  Taken 1/14/2025 0600 by Mena Lim RN  Safety Promotion/Fall Prevention:   activity supervised   assistive device/personal items within reach   clutter free environment maintained   elopement precautions   fall prevention program maintained   gait belt   lighting adjusted   mobility aid in reach   nonskid shoes/slippers when out of bed   room organization consistent   toileting scheduled   safety round/check completed  Taken 1/14/2025 0431 by Mena Lim RN  Safety Promotion/Fall Prevention:   activity supervised   assistive device/personal items within reach   clutter free environment maintained   elopement precautions   fall prevention program maintained   gait belt   lighting adjusted   mobility aid in reach   nonskid shoes/slippers when out of bed    room organization consistent   safety round/check completed   toileting scheduled  Taken 1/14/2025 0200 by Mena Lim RN  Safety Promotion/Fall Prevention:   activity supervised   assistive device/personal items within reach   clutter free environment maintained   elopement precautions   fall prevention program maintained   gait belt   lighting adjusted   mobility aid in reach   nonskid shoes/slippers when out of bed   room organization consistent   safety round/check completed   toileting scheduled  Taken 1/14/2025 0027 by Mena Lim RN  Safety Promotion/Fall Prevention:   activity supervised   assistive device/personal items within reach   clutter free environment maintained   elopement precautions   fall prevention program maintained   gait belt   lighting adjusted   mobility aid in reach   nonskid shoes/slippers when out of bed   room organization consistent   safety round/check completed   toileting scheduled  Taken 1/13/2025 2202 by Mena Lim RN  Safety Promotion/Fall Prevention:   activity supervised   assistive device/personal items within reach   clutter free environment maintained   elopement precautions   fall prevention program maintained   gait belt   lighting adjusted   mobility aid in reach   nonskid shoes/slippers when out of bed   room organization consistent   safety round/check completed   toileting scheduled  Taken 1/13/2025 2032 by Mena Lim RN  Safety Promotion/Fall Prevention:   activity supervised   assistive device/personal items within reach   clutter free environment maintained   elopement precautions   fall prevention program maintained   gait belt   lighting adjusted   mobility aid in reach   nonskid shoes/slippers when out of bed   room organization consistent   safety round/check completed   toileting scheduled   Goal Outcome Evaluation:            Patient alert and oriented X4, vss, room air. Patient denies having pain.

## 2025-01-14 NOTE — THERAPY TREATMENT NOTE
Patient Name: Masha Chou  : 1945    MRN: [id number]                              Today's Date: 2025       Admit Date: 2025    Visit Dx:     ICD-10-CM ICD-9-CM   1. Falls  R29.6 V15.88   2. Acute cystitis without hematuria  N30.00 595.0     Patient Active Problem List   Diagnosis    Chest pain    Type 2 diabetes mellitus    Pulmonary HTN    Heart failure    COPD exacerbation    Chronic respiratory failure with hypoxia    Falls    Left knee pain    Osteoarthritis of left knee    Acute UTI (urinary tract infection)     Past Medical History:   Diagnosis Date    Asthma     Cancer     BILATERAL BREAST     COPD (chronic obstructive pulmonary disease)     Diabetes mellitus     Heart failure     Hypertension     Pulmonary hypertension      Past Surgical History:   Procedure Laterality Date    BREAST SURGERY      CHOLECYSTECTOMY      HYSTERECTOMY      JOINT REPLACEMENT      RIGHT KNEE    KELOID EXCISION        General Information       Row Name 25 1357          Physical Therapy Time and Intention    Document Type therapy note (daily note)  -AS     Mode of Treatment physical therapy  -AS       Row Name 25 1353          General Information    Patient Profile Reviewed yes  -AS     Existing Precautions/Restrictions fall;oxygen therapy device and L/min;other (see comments)  complaints of nausea/vomitting  -AS     Barriers to Rehab medically complex;previous functional deficit  -AS       Row Name 25 1350          Cognition    Orientation Status (Cognition) oriented x 3;verbal cues/prompts needed for orientation  -AS       Row Name 25 1355          Safety Issues/Impairments Affecting Functional Mobility    Safety Issues Affecting Function (Mobility) awareness of need for assistance;insight into deficits/self-awareness;safety precautions follow-through/compliance;sequencing abilities;positioning of assistive device  -AS     Impairments Affecting Function (Mobility)  balance;endurance/activity tolerance;pain;shortness of breath;strength  -AS     Comment, Safety Issues/Impairments (Mobility) alert and following commands, needs encouragement to participate in therapy d/t nausea, agreed to UIC and exercises  -AS               User Key  (r) = Recorded By, (t) = Taken By, (c) = Cosigned By      Initials Name Provider Type    AS Alejandra Crowder PTA Physical Therapist Assistant                   Mobility       Row Name 01/14/25 1354          Bed Mobility    Supine-Sit [place] (Bed Mobility) contact guard;1 person assist  -AS     Assistive Device (Bed Mobility) head of bed elevated  -AS     Comment, (Bed Mobility) increased time and effort to reach EOB  -AS       Row Name 01/14/25 1353          Transfers    Comment, (Transfers) cues for hand placement  -AS       Row Name 01/14/25 1355          Bed-Chair Transfer    Bed-Chair [place] (Transfers) verbal cues;minimum assist (75% patient effort);1 person assist  -AS     Assistive Device (Bed-Chair Transfers) walker, front-wheeled  -AS       Row Name 01/14/25 1359          Sit-Stand Transfer    Sit-Stand [place] (Transfers) verbal cues;contact guard;1 person assist  -AS     Assistive Device (Sit-Stand Transfers) walker, front-wheeled  -AS     Comment, (Sit-Stand Transfer) mulitple cues for hand placement  -AS       Row Name 01/14/25 7209          Gait/Stairs (Locomotion)    [place] Level (Gait) verbal cues;minimum assist (75% patient effort);1 person assist  -AS     Assistive Device (Gait) walker, front-wheeled  -AS     Distance in Feet (Gait) 6  -AS     Deviations/Abnormal Patterns (Gait) bilateral deviations;base of support, wide;leighann decreased;gait speed decreased;stride length decreased  -AS     Bilateral Gait Deviations heel strike decreased  -AS     Comment, (Gait/Stairs) patient took 6 steps bed>recliner with min assist x1, mild unsteadiness and mutliple cues to keep walker close. Assist needed to control  walker, furhter distance limited by nausea. Nursing notified and aware.  -AS               User Key  (r) = Recorded By, (t) = Taken By, (c) = Cosigned By      Initials Name Provider Type    AS Alejandra Crowder PTA Physical Therapist Assistant                   Obj/Interventions       Row Name 01/14/25 [facility]          Motor Skills    Therapeutic Exercise knee;ankle  -AS       [facility] Name 01/14/25 [facility]          Knee (Therapeutic Exercise)    Knee (Therapeutic Exercise) strengthening exercise  -AS     Knee Strengthening (Therapeutic Exercise) bilateral;marching while seated;LAQ (long arc quad);sitting;10 repetitions  -AS       Row Name 01/14/25 [facility]          Ankle (Therapeutic Exercise)    Ankle (Therapeutic Exercise) AROM (active range of motion)  -AS     Ankle AROM (Therapeutic Exercise) bilateral;dorsiflexion;plantarflexion;sitting;10 repetitions  -AS       [facility] Name 01/14/25 [facility]          Balance    Dynamic Standing Balance verbal cues;minimal assist;1-person assist  -AS     Position/Device Used, Standing Balance supported;walker, front-wheeled  -AS     Comment, Balance mild unsteadiness, no LOB  -AS               User Key  (r) = Recorded By, (t) = Taken By, (c) = Cosigned By      Initials Name Provider Type    AS Alejandra Crowder PTA Physical Therapist Assistant                   Goals/Plan    No documentation.                  Clinical Impression       Row Name 01/14/25 [facility]          Pain    Pretreatment Pain Rating 0/10 - no pain  -AS     Posttreatment Pain Rating 0/10 - no pain  -AS       Row Name 01/14/25 [facility]          Plan of Care Review    Plan of Care Reviewed With patient  -AS     Progress no change  -AS     Outcome Evaluation patient took 6 steps bed>recliner with min assist x1, mild unsteadiness and mutliple cues to keep walker close. Assist needed to control walker, furhter distance limited by nausea. Nursing notified and aware. Completed B LE exercises with kathleen for technique. Recommend SNF at D/C for  best functional outcome.  -AS       Row Name 01/14/25 1401          Positioning and Restraints    Pre-Treatment Position in bed  -AS     Post Treatment Position chair  -AS     In Chair reclined;call light within reach;notified nsg;encouraged to call for assist;exit alarm on;waffle cushion;legs elevated  -AS               User Key  (r) = Recorded By, (t) = Taken By, (c) = Cosigned By      Initials Name Provider Type    AS Alejandra Crowder PTA Physical Therapist Assistant                   Outcome Measures       Row Name 01/14/25 1406 01/14/25 0800       How much help from another person do you currently need...    Turning from your back to your side while in flat bed without using bedrails? 3  -AS 3  -LH    Moving from lying on back to sitting on the side of a flat bed without bedrails? 3  -AS 3  -LH    Moving to and from a bed to a chair (including a wheelchair)? 3  -AS 3  -LH    Standing up from a chair using your arms (e.g., wheelchair, bedside chair)? 3  -AS 3  -LH    Climbing 3-5 steps with a railing? 2  -AS 3  -LH    To walk in hospital room? 3  -AS 3  -LH    AM-PAC 6 Clicks Score (PT) 17  -AS 18  -LH    Highest Level of Mobility Goal 5 --> Static standing  -AS 6 --> Walk 10 steps or more  -      Row Name 01/14/25 1406          Functional Assessment    Outcome Measure Options AM-PAC 6 Clicks Basic Mobility (PT)  -AS               User Key  (r) = Recorded By, (t) = Taken By, (c) = Cosigned By      Initials Name Provider Type    AS Alejandra Crowder PTA Physical Therapist Assistant     Ruchi Linton RN Registered Nurse                                 Physical Therapy Education       Title: PT OT SLP Therapies (In Progress)       Topic: Physical Therapy (In Progress)       Point: Mobility training (In Progress)       Learning Progress Summary            Patient Acceptance, E, NR by AS at 1/14/2025 1406                      Point: Home exercise program (In Progress)       Learning Progress Summary             Patient Acceptance, E, NR by AS at 1/14/2025 1406    Acceptance, E, VU by CK at 1/13/2025 1056                      Point: Body mechanics (In Progress)       Learning Progress Summary            Patient Acceptance, E, NR by AS at 1/14/2025 1406    Acceptance, E, VU by CK at 1/13/2025 1056                      Point: Precautions (In Progress)       Learning Progress Summary            Patient Acceptance, E, NR by AS at 1/14/2025 1406    Acceptance, E, VU by CK at 1/13/2025 1056                                      User Key       Initials Effective Dates Name Provider Type Discipline    AS 12/13/24 -  Alejandra Crowder PTA Physical Therapist Assistant PT    CK 02/06/24 -  Loretta Webber PT Physical Therapist PT                  PT Recommendation and Plan     Progress: no change  Outcome Evaluation: patient took 6 steps bed>recliner with min assist x1, mild unsteadiness and mutliple cues to keep walker close. Assist needed to control walker, furhter distance limited by nausea. Nursing notified and aware. Completed B LE exercises with kathleen for technique. Recommend SNF at D/C for best functional outcome.     Time Calculation:         PT Charges       Row Name 01/14/25 1407             Time Calculation    Start Time 1303  -AS      PT Received On 01/14/25  -AS      PT Goal Re-Cert Due Date 01/23/25  -AS         Timed Charges    90289 - PT Therapeutic Exercise Minutes 9  -AS      91014 - Gait Training Minutes  14  -AS         Total Minutes    Timed Charges Total Minutes 23  -AS       Total Minutes 23  -AS                User Key  (r) = Recorded By, (t) = Taken By, (c) = Cosigned By      Initials Name Provider Type    AS Alejandra Crowder PTA Physical Therapist Assistant                  Therapy Charges for Today       Code Description Service Date Service Provider Modifiers Qty    54769301374 HC PT THER PROC EA 15 MIN 1/14/2025 Alejandra Crowder PTA GP 1    59322432483 HC GAIT TRAINING EA 15 MIN 1/14/2025  Alejandra Crowder, DENISE GP 1    00413713257 HC PT THER SUPP EA 15 MIN 1/14/2025 Alejandra Crowder PTA GP 2            PT G-Codes  Outcome Measure Options: AM-PAC 6 Clicks Basic Mobility (PT)  AM-PAC 6 Clicks Score (PT): 17  AM-PAC 6 Clicks Score (OT): 15       Alejandra Crowder PTA  1/14/2025

## 2025-01-15 LAB
ANION GAP SERPL CALCULATED.3IONS-SCNC: 10 MMOL/L (ref 5–15)
BUN SERPL-MCNC: 24 MG/DL (ref 8–23)
BUN/CREAT SERPL: 20.5 (ref 7–25)
CALCIUM SPEC-SCNC: 9.7 MG/DL (ref 8.6–10.5)
CHLORIDE SERPL-SCNC: 96 MMOL/L (ref 98–107)
CO2 SERPL-SCNC: 27 MMOL/L (ref 22–29)
CREAT SERPL-MCNC: 1.17 MG/DL (ref 0.57–1)
EGFRCR SERPLBLD CKD-EPI 2021: 47.9 ML/MIN/1.73
GLUCOSE BLDC GLUCOMTR-MCNC: 216 MG/DL (ref 70–130)
GLUCOSE BLDC GLUCOMTR-MCNC: 288 MG/DL (ref 70–130)
GLUCOSE BLDC GLUCOMTR-MCNC: 333 MG/DL (ref 70–130)
GLUCOSE BLDC GLUCOMTR-MCNC: 351 MG/DL (ref 70–130)
GLUCOSE BLDC GLUCOMTR-MCNC: 405 MG/DL (ref 70–130)
GLUCOSE SERPL-MCNC: 295 MG/DL (ref 65–99)
POTASSIUM SERPL-SCNC: 5.1 MMOL/L (ref 3.5–5.2)
SODIUM SERPL-SCNC: 133 MMOL/L (ref 136–145)

## 2025-01-15 PROCEDURE — 99232 SBSQ HOSP IP/OBS MODERATE 35: CPT | Performed by: STUDENT IN AN ORGANIZED HEALTH CARE EDUCATION/TRAINING PROGRAM

## 2025-01-15 PROCEDURE — 82948 REAGENT STRIP/BLOOD GLUCOSE: CPT

## 2025-01-15 PROCEDURE — 96372 THER/PROPH/DIAG INJ SC/IM: CPT

## 2025-01-15 PROCEDURE — 97535 SELF CARE MNGMENT TRAINING: CPT

## 2025-01-15 PROCEDURE — G0378 HOSPITAL OBSERVATION PER HR: HCPCS

## 2025-01-15 PROCEDURE — 63710000001 INSULIN LISPRO (HUMAN) PER 5 UNITS: Performed by: NURSE PRACTITIONER

## 2025-01-15 PROCEDURE — 80048 BASIC METABOLIC PNL TOTAL CA: CPT | Performed by: STUDENT IN AN ORGANIZED HEALTH CARE EDUCATION/TRAINING PROGRAM

## 2025-01-15 PROCEDURE — 94761 N-INVAS EAR/PLS OXIMETRY MLT: CPT

## 2025-01-15 PROCEDURE — 63710000001 INSULIN GLARGINE PER 5 UNITS: Performed by: NURSE PRACTITIONER

## 2025-01-15 PROCEDURE — 94799 UNLISTED PULMONARY SVC/PX: CPT

## 2025-01-15 PROCEDURE — 63710000001 INSULIN LISPRO (HUMAN) PER 5 UNITS: Performed by: STUDENT IN AN ORGANIZED HEALTH CARE EDUCATION/TRAINING PROGRAM

## 2025-01-15 PROCEDURE — 25010000002 HEPARIN (PORCINE) PER 1000 UNITS: Performed by: NURSE PRACTITIONER

## 2025-01-15 RX ORDER — INSULIN LISPRO 100 [IU]/ML
3 INJECTION, SOLUTION INTRAVENOUS; SUBCUTANEOUS
Status: DISCONTINUED | OUTPATIENT
Start: 2025-01-15 | End: 2025-01-16

## 2025-01-15 RX ORDER — LOPERAMIDE HYDROCHLORIDE 2 MG/1
2 CAPSULE ORAL 4 TIMES DAILY PRN
Status: DISCONTINUED | OUTPATIENT
Start: 2025-01-15 | End: 2025-01-15

## 2025-01-15 RX ORDER — L.ACID,PARA/B.BIFIDUM/S.THERM 8B CELL
1 CAPSULE ORAL DAILY
Status: DISCONTINUED | OUTPATIENT
Start: 2025-01-15 | End: 2025-01-29 | Stop reason: HOSPADM

## 2025-01-15 RX ADMIN — ASPIRIN 81 MG: 81 TABLET, COATED ORAL at 08:21

## 2025-01-15 RX ADMIN — IPRATROPIUM BROMIDE AND ALBUTEROL SULFATE 3 ML: 2.5; .5 SOLUTION RESPIRATORY (INHALATION) at 08:06

## 2025-01-15 RX ADMIN — IPRATROPIUM BROMIDE AND ALBUTEROL SULFATE 3 ML: 2.5; .5 SOLUTION RESPIRATORY (INHALATION) at 16:18

## 2025-01-15 RX ADMIN — INSULIN LISPRO 3 UNITS: 100 INJECTION, SOLUTION INTRAVENOUS; SUBCUTANEOUS at 08:21

## 2025-01-15 RX ADMIN — BUPROPION HYDROCHLORIDE 150 MG: 150 TABLET, EXTENDED RELEASE ORAL at 08:21

## 2025-01-15 RX ADMIN — CEFUROXIME AXETIL 500 MG: 250 TABLET ORAL at 21:54

## 2025-01-15 RX ADMIN — MAGNESIUM OXIDE TAB 400 MG (241.3 MG ELEMENTAL MG) 400 MG: 400 (241.3 MG) TAB at 08:22

## 2025-01-15 RX ADMIN — INSULIN LISPRO 6 UNITS: 100 INJECTION, SOLUTION INTRAVENOUS; SUBCUTANEOUS at 21:55

## 2025-01-15 RX ADMIN — IPRATROPIUM BROMIDE AND ALBUTEROL SULFATE 3 ML: 2.5; .5 SOLUTION RESPIRATORY (INHALATION) at 11:47

## 2025-01-15 RX ADMIN — INSULIN LISPRO 5 UNITS: 100 INJECTION, SOLUTION INTRAVENOUS; SUBCUTANEOUS at 12:01

## 2025-01-15 RX ADMIN — INSULIN LISPRO 3 UNITS: 100 INJECTION, SOLUTION INTRAVENOUS; SUBCUTANEOUS at 17:19

## 2025-01-15 RX ADMIN — VERAPAMIL HYDROCHLORIDE 240 MG: 240 TABLET, FILM COATED, EXTENDED RELEASE ORAL at 08:21

## 2025-01-15 RX ADMIN — Medication 1 CAPSULE: at 12:01

## 2025-01-15 RX ADMIN — DONEPEZIL HYDROCHLORIDE 10 MG: 10 TABLET, FILM COATED ORAL at 08:22

## 2025-01-15 RX ADMIN — CHOLESTYRAMINE 4 G: 4 POWDER, FOR SUSPENSION ORAL at 08:21

## 2025-01-15 RX ADMIN — INSULIN GLARGINE 10 UNITS: 100 INJECTION, SOLUTION SUBCUTANEOUS at 21:54

## 2025-01-15 RX ADMIN — FLUTICASONE PROPIONATE 2 SPRAY: 50 SPRAY, METERED NASAL at 08:22

## 2025-01-15 RX ADMIN — ATORVASTATIN CALCIUM 20 MG: 20 TABLET, FILM COATED ORAL at 08:22

## 2025-01-15 RX ADMIN — CEFUROXIME AXETIL 500 MG: 250 TABLET ORAL at 08:21

## 2025-01-15 RX ADMIN — HEPARIN SODIUM 5000 UNITS: 5000 INJECTION INTRAVENOUS; SUBCUTANEOUS at 08:21

## 2025-01-15 RX ADMIN — CITALOPRAM HYDROBROMIDE 40 MG: 40 TABLET ORAL at 08:21

## 2025-01-15 RX ADMIN — IPRATROPIUM BROMIDE AND ALBUTEROL SULFATE 3 ML: 2.5; .5 SOLUTION RESPIRATORY (INHALATION) at 20:53

## 2025-01-15 RX ADMIN — INSULIN LISPRO 4 UNITS: 100 INJECTION, SOLUTION INTRAVENOUS; SUBCUTANEOUS at 17:19

## 2025-01-15 RX ADMIN — HEPARIN SODIUM 5000 UNITS: 5000 INJECTION INTRAVENOUS; SUBCUTANEOUS at 21:54

## 2025-01-15 NOTE — DISCHARGE PLACEMENT REQUEST
"Masha Chou N (79 y.o. Female)       Lillian Marley RN   [phone]    Looking for skilled to long term medicaid             Date of Birth   1945    Social Security Number       Address   [address] [facility] [address] [address]    Home Phone   [phone]    MRN   [id number]       Adventist   [employer]    Marital Status                               Admission Date   1/12/25    Admission Type   Emergency    Admitting Provider   Christine Guevara MD    Attending Provider   Christine Guevara MD    Department, Room/Bed   [facility] 3H, S375/1       Discharge Date       Discharge Disposition       Discharge Destination                                 Attending Provider: Christine Guevara MD    Allergies: Contrast Dye (Echo Or Unknown Ct/mr), Shellfish-derived Products, Fluoxetine, Mushroom, Mushroom Extract Complex (Do Not Select), Doxycycline, Sulfamethoxazole-trimethoprim, Bupropion, Levofloxacin, Penicillins, Prednisone & Diphenhydramine    Isolation: None   Infection: None   Code Status: CPR    Ht: 142.2 cm (56\")   Wt: 109 kg (240 lb)    Admission Cmt: None   Principal Problem: Falls [R29.6]                   Active Insurance as of 1/12/2025       Primary Coverage       Payor Plan Insurance Group Employer/Plan Group    ANTHEM MEDICARE REPLACEMENT ANTHEM MED ADV HMO [id number]       Payor Plan Address Payor Plan Phone Number Payor Plan Fax Number Effective Dates    [address] [phone]  1/1/2025 - None Entered    [facility] [address]         Subscriber Name Subscriber Birth Date Member ID       MASHA CHOU N 1945 [id number]                     Emergency Contacts        (Rel.) Home Phone Work Phone Mobile Phone    DONALD NOVA (Daughter) [phone] -- [phone]              Insurance Information                  ANTHEM MEDICARE REPLACEMENT/ANTHEM MED ADV HMO Phone: [phone]    Subscriber: Effie Masha " N Subscriber#: [id number]    Group#: [id number] Precert#: --    Authorization#: -- Effective Date: --             History & Physical        Rosanna Mckeon APRN at 25       Attestation signed by Eugene James MD at 25 0120    I have reviewed this documentation and agree.                      [facility] Medicine Services  HISTORY AND PHYSICAL    Patient Name: Masha Chou  : 1945  MRN: [id number]  Primary Care Physician: Kary Wu MD  Date of admission: 2025    Subjective  Subjective     Chief Complaint:  Left knee pain    HPI:  Masha Chou is a 79 y.o. female with history of T2DM, HTN, CHF, COPD, breast cancer, pulmonary hypertension, presents to the ED after having a near fall.  Patient reports that she has been experiencing some left knee pain for about a week.  She states that while walking sometimes her left leg will give out but she is able to catch herself on the wall or furniture before she actually falls.  She also reports that she has been experiencing dysuria and urinary frequency for approximately 1 week.  She has dyspnea with exertion, mild cough and wheezing.  No fevers, chills, chest pain, nausea, vomiting, abdominal pain, diarrhea, focal weakness, difficulty with speech, headaches, syncope, or any other complaints at this time.  Of note patient does live with her daughter and the daughter's boyfriend.  They are moving out of their current home and will be staying with friends on the couch for approximately 1 week before getting their new apartment.    UA consistent with UTI.  Chest x-ray shows cardiomegaly and mild interstitial pulmonary edema.  X-ray left knee shows mild to moderate tricompartmental osteoarthritis.  Patient was started on cefuroxime mean in the ED, and is being admitted to the hospitalist for further evaluation and management.    Review of Systems   Constitutional: Negative.    HENT: Negative.     Eyes:  Negative.    Respiratory:  Positive for cough, shortness of breath and wheezing.    Cardiovascular: Negative.    Gastrointestinal: Negative.    Endocrine: Negative.    Genitourinary:  Positive for dysuria and frequency. Negative for difficulty urinating and hematuria.   Musculoskeletal:  Negative for back pain and neck pain.        Left knee pain   Skin: Negative.    Allergic/Immunologic: Negative.    Neurological: Negative.    Hematological: Negative.    Psychiatric/Behavioral: Negative.                  Personal History     Past Medical History:   Diagnosis Date    Asthma     Cancer     BILATERAL BREAST     COPD (chronic obstructive pulmonary disease)     Diabetes mellitus     Heart failure     Hypertension     Pulmonary hypertension              Past Surgical History:   Procedure Laterality Date    BREAST SURGERY      CHOLECYSTECTOMY      HYSTERECTOMY      JOINT REPLACEMENT      RIGHT KNEE    KELOID EXCISION         Family History:  family history includes Cancer in her paternal grandmother; No Known Problems in her father, maternal grandfather, maternal grandmother, mother, paternal grandfather, and sister.     Social History:  reports that she has never smoked. She has never used smokeless tobacco. She reports current alcohol use. She reports that she does not use drugs.  Social History     Social History Narrative    Caffeine: 1 cup of coffee daily        Medications:  acetaminophen, albuterol sulfate HFA, aspirin, atorvastatin, buPROPion XL, citalopram, colestipol, donepezil, fluticasone, furosemide, guaifenesin-dextromethorphan 600-30 mg, insulin glargine, insulin lispro, loperamide, magnesium oxide, nystatin, and verapamil SR    Allergies   Allergen Reactions    Contrast Dye (Echo Or Unknown Ct/Mr) Seizure    Shellfish-Derived Products Nausea And Vomiting    Fluoxetine Hives, Other (See Comments) and Unknown (See Comments)     Other reaction(s): aching    Mushroom Rash    Mushroom Extract Complex (Do Not  Select) Rash    Doxycycline Diarrhea    Sulfamethoxazole-Trimethoprim Unknown - Low Severity    Bupropion Rash    Levofloxacin Other (See Comments), Rash and Unknown (See Comments)     Bone Aches    Penicillins Rash     Has tolerated cefdinir    Prednisone & Diphenhydramine Unknown (See Comments)       Objective  Objective     Vital Signs:   Temp:  [99 °F (37.2 °C)] 99 °F (37.2 °C)  Heart Rate:  [93] 93  Resp:  [20] 20  BP: (153)/(73) 153/73  Flow (L/min) (Oxygen Therapy):  [2] 2    Physical Exam   Constitutional: Awake, alert, resting in bed, family at bedside  Eyes: PERRLA, sclerae anicteric, no conjunctival injection  HENT: NCAT, mucous membranes moist  Neck: Supple, no thyromegaly, no lymphadenopathy, trachea midline  Respiratory: Clear to auscultation bilaterally, diminished in the bases, nonlabored respirations   Cardiovascular: RRR, no murmurs, rubs, or gallops, palpable pedal pulses bilaterally  Gastrointestinal: Positive bowel sounds, soft, nontender, nondistended  Musculoskeletal: No bilateral ankle edema, no clubbing or cyanosis to extremities  Psychiatric: Appropriate affect, cooperative  Neurologic: Oriented x 3, strength symmetric in all extremities, Cranial Nerves grossly intact to confrontation, speech clear  Skin: No rashes      Result Review:  I have personally reviewed the results from the time of this admission to 1/12/2025 23:00 EST and agree with these findings:  [x]  Laboratory list / accordion  []  Microbiology  [x]  Radiology  [x]  EKG/Telemetry   []  Cardiology/Vascular   []  Pathology  [x]  Old records  []  Other:  Most notable findings include:     LAB RESULTS:      Lab 01/12/25 2037   WBC 9.27   HEMOGLOBIN 13.0   HEMATOCRIT 41.1   PLATELETS 183   NEUTROS ABS 5.98   IMMATURE GRANS (ABS) 0.13*   LYMPHS ABS 2.32   MONOS ABS 0.71   EOS ABS 0.09   MCV 93.6         Lab 01/12/25 2037   SODIUM 139   POTASSIUM 5.4*   CHLORIDE 100   CO2 27.0   ANION GAP 12.0   BUN 31*   CREATININE 1.16*   EGFR  48.4*   GLUCOSE 220*   CALCIUM 9.7   MAGNESIUM 1.7         Lab 01/12/25 2037   TOTAL PROTEIN 6.3   ALBUMIN 4.0   GLOBULIN 2.3   ALT (SGPT) 22   AST (SGOT) 19   BILIRUBIN 0.5   ALK PHOS 83                     Brief Urine Lab Results  (Last result in the past 365 days)        Color   Clarity   Blood   Leuk Est   Nitrite   Protein   CREAT   Urine HCG        01/12/25 2059 Yellow   Clear   Negative   Trace   Positive   Negative                 Microbiology Results (last 10 days)       ** No results found for the last 240 hours. **            XR Chest 1 View    Result Date: 1/12/2025  XR CHEST 1 VW Date of Exam: 1/12/2025 8:30 PM EST Indication: cough Comparison: 2/12/2024. Findings: The heart appears enlarged. There is mild interstitial pulmonary edema. No pneumothorax or pleural effusion. No focal lung consolidation. Stable prominent left-sided epicardial fat.     Impression: Impression: Cardiomegaly and mild interstitial pulmonary edema. Electronically Signed: Chidi Hooker MD  1/12/2025 9:14 PM EST  Workstation ID: [id number]    XR Knee 1 or 2 View Left    Result Date: 1/12/2025  XR KNEE 1 OR 2 VW LEFT Date of Exam: 1/12/2025 8:30 PM EST Indication: falls Comparison: None available. Findings: There is moderate medial and lateral compartment and mild patellofemoral osteophyte formation. No acute fracture or dislocation. There is mild tricompartmental joint space narrowing. No erosions. No evidence of joint effusion.     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 Electronically Signed: Chidi Hooker MD  1/12/2025 9:14 PM EST  Workstation ID: [id number]     Results for orders placed during the hospital encounter of 06/25/21    Adult Transthoracic Echo Complete W/ Cont if Necessary Per Protocol    Interpretation Summary  · Estimated left ventricular EF = 70% Left ventricular ejection fraction appears to be 66 - 70%. Left ventricular systolic function is normal.  · Left ventricular diastolic  function is consistent with (grade I) impaired relaxation.  · Left ventricular wall thickness is consistent with hypertrophy. Sigmoid-shaped ventricular septum is present.  · LVOT turbulence without gradient.      Assessment & Plan  Assessment & Plan       Falls    Type 2 diabetes mellitus    COPD exacerbation    Left knee pain    Osteoarthritis of left knee    Acute UTI (urinary tract infection)    Masha Chou is a 79 y.o. female with history of T2DM, HTN, CHF, COPD, breast cancer, pulmonary hypertension, presents to the ED after having a near fall.      Assessment and Plan:    Left knee pain  Osteoarthritis  Generalized weakness  -- X-ray left knee shows mild to moderate tricompartmental osteoarthritis  -- Fall precautions  -- PT/OT consult  -- Consult case management  -- Tylenol and Voltaren for pain    Hyperkalemia  Hypomagnesemia  -- K+ 5.4, Mg 1.7  -- Replace Mg per protocol  -- Lokelma x 1 dose  -- BMP, Mg in a.m.    Acute UTI (POA)  -- UA: Ketones trace, nitrite positive, leukocytes trace, WBC 3-5, bacteria 4+, squamous 3-6  -- Urine culture  -- Ceftin    CKD  -- creatinine 1.16  -- baseline creatinine ~ 1.1-1.2   -- bmp in the am    COPD  JUAN F  -- CPAP nightly  -- DuoNebs    T2DM  -- A1c in the a.m.  -- FSBG with SSI  -- Lantus 10 units nightly    DVT prophylaxis: Heparin      CODE STATUS:    Level Of Support Discussed With: Patient  Code Status (Patient has no pulse and is not breathing): CPR (Attempt to Resuscitate)  Medical Interventions (Patient has pulse or is breathing): Full Support      Expected Discharge  TBD  Expected discharge date/ time has not been documented.      This note has been completed as part of a split-shared workflow.     Signature: Electronically signed by SUNITA Cox, 01/12/25, 11:00 PM EST.                   Electronically signed by Eugene James MD at 01/13/25 0120       Current Facility-Administered Medications   Medication Dose Route Frequency Provider Last  Rate Last Admin    acetaminophen (TYLENOL) tablet 650 mg  650 mg Oral Q4H PRN Rosanna Mckeon, APRN   650 mg at 01/14/25 1725    Or    acetaminophen (TYLENOL) 160 MG/5ML oral solution 650 mg  650 mg Oral Q4H PRN Rosanna Mckeon, APRN        Or    acetaminophen (TYLENOL) suppository 650 mg  650 mg Rectal Q4H PRN Rosanna Mckeon, APRN        albuterol (PROVENTIL) nebulizer solution 0.083% 2.5 mg/3mL  2.5 mg Nebulization Q4H PRN Rosanna Mckeon, APRN        aspirin EC tablet 81 mg  81 mg Oral Daily Rosanna Mckeon, APRN   81 mg at 01/15/25 0821    atorvastatin (LIPITOR) tablet 20 mg  20 mg Oral Daily Rosanna Mckeon, APRN   20 mg at 01/15/25 0822    sennosides-docusate (PERICOLACE) 8.6-50 MG per tablet 2 tablet  2 tablet Oral BID PRN Rosanna Mckeon APRN        And    polyethylene glycol (MIRALAX) packet 17 g  17 g Oral Daily PRN Rosanna Mckeon APRN        And    bisacodyl (DULCOLAX) EC tablet 5 mg  5 mg Oral Daily PRN Rosanna Mckeon APRN        And    bisacodyl (DULCOLAX) suppository 10 mg  10 mg Rectal Daily PRN Rosanna Mckeon, APRN        buPROPion XL (WELLBUTRIN XL) 24 hr tablet 150 mg  150 mg Oral Daily Rosanna Mckeon, APRN   150 mg at 01/15/25 0821    Calcium Replacement - Follow Nurse / BPA Driven Protocol   Not Applicable PRN Rosanna Mckeon, APRN        cefuroxime (CEFTIN) tablet 500 mg  500 mg Oral Q12H Rosanna Mckeon, APRN   500 mg at 01/15/25 0821    cholestyramine light packet 4 g  1 packet Oral Daily Rosanna Mckeon, APRN   4 g at 01/15/25 0821    citalopram (CeleXA) tablet 40 mg  40 mg Oral QAM Rosanna Mckeon APRN   40 mg at 01/15/25 0821    dextrose (D50W) (25 g/50 mL) IV injection 25 g  25 g Intravenous Q15 Min PRN Rosanna Mckeon, SUNITA        dextrose (GLUTOSE) oral gel 15 g  15 g Oral Q15 Min PRN Rosanna Mckeon APRN        Diclofenac Sodium (VOLTAREN) 1 % gel 2 g  2 g Topical 4x Daily PRN Linda,  SUNITA Orellana        donepezil (ARICEPT) tablet 10 mg  10 mg Oral Daily Rosanna Mckeon APRN   10 mg at 01/15/25 0822    fluticasone (FLONASE) 50 MCG/ACT nasal spray 2 spray  2 spray Nasal Daily Rosanna Mckeon APRN   2 spray at 01/15/25 0822    glucagon (GLUCAGEN) injection 1 mg  1 mg Intramuscular Q15 Min PRN Rosanna Mckeon APRN        heparin (porcine) 5000 UNIT/ML injection 5,000 Units  5,000 Units Subcutaneous Q12H Rosanna Mckeon APRN   5,000 Units at 01/15/25 0821    insulin glargine (LANTUS, SEMGLEE) injection 10 Units  10 Units Subcutaneous Nightly Rosanna Mckeon APRN   10 Units at 01/14/25 2107    Insulin Lispro (humaLOG) injection 2-7 Units  2-7 Units Subcutaneous 4x Daily AC & at Bedtime Rosanna Mckeon APRN   3 Units at 01/15/25 0821    ipratropium-albuterol (DUO-NEB) nebulizer solution 3 mL  3 mL Nebulization 4x Daily - RT Christine Guevara MD   3 mL at 01/15/25 0806    lactobacillus acidophilus (RISAQUAD) capsule 1 capsule  1 capsule Oral Daily Christine Guevara MD        Magnesium Standard Dose Replacement - Follow Nurse / BPA Driven Protocol   Not Applicable PRN Rosanna Mckeon APRN        nitroglycerin (NITROSTAT) SL tablet 0.4 mg  0.4 mg Sublingual Q5 Min PRN Rosanna Mckeon APRN        ondansetron ODT (ZOFRAN-ODT) disintegrating tablet 4 mg  4 mg Translingual Q6H PRN Christine Guevara MD   4 mg at 01/14/25 1231    Phosphorus Replacement - Follow Nurse / BPA Driven Protocol   Not Applicable PRN Rosanna Mckeon APRN        Potassium Replacement - Follow Nurse / BPA Driven Protocol   Not Applicable PRN Rosanna Mckeon APRN        sodium chloride 0.9 % flush 10 mL  10 mL Intravenous Q12H Rosanna Mckeon APRN        sodium chloride 0.9 % flush 10 mL  10 mL Intravenous PRN Rosanna Mckeon, APRN        sodium chloride 0.9 % infusion 40 mL  40 mL Intravenous PRN Rosanna Mckeon,  APRN        verapamil SR (CALAN-SR) CR tablet 240 mg  240 mg Oral Daily Rosanna Mckeon, APRN   240 mg at 01/15/25 0821        Physician Progress Notes (most recent note)        Christine Guevara MD at 25 1454              [facility] Medicine Services  PROGRESS NOTE    Patient Name: Masha Chou  : 1945  MRN: [id number]    Date of Admission: 2025  Primary Care Physician: Kary Wu MD    Subjective   Subjective     CC:  Left knee pain    HPI:  Patient reporting some cough this morning.  She reports this often happens when the weather changes and gets colder.  She reports she does use an albuterol inhaler at home.  She denies shortness of breath, sputum production, chest pain.      Objective   Objective     Vital Signs:   Temp:  [97.5 °F (36.4 °C)-98.2 °F (36.8 °C)] 97.8 °F (36.6 °C)  Heart Rate:  [72-87] 72  Resp:  [18] 18  BP: (119-143)/(50-76) 142/63  Flow (L/min) (Oxygen Therapy):  [2-3] 3     Physical Exam  Constitutional:       General: She is not in acute distress.  Cardiovascular:      Rate and Rhythm: Normal rate and regular rhythm.      Heart sounds: Normal heart sounds.   Pulmonary:      Effort: Pulmonary effort is normal. No respiratory distress.      Breath sounds: Normal breath sounds. No wheezing or rhonchi.   Abdominal:      Palpations: Abdomen is soft.      Tenderness: There is no abdominal tenderness.   Musculoskeletal:      Right lower leg: No edema.      Left lower leg: No edema.   Neurological:      General: No focal deficit present.      Mental Status: She is alert. Mental status is at baseline.   Psychiatric:         Mood and Affect: Mood normal.         Thought Content: Thought content normal.          Results Reviewed:  LAB RESULTS:      Lab 25  0646 25   WBC 8.72 9.27   HEMOGLOBIN 12.3 13.0   HEMATOCRIT 40.3 41.1   PLATELETS 172 183   NEUTROS ABS 4.54 5.98   IMMATURE GRANS (ABS) 0.14* 0.13*   LYMPHS  ABS 2.99 2.32   MONOS ABS 0.82 0.71   EOS ABS 0.16 0.09   MCV 95.5 93.6         Lab 01/13/25  0646 01/12/25 2037   SODIUM 144 139   POTASSIUM 5.1 5.4*   CHLORIDE 105 100   CO2 32.0* 27.0   ANION GAP 7.0 12.0   BUN 31* 31*   CREATININE 1.23* 1.16*   EGFR 45.1* 48.4*   GLUCOSE 158* 220*   CALCIUM 9.4 9.7   MAGNESIUM 1.9 1.7   HEMOGLOBIN A1C 9.00*  --          Lab 01/12/25 2037   TOTAL PROTEIN 6.3   ALBUMIN 4.0   GLOBULIN 2.3   ALT (SGPT) 22   AST (SGOT) 19   BILIRUBIN 0.5   ALK PHOS 83                     Brief Urine Lab Results  (Last result in the past 365 days)        Color   Clarity   Blood   Leuk Est   Nitrite   Protein   CREAT   Urine HCG        01/13/25 0722 Yellow   Clear   Negative   Trace   Negative   Negative                   Microbiology Results Abnormal       None            XR Chest 1 View    Result Date: 1/12/2025  XR CHEST 1 VW Date of Exam: 1/12/2025 8:30 PM EST Indication: cough Comparison: 2/12/2024. Findings: The heart appears enlarged. There is mild interstitial pulmonary edema. No pneumothorax or pleural effusion. No focal lung consolidation. Stable prominent left-sided epicardial fat.     Impression: Impression: Cardiomegaly and mild interstitial pulmonary edema. Electronically Signed: Chidi Hooker MD  1/12/2025 9:14 PM EST  Workstation ID: [id number]    XR Knee 1 or 2 View Left    Result Date: 1/12/2025  XR KNEE 1 OR 2 VW LEFT Date of Exam: 1/12/2025 8:30 PM EST Indication: falls Comparison: None available. Findings: There is moderate medial and lateral compartment and mild patellofemoral osteophyte formation. No acute fracture or dislocation. There is mild tricompartmental joint space narrowing. No erosions. No evidence of joint effusion.     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 Electronically Signed: Chidi Hooker MD  1/12/2025 9:14 PM EST  Workstation ID: [id number]     Results for orders placed during the hospital encounter of 06/25/21    Adult  Transthoracic Echo Complete W/ Cont if Necessary Per Protocol    Interpretation Summary  · Estimated left ventricular EF = 70% Left ventricular ejection fraction appears to be 66 - 70%. Left ventricular systolic function is normal.  · Left ventricular diastolic function is consistent with (grade I) impaired relaxation.  · Left ventricular wall thickness is consistent with hypertrophy. Sigmoid-shaped ventricular septum is present.  · LVOT turbulence without gradient.      Current medications:  Scheduled Meds:aspirin, 81 mg, Oral, Daily  atorvastatin, 20 mg, Oral, Daily  buPROPion XL, 150 mg, Oral, Daily  cefuroxime, 500 mg, Oral, Q12H  cholestyramine light, 1 packet, Oral, Daily  citalopram, 40 mg, Oral, QAM  donepezil, 10 mg, Oral, Daily  fluticasone, 2 spray, Nasal, Daily  heparin (porcine), 5,000 Units, Subcutaneous, Q12H  insulin glargine, 10 Units, Subcutaneous, Nightly  insulin lispro, 2-7 Units, Subcutaneous, 4x Daily AC & at Bedtime  ipratropium-albuterol, 3 mL, Nebulization, 4x Daily - RT  magnesium oxide, 400 mg, Oral, BID  sodium chloride, 10 mL, Intravenous, Q12H  verapamil SR, 240 mg, Oral, Daily      Continuous Infusions:   PRN Meds:.  acetaminophen **OR** acetaminophen **OR** acetaminophen    albuterol    senna-docusate sodium **AND** polyethylene glycol **AND** bisacodyl **AND** bisacodyl    Calcium Replacement - Follow Nurse / BPA Driven Protocol    dextrose    dextrose    Diclofenac Sodium    glucagon (human recombinant)    Magnesium Standard Dose Replacement - Follow Nurse / BPA Driven Protocol    nitroglycerin    ondansetron ODT    Phosphorus Replacement - Follow Nurse / BPA Driven Protocol    Potassium Replacement - Follow Nurse / BPA Driven Protocol    sodium chloride    sodium chloride    Assessment & Plan   Assessment & Plan     Active Hospital Problems    Diagnosis  POA    **Falls [R29.6]  Not Applicable    Left knee pain [M25.562]  Unknown    Osteoarthritis of left knee [M17.12]  Unknown     Acute UTI (urinary tract infection) [N39.0]  Unknown    COPD exacerbation [J44.1]  Yes    Type 2 diabetes mellitus [E11.9]  Yes      Resolved Hospital Problems   No resolved problems to display.        Brief Hospital Course to date:  Masha Chou is a 79 y.o. female with type 2 diabetes, hypertension, COPD, breast cancer, pulmonary hypertension, diastolic CHF who presented to the ED after having a near fall.    Left knee pain  Osteoarthritis  Generalized weakness  -- X-ray left knee shows mild to moderate tricompartmental osteoarthritis  -- Fall precautions  -- PT/OT consult, recommending SNF  -- Consult case management  -- Tylenol and Voltaren for pain     Hyperkalemia-resolved  Hypomagnesemia-resolved  -- K+ 5.4, Mg 1.7  -- Replace Mg per protocol  -- s/p Lokelma x 1 dose  -- BMP, Mg in a.m.     Acute UTI (POA)  -- UA: Ketones trace, nitrite positive, leukocytes trace, WBC 3-5, bacteria 4+, squamous 3-6  -- Urine culture  -- Ceftin     CKD  -- baseline creatinine ~ 1.1-1.2   -- bmp in the am     COPD  JUAN F  Asthma  --Does not appear to be in acute exacerbation.  No sputum production and no wheezing  -- CPAP nightly  -- DuoNebs, will change from as needed to scheduled     T2DM  -- A1c in the a.m.  -- FSBG with SSI  -- Lantus 10 units nightly    Expected Discharge Location and Transportation: SNF  Expected Discharge   Expected Discharge Date: 1/17/2025; Expected Discharge Time:      VTE Prophylaxis:  Pharmacologic VTE prophylaxis orders are present.         AM-PAC 6 Clicks Score (PT): 17 (01/14/25 2770)    CODE STATUS:   Code Status and Medical Interventions: CPR (Attempt to Resuscitate); Full Support   Ordered at: 01/12/25 8713     Level Of Support Discussed With:    Patient     Code Status (Patient has no pulse and is not breathing):    CPR (Attempt to Resuscitate)     Medical Interventions (Patient has pulse or is breathing):    Full Support       Christine Guevara,  MD  25        Electronically signed by Christine Guevara MD at 25 1458          Physical Therapy Notes (most recent note)        Alejandra Crowder PTA at 25 1303  Version 1 of 1         Patient Name: Masha Chou  : 1945    MRN: [id number]                              Today's Date: 2025       Admit Date: 2025    Visit Dx:     ICD-10-CM ICD-9-CM   1. Falls  R29.6 V15.88   2. Acute cystitis without hematuria  N30.00 595.0     Patient Active Problem List   Diagnosis    Chest pain    Type 2 diabetes mellitus    Pulmonary HTN    Heart failure    COPD exacerbation    Chronic respiratory failure with hypoxia    Falls    Left knee pain    Osteoarthritis of left knee    Acute UTI (urinary tract infection)     Past Medical History:   Diagnosis Date    Asthma     Cancer     BILATERAL BREAST     COPD (chronic obstructive pulmonary disease)     Diabetes mellitus     Heart failure     Hypertension     Pulmonary hypertension      Past Surgical History:   Procedure Laterality Date    BREAST SURGERY      CHOLECYSTECTOMY      HYSTERECTOMY      JOINT REPLACEMENT      RIGHT KNEE    KELOID EXCISION        General Information       Row Name 25 6096          Physical Therapy Time and Intention    Document Type therapy note (daily note)  -AS     Mode of Treatment physical therapy  -AS       Row Name 25 135          General Information    Patient Profile Reviewed yes  -AS     Existing Precautions/Restrictions fall;oxygen therapy device and L/min;other (see comments)  complaints of nausea/vomitting  -AS     Barriers to Rehab medically complex;previous functional deficit  -AS       Row Name 25 135          Cognition    Orientation Status (Cognition) oriented x 3;verbal cues/prompts needed for orientation  -AS       Row Name 25 1358          Safety Issues/Impairments Affecting Functional Mobility    Safety Issues Affecting Function (Mobility) awareness of need  for assistance;insight into deficits/self-awareness;safety precautions follow-through/compliance;sequencing abilities;positioning of assistive device  -AS     Impairments Affecting Function (Mobility) balance;endurance/activity tolerance;pain;shortness of breath;strength  -AS     Comment, Safety Issues/Impairments (Mobility) alert and following commands, needs encouragement to participate in therapy d/t nausea, agreed to UIC and exercises  -AS               User Key  (r) = Recorded By, (t) = Taken By, (c) = Cosigned By      Initials Name Provider Type    AS Alejandra Crowder PTA Physical Therapist Assistant                   Mobility       Row Name 01/14/25 1358          Bed Mobility    Supine-Sit [place] (Bed Mobility) contact guard;1 person assist  -AS     Assistive Device (Bed Mobility) head of bed elevated  -AS     Comment, (Bed Mobility) increased time and effort to reach EOB  -AS       Row Name 01/14/25 1356          Transfers    Comment, (Transfers) cues for hand placement  -AS       Row Name 01/14/25 [facility]          Bed-Chair Transfer    Bed-Chair [place] (Transfers) verbal cues;minimum assist (75% patient effort);1 person assist  -AS     Assistive Device (Bed-Chair Transfers) walker, front-wheeled  -AS       Row Name 01/14/25 1358          Sit-Stand Transfer    Sit-Stand [place] (Transfers) verbal cues;contact guard;1 person assist  -AS     Assistive Device (Sit-Stand Transfers) walker, front-wheeled  -AS     Comment, (Sit-Stand Transfer) mulitple cues for hand placement  -AS       Row Name 01/14/25 1350          Gait/Stairs (Locomotion)    [place] Level (Gait) verbal cues;minimum assist (75% patient effort);1 person assist  -AS     Assistive Device (Gait) walker, front-wheeled  -AS     Distance in Feet (Gait) 6  -AS     Deviations/Abnormal Patterns (Gait) bilateral deviations;base of support, wide;leighann decreased;gait speed decreased;stride length decreased  -AS     Bilateral Gait  Deviations heel strike decreased  -AS     Comment, (Gait/Stairs) patient took 6 steps bed>recliner with min assist x1, mild unsteadiness and mutliple cues to keep walker close. Assist needed to control walker, furhter distance limited by nausea. Nursing notified and aware.  -AS               User Key  (r) = Recorded By, (t) = Taken By, (c) = Cosigned By      Initials Name Provider Type    AS Alejandra Crowder PTA Physical Therapist Assistant                   Obj/Interventions       Row Name 01/14/25 [facility]          Motor Skills    Therapeutic Exercise knee;ankle  -AS       Row Name 01/14/25 [facility]          Knee (Therapeutic Exercise)    Knee (Therapeutic Exercise) strengthening exercise  -AS     Knee Strengthening (Therapeutic Exercise) bilateral;marching while seated;LAQ (long arc quad);sitting;10 repetitions  -AS       Row Name 01/14/25 [facility]          Ankle (Therapeutic Exercise)    Ankle (Therapeutic Exercise) AROM (active range of motion)  -AS     Ankle AROM (Therapeutic Exercise) bilateral;dorsiflexion;plantarflexion;sitting;10 repetitions  -AS       Row Name 01/14/25 [facility]          Balance    Dynamic Standing Balance verbal cues;minimal assist;1-person assist  -AS     Position/Device Used, Standing Balance supported;walker, front-wheeled  -AS     Comment, Balance mild unsteadiness, no LOB  -AS               User Key  (r) = Recorded By, (t) = Taken By, (c) = Cosigned By      Initials Name Provider Type    AS Alejandra Crowder PTA Physical Therapist Assistant                   Goals/Plan    No documentation.                  Clinical Impression       Row Name 01/14/25 [facility]          Pain    Pretreatment Pain Rating 0/10 - no pain  -AS     Posttreatment Pain Rating 0/10 - no pain  -AS       Row Name 01/14/25 [facility]          Plan of Care Review    Plan of Care Reviewed With patient  -AS     Progress no change  -AS     Outcome Evaluation patient took 6 steps bed>recliner with min assist x1, mild unsteadiness and  mutliple cues to keep walker close. Assist needed to control walker, furhter distance limited by nausea. Nursing notified and aware. Completed B LE exercises with kathleen for technique. Recommend SNF at [place] for best functional outcome.  -AS       Row Name 01/14/25 1401          Positioning and Restraints    Pre-Treatment Position in bed  -AS     Post Treatment Position chair  -AS     In Chair reclined;call light within reach;notified nsg;encouraged to call for assist;exit alarm on;waffle cushion;legs elevated  -AS               User Key  (r) = Recorded By, (t) = Taken By, (c) = Cosigned By      Initials Name Provider Type    AS Alejandra Crowder PTA Physical Therapist Assistant                   Outcome Measures       Row Name 01/14/25 1406 01/14/25 0800       How much help from another person do you currently need...    Turning from your back to your side while in flat bed without using bedrails? 3  -AS 3  -LH    Moving from lying on back to sitting on the side of a flat bed without bedrails? 3  -AS 3  -LH    Moving to and from a bed to a chair (including a wheelchair)? 3  -AS 3  -LH    Standing up from a chair using your arms (e.g., wheelchair, bedside chair)? 3  -AS 3  -LH    Climbing 3-5 steps with a railing? 2  -AS 3  -LH    To walk in hospital room? 3  -AS 3  -LH    AM-[facility] 6 Clicks Score (PT) 17  -AS 18  -LH    Highest Level of Mobility Goal 5 --> Static standing  -AS 6 --> Walk 10 steps or more  -      Row Name 01/14/25 1406          Functional Assessment    Outcome Measure Options AM-PAC 6 Clicks Basic Mobility (PT)  -AS               User Key  (r) = Recorded By, (t) = Taken By, (c) = Cosigned By      Initials Name Provider Type    AS Alejandra Crowder PTA Physical Therapist Assistant    Ruchi Ramirez RN Registered Nurse                                 Physical Therapy Education       Title: PT OT SLP Therapies (In Progress)       Topic: Physical Therapy (In Progress)       Point: Mobility training (In  Progress)       Learning Progress Summary            Patient Acceptance, E, NR by AS at 1/14/2025 1406                      Point: Home exercise program (In Progress)       Learning Progress Summary            Patient Acceptance, E, NR by AS at 1/14/2025 1406    Acceptance, E, VU by CK at 1/13/2025 1056                      Point: Body mechanics (In Progress)       Learning Progress Summary            Patient Acceptance, E, NR by AS at 1/14/2025 1406    Acceptance, E, VU by CK at 1/13/2025 1056                      Point: Precautions (In Progress)       Learning Progress Summary            Patient Acceptance, E, NR by AS at 1/14/2025 1406    Acceptance, E, VU by CK at 1/13/2025 1056                                      User Key       Initials Effective Dates Name Provider Type Discipline    AS 12/13/24 -  Alejandra Crowder PTA Physical Therapist Assistant PT    CK 02/06/24 -  Loretta Webber PT Physical Therapist PT                  PT Recommendation and Plan     Progress: no change  Outcome Evaluation: patient took 6 steps bed>recliner with min assist x1, mild unsteadiness and mutliple cues to keep walker close. Assist needed to control walker, furhter distance limited by nausea. Nursing notified and aware. Completed B LE exercises with kathleen for technique. Recommend SNF at [place] for best functional outcome.     Time Calculation:         PT Charges       Row Name 01/14/25 1407             Time Calculation    Start Time 1303  -AS      PT Received On 01/14/25  -AS      PT Goal Re-Cert Due Date 01/23/25  -AS         Timed Charges    94071 - PT Therapeutic Exercise Minutes 9  -AS      19776 - Gait Training Minutes  14  -AS         Total Minutes    Timed Charges Total Minutes 23  -AS       Total Minutes 23  -AS                User Key  (r) = Recorded By, (t) = Taken By, (c) = Cosigned By      Initials Name Provider Type    AS Alejandra Crowder, DENISE Physical Therapist Assistant                  Therapy Charges for  Today       Code Description Service Date Service Provider Modifiers Qty    68938533178 HC PT THER PROC EA 15 MIN 2025 Alejandra Crowder, DENISE GP 1    27690059078 HC GAIT TRAINING EA 15 MIN 2025 Alejandra Crowder PTA GP 1    64719424332 HC PT THER SUPP EA 15 MIN 2025 Alejandra Crowder PTA GP 2            PT G-Codes  Outcome Measure Options: AM-PAC 6 Clicks Basic Mobility (PT)  AM-PAC 6 Clicks Score (PT): 17  AM-PAC 6 Clicks Score (OT): 15       Alejandra Crowder PTA  2025      Electronically signed by Alejandra Crowder PTA at 25 1407          Occupational Therapy Notes (most recent note)        Anne Marie Cid, OT at 25 0925          Patient Name: Masha Chou  : 1945    MRN: [id number]                              Today's Date: 2025       Admit Date: 2025    Visit Dx:     ICD-10-CM ICD-9-CM   1. Falls  R29.6 V15.88   2. Acute cystitis without hematuria  N30.00 595.0     Patient Active Problem List   Diagnosis    Chest pain    Type 2 diabetes mellitus    Pulmonary HTN    Heart failure    COPD exacerbation    Chronic respiratory failure with hypoxia    Falls    Left knee pain    Osteoarthritis of left knee    Acute UTI (urinary tract infection)     Past Medical History:   Diagnosis Date    Asthma     Cancer     BILATERAL BREAST     COPD (chronic obstructive pulmonary disease)     Diabetes mellitus     Heart failure     Hypertension     Pulmonary hypertension      Past Surgical History:   Procedure Laterality Date    BREAST SURGERY      CHOLECYSTECTOMY      HYSTERECTOMY      JOINT REPLACEMENT      RIGHT KNEE    KELOID EXCISION        General Information       Row Name 25 1030          OT Time and Intention    Document Type evaluation  -AN     Mode of Treatment occupational therapy  -AN       Row Name 25 1030          General Information    Patient Profile Reviewed yes  -AN     Prior Level of Function independent:;all household  mobility;transfer;min assist:;ADL's;bathing  ambulates using a cane at baseline, near falls  -AN     Existing Precautions/Restrictions fall  -AN     Barriers to Rehab medically complex;previous functional deficit  -AN       Row Name 01/13/25 1030          Occupational Profile    Environmental Supports and Barriers (Occupational Profile) owns cane  -AN       Row Name 01/13/25 1030          Living Environment    People in Home child(more), adult  -AN       Row Name 01/13/25 1030          Home Main Entrance    Number of Stairs, Main Entrance one  -AN     Stair Railings, Main Entrance railings safe and in good condition  -AN       Row Name 01/13/25 1030          Stairs Within Home, Primary    Number of Stairs, Within Home, Primary none  -AN       Row Name 01/13/25 1030          Cognition    Orientation Status (Cognition) oriented x 3  -AN       Row Name 01/13/25 1030          Safety Issues/Impairments Affecting Functional Mobility    Safety Issues Affecting Function (Mobility) safety precaution awareness;safety precautions follow-through/compliance;judgment;insight into deficits/self-awareness;awareness of need for assistance;problem-solving  -AN     Impairments Affecting Function (Mobility) balance;cognition;endurance/activity tolerance;pain;strength  -AN     Cognitive Impairments, Mobility Safety/Performance awareness, need for assistance;insight into deficits/self-awareness;problem-solving/reasoning;safety precaution awareness;safety precaution follow-through;judgment  -AN               User Key  (r) = Recorded By, (t) = Taken By, (c) = Cosigned By      Initials Name Provider Type    AN Anne Marie Cid OT Occupational Therapist                     Mobility/ADL's       Row Name 01/13/25 1040          Bed Mobility    Bed Mobility supine-sit  -AN     Supine-Sit [place] (Bed Mobility) verbal cues;standby assist  -AN     Assistive Device (Bed Mobility) head of bed elevated  -AN       Row Name 01/13/25 1040           Transfers    Transfers sit-stand transfer;stand-sit transfer  -AN       [facility] Name 01/13/25 1040          Sit-Stand Transfer    Sit-Stand [place] (Transfers) verbal cues;contact guard  -AN     Assistive Device (Sit-Stand Transfers) cane, straight  -AN       [facility] Name 01/13/25 1040          Stand-Sit Transfer    Stand-Sit [place] (Transfers) verbal cues;contact guard  -AN     Assistive Device (Stand-Sit Transfers) cane, straight  -AN       [facility] Name 01/13/25 1040          Functional Mobility    Functional Mobility- Ind. Level not tested  -AN       [facility] Name 01/13/25 1040          Activities of Daily Living    BADL Assessment/Intervention upper body dressing;lower body dressing  -AN       [facility] Name 01/13/25 1040          Upper Body Dressing Assessment/Training    [place] Level (Upper Body Dressing) don;pajama/robe;set up  -AN     Position (Upper Body Dressing) edge of bed sitting  -AN       [facility] Name 01/13/25 1040          Lower Body Dressing Assessment/Training    [place] Level (Lower Body Dressing) don;pants/bottoms;socks;maximum assist (25% patient effort)  -AN     Position (Lower Body Dressing) edge of bed sitting  -AN               User Key  (r) = Recorded By, (t) = Taken By, (c) = Cosigned By      Initials Name Provider Type    AN Anne Marie Cid OT Occupational Therapist                   Obj/Interventions       [facility] Name 01/13/25 1043          Sensory Assessment (Somatosensory)    Sensory Assessment (Somatosensory) UE sensation intact  -AN       Row Name 01/13/25 1043          Vision Assessment/Intervention    Visual Impairment/Limitations WFL  -AN       Row Name 01/13/25 1043          Range of Motion Comprehensive    General Range of Motion bilateral upper extremity ROM WFL  -AN       Row Name 01/13/25 1043          Strength Comprehensive (MMT)    General Manual Muscle Testing (MMT) Assessment lower extremity strength deficits identified  -AN     Comment, General Manual Muscle Testing (MMT)  Assessment BLE weakness observed with mobility  -AN       Row Name 01/13/25 1043          Motor Skills    Motor Skills functional endurance  -AN     Functional Endurance decreased functional endurance  -AN       Row Name 01/13/25 1043          Balance    Balance Assessment sitting static balance;sitting dynamic balance;sit to stand dynamic balance;standing static balance  -AN     Static Sitting Balance standby assist  -AN     Dynamic Sitting Balance standby assist  -AN     Position, Sitting Balance sitting edge of bed  -AN     Sit to Stand Dynamic Balance verbal cues;contact guard  -AN     Static Standing Balance verbal cues;contact guard  -AN     Position/Device Used, Standing Balance cane, straight  -AN     Balance Interventions standing;sit to stand;supported;static;minimal challenge;occupation based/functional task  -AN               User Key  (r) = Recorded By, (t) = Taken By, (c) = Cosigned By      Initials Name Provider Type    AN Anne Marie Cid OT Occupational Therapist                   Goals/Plan       Row Name 01/13/25 1056          Transfer Goal 1 (OT)    Activity/Assistive Device (Transfer Goal 1, OT) sit-to-stand/stand-to-sit;toilet;cane, straight  -AN     [place] Level/Cues Needed (Transfer Goal 1, OT) standby assist  -AN     Time Frame (Transfer Goal 1, OT) long term goal (LTG);1 week  -AN       Row Name 01/13/25 1056          Dressing Goal 1 (OT)    Activity/Device (Dressing Goal 1, OT) lower body dressing  -AN     [place]/Cues Needed (Dressing Goal 1, OT) minimum assist (75% or more patient effort)  -AN     Time Frame (Dressing Goal 1, OT) long term goal (LTG);1 week  -AN       Row Name 01/13/25 1056          Toileting Goal 1 (OT)    Activity/Device (Toileting Goal 1, OT) adjust/manage clothing;perform perineal hygiene;commode  -AN     [place] Level/Cues Needed (Toileting Goal 1, OT) minimum assist (75% or more patient effort)  -AN     Time Frame (Toileting Goal 1, OT) long term  goal (LTG);1 week  -AN       Row Name 01/13/25 1056          Grooming Goal 1 (OT)    Activity/Device (Grooming Goal 1, OT) oral care;wash face, hands  -AN     [place] (Grooming Goal 1, OT) standby assist  -AN     Time Frame (Grooming Goal 1, OT) short term goal (STG);3 days  -AN       Row Name 01/13/25 1056          Therapy Assessment/Plan (OT)    Planned Therapy Interventions (OT) activity tolerance training;adaptive equipment training;BADL retraining;functional balance retraining;occupation/activity based interventions;patient/caregiver education/training;strengthening exercise;transfer/mobility retraining  -AN               User Key  (r) = Recorded By, (t) = Taken By, (c) = Cosigned By      Initials Name Provider Type    Anne Marie Dale OT Occupational Therapist                   Clinical Impression       Row Name 01/13/25 1052          Pain Assessment    Pretreatment Pain Rating 0/10 - no pain  -AN     Posttreatment Pain Rating 0/10 - no pain  -AN       Row Name 01/13/25 1052          Plan of Care Review    Plan of Care Reviewed With patient  -AN     Progress no change  -AN     Outcome Evaluation Pt presents below her functional baseline with deficits including generalized weakness, impaired balance, decreased activity tolerance, and impaired ADLs warranting OT services. Pt performed bed mobility with stand by assist and functional transfers with CGA using straight cane. Rec SNF at [place] for best functional outcomes.  -AN       Row Name 01/13/25 1052          Therapy Assessment/Plan (OT)    Patient/Family Therapy Goal Statement (OT) Return to PLOF  -AN     Rehab Potential (OT) good  -AN     Criteria for Skilled Therapeutic Interventions Met (OT) yes;skilled treatment is necessary  -AN     Therapy Frequency (OT) daily  -AN     Predicted Duration of Therapy Intervention (OT) 7 days  -AN       Row Name 01/13/25 1052          Therapy Plan Review/Discharge Plan (OT)    Anticipated Discharge Disposition (OT)  skilled nursing facility  -AN       Row Name 01/13/25 1052          Vital Signs    Pre SpO2 (%) 95  -AN     O2 Delivery Pre Treatment nasal cannula  -AN     O2 Delivery Intra Treatment nasal cannula  -AN     Post SpO2 (%) 95  -AN     O2 Delivery Post Treatment nasal cannula  -AN     Pre Patient Position Supine  -AN     Intra Patient Position Standing  -AN     Post Patient Position Sitting  -AN       Row Name 01/13/25 1052          Positioning and Restraints    Pre-Treatment Position in bed  -AN     Post Treatment Position bed  -AN     In Bed notified nsg;sitting EOB;call light within reach;encouraged to call for assist;with PT  -AN               User Key  (r) = Recorded By, (t) = Taken By, (c) = Cosigned By      Initials Name Provider Type    Anne Marie Dale OT Occupational Therapist                   Outcome Measures       Row Name 01/13/25 1057          How much help from another is currently needed...    Putting on and taking off regular lower body clothing? 2  -AN     Bathing (including washing, rinsing, and drying) 2  -AN     Toileting (which includes using toilet bed pan or urinal) 2  -AN     Putting on and taking off regular upper body clothing 3  -AN     Taking care of personal grooming (such as brushing teeth) 3  -AN     Eating meals 3  -AN     AM-PAC 6 Clicks Score (OT) 15  -AN       Row Name 01/13/25 1056 01/13/25 0817       How much help from another person do you currently need...    Turning from your back to your side while in flat bed without using bedrails? 3  -CK 3  -LH    Moving from lying on back to sitting on the side of a flat bed without bedrails? 3  -CK 3  -LH    Moving to and from a bed to a chair (including a wheelchair)? 3  -CK 3  -LH    Standing up from a chair using your arms (e.g., wheelchair, bedside chair)? 3  -CK 3  -LH    Climbing 3-5 steps with a railing? 2  -CK 2  -LH    To walk in hospital room? 3  -CK 3  -LH    AM-PAC 6 Clicks Score (PT) 17  -CK 17  -LH    Highest Level of  Mobility Goal 5 --> Static standing  -CK 5 --> Static standing  -LH      Row Name 01/12/25 2313          How much help from another person do you currently need...    Turning from your back to your side while in flat bed without using bedrails? 3  -BS     Moving from lying on back to sitting on the side of a flat bed without bedrails? 3  -BS     Moving to and from a bed to a chair (including a wheelchair)? 3  -BS     Standing up from a chair using your arms (e.g., wheelchair, bedside chair)? 3  -BS     Climbing 3-5 steps with a railing? 2  -BS     To walk in hospital room? 3  -BS     AM-PAC 6 Clicks Score (PT) 17  -BS     Highest Level of Mobility Goal 5 --> Static standing  -BS       Row Name 01/13/25 1057 01/13/25 1056       Functional Assessment    Outcome Measure Options AM-PAC 6 Clicks Daily Activity (OT)  -AN AM-PAC 6 Clicks Basic Mobility (PT)  -CK              User Key  (r) = Recorded By, (t) = Taken By, (c) = Cosigned By      Initials Name Provider Type    Mena Kaiser, RN Registered Nurse    Anne Marie Dale OT Occupational Therapist    Loretta Gunn, PT Physical Therapist    Ruchi Ramirez, CHARO Registered Nurse                    Occupational Therapy Education       Title: PT OT SLP Therapies (In Progress)       Topic: Occupational Therapy (In Progress)       Point: ADL training (Done)       Description:   Instruct learner(s) on proper safety adaptation and remediation techniques during self care or transfers.   Instruct in proper use of assistive devices.                  Learning Progress Summary            Patient Acceptance, E, VU by [email or web address] at 1/13/2025 1058                      Point: Home exercise program (Not Started)       Description:   Instruct learner(s) on appropriate technique for monitoring, assisting and/or progressing therapeutic exercises/activities.                  Learner Progress:  Not documented in this visit.              Point: Precautions (Done)       Description:    Instruct learner(s) on prescribed precautions during self-care and functional transfers.                  Learning Progress Summary            Patient Acceptance, E, VU by AN at 1/13/2025 1058                      Point: Body mechanics (Done)       Description:   Instruct learner(s) on proper positioning and spine alignment during self-care, functional mobility activities and/or exercises.                  Learning Progress Summary            Patient Acceptance, E, VU by AN at 1/13/2025 1058                                      User Key       Initials Effective Dates Name Provider Type Discipline    AN 09/21/21 -  Anne Marie Cid OT Occupational Therapist OT                  OT Recommendation and Plan  Planned Therapy Interventions (OT): activity tolerance training, adaptive equipment training, BADL retraining, functional balance retraining, occupation/activity based interventions, patient/caregiver education/training, strengthening exercise, transfer/mobility retraining  Therapy Frequency (OT): daily  Plan of Care Review  Plan of Care Reviewed With: patient  Progress: no change  Outcome Evaluation: Pt presents below her functional baseline with deficits including generalized weakness, impaired balance, decreased activity tolerance, and impaired ADLs warranting OT services. Pt performed bed mobility with stand by assist and functional transfers with CGA using straight cane. Rec SNF at [place] for best functional outcomes.     Time Calculation:   Evaluation Complexity (OT)  Review Occupational Profile/Medical/Therapy History Complexity: expanded/moderate complexity  Assessment, Occupational Performance/Identification of Deficit Complexity: 3-5 performance deficits  Clinical Decision Making Complexity (OT): detailed assessment/moderate complexity  Overall Complexity of Evaluation (OT): moderate complexity     Time Calculation- OT       Row Name 01/13/25 1058             Time Calculation- OT    OT Start Time 0925  -AN       OT Received On 01/13/25  -AN      OT Goal Re-Cert Due Date 01/23/25  -AN         Untimed Charges    OT Eval/Re-eval Minutes 46  -AN         Total Minutes    Untimed Charges Total Minutes 46  -AN       Total Minutes 46  -AN                User Key  (r) = Recorded By, (t) = Taken By, (c) = Cosigned By      Initials Name Provider Type    AN Anne Marie Cid OT Occupational Therapist                  Therapy Charges for Today       Code Description Service Date Service Provider Modifiers Qty    17743402019 HC OT EVAL MOD COMPLEXITY 4 1/13/2025 Anne Marie Cid OT GO 1                 Anne Marie Cid OT  1/13/2025    Electronically signed by Anne Marie Cid OT at 01/13/25 1052

## 2025-01-15 NOTE — PROGRESS NOTES
[facility] Medicine Services  PROGRESS NOTE    Patient Name: Masha Chou  : 1945  MRN: [id number]    Date of Admission: 2025  Primary Care Physician: Kary Wu MD    Subjective   Subjective     CC:  Left knee pain    HPI:  Patient doing well this morning.  She reports she is having some diarrhea.  Evidently this is a chronic problem and she uses Imodium regularly at home.      Objective   Objective     Vital Signs:   Temp:  [97.6 °F (36.4 °C)-98.1 °F (36.7 °C)] 98.1 °F (36.7 °C)  Heart Rate:  [73-86] 75  Resp:  [16-20] 16  BP: (137-162)/(60-86) 143/66  Flow (L/min) (Oxygen Therapy):  [3-4] 4     Physical Exam  Constitutional:       General: She is not in acute distress.  Cardiovascular:      Rate and Rhythm: Normal rate and regular rhythm.      Heart sounds: Normal heart sounds.   Pulmonary:      Effort: Pulmonary effort is normal. No respiratory distress.      Breath sounds: Normal breath sounds. No wheezing or rhonchi.   Abdominal:      Palpations: Abdomen is soft.      Tenderness: There is no abdominal tenderness.   Musculoskeletal:      Right lower leg: No edema.      Left lower leg: No edema.   Neurological:      General: No focal deficit present.      Mental Status: She is alert. Mental status is at baseline.   Psychiatric:         Mood and Affect: Mood normal.         Thought Content: Thought content normal.          Results Reviewed:  LAB RESULTS:      Lab 25  0646 25   WBC 8.72 9.27   HEMOGLOBIN 12.3 13.0   HEMATOCRIT 40.3 41.1   PLATELETS 172 183   NEUTROS ABS 4.54 5.98   IMMATURE GRANS (ABS) 0.14* 0.13*   LYMPHS ABS 2.99 2.32   MONOS ABS 0.82 0.71   EOS ABS 0.16 0.09   MCV 95.5 93.6         Lab 25  0646 25   SODIUM 144 139   POTASSIUM 5.1 5.4*   CHLORIDE 105 100   CO2 32.0* 27.0   ANION GAP 7.0 12.0   BUN 31* 31*   CREATININE 1.23* 1.16*   EGFR 45.1* 48.4*   GLUCOSE 158* 220*   CALCIUM 9.4 9.7   MAGNESIUM 1.9 1.7    HEMOGLOBIN A1C 9.00*  --          Lab 01/12/25 2037   TOTAL PROTEIN 6.3   ALBUMIN 4.0   GLOBULIN 2.3   ALT (SGPT) 22   AST (SGOT) 19   BILIRUBIN 0.5   ALK PHOS 83                     Brief Urine Lab Results  (Last result in the past 365 days)        Color   Clarity   Blood   Leuk Est   Nitrite   Protein   CREAT   Urine HCG        01/13/25 0722 Yellow   Clear   Negative   Trace   Negative   Negative                   Microbiology Results Abnormal       None            No radiology results from the last 24 hrs    Results for orders placed during the hospital encounter of 06/25/21    Adult Transthoracic Echo Complete W/ Cont if Necessary Per Protocol    Interpretation Summary  · Estimated left ventricular EF = 70% Left ventricular ejection fraction appears to be 66 - 70%. Left ventricular systolic function is normal.  · Left ventricular diastolic function is consistent with (grade I) impaired relaxation.  · Left ventricular wall thickness is consistent with hypertrophy. Sigmoid-shaped ventricular septum is present.  · LVOT turbulence without gradient.      Current medications:  Scheduled Meds:aspirin, 81 mg, Oral, Daily  atorvastatin, 20 mg, Oral, Daily  buPROPion XL, 150 mg, Oral, Daily  cefuroxime, 500 mg, Oral, Q12H  cholestyramine light, 1 packet, Oral, Daily  citalopram, 40 mg, Oral, QAM  donepezil, 10 mg, Oral, Daily  fluticasone, 2 spray, Nasal, Daily  heparin (porcine), 5,000 Units, Subcutaneous, Q12H  insulin glargine, 10 Units, Subcutaneous, Nightly  insulin lispro, 2-7 Units, Subcutaneous, 4x Daily AC & at Bedtime  ipratropium-albuterol, 3 mL, Nebulization, 4x Daily - RT  lactobacillus acidophilus, 1 capsule, Oral, Daily  sodium chloride, 10 mL, Intravenous, Q12H  verapamil SR, 240 mg, Oral, Daily      Continuous Infusions:   PRN Meds:.  acetaminophen **OR** acetaminophen **OR** acetaminophen    albuterol    senna-docusate sodium **AND** polyethylene glycol **AND** bisacodyl **AND** bisacodyl    Calcium  Replacement - Follow Nurse / BPA Driven Protocol    dextrose    dextrose    Diclofenac Sodium    glucagon (human recombinant)    Magnesium Standard Dose Replacement - Follow Nurse / BPA Driven Protocol    nitroglycerin    ondansetron ODT    Phosphorus Replacement - Follow Nurse / BPA Driven Protocol    Potassium Replacement - Follow Nurse / BPA Driven Protocol    sodium chloride    sodium chloride    Assessment & Plan   Assessment & Plan     Active Hospital Problems    Diagnosis  POA    **Falls [R29.6]  Not Applicable    Left knee pain [M25.562]  Unknown    Osteoarthritis of left knee [M17.12]  Unknown    Acute UTI (urinary tract infection) [N39.0]  Unknown    COPD exacerbation [J44.1]  Yes    Type 2 diabetes mellitus [E11.9]  Yes      Resolved Hospital Problems   No resolved problems to display.        Brief Hospital Course to date:  Masha Chou is a 79 y.o. female with type 2 diabetes, hypertension, COPD, breast cancer, pulmonary hypertension, diastolic CHF who presented to the ED after having a near fall.    Left knee pain  Osteoarthritis  Generalized weakness  -- X-ray left knee shows mild to moderate tricompartmental osteoarthritis  -- Fall precautions  -- PT/OT consult, recommending SNF  -- Consult case management  -- Tylenol and Voltaren for pain    Diarrhea, chronic  -Intermittent diarrhea, patient reports typically associate with anxiety  -Patient has been on magnesium orally that she reports she does not take, will discontinue  -Antibiotics also be contributing, will start probiotic  -If above interventions do not help improve diarrhea can restart her home loperamide     Acute UTI (POA)  -- UA: Ketones trace, nitrite positive, leukocytes trace, WBC 3-5, bacteria 4+, squamous 3-6  -- Ceftin 5 days     CKD  -- baseline creatinine ~ 1.1-1.2   -- bmp in the am     COPD  JUAN F  Asthma  --Does not appear to be in acute exacerbation.  No sputum production and no wheezing  -- CPAP nightly  -- DuoNebs, will change  from as needed to scheduled     T2DM  -- A1c in the a.m.  -- FSBG with SSI  -- Lantus 10 units nightly    Expected Discharge Location and Transportation: SNF to long-term care  Expected Discharge   Expected Discharge Date: 1/17/2025; Expected Discharge Time:      VTE Prophylaxis:  Pharmacologic VTE prophylaxis orders are present.         AM-PAC 6 Clicks Score (PT): 18 (01/14/25 2000)    CODE STATUS:   Code Status and Medical Interventions: CPR (Attempt to Resuscitate); Full Support   Ordered at: 01/12/25 7953     Level Of Support Discussed With:    Patient     Code Status (Patient has no pulse and is not breathing):    CPR (Attempt to Resuscitate)     Medical Interventions (Patient has pulse or is breathing):    Full Support       Christine Guevara MD  01/15/25

## 2025-01-15 NOTE — PLAN OF CARE
Problem: Adult Inpatient Plan of Care  Goal: Plan of Care Review  Outcome: Progressing  Goal: Patient-Specific Goal (Individualized)  Outcome: Progressing  Goal: Absence of Hospital-Acquired Illness or Injury  Outcome: Progressing  Intervention: Identify and Manage Fall Risk  Recent Flowsheet Documentation  Taken 1/15/2025 1405 by Fior Hills, RN  Safety Promotion/Fall Prevention:   activity supervised   assistive device/personal items within reach   clutter free environment maintained   elopement precautions   fall prevention program maintained   gait belt   lighting adjusted   mobility aid in reach   muscle strengthening facilitated   nonskid shoes/slippers when out of bed   room organization consistent   safety round/check completed   toileting scheduled  Taken 1/15/2025 1257 by Fior Hills RN  Safety Promotion/Fall Prevention:   activity supervised   assistive device/personal items within reach   clutter free environment maintained   elopement precautions   fall prevention program maintained   gait belt   lighting adjusted   mobility aid in reach   muscle strengthening facilitated   nonskid shoes/slippers when out of bed   room organization consistent   safety round/check completed   toileting scheduled  Taken 1/15/2025 1042 by Fior Hills RN  Safety Promotion/Fall Prevention:   activity supervised   assistive device/personal items within reach   clutter free environment maintained   fall prevention program maintained   elopement precautions   gait belt   lighting adjusted   muscle strengthening facilitated   mobility aid in reach   nonskid shoes/slippers when out of bed   room organization consistent   safety round/check completed   toileting scheduled  Taken 1/15/2025 0821 by Fior Hills, RN  Safety Promotion/Fall Prevention:   activity supervised   assistive device/personal items within reach   clutter free environment maintained   elopement precautions   fall prevention program maintained    gait belt   lighting adjusted   mobility aid in reach   muscle strengthening facilitated   nonskid shoes/slippers when out of bed   room organization consistent   safety round/check completed   toileting scheduled  Intervention: Prevent Skin Injury  Recent Flowsheet Documentation  Taken 1/15/2025 1405 by Fior Hills RN  Body Position: position changed independently  Skin Protection: incontinence pads utilized  Taken 1/15/2025 1257 by Fior Hills RN  Body Position: position changed independently  Skin Protection: incontinence pads utilized  Taken 1/15/2025 1042 by Fior Hills RN  Body Position: position changed independently  Skin Protection: incontinence pads utilized  Taken 1/15/2025 0821 by Fior Hills RN  Body Position: position changed independently  Skin Protection: incontinence pads utilized  Intervention: Prevent and Manage VTE (Venous Thromboembolism) Risk  Recent Flowsheet Documentation  Taken 1/15/2025 1405 by Fior Hills RN  VTE Prevention/Management:   bilateral   SCDs (sequential compression devices) on  Taken 1/15/2025 1257 by Fior Hills RN  VTE Prevention/Management:   bilateral   SCDs (sequential compression devices) on  Taken 1/15/2025 1042 by Fior Hills RN  VTE Prevention/Management:   bilateral   SCDs (sequential compression devices) on  Taken 1/15/2025 0821 by Fior Hills RN  VTE Prevention/Management:   bilateral   SCDs (sequential compression devices) on  Intervention: Prevent Infection  Recent Flowsheet Documentation  Taken 1/15/2025 1405 by Fior Hills RN  Infection Prevention:   environmental surveillance performed   equipment surfaces disinfected   hand hygiene promoted   personal protective equipment utilized   rest/sleep promoted   single patient room provided  Taken 1/15/2025 1257 by Fior Hills RN  Infection Prevention:   environmental surveillance performed   equipment surfaces disinfected   hand hygiene promoted   personal protective equipment  utilized   rest/sleep promoted   single patient room provided  Taken 1/15/2025 1042 by Fior Hills RN  Infection Prevention:   environmental surveillance performed   equipment surfaces disinfected   hand hygiene promoted   rest/sleep promoted   personal protective equipment utilized   single patient room provided  Taken 1/15/2025 0821 by Fior Hills RN  Infection Prevention:   environmental surveillance performed   equipment surfaces disinfected   personal protective equipment utilized   hand hygiene promoted   rest/sleep promoted   single patient room provided  Goal: Optimal Comfort and Wellbeing  Outcome: Progressing  Intervention: Provide Person-Centered Care  Recent Flowsheet Documentation  Taken 1/15/2025 0821 by Fior Hills RN  Trust Relationship/Rapport:   care explained   choices provided   emotional support provided   empathic listening provided   questions answered   questions encouraged   reassurance provided   thoughts/feelings acknowledged  Goal: Readiness for Transition of Care  Outcome: Progressing     Problem: Comorbidity Management  Goal: Blood Glucose Level Within Target Range  Outcome: Progressing  Intervention: Monitor and Manage Glycemia  Recent Flowsheet Documentation  Taken 1/15/2025 1405 by Fior Hills RN  Medication Review/Management: medications reviewed  Taken 1/15/2025 1257 by Fior Hills RN  Medication Review/Management: medications reviewed  Taken 1/15/2025 1042 by Fior Hills RN  Medication Review/Management: medications reviewed  Taken 1/15/2025 0821 by Fior Hills RN  Medication Review/Management: medications reviewed  Goal: Maintenance of Heart Failure Symptom Control  Outcome: Progressing  Intervention: Maintain Heart Failure Management  Recent Flowsheet Documentation  Taken 1/15/2025 1405 by Fior Hills RN  Medication Review/Management: medications reviewed  Taken 1/15/2025 1257 by Fior Hills RN  Medication Review/Management: medications  reviewed  Taken 1/15/2025 1042 by Fior Hills RN  Medication Review/Management: medications reviewed  Taken 1/15/2025 0821 by Fior Hills RN  Medication Review/Management: medications reviewed  Goal: Maintenance of Osteoarthritis Symptom Control  Outcome: Progressing  Intervention: Maintain Osteoarthritis Symptom Control  Recent Flowsheet Documentation  Taken 1/15/2025 1405 by Fior Hills RN  Medication Review/Management: medications reviewed  Taken 1/15/2025 1257 by Fior Hills RN  Medication Review/Management: medications reviewed  Taken 1/15/2025 1042 by Fior Hills RN  Medication Review/Management: medications reviewed  Taken 1/15/2025 0821 by Fior Hills RN  Medication Review/Management: medications reviewed     Problem: Skin Injury Risk Increased  Goal: Skin Health and Integrity  Outcome: Progressing  Intervention: Optimize Skin Protection  Recent Flowsheet Documentation  Taken 1/15/2025 1405 by Fior Hills RN  Pressure Reduction Techniques: frequent weight shift encouraged  Head of Bed (HOB) Positioning: [facility] elevated  Pressure Reduction Devices:   positioning supports utilized   pressure-redistributing mattress utilized  Skin Protection: incontinence pads utilized  Taken 1/15/2025 1257 by Fior Hills RN  Pressure Reduction Techniques: frequent weight shift encouraged  Head of Bed (HOB) Positioning: HOB elevated  Pressure Reduction Devices:   positioning supports utilized   pressure-redistributing mattress utilized  Skin Protection: incontinence pads utilized  Taken 1/15/2025 1042 by Fior Hills RN  Pressure Reduction Techniques: frequent weight shift encouraged  Head of Bed (HOB) Positioning: HOB elevated  Pressure Reduction Devices:   positioning supports utilized   pressure-redistributing mattress utilized  Skin Protection: incontinence pads utilized  Taken 1/15/2025 0821 by Fior Hills RN  Pressure Reduction Techniques: frequent weight shift encouraged  Head of Bed  (HOB) Positioning: HOB elevated  Pressure Reduction Devices:   pressure-redistributing mattress utilized   positioning supports utilized  Skin Protection: incontinence pads utilized     Problem: Fall Injury Risk  Goal: Absence of Fall and Fall-Related Injury  Outcome: Progressing  Intervention: Identify and Manage Contributors  Recent Flowsheet Documentation  Taken 1/15/2025 1405 by Fior Hills RN  Medication Review/Management: medications reviewed  Taken 1/15/2025 1257 by Fior Hills RN  Medication Review/Management: medications reviewed  Taken 1/15/2025 1042 by Fior Hills RN  Medication Review/Management: medications reviewed  Taken 1/15/2025 0821 by Fior Hills RN  Medication Review/Management: medications reviewed  Intervention: Promote Injury-Free Environment  Recent Flowsheet Documentation  Taken 1/15/2025 1405 by Fior Hills RN  Safety Promotion/Fall Prevention:   activity supervised   assistive device/personal items within reach   clutter free environment maintained   elopement precautions   fall prevention program maintained   gait belt   lighting adjusted   mobility aid in reach   muscle strengthening facilitated   nonskid shoes/slippers when out of bed   room organization consistent   safety round/check completed   toileting scheduled  Taken 1/15/2025 1257 by Fior Hills RN  Safety Promotion/Fall Prevention:   activity supervised   assistive device/personal items within reach   clutter free environment maintained   elopement precautions   fall prevention program maintained   gait belt   lighting adjusted   mobility aid in reach   muscle strengthening facilitated   nonskid shoes/slippers when out of bed   room organization consistent   safety round/check completed   toileting scheduled  Taken 1/15/2025 1042 by Fior Hills RN  Safety Promotion/Fall Prevention:   activity supervised   assistive device/personal items within reach   clutter free environment maintained   fall  prevention program maintained   elopement precautions   gait belt   lighting adjusted   muscle strengthening facilitated   mobility aid in reach   nonskid shoes/slippers when out of bed   room organization consistent   safety round/check completed   toileting scheduled  Taken 1/15/2025 0821 by Fior Hills RN  Safety Promotion/Fall Prevention:   activity supervised   assistive device/personal items within reach   clutter free environment maintained   elopement precautions   fall prevention program maintained   gait belt   lighting adjusted   mobility aid in reach   muscle strengthening facilitated   nonskid shoes/slippers when out of bed   room organization consistent   safety round/check completed   toileting scheduled   Goal Outcome Evaluation:

## 2025-01-15 NOTE — CASE MANAGEMENT/SOCIAL WORK
Continued Stay Note  [facility]     Patient Name: Masha Chou  MRN: [id number]  Today's Date: 1/15/2025    Admit Date: 1/12/2025    Plan: [facility]   Discharge Plan       Row Name 01/15/25 0937       Plan    Plan Comments CM spoke with pt and her daughter. Both confirm that daughter has been evicted and no longer feels she can care for her mother. Both are in agreement for looking for a skilled to long term care bed. CM has discussed that while every attempt would be made to keep her as close to [facility] this may not be an option. Both report understanding. Referrals have been made to [facility], [facility], [place], [facility] and [facility].                   Discharge Codes    No documentation.                 Expected Discharge Date and Time       Expected Discharge Date Expected Discharge Time    Jan 17, 2025               Lillian Marley RN

## 2025-01-15 NOTE — THERAPY TREATMENT NOTE
Patient Name: Masha Chou  : 1945    MRN: [id number]                              Today's Date: 1/15/2025       Admit Date: 2025    Visit Dx:     ICD-10-CM ICD-9-CM   1. Falls  R29.6 V15.88   2. Acute cystitis without hematuria  N30.00 595.0     Patient Active Problem List   Diagnosis    Chest pain    Type 2 diabetes mellitus    Pulmonary HTN    Heart failure    COPD exacerbation    Chronic respiratory failure with hypoxia    Falls    Left knee pain    Osteoarthritis of left knee    Acute UTI (urinary tract infection)     Past Medical History:   Diagnosis Date    Asthma     Cancer     BILATERAL BREAST     COPD (chronic obstructive pulmonary disease)     Diabetes mellitus     Heart failure     Hypertension     Pulmonary hypertension      Past Surgical History:   Procedure Laterality Date    BREAST SURGERY      CHOLECYSTECTOMY      HYSTERECTOMY      JOINT REPLACEMENT      RIGHT KNEE    KELOID EXCISION        General Information       Row Name 01/15/25 1634          OT Time and Intention    Subjective Information complains of;weakness  -TB     Document Type therapy note (daily note)  -TB     Mode of Treatment occupational therapy;individual therapy  -TB     Patient Effort good  -TB     Symptoms Noted During/After Treatment none  -TB       Row Name 01/15/25 1634          General Information    Patient Profile Reviewed yes  -TB     Existing Precautions/Restrictions fall;oxygen therapy device and L/min;other (see comments)  Incontinent  -TB     Barriers to Rehab medically complex;previous functional deficit  -TB       Row Name 01/15/25 1634          Occupational Profile    Reason for Services/Referral (Occupational Profile) Occupational decline  -TB       Row Name 01/15/25 1634          Cognition    Orientation Status (Cognition) oriented x 3  -TB       Row Name 01/15/25 1634          Safety Issues/Impairments Affecting Functional Mobility    Safety Issues Affecting Function (Mobility) insight into  deficits/self-awareness;awareness of need for assistance;safety precaution awareness;safety precautions follow-through/compliance;sequencing abilities;ability to follow commands  -TB     Impairments Affecting Function (Mobility) balance;endurance/activity tolerance;pain;shortness of breath;strength  -TB     Cognitive Impairments, Mobility Safety/Performance awareness, need for assistance;insight into deficits/self-awareness;safety precaution awareness;safety precaution follow-through;sequencing abilities  -TB     Comment, Safety Issues/Impairments (Mobility) Pt able to stand and transfer with SPC and Min Ax1.  -TB               User Key  (r) = Recorded By, (t) = Taken By, (c) = Cosigned By      Initials Name Provider Type    TB Lindsey Sandoval, OT Occupational Therapist                     Mobility/ADL's       Row Name 01/15/25 1636          Bed Mobility    Bed Mobility rolling left;rolling right;scooting/bridging;supine-sit  -TB     Rolling Left [place] (Bed Mobility) minimum assist (75% patient effort);verbal cues  -TB     Rolling Right [place] (Bed Mobility) minimum assist (75% patient effort);verbal cues  -TB     Scooting/Bridging [place] (Bed Mobility) standby assist;verbal cues  -TB     Supine-Sit [place] (Bed Mobility) minimum assist (75% patient effort);verbal cues  -TB     Bed Mobility, Safety Issues decreased use of arms for pushing/pulling;decreased use of legs for bridging/pushing;impaired trunk control for bed mobility  -TB     Assistive Device (Bed Mobility) head of bed elevated;bed rails  -TB     Comment, (Bed Mobility) Rolling to complete toileting hygiene following episode of bowel incontinence. Cues and assist to sequence transition to EOB sitting.  -TB       Row Name 01/15/25 1636          Transfers    Transfers sit-stand transfer;stand-sit transfer;bed-chair transfer  -TB     Comment, (Transfers) Education and cues for hand placement and sequencing with SPC  -TB        Row Name 01/15/25 1636          Bed-Chair Transfer    Bed-Chair [place] (Transfers) minimum assist (75% patient effort);1 person assist;verbal cues  -TB     Assistive Device (Bed-Chair Transfers) cane, straight  -TB       Row Name 01/15/25 1636          Sit-Stand Transfer    Sit-Stand [place] (Transfers) minimum assist (75% patient effort);1 person assist;verbal cues  -TB     Assistive Device (Sit-Stand Transfers) cane, straight  -TB       Row Name 01/15/25 1636          Stand-Sit Transfer    Stand-Sit [place] (Transfers) contact guard;1 person assist;verbal cues  -TB     Assistive Device (Stand-Sit Transfers) cane, straight  -TB       Row Name 01/15/25 1636          Functional Mobility    Functional Mobility- Ind. Level minimum assist (75% patient effort);1 person;verbal cues required  -TB     Functional Mobility- Device cane, straight  -TB     Functional Mobility-Distance (Feet) 5  -TB     Functional Mobility- Safety Issues balance decreased during turns;sequencing ability decreased  -TB     Functional Mobility- Comment Pt is up in room and transfering with SPC. Good effort. No LOB.  -TB     Patient was able to Ambulate yes  -TB       Row Name 01/15/25 1636          Activities of Daily Living    BADL Assessment/Intervention bathing;lower body dressing;toileting;feeding;grooming  -TB       Row Name 01/15/25 1636          Lower Body Dressing Assessment/Training    [place] Level (Lower Body Dressing) don;socks;dependent (less than 25% patient effort)  -TB       Row Name 01/15/25 1636          Bathing Assessment/Intervention    [place] Level (Bathing) dependent (less than 25% patient effort);perineal area  -TB     Position (Bathing) supine  -TB     Comment, (Bathing) following episode of bowel incontinence  -TB       Row Name 01/15/25 1636          Toileting Assessment/Training    [place] Level (Toileting) toileting skills;dependent (less than 25% patient effort)  -TB     Position  (Toileting) supine  -TB     Comment, (Toileting) following episode of bowel incontinence  -TB       Row Name 01/15/25 1636          Self-Feeding Assessment/Training    [place] Level (Feeding) independent;liquids to mouth  -TB     Position (Feeding) supported sitting  -TB       Row Name 01/15/25 1636          Grooming Assessment/Training    [place] Level (Grooming) set up;hair care, combing/brushing  -TB     Position (Grooming) supported sitting  -TB               User Key  (r) = Recorded By, (t) = Taken By, (c) = Cosigned By      Initials Name Provider Type    TB Lindsey Sandoval OT Occupational Therapist                   Obj/Interventions       Row Name 01/15/25 1642          Balance    Balance Assessment sitting static balance;sitting dynamic balance;sit to stand dynamic balance;standing static balance;standing dynamic balance  -TB     Static Sitting Balance supervision  -TB     Dynamic Sitting Balance standby assist  -TB     Position, Sitting Balance unsupported;sitting in chair;sitting edge of bed  -TB     Sit to Stand Dynamic Balance minimal assist;1-person assist;verbal cues  -TB     Static Standing Balance contact guard;verbal cues;1-person assist  -TB     Dynamic Standing Balance minimal assist;verbal cues;1-person assist  -TB     Position/Device Used, Standing Balance supported;cane, straight  -TB     Balance Interventions sitting;standing;sit to stand;supported;static;dynamic;dynamic reaching;occupation based/functional task;UE activity with balance activity  -TB     Comment, Balance No LOB  -TB               User Key  (r) = Recorded By, (t) = Taken By, (c) = Cosigned By      Initials Name Provider Type    Lindsey Ye OT Occupational Therapist                   Goals/Plan    No documentation.                  Clinical Impression       Row Name 01/15/25 1642          Pain Assessment    Pretreatment Pain Rating 0/10 - no pain  -TB     Posttreatment Pain Rating 0/10 - no pain   -TB     Pre/Posttreatment Pain Comment Pt denies pain pre/post  -TB       Row Name 01/15/25 1642          Plan of Care Review    Plan of Care Reviewed With patient  -TB     Progress no change  -TB     Outcome Evaluation Pt participates in therapy with good effort. Remains below her baseline with strength and activity tolerance deficits. Dependent with LB bathing and toileting hygiene following incontinent episode. Dependent to don socks for OOB activity. Min A supine to sit. Pt able to stand and take steps from EOB to UIC with SPC support and Min Ax1. Set-up simple grooming and self-feeding. OT will continue to follow IP. Recommend SNF when pt is medically ready.  -TB       Row Name 01/15/25 1642          Therapy Plan Review/Discharge Plan (OT)    Anticipated Discharge Disposition (OT) skilled nursing facility  -TB       Row Name 01/15/25 1642          Vital Signs    Pre Systolic BP Rehab --  RN cleared OT  -TB     Pre SpO2 (%) 92  -TB     O2 Delivery Pre Treatment supplemental O2  -TB     Intra SpO2 (%) 88  -TB     O2 Delivery Intra Treatment supplemental O2  -TB     Post SpO2 (%) 96  -TB     O2 Delivery Post Treatment supplemental O2  -TB     Pre Patient Position Supine  -TB     Intra Patient Position Standing  -TB     Post Patient Position Sitting  -TB       Row Name 01/15/25 1642          Positioning and Restraints    Pre-Treatment Position in bed  -TB     Post Treatment Position chair  -TB     In Chair notified nsg;reclined;call light within reach;encouraged to call for assist;exit alarm on;waffle cushion;legs elevated  -TB               User Key  (r) = Recorded By, (t) = Taken By, (c) = Cosigned By      Initials Name Provider Type    TB Lindsey Sandoval, OT Occupational Therapist                   Outcome Measures       Row Name 01/15/25 9863          How much help from another person do you currently need...    Turning from your back to your side while in flat bed without using bedrails? 3  -      Moving from lying on back to sitting on the side of a flat bed without bedrails? 3  -MH     Moving to and from a bed to a chair (including a wheelchair)? 3  -MH     Standing up from a chair using your arms (e.g., wheelchair, bedside chair)? 3  -MH     Climbing 3-5 steps with a railing? 3  -MH     To walk in hospital room? 3  -MH     AM-PAC 6 Clicks Score (PT) 18  -MH     Highest Level of Mobility Goal 6 --> Walk 10 steps or more  -               User Key  (r) = Recorded By, (t) = Taken By, (c) = Cosigned By      Initials Name Provider Type     [place], RN Registered Nurse                    Occupational Therapy Education       Title: PT OT SLP Therapies (In Progress)       Topic: Occupational Therapy (In Progress)       Point: ADL training (Done)       Description:   Instruct learner(s) on proper safety adaptation and remediation techniques during self care or transfers.   Instruct in proper use of assistive devices.                  Learning Progress Summary            Patient Acceptance, E,D, VU,NR by TB at 1/15/2025 1646    Acceptance, E, VU by AN at 1/13/2025 1058                      Point: Home exercise program (Not Started)       Description:   Instruct learner(s) on appropriate technique for monitoring, assisting and/or progressing therapeutic exercises/activities.                  Learner Progress:  Not documented in this visit.              Point: Precautions (Done)       Description:   Instruct learner(s) on prescribed precautions during self-care and functional transfers.                  Learning Progress Summary            Patient Acceptance, E, VU by AN at 1/13/2025 1058                      Point: Body mechanics (Done)       Description:   Instruct learner(s) on proper positioning and spine alignment during self-care, functional mobility activities and/or exercises.                  Learning Progress Summary            Patient Acceptance, E, VU by AN at 1/13/2025 1058                                       User Key       Initials Effective Dates Name Provider Type Discipline    TB 07/11/23 -  Lindsey Sandoval OT Occupational Therapist OT    AN 09/21/21 -  Anne Marie Cid OT Occupational Therapist OT                  OT Recommendation and Plan     Plan of Care Review  Plan of Care Reviewed With: patient  Progress: no change  Outcome Evaluation: Pt participates in therapy with good effort. Remains below her baseline with strength and activity tolerance deficits. Dependent with LB bathing and toileting hygiene following incontinent episode. Dependent to don socks for OOB activity. Min A supine to sit. Pt able to stand and take steps from EOB to UIC with SPC support and Min Ax1. Set-up simple grooming and self-feeding. OT will continue to follow IP. Recommend SNF when pt is medically ready.     Time Calculation:         Time Calculation- OT       Row Name 01/15/25 1549             Time Calculation- OT    OT Start Time 1549  -TB      OT Received On 01/15/25  -TB      OT Goal Re-Cert Due Date 01/23/25  -TB         Timed Charges    43784 - OT Self Care/Mgmt Minutes 25  -TB         Total Minutes    Timed Charges Total Minutes 25  -TB       Total Minutes 25  -TB                User Key  (r) = Recorded By, (t) = Taken By, (c) = Cosigned By      Initials Name Provider Type    TB Lindsey Sandoval OT Occupational Therapist                  Therapy Charges for Today       Code Description Service Date Service Provider Modifiers Qty    [id number]  OT SELF CARE/MGMT/TRAIN EA 15 MIN 1/15/2025 Lindsey Sandoval OT GO 2                 Lindsey Sandoval OT  1/15/2025

## 2025-01-15 NOTE — PLAN OF CARE
Goal Outcome Evaluation:                                            Problem: Adult Inpatient Plan of Care  Goal: Absence of Hospital-Acquired Illness or Injury  Intervention: Identify and Manage Fall Risk  Recent Flowsheet Documentation  Taken 1/15/2025 0400 by Luis Fernando Castelan RN  Safety Promotion/Fall Prevention:   activity supervised   safety round/check completed   toileting scheduled  Taken 1/15/2025 0200 by Luis Fernando Castelan RN  Safety Promotion/Fall Prevention:   activity supervised   safety round/check completed   toileting scheduled  Taken 1/15/2025 0000 by Luis Fernando Castelan RN  Safety Promotion/Fall Prevention:   activity supervised   safety round/check completed   toileting scheduled  Taken 1/14/2025 2200 by Luis Fernando Castelan RN  Safety Promotion/Fall Prevention:   activity supervised   safety round/check completed   toileting scheduled  Taken 1/14/2025 2000 by Luis Fernando Castelan RN  Safety Promotion/Fall Prevention:   activity supervised   safety round/check completed   toileting scheduled  Intervention: Prevent Skin Injury  Recent Flowsheet Documentation  Taken 1/15/2025 0400 by Luis Fernando Castelan RN  Body Position: position maintained  Skin Protection: drying agents applied  Taken 1/15/2025 0200 by Luis Fernando Castelan RN  Body Position: position maintained  Skin Protection: drying agents applied  Taken 1/15/2025 0000 by Luis Fernando Castelan RN  Body Position: supine  Skin Protection: drying agents applied  Taken 1/14/2025 2200 by Luis Fernando Castelan RN  Body Position: position maintained  Skin Protection: drying agents applied  Taken 1/14/2025 2000 by Luis Fernando Castelan RN  Body Position:   left   side-lying  Skin Protection: drying agents applied  Intervention: Prevent and Manage VTE (Venous Thromboembolism) Risk  Recent Flowsheet Documentation  Taken 1/15/2025 0400 by Luis Fernando Castelan RN  VTE Prevention/Management:   bilateral   SCDs (sequential compression devices) on  Taken 1/15/2025 0200 by Luis Fernando Castelan RN  VTE  Prevention/Management:   bilateral   SCDs (sequential compression devices) on  Taken 1/15/2025 0000 by Luis Fernando Castelan RN  VTE Prevention/Management:   bilateral   SCDs (sequential compression devices) on  Taken 1/14/2025 2200 by Luis Fernando Castelan RN  VTE Prevention/Management:   bilateral   SCDs (sequential compression devices) on  Taken 1/14/2025 2000 by Luis Fernando Castelan RN  VTE Prevention/Management:   bilateral   SCDs (sequential compression devices) on  Intervention: Prevent Infection  Recent Flowsheet Documentation  Taken 1/15/2025 0400 by Luis Fernando Castelan RN  Infection Prevention: environmental surveillance performed  Taken 1/15/2025 0200 by Luis Fernando Castelan RN  Infection Prevention: environmental surveillance performed  Taken 1/15/2025 0000 by Luis Fernando Castelan RN  Infection Prevention: environmental surveillance performed  Taken 1/14/2025 2200 by Luis Fernando Castelan RN  Infection Prevention: environmental surveillance performed  Taken 1/14/2025 2000 by Luis Fernando Castelan RN  Infection Prevention: environmental surveillance performed  Goal: Optimal Comfort and Wellbeing  Intervention: Monitor Pain and Promote Comfort  Recent Flowsheet Documentation  Taken 1/15/2025 0400 by Luis Fernando Castelan RN  Pain Management Interventions: quiet environment facilitated  Taken 1/15/2025 0200 by Luis Fernando Castelan RN  Pain Management Interventions: quiet environment facilitated  Taken 1/15/2025 0000 by Luis Fernando Castelan RN  Pain Management Interventions: quiet environment facilitated  Taken 1/14/2025 2200 by Luis Fernando Castelan RN  Pain Management Interventions: quiet environment facilitated  Taken 1/14/2025 2000 by Luis Fernando Castelan RN  Pain Management Interventions: quiet environment facilitated  Intervention: Provide Person-Centered Care  Recent Flowsheet Documentation  Taken 1/15/2025 0400 by Luis Fernando Castelan RN  Trust Relationship/Rapport: care explained  Taken 1/15/2025 0200 by Luis Fernando Castelan RN  Trust Relationship/Rapport: care  explained  Taken 1/15/2025 0000 by Luis Fernando Castelan, RN  Trust Relationship/Rapport: care explained  Taken 1/14/2025 2200 by Luis Fernando Castelan, RN  Trust Relationship/Rapport: care explained     VSS, voids well, rested throughout the night, awaiting placement, will continue to monitor for changes.

## 2025-01-15 NOTE — PLAN OF CARE
Problem: Adult Inpatient Plan of Care  Goal: Plan of Care Review  Recent Flowsheet Documentation  Taken 1/15/2025 2792 by Lindsey Sandoval OT  Progress: no change  Outcome Evaluation: Pt participates in therapy with good effort. Remains below her baseline with strength and activity tolerance deficits. Dependent with LB bathing and toileting hygiene following incontinent episode. Dependent to don socks for OOB activity. Min A supine to sit. Pt able to stand and take steps from EOB to UIC with SPC support and Min Ax1. Set-up simple grooming and self-feeding. OT will continue to follow IP. Recommend SNF when pt is medically ready.

## 2025-01-16 LAB
GLUCOSE BLDC GLUCOMTR-MCNC: 204 MG/DL (ref 70–130)
GLUCOSE BLDC GLUCOMTR-MCNC: 248 MG/DL (ref 70–130)
GLUCOSE BLDC GLUCOMTR-MCNC: 292 MG/DL (ref 70–130)
GLUCOSE BLDC GLUCOMTR-MCNC: 344 MG/DL (ref 70–130)

## 2025-01-16 PROCEDURE — 99232 SBSQ HOSP IP/OBS MODERATE 35: CPT | Performed by: STUDENT IN AN ORGANIZED HEALTH CARE EDUCATION/TRAINING PROGRAM

## 2025-01-16 PROCEDURE — 96372 THER/PROPH/DIAG INJ SC/IM: CPT

## 2025-01-16 PROCEDURE — 82948 REAGENT STRIP/BLOOD GLUCOSE: CPT

## 2025-01-16 PROCEDURE — 63710000001 INSULIN GLARGINE PER 5 UNITS: Performed by: STUDENT IN AN ORGANIZED HEALTH CARE EDUCATION/TRAINING PROGRAM

## 2025-01-16 PROCEDURE — 25010000002 HEPARIN (PORCINE) PER 1000 UNITS: Performed by: NURSE PRACTITIONER

## 2025-01-16 PROCEDURE — 94664 DEMO&/EVAL PT USE INHALER: CPT

## 2025-01-16 PROCEDURE — 94799 UNLISTED PULMONARY SVC/PX: CPT

## 2025-01-16 PROCEDURE — 63710000001 INSULIN LISPRO (HUMAN) PER 5 UNITS: Performed by: STUDENT IN AN ORGANIZED HEALTH CARE EDUCATION/TRAINING PROGRAM

## 2025-01-16 PROCEDURE — 63710000001 INSULIN LISPRO (HUMAN) PER 5 UNITS: Performed by: NURSE PRACTITIONER

## 2025-01-16 PROCEDURE — G0378 HOSPITAL OBSERVATION PER HR: HCPCS

## 2025-01-16 PROCEDURE — 94761 N-INVAS EAR/PLS OXIMETRY MLT: CPT

## 2025-01-16 RX ORDER — INSULIN LISPRO 100 [IU]/ML
5 INJECTION, SOLUTION INTRAVENOUS; SUBCUTANEOUS
Status: DISCONTINUED | OUTPATIENT
Start: 2025-01-16 | End: 2025-01-17

## 2025-01-16 RX ADMIN — IPRATROPIUM BROMIDE AND ALBUTEROL SULFATE 3 ML: 2.5; .5 SOLUTION RESPIRATORY (INHALATION) at 20:12

## 2025-01-16 RX ADMIN — BUPROPION HYDROCHLORIDE 150 MG: 150 TABLET, EXTENDED RELEASE ORAL at 08:49

## 2025-01-16 RX ADMIN — VERAPAMIL HYDROCHLORIDE 240 MG: 240 TABLET, FILM COATED, EXTENDED RELEASE ORAL at 08:48

## 2025-01-16 RX ADMIN — INSULIN LISPRO 5 UNITS: 100 INJECTION, SOLUTION INTRAVENOUS; SUBCUTANEOUS at 17:14

## 2025-01-16 RX ADMIN — INSULIN LISPRO 3 UNITS: 100 INJECTION, SOLUTION INTRAVENOUS; SUBCUTANEOUS at 11:47

## 2025-01-16 RX ADMIN — DONEPEZIL HYDROCHLORIDE 10 MG: 10 TABLET, FILM COATED ORAL at 08:49

## 2025-01-16 RX ADMIN — FLUTICASONE PROPIONATE 2 SPRAY: 50 SPRAY, METERED NASAL at 08:52

## 2025-01-16 RX ADMIN — INSULIN LISPRO 5 UNITS: 100 INJECTION, SOLUTION INTRAVENOUS; SUBCUTANEOUS at 20:45

## 2025-01-16 RX ADMIN — INSULIN GLARGINE 15 UNITS: 100 INJECTION, SOLUTION SUBCUTANEOUS at 20:45

## 2025-01-16 RX ADMIN — IPRATROPIUM BROMIDE AND ALBUTEROL SULFATE 3 ML: 2.5; .5 SOLUTION RESPIRATORY (INHALATION) at 07:24

## 2025-01-16 RX ADMIN — IPRATROPIUM BROMIDE AND ALBUTEROL SULFATE 3 ML: 2.5; .5 SOLUTION RESPIRATORY (INHALATION) at 15:54

## 2025-01-16 RX ADMIN — CITALOPRAM HYDROBROMIDE 40 MG: 40 TABLET ORAL at 08:48

## 2025-01-16 RX ADMIN — INSULIN LISPRO 3 UNITS: 100 INJECTION, SOLUTION INTRAVENOUS; SUBCUTANEOUS at 08:51

## 2025-01-16 RX ADMIN — ASPIRIN 81 MG: 81 TABLET, COATED ORAL at 08:50

## 2025-01-16 RX ADMIN — CEFUROXIME AXETIL 500 MG: 250 TABLET ORAL at 08:48

## 2025-01-16 RX ADMIN — CEFUROXIME AXETIL 500 MG: 250 TABLET ORAL at 20:45

## 2025-01-16 RX ADMIN — IPRATROPIUM BROMIDE AND ALBUTEROL SULFATE 3 ML: 2.5; .5 SOLUTION RESPIRATORY (INHALATION) at 11:29

## 2025-01-16 RX ADMIN — HEPARIN SODIUM 5000 UNITS: 5000 INJECTION INTRAVENOUS; SUBCUTANEOUS at 20:45

## 2025-01-16 RX ADMIN — ATORVASTATIN CALCIUM 20 MG: 20 TABLET, FILM COATED ORAL at 08:49

## 2025-01-16 RX ADMIN — ACETAMINOPHEN 650 MG: 325 TABLET ORAL at 15:00

## 2025-01-16 RX ADMIN — HEPARIN SODIUM 5000 UNITS: 5000 INJECTION INTRAVENOUS; SUBCUTANEOUS at 08:50

## 2025-01-16 RX ADMIN — ACETAMINOPHEN 650 MG: 325 TABLET ORAL at 20:45

## 2025-01-16 RX ADMIN — INSULIN LISPRO 4 UNITS: 100 INJECTION, SOLUTION INTRAVENOUS; SUBCUTANEOUS at 17:13

## 2025-01-16 NOTE — PLAN OF CARE
Goal Outcome Evaluation:                                            Problem: Adult Inpatient Plan of Care  Goal: Absence of Hospital-Acquired Illness or Injury  Intervention: Identify and Manage Fall Risk  Recent Flowsheet Documentation  Taken 1/16/2025 0400 by Luis Fernando Castelan RN  Safety Promotion/Fall Prevention:   activity supervised   safety round/check completed   toileting scheduled  Taken 1/16/2025 0200 by Luis Fernando Castelan RN  Safety Promotion/Fall Prevention:   activity supervised   safety round/check completed   toileting scheduled  Taken 1/16/2025 0000 by Luis Fernando Castelan RN  Safety Promotion/Fall Prevention:   activity supervised   safety round/check completed   toileting scheduled  Taken 1/15/2025 2200 by Luis Fernando Castelan RN  Safety Promotion/Fall Prevention:   activity supervised   safety round/check completed   toileting scheduled  Taken 1/15/2025 2000 by Luis Fernando Castelan RN  Safety Promotion/Fall Prevention:   activity supervised   safety round/check completed   toileting scheduled  Intervention: Prevent Skin Injury  Recent Flowsheet Documentation  Taken 1/16/2025 0400 by Luis Fernando Csatelan RN  Body Position: position maintained  Skin Protection: drying agents applied  Taken 1/16/2025 0200 by Luis Fernando Castelan RN  Body Position: position maintained  Skin Protection: drying agents applied  Taken 1/16/2025 0100 by Luis Fernando Castelan RN  Body Position:   left   side-lying  Taken 1/16/2025 0000 by Luis Fernando Castelan RN  Body Position: position maintained  Skin Protection: drying agents applied  Taken 1/15/2025 2200 by Luis Fernando Castelan RN  Body Position: position maintained  Skin Protection: drying agents applied  Taken 1/15/2025 2000 by Luis Fernando Castelan RN  Body Position: position maintained  Skin Protection: drying agents applied  Intervention: Prevent and Manage VTE (Venous Thromboembolism) Risk  Recent Flowsheet Documentation  Taken 1/16/2025 0400 by Luis Fernando Castelan RN  VTE Prevention/Management:    bilateral   SCDs (sequential compression devices) on  Taken 1/16/2025 0200 by Luis Fernando Castelan RN  VTE Prevention/Management:   bilateral   SCDs (sequential compression devices) on  Taken 1/16/2025 0000 by Luis Fernando Castelan RN  VTE Prevention/Management:   bilateral   SCDs (sequential compression devices) on  Taken 1/15/2025 2200 by Luis Fernando Castelan RN  VTE Prevention/Management:   bilateral   SCDs (sequential compression devices) on  Taken 1/15/2025 2000 by Luis Fernando Castelan RN  VTE Prevention/Management:   bilateral   SCDs (sequential compression devices) on  Intervention: Prevent Infection  Recent Flowsheet Documentation  Taken 1/16/2025 0400 by Luis Fernando Castelan RN  Infection Prevention: environmental surveillance performed  Taken 1/16/2025 0200 by Luis Fernando Castelan RN  Infection Prevention: environmental surveillance performed  Taken 1/16/2025 0000 by Luis Fernando Castelan RN  Infection Prevention: environmental surveillance performed  Taken 1/15/2025 2200 by Luis Fernando Castelan RN  Infection Prevention: environmental surveillance performed  Taken 1/15/2025 2000 by Luis Fernando Castelan RN  Infection Prevention: environmental surveillance performed  Goal: Optimal Comfort and Wellbeing  Intervention: Monitor Pain and Promote Comfort  Recent Flowsheet Documentation  Taken 1/16/2025 0400 by Luis Fernando Castelan RN  Pain Management Interventions: quiet environment facilitated  Taken 1/16/2025 0200 by Luis Fernando Castelan RN  Pain Management Interventions: quiet environment facilitated  Taken 1/16/2025 0000 by Luis Fernando Castelan RN  Pain Management Interventions: quiet environment facilitated  Taken 1/15/2025 2200 by Luis Fernando Castelan RN  Pain Management Interventions: quiet environment facilitated  Taken 1/15/2025 2000 by Luis Fernando Castelan RN  Pain Management Interventions: quiet environment facilitated  Intervention: Provide Person-Centered Care  Recent Flowsheet Documentation  Taken 1/16/2025 0400 by Luis Fernando Castelan RN  Trust  Relationship/Rapport: care explained  Taken 1/16/2025 0200 by Luis Fernando Castelan, RN  Trust Relationship/Rapport: care explained  Taken 1/16/2025 0000 by Luis Fernando Castelan, RN  Trust Relationship/Rapport: care explained  Taken 1/15/2025 2200 by Luis Fernando Castelan, RN  Trust Relationship/Rapport: care explained  Taken 1/15/2025 2000 by Luis Fernando Castelan, RN  Trust Relationship/Rapport: care explained     VSS, voids well, awaiting placement, rested throughout the night, will continue to monitor for changes.

## 2025-01-16 NOTE — PROGRESS NOTES
[facility] Medicine Services  PROGRESS NOTE    Patient Name: Masha Chou  : 1945  MRN: [id number]    Date of Admission: 2025  Primary Care Physician: Kary Wu MD    Subjective   Subjective     CC:  Left knee pain    HPI:  Patient doing well this morning.  She reports her diarrhea has improved today. So remains on her home 2L.       Objective   Objective     Vital Signs:   Temp:  [97.3 °F (36.3 °C)-98.7 °F (37.1 °C)] 98.6 °F (37 °C)  Heart Rate:  [69-95] 92  Resp:  [16-18] 16  BP: ()/(61-87) 143/70  Flow (L/min) (Oxygen Therapy):  [2-4] 4     Physical Exam  Constitutional:       General: She is not in acute distress.  Cardiovascular:      Rate and Rhythm: Normal rate and regular rhythm.      Heart sounds: Normal heart sounds.   Pulmonary:      Effort: Pulmonary effort is normal. No respiratory distress.      Breath sounds: Normal breath sounds. No wheezing or rhonchi.   Abdominal:      Palpations: Abdomen is soft.      Tenderness: There is no abdominal tenderness.   Musculoskeletal:      Right lower leg: No edema.      Left lower leg: No edema.   Neurological:      General: No focal deficit present.      Mental Status: She is alert. Mental status is at baseline.   Psychiatric:         Mood and Affect: Mood normal.         Thought Content: Thought content normal.          Results Reviewed:  LAB RESULTS:      Lab 25  0646 25   WBC 8.72 9.27   HEMOGLOBIN 12.3 13.0   HEMATOCRIT 40.3 41.1   PLATELETS 172 183   NEUTROS ABS 4.54 5.98   IMMATURE GRANS (ABS) 0.14* 0.13*   LYMPHS ABS 2.99 2.32   MONOS ABS 0.82 0.71   EOS ABS 0.16 0.09   MCV 95.5 93.6         Lab 01/15/25  1618 25  0646 25   SODIUM 133* 144 139   POTASSIUM 5.1 5.1 5.4*   CHLORIDE 96* 105 100   CO2 27.0 32.0* 27.0   ANION GAP 10.0 7.0 12.0   BUN 24* 31* 31*   CREATININE 1.17* 1.23* 1.16*   EGFR 47.9* 45.1* 48.4*   GLUCOSE 295* 158* 220*   CALCIUM 9.7 9.4 9.7    MAGNESIUM  --  1.9 1.7   HEMOGLOBIN A1C  --  9.00*  --          Lab 01/12/25 2037   TOTAL PROTEIN 6.3   ALBUMIN 4.0   GLOBULIN 2.3   ALT (SGPT) 22   AST (SGOT) 19   BILIRUBIN 0.5   ALK PHOS 83                     Brief Urine Lab Results  (Last result in the past 365 days)        Color   Clarity   Blood   Leuk Est   Nitrite   Protein   CREAT   Urine HCG        01/13/25 0722 Yellow   Clear   Negative   Trace   Negative   Negative                   Microbiology Results Abnormal       None            No radiology results from the last 24 hrs    Results for orders placed during the hospital encounter of 06/25/21    Adult Transthoracic Echo Complete W/ Cont if Necessary Per Protocol    Interpretation Summary  · Estimated left ventricular EF = 70% Left ventricular ejection fraction appears to be 66 - 70%. Left ventricular systolic function is normal.  · Left ventricular diastolic function is consistent with (grade I) impaired relaxation.  · Left ventricular wall thickness is consistent with hypertrophy. Sigmoid-shaped ventricular septum is present.  · LVOT turbulence without gradient.      Current medications:  Scheduled Meds:aspirin, 81 mg, Oral, Daily  atorvastatin, 20 mg, Oral, Daily  buPROPion XL, 150 mg, Oral, Daily  cefuroxime, 500 mg, Oral, Q12H  cholestyramine light, 1 packet, Oral, Daily  citalopram, 40 mg, Oral, QAM  donepezil, 10 mg, Oral, Daily  fluticasone, 2 spray, Nasal, Daily  heparin (porcine), 5,000 Units, Subcutaneous, Q12H  insulin glargine, 10 Units, Subcutaneous, Nightly  insulin lispro, 2-7 Units, Subcutaneous, 4x Daily AC & at Bedtime  Insulin Lispro, 3 Units, Subcutaneous, TID With Meals  ipratropium-albuterol, 3 mL, Nebulization, 4x Daily - RT  lactobacillus acidophilus, 1 capsule, Oral, Daily  sodium chloride, 10 mL, Intravenous, Q12H  verapamil SR, 240 mg, Oral, Daily      Continuous Infusions:   PRN Meds:.  acetaminophen **OR** acetaminophen **OR** acetaminophen    albuterol     senna-docusate sodium **AND** polyethylene glycol **AND** bisacodyl **AND** bisacodyl    Calcium Replacement - Follow Nurse / BPA Driven Protocol    dextrose    dextrose    Diclofenac Sodium    glucagon (human recombinant)    Magnesium Standard Dose Replacement - Follow Nurse / BPA Driven Protocol    nitroglycerin    ondansetron ODT    Phosphorus Replacement - Follow Nurse / BPA Driven Protocol    Potassium Replacement - Follow Nurse / BPA Driven Protocol    sodium chloride    sodium chloride    Assessment & Plan   Assessment & Plan     Active Hospital Problems    Diagnosis  POA    **Falls [R29.6]  Not Applicable    Left knee pain [M25.562]  Unknown    Osteoarthritis of left knee [M17.12]  Unknown    Acute UTI (urinary tract infection) [N39.0]  Unknown    COPD exacerbation [J44.1]  Yes    Type 2 diabetes mellitus [E11.9]  Yes      Resolved Hospital Problems   No resolved problems to display.        Brief Hospital Course to date:  Masha Chou is a 79 y.o. female with type 2 diabetes, hypertension, COPD, breast cancer, pulmonary hypertension, diastolic CHF who presented to the ED after having a near fall.    Left knee pain  Osteoarthritis  Generalized weakness  -- X-ray left knee shows mild to moderate tricompartmental osteoarthritis  -- Fall precautions  -- PT/OT consult, recommending SNF  -- Consult case management  -- Tylenol and Voltaren for pain    Diarrhea, chronic  -Intermittent diarrhea, patient reports typically associate with anxiety  -resolved today  -continue probiotic     Acute UTI (POA)  -- UA: Ketones trace, nitrite positive, leukocytes trace, WBC 3-5, bacteria 4+, squamous 3-6  -- Ceftin 5 days     CKD  -- baseline creatinine ~ 1.1-1.2   -- bmp in the am     COPD  JUAN F  Asthma  --Does not appear to be in acute exacerbation.  No sputum production and no wheezing  -- CPAP nightly  -- DuoNebs, will change from as needed to scheduled     T2DM  -- A1c in the a.m.  --lantus 15u nightly, 5u mealtime with  SSI, adjust as needed    Expected Discharge Location and Transportation: SNF to long-term care  Expected Discharge   Expected Discharge Date: 1/17/2025; Expected Discharge Time:      VTE Prophylaxis:  Pharmacologic VTE prophylaxis orders are present.         AM-PAC 6 Clicks Score (PT): 16 (01/16/25 0361)    CODE STATUS:   Code Status and Medical Interventions: CPR (Attempt to Resuscitate); Full Support   Ordered at: 01/12/25 7288     Level Of Support Discussed With:    Patient     Code Status (Patient has no pulse and is not breathing):    CPR (Attempt to Resuscitate)     Medical Interventions (Patient has pulse or is breathing):    Full Support       Christine Guevara MD  01/16/25

## 2025-01-17 LAB
GLUCOSE BLDC GLUCOMTR-MCNC: 211 MG/DL (ref 70–130)
GLUCOSE BLDC GLUCOMTR-MCNC: 295 MG/DL (ref 70–130)
GLUCOSE BLDC GLUCOMTR-MCNC: 319 MG/DL (ref 70–130)
GLUCOSE BLDC GLUCOMTR-MCNC: 376 MG/DL (ref 70–130)

## 2025-01-17 PROCEDURE — 63710000001 INSULIN LISPRO (HUMAN) PER 5 UNITS: Performed by: NURSE PRACTITIONER

## 2025-01-17 PROCEDURE — 99232 SBSQ HOSP IP/OBS MODERATE 35: CPT | Performed by: NURSE PRACTITIONER

## 2025-01-17 PROCEDURE — G0378 HOSPITAL OBSERVATION PER HR: HCPCS

## 2025-01-17 PROCEDURE — 97535 SELF CARE MNGMENT TRAINING: CPT

## 2025-01-17 PROCEDURE — 97116 GAIT TRAINING THERAPY: CPT

## 2025-01-17 PROCEDURE — 96372 THER/PROPH/DIAG INJ SC/IM: CPT

## 2025-01-17 PROCEDURE — 97530 THERAPEUTIC ACTIVITIES: CPT

## 2025-01-17 PROCEDURE — 94799 UNLISTED PULMONARY SVC/PX: CPT

## 2025-01-17 PROCEDURE — 82948 REAGENT STRIP/BLOOD GLUCOSE: CPT

## 2025-01-17 PROCEDURE — 63710000001 INSULIN GLARGINE PER 5 UNITS: Performed by: NURSE PRACTITIONER

## 2025-01-17 PROCEDURE — 25010000002 HEPARIN (PORCINE) PER 1000 UNITS: Performed by: NURSE PRACTITIONER

## 2025-01-17 PROCEDURE — 94761 N-INVAS EAR/PLS OXIMETRY MLT: CPT

## 2025-01-17 PROCEDURE — 94664 DEMO&/EVAL PT USE INHALER: CPT

## 2025-01-17 RX ORDER — LOPERAMIDE HYDROCHLORIDE 2 MG/1
2 CAPSULE ORAL 3 TIMES DAILY PRN
Status: DISCONTINUED | OUTPATIENT
Start: 2025-01-17 | End: 2025-01-29 | Stop reason: HOSPADM

## 2025-01-17 RX ORDER — INSULIN LISPRO 100 [IU]/ML
7 INJECTION, SOLUTION INTRAVENOUS; SUBCUTANEOUS
Status: DISCONTINUED | OUTPATIENT
Start: 2025-01-17 | End: 2025-01-18

## 2025-01-17 RX ADMIN — INSULIN GLARGINE 20 UNITS: 100 INJECTION, SOLUTION SUBCUTANEOUS at 20:26

## 2025-01-17 RX ADMIN — IPRATROPIUM BROMIDE AND ALBUTEROL SULFATE 3 ML: 2.5; .5 SOLUTION RESPIRATORY (INHALATION) at 15:54

## 2025-01-17 RX ADMIN — BUPROPION HYDROCHLORIDE 150 MG: 150 TABLET, EXTENDED RELEASE ORAL at 09:04

## 2025-01-17 RX ADMIN — INSULIN LISPRO 6 UNITS: 100 INJECTION, SOLUTION INTRAVENOUS; SUBCUTANEOUS at 20:26

## 2025-01-17 RX ADMIN — IPRATROPIUM BROMIDE AND ALBUTEROL SULFATE 3 ML: 2.5; .5 SOLUTION RESPIRATORY (INHALATION) at 12:05

## 2025-01-17 RX ADMIN — IPRATROPIUM BROMIDE AND ALBUTEROL SULFATE 3 ML: 2.5; .5 SOLUTION RESPIRATORY (INHALATION) at 07:52

## 2025-01-17 RX ADMIN — LOPERAMIDE HYDROCHLORIDE 2 MG: 2 CAPSULE ORAL at 12:22

## 2025-01-17 RX ADMIN — HEPARIN SODIUM 5000 UNITS: 5000 INJECTION INTRAVENOUS; SUBCUTANEOUS at 20:26

## 2025-01-17 RX ADMIN — CHOLESTYRAMINE 4 G: 4 POWDER, FOR SUSPENSION ORAL at 09:04

## 2025-01-17 RX ADMIN — ASPIRIN 81 MG: 81 TABLET, COATED ORAL at 09:04

## 2025-01-17 RX ADMIN — IPRATROPIUM BROMIDE AND ALBUTEROL SULFATE 3 ML: 2.5; .5 SOLUTION RESPIRATORY (INHALATION) at 21:15

## 2025-01-17 RX ADMIN — INSULIN LISPRO 4 UNITS: 100 INJECTION, SOLUTION INTRAVENOUS; SUBCUTANEOUS at 16:47

## 2025-01-17 RX ADMIN — ATORVASTATIN CALCIUM 20 MG: 20 TABLET, FILM COATED ORAL at 09:04

## 2025-01-17 RX ADMIN — INSULIN LISPRO 5 UNITS: 100 INJECTION, SOLUTION INTRAVENOUS; SUBCUTANEOUS at 11:44

## 2025-01-17 RX ADMIN — LOPERAMIDE HYDROCHLORIDE 2 MG: 2 CAPSULE ORAL at 20:26

## 2025-01-17 RX ADMIN — INSULIN LISPRO 7 UNITS: 100 INJECTION, SOLUTION INTRAVENOUS; SUBCUTANEOUS at 17:09

## 2025-01-17 RX ADMIN — HEPARIN SODIUM 5000 UNITS: 5000 INJECTION INTRAVENOUS; SUBCUTANEOUS at 09:04

## 2025-01-17 RX ADMIN — FLUTICASONE PROPIONATE 2 SPRAY: 50 SPRAY, METERED NASAL at 09:08

## 2025-01-17 RX ADMIN — CITALOPRAM HYDROBROMIDE 40 MG: 40 TABLET ORAL at 09:04

## 2025-01-17 RX ADMIN — INSULIN LISPRO 3 UNITS: 100 INJECTION, SOLUTION INTRAVENOUS; SUBCUTANEOUS at 09:03

## 2025-01-17 RX ADMIN — Medication 1 CAPSULE: at 09:04

## 2025-01-17 RX ADMIN — INSULIN LISPRO 7 UNITS: 100 INJECTION, SOLUTION INTRAVENOUS; SUBCUTANEOUS at 11:45

## 2025-01-17 RX ADMIN — DONEPEZIL HYDROCHLORIDE 10 MG: 10 TABLET, FILM COATED ORAL at 09:04

## 2025-01-17 RX ADMIN — VERAPAMIL HYDROCHLORIDE 240 MG: 240 TABLET, FILM COATED, EXTENDED RELEASE ORAL at 09:04

## 2025-01-17 NOTE — CASE MANAGEMENT/SOCIAL WORK
Continued Stay Note  [facility]     Patient Name: Masha Chou  MRN: [id number]  Today's Date: 1/17/2025    Admit Date: 1/12/2025    Plan: SNF to LTC   Discharge Plan       Row Name 01/17/25 0902       Plan    Plan SNF to LTC    Patient/Family in Agreement with Plan yes    Plan Comments Referrals have been made to [facility], [facility], [facility], [facility] and [facility]. Plan is SNF to LTC. CM will continue to follow.    Final Discharge Disposition Code 03 - skilled nursing facility (SNF)                   Discharge Codes    No documentation.                 Expected Discharge Date and Time       Expected Discharge Date Expected Discharge Time    Jan 17, 2025               Josue Duffy RN

## 2025-01-17 NOTE — THERAPY TREATMENT NOTE
Patient Name: Masha Chou  : 1945    MRN: [id number]                              Today's Date: 2025       Admit Date: 2025    Visit Dx:     ICD-10-CM ICD-9-CM   1. Falls  R29.6 V15.88   2. Acute cystitis without hematuria  N30.00 595.0     Patient Active Problem List   Diagnosis    Chest pain    Type 2 diabetes mellitus    Pulmonary HTN    Heart failure    COPD exacerbation    Chronic respiratory failure with hypoxia    Falls    Left knee pain    Osteoarthritis of left knee    Acute UTI (urinary tract infection)     Past Medical History:   Diagnosis Date    Asthma     Cancer     BILATERAL BREAST     COPD (chronic obstructive pulmonary disease)     Diabetes mellitus     Heart failure     Hypertension     Pulmonary hypertension      Past Surgical History:   Procedure Laterality Date    BREAST SURGERY      CHOLECYSTECTOMY      HYSTERECTOMY      JOINT REPLACEMENT      RIGHT KNEE    KELOID EXCISION        General Information       Row Name 25 1539          Physical Therapy Time and Intention    Document Type therapy note (daily note)  -AB     Mode of Treatment physical therapy  -AB       Row Name 25 1539          General Information    Patient Profile Reviewed yes  -AB     Existing Precautions/Restrictions fall;oxygen therapy device and L/min;other (see comments)  Incontinent  -AB     Barriers to Rehab previous functional deficit;medically complex  -AB       Row Name 25 1539          Cognition    Orientation Status (Cognition) oriented x 3  -AB       Row Name 25 1539          Safety Issues/Impairments Affecting Functional Mobility    Safety Issues Affecting Function (Mobility) awareness of need for assistance;insight into deficits/self-awareness;safety precaution awareness;safety precautions follow-through/compliance;sequencing abilities  -AB     Impairments Affecting Function (Mobility) balance;endurance/activity tolerance;pain;shortness of breath;strength  -AB                User Key  (r) = Recorded By, (t) = Taken By, (c) = Cosigned By      Initials Name Provider Type    AB Alejandra Farias PT Physical Therapist                   Mobility       Row Name 01/17/25 1543          Bed Mobility    Comment, (Bed Mobility) Pt received standing in bathroom.  -AB       Row Name 01/17/25 1543          Transfers    Comment, (Transfers) Cues for hand placement and sequencing.  -AB       Row Name 01/17/25 1543          Sit-Stand Transfer    Sit-Stand [place] (Transfers) minimum assist (75% patient effort);1 person assist;verbal cues  -AB     Assistive Device (Sit-Stand Transfers) walker, front-wheeled  -AB     Comment, (Sit-Stand Transfer) Cues to push up from bed.  -AB       Row Name 01/17/25 1543          Gait/Stairs (Locomotion)    [place] Level (Gait) verbal cues;minimum assist (75% patient effort);1 person assist  -AB     Assistive Device (Gait) walker, front-wheeled  -AB     Patient was able to Ambulate yes  -AB     Distance in Feet (Gait) 60  -AB     Deviations/Abnormal Patterns (Gait) bilateral deviations;base of support, wide;leighann decreased;gait speed decreased;stride length decreased  -AB     Bilateral Gait Deviations heel strike decreased;forward flexed posture  -AB     Comment, (Gait/Stairs) Pt ambulated with step through gait pattern at a slowed pace and demonstrated forward flexed posture. Cues provided for PLB and forward gaze. No overt LOB or knee buckling. Min A to steady. O2 sat >90% on RA. Further activity limited by weakness and fatigue.  -AB               User Key  (r) = Recorded By, (t) = Taken By, (c) = Cosigned By      Initials Name Provider Type    Alejandra Jones PT Physical Therapist                   Obj/Interventions       Row Name 01/17/25 1549          Balance    Balance Assessment sitting static balance;sitting dynamic balance;standing static balance;standing dynamic balance  -AB     Static Sitting Balance supervision  -AB     Dynamic Sitting Balance  supervision  -AB     Position, Sitting Balance sitting edge of bed;unsupported  -AB     Static Standing Balance contact guard  -AB     Dynamic Standing Balance minimal assist;1-person assist;verbal cues  -AB     Position/Device Used, Standing Balance walker, front-wheeled;supported  -AB     Balance Interventions sitting;standing;sit to stand;supported;static;dynamic;occupation based/functional task  -AB     Comment, Balance No overt LOB. Min A to steady  -AB               User Key  (r) = Recorded By, (t) = Taken By, (c) = Cosigned By      Initials Name Provider Type    AB Alejandra Farias, PT Physical Therapist                   Goals/Plan    No documentation.                  Clinical Impression       Row Name 01/17/25 1549          Pain    Pretreatment Pain Rating 0/10 - no pain  -AB     Posttreatment Pain Rating 0/10 - no pain  -AB       Row Name 01/17/25 1549          Plan of Care Review    Plan of Care Reviewed With patient  -AB     Progress improving  -AB     Outcome Evaluation Pt with good effort and increased ambulation distance to 60' w/ min Ax1+1 and RW. She continues to present below baseline with decreased strength, impaired endurance, and SOA. Further IPPT is warrented. PT will progress as able per POC.  -AB       Row Name 01/17/25 1549          Vital Signs    Pre Systolic BP Rehab 132  -AB     Pre Treatment Diastolic BP 70  -AB     O2 Delivery Pre Treatment nasal cannula  -AB     Intra SpO2 (%) 96  -AB     O2 Delivery Intra Treatment nasal cannula  -AB     O2 Delivery Post Treatment nasal cannula  -AB     Pre Patient Position Standing  -AB     Intra Patient Position Standing  -AB     Post Patient Position Sitting  -AB       Row Name 01/17/25 1549          Positioning and Restraints    Pre-Treatment Position bathroom  -AB     Post Treatment Position chair  -AB     In Chair notified nsg;reclined;sitting;call light within reach;encouraged to call for assist;exit alarm on;legs elevated;waffle cushion  -AB                User Key  (r) = Recorded By, (t) = Taken By, (c) = Cosigned By      Initials Name Provider Type    Alejandra Jones, PT Physical Therapist                   Outcome Measures       Row Name 01/17/25 1551 01/17/25 0800       How much help from another person do you currently need...    Turning from your back to your side while in flat bed without using bedrails? 3  -AB 3  -AC    Moving from lying on back to sitting on the side of a flat bed without bedrails? 3  -AB 3  -AC    Moving to and from a bed to a chair (including a wheelchair)? 3  -AB 3  -AC    Standing up from a chair using your arms (e.g., wheelchair, bedside chair)? 2  -AB 2  -AC    Climbing 3-5 steps with a railing? 2  -AB 2  -AC    To walk in hospital room? 3  -AB 2  -AC    AM-PAC 6 Clicks Score (PT) 16  -AB 15  -AC    Highest Level of Mobility Goal 5 --> Static standing  -AB 4 --> Transfer to chair/commode  -AC      Row Name 01/17/25 1551 01/17/25 1544       Functional Assessment    Outcome Measure Options AM-PAC 6 Clicks Basic Mobility (PT)  -AB AM-PAC 6 Clicks Daily Activity (OT)  -MR              User Key  (r) = Recorded By, (t) = Taken By, (c) = Cosigned By      Initials Name Provider Type    Beth Varner, CHARO Registered Nurse     Obdulio Pam, OT Occupational Therapist    Alejandra Jones, PT Physical Therapist                                 Physical Therapy Education       Title: PT OT SLP Therapies (In Progress)       Topic: Physical Therapy (In Progress)       Point: Mobility training (Done)       Learning Progress Summary            Patient Acceptance, E,D, VU,NR by AB at 1/17/2025 1551    Acceptance, E, NR by AS at 1/14/2025 1406                      Point: Home exercise program (In Progress)       Learning Progress Summary            Patient Acceptance, E, NR by AS at 1/14/2025 1406    Acceptance, E, VU by CK at 1/13/2025 1056                      Point: Body mechanics (Done)       Learning Progress Summary             Patient Acceptance, E,D, VU,NR by AB at 1/17/2025 1551    Acceptance, E, NR by AS at 1/14/2025 1406    Acceptance, E, VU by CK at 1/13/2025 1056                      Point: Precautions (Done)       Learning Progress Summary            Patient Acceptance, E,D, VU,NR by AB at 1/17/2025 1551    Acceptance, E, NR by AS at 1/14/2025 1406    Acceptance, E, VU by CK at 1/13/2025 1056                                      User Key       Initials Effective Dates Name Provider Type Discipline    AS 12/13/24 -  Alejandra Crowder, PTA Physical Therapist Assistant PT    AB 09/22/22 -  Alejandra Farias, PT Physical Therapist PT    CK 02/06/24 -  Loretta Webber, PT Physical Therapist PT                  PT Recommendation and Plan     Progress: improving  Outcome Evaluation: Pt with good effort and increased ambulation distance to 60' w/ min Ax1+1 and RW. She continues to present below baseline with decreased strength, impaired endurance, and SOA. Further IPPT is warrented. PT will progress as able per POC.     Time Calculation:         PT Charges       Row Name 01/17/25 1552             Time Calculation    Start Time 1510  -AB      PT Received On 01/17/25  -AB         Timed Charges    78019 - Gait Training Minutes  10  -AB      49405 - PT Therapeutic Activity Minutes 13  -AB         Total Minutes    Timed Charges Total Minutes 23  -AB       Total Minutes 23  -AB                User Key  (r) = Recorded By, (t) = Taken By, (c) = Cosigned By      Initials Name Provider Type    AB Alejandra Farias, PT Physical Therapist                  Therapy Charges for Today       Code Description Service Date Service Provider Modifiers Qty    98928421919 HC GAIT TRAINING EA 15 MIN 1/17/2025 Alejandra Farias, PT GP 1    71784469628 HC PT THERAPEUTIC ACT EA 15 MIN 1/17/2025 Alejandra Farias, PT GP 1            PT G-Codes  Outcome Measure Options: AM-PAC 6 Clicks Basic Mobility (PT)  AM-PAC 6 Clicks Score (PT): 16  AM-PAC 6 Clicks Score (OT):  15  PT Discharge Summary  Anticipated Discharge Disposition (PT): skilled nursing facility    Alejandra Farias, PT  1/17/2025

## 2025-01-17 NOTE — PLAN OF CARE
Goal Outcome Evaluation:  Plan of Care Reviewed With: patient        Progress: improving  Outcome Evaluation: Alert, oriented x 4, and pleasant. VSS. Ambulates with assist x 1, walker, and gait belt. Voiding well per purewick. No c/o pain.

## 2025-01-17 NOTE — PLAN OF CARE
Goal Outcome Evaluation:  Plan of Care Reviewed With: patient        Progress: improving  Outcome Evaluation: Pt demonstrating improvements w/ bed mobility, transfers and ADLs this date. Good effort w/ HH distances of mobility to complete toileting tasks. Pt remains below her functional baseline. Continue to progress as able per current POC.    Anticipated Discharge Disposition (OT): skilled nursing facility

## 2025-01-17 NOTE — PROGRESS NOTES
[facility] Medicine Services  PROGRESS NOTE    Patient Name: Masha Chou  : 1945  MRN: [id number]    Date of Admission: 2025  Primary Care Physician: Kary Wu MD    Subjective   Subjective     CC:  Left knee pain    HPI:  Multiple BMs today- 6 requesting home imodium restarted  No abd pain  No knee pain currently      Objective   Objective     Vital Signs:   Temp:  [97.5 °F (36.4 °C)-98.4 °F (36.9 °C)] 98.4 °F (36.9 °C)  Heart Rate:  [65-97] 97  Resp:  [16-20] 18  BP: (122-150)/(42-87) 132/70  Flow (L/min) (Oxygen Therapy):  [3-4] 3     PE:  Constitutional: No acute distress, awake, alert  HENT: NCAT, mucous membranes moist  Respiratory: Clear to auscultation bilaterally, respiratory effort normal on 2LNC  Cardiovascular: RRR  Gastrointestinal: Positive bowel sounds, soft, nontender, nondistended, obese abd  Musculoskeletal: No bilateral ankle edema, obese legs  Psychiatric: Appropriate affect, cooperative  Neurologic: Oriented x 3, ANDRADE, speech clear  Skin: No rashes       Results Reviewed:  LAB RESULTS:      Lab 25  0646 25   WBC 8.72 9.27   HEMOGLOBIN 12.3 13.0   HEMATOCRIT 40.3 41.1   PLATELETS 172 183   NEUTROS ABS 4.54 5.98   IMMATURE GRANS (ABS) 0.14* 0.13*   LYMPHS ABS 2.99 2.32   MONOS ABS 0.82 0.71   EOS ABS 0.16 0.09   MCV 95.5 93.6         Lab 01/15/25  1618 25  0646 25   SODIUM 133* 144 139   POTASSIUM 5.1 5.1 5.4*   CHLORIDE 96* 105 100   CO2 27.0 32.0* 27.0   ANION GAP 10.0 7.0 12.0   BUN 24* 31* 31*   CREATININE 1.17* 1.23* 1.16*   EGFR 47.9* 45.1* 48.4*   GLUCOSE 295* 158* 220*   CALCIUM 9.7 9.4 9.7   MAGNESIUM  --  1.9 1.7   HEMOGLOBIN A1C  --  9.00*  --          Lab 25   TOTAL PROTEIN 6.3   ALBUMIN 4.0   GLOBULIN 2.3   ALT (SGPT) 22   AST (SGOT) 19   BILIRUBIN 0.5   ALK PHOS 83                     Brief Urine Lab Results  (Last result in the past 365 days)        Color   Clarity   Blood   Leuk Est    Nitrite   Protein   CREAT   Urine HCG        01/13/25 0722 Yellow   Clear   Negative   Trace   Negative   Negative                   Microbiology Results Abnormal       None            No radiology results from the last 24 hrs    Results for orders placed during the hospital encounter of 06/25/21    Adult Transthoracic Echo Complete W/ Cont if Necessary Per Protocol    Interpretation Summary  · Estimated left ventricular EF = 70% Left ventricular ejection fraction appears to be 66 - 70%. Left ventricular systolic function is normal.  · Left ventricular diastolic function is consistent with (grade I) impaired relaxation.  · Left ventricular wall thickness is consistent with hypertrophy. Sigmoid-shaped ventricular septum is present.  · LVOT turbulence without gradient.      Current medications:  Scheduled Meds:aspirin, 81 mg, Oral, Daily  atorvastatin, 20 mg, Oral, Daily  buPROPion XL, 150 mg, Oral, Daily  cholestyramine light, 1 packet, Oral, Daily  citalopram, 40 mg, Oral, QAM  donepezil, 10 mg, Oral, Daily  fluticasone, 2 spray, Nasal, Daily  heparin (porcine), 5,000 Units, Subcutaneous, Q12H  insulin glargine, 20 Units, Subcutaneous, Nightly  insulin lispro, 2-7 Units, Subcutaneous, 4x Daily AC & at Bedtime  Insulin Lispro, 7 Units, Subcutaneous, TID With Meals  ipratropium-albuterol, 3 mL, Nebulization, 4x Daily - RT  lactobacillus acidophilus, 1 capsule, Oral, Daily  sodium chloride, 10 mL, Intravenous, Q12H  verapamil SR, 240 mg, Oral, Daily      Continuous Infusions:   PRN Meds:.  acetaminophen **OR** acetaminophen **OR** acetaminophen    albuterol    senna-docusate sodium **AND** polyethylene glycol **AND** bisacodyl **AND** bisacodyl    Calcium Replacement - Follow Nurse / BPA Driven Protocol    dextrose    dextrose    Diclofenac Sodium    glucagon (human recombinant)    loperamide    Magnesium Standard Dose Replacement - Follow Nurse / BPA Driven Protocol    nitroglycerin    ondansetron ODT    Phosphorus  Replacement - Follow Nurse / BPA Driven Protocol    Potassium Replacement - Follow Nurse / BPA Driven Protocol    sodium chloride    sodium chloride    Assessment & Plan   Assessment & Plan     Active Hospital Problems    Diagnosis  POA    **Falls [R29.6]  Not Applicable    Left knee pain [M25.562]  Unknown    Osteoarthritis of left knee [M17.12]  Unknown    Acute UTI (urinary tract infection) [N39.0]  Unknown    COPD exacerbation [J44.1]  Yes    Type 2 diabetes mellitus [E11.9]  Yes      Resolved Hospital Problems   No resolved problems to display.        Brief Hospital Course to date:  Masha Chou is a 79 y.o. female with type 2 diabetes, hypertension, COPD, breast cancer, pulmonary hypertension, diastolic CHF who presented to the ED after having a near fall.    This patient's problems and plans were partially entered by my partner and updated as appropriate by me 01/17/25.      Left knee pain  Osteoarthritis  Generalized weakness  -- X-ray left knee shows mild to moderate tricompartmental osteoarthritis  -- Fall precautions  -- PT/OT consult, recommending SNF  -- Consult case management  -- Tylenol and Voltaren prn for pain    Diarrhea, chronic  -intermittent chronic diarrhea associated with dietary intake/ anxiety  -continue probiotic  -resume home imodium prn     Acute UTI (POA)  -- UA: Ketones trace, nitrite positive, leukocytes trace, WBC 3-5, bacteria 4+, squamous 3-6  -- Ceftin 5 days completed     CKD  -- baseline creatinine ~ 1.1-1.2   -- at baseline     COPD  JUAN F  Asthma  --Does not appear to be in acute exacerbation.    -- CPAP nightly  -- DuoNebs, will change from as needed to scheduled     T2DM  -- A1c in the a.m.  -ssi, increase lantus and lispro    Expected Discharge Location and Transportation: SNF to long-term care when bed available  Expected Discharge   Expected Discharge Date: 1/17/2025; Expected Discharge Time:      VTE Prophylaxis:  Pharmacologic VTE prophylaxis orders are present.          AM-PAC 6 Clicks Score (PT): 15 (01/17/25 0800)    CODE STATUS:   Code Status and Medical Interventions: CPR (Attempt to Resuscitate); Full Support   Ordered at: 01/12/25 3840     Level Of Support Discussed With:    Patient     Code Status (Patient has no pulse and is not breathing):    CPR (Attempt to Resuscitate)     Medical Interventions (Patient has pulse or is breathing):    Full Support       Evette Gross, APRN  01/17/25

## 2025-01-17 NOTE — THERAPY TREATMENT NOTE
Patient Name: Masha Chou  : 1945    MRN: [id number]                              Today's Date: 2025       Admit Date: 2025    Visit Dx:     ICD-10-CM ICD-9-CM   1. Falls  R29.6 V15.88   2. Acute cystitis without hematuria  N30.00 595.0     Patient Active Problem List   Diagnosis    Chest pain    Type 2 diabetes mellitus    Pulmonary HTN    Heart failure    COPD exacerbation    Chronic respiratory failure with hypoxia    Falls    Left knee pain    Osteoarthritis of left knee    Acute UTI (urinary tract infection)     Past Medical History:   Diagnosis Date    Asthma     Cancer     BILATERAL BREAST     COPD (chronic obstructive pulmonary disease)     Diabetes mellitus     Heart failure     Hypertension     Pulmonary hypertension      Past Surgical History:   Procedure Laterality Date    BREAST SURGERY      CHOLECYSTECTOMY      HYSTERECTOMY      JOINT REPLACEMENT      RIGHT KNEE    KELOID EXCISION        General Information       Row Name 25 1534          OT Time and Intention    Document Type therapy note (daily note)  -MR     Mode of Treatment occupational therapy  -MR       Row Name 25 1534          General Information    Patient Profile Reviewed yes  -MR     Existing Precautions/Restrictions fall;oxygen therapy device and L/min;other (see comments)  Incontinent  -MR     Barriers to Rehab medically complex;previous functional deficit  -MR       Row Name 25 1534          Cognition    Orientation Status (Cognition) oriented x 3  -MR       Row Name 25 1534          Safety Issues/Impairments Affecting Functional Mobility    Safety Issues Affecting Function (Mobility) insight into deficits/self-awareness;awareness of need for assistance;safety precaution awareness;safety precautions follow-through/compliance;sequencing abilities;ability to follow commands  -MR     Impairments Affecting Function (Mobility) balance;endurance/activity tolerance;pain;shortness of breath;strength   -MR     Cognitive Impairments, Mobility Safety/Performance awareness, need for assistance;insight into deficits/self-awareness;safety precaution awareness;safety precaution follow-through;sequencing abilities  -MR               User Key  (r) = Recorded By, (t) = Taken By, (c) = Cosigned By      Initials Name Provider Type    Pam Henry, OT Occupational Therapist                     Mobility/ADL's       Row Name 01/17/25 1537          Bed Mobility    Bed Mobility supine-sit  -MR     Supine-Sit [place] (Bed Mobility) standby assist  -MR     Assistive Device (Bed Mobility) head of bed elevated;bed rails  -MR       Row Name 01/17/25 1537          Transfers    Transfers sit-stand transfer;toilet transfer  -MR       Row Name 01/17/25 1537          Sit-Stand Transfer    Sit-Stand [place] (Transfers) minimum assist (75% patient effort);1 person assist;verbal cues  -MR     Assistive Device (Sit-Stand Transfers) walker, front-wheeled  -MR       Row Name 01/17/25 1537          Toilet Transfer    Type (Toilet Transfer) sit-stand;stand-sit  -MR     [place] Level (Toilet Transfer) contact guard;verbal cues  -MR     Assistive Device (Toilet Transfer) walker, front-wheeled  -MR       Row Name 01/17/25 1537          Functional Mobility    Functional Mobility- Ind. Level minimum assist (75% patient effort);1 person;verbal cues required  -MR     Functional Mobility- Device walker, front-wheeled  -MR     Functional Mobility-Distance (Feet) --  HH distances to restroom  -MR       Row Name 01/17/25 1537          Activities of Daily Living    BADL Assessment/Intervention grooming;upper body dressing;toileting  -MR       Row Name 01/17/25 1537          Upper Body Dressing Assessment/Training    [place] Level (Upper Body Dressing) don;other (see comments)  hospital gown  -MR     Position (Upper Body Dressing) edge of bed sitting  -MR       Row Name 01/17/25 1537          Toileting Assessment/Training     [place] Level (Toileting) adjust/manage clothing;contact guard assist;perform perineal hygiene;maximum assist (25% patient effort)  -MR     Assistive Devices (Toileting) commode  -MR     Position (Toileting) unsupported sitting;supported standing  -MR       Row Name 01/17/25 1537          Grooming Assessment/Training    [place] Level (Grooming) hair care, combing/brushing;maximum assist (25% patient effort);wash face, hands;set up  -MR     Position (Grooming) edge of bed sitting  -MR               User Key  (r) = Recorded By, (t) = Taken By, (c) = Cosigned By      Initials Name Provider Type    Pam Henry, OT Occupational Therapist                   Obj/Interventions       Row Name 01/17/25 1540          Balance    Balance Assessment sitting static balance;sitting dynamic balance;standing static balance;standing dynamic balance  -MR     Static Sitting Balance supervision  -MR     Dynamic Sitting Balance standby assist  -MR     Position, Sitting Balance unsupported;sitting edge of bed;sitting in chair  -MR     Static Standing Balance contact guard  -MR     Dynamic Standing Balance minimal assist  -MR     Position/Device Used, Standing Balance supported;walker, front-wheeled  -MR     Balance Interventions sitting;standing;sit to stand;supported;static;dynamic;minimal challenge;occupation based/functional task  -MR     Comment, Balance No overt LOB noted w/ ADLs and transfers  -MR               User Key  (r) = Recorded By, (t) = Taken By, (c) = Cosigned By      Initials Name Provider Type    Pam Henry, OT Occupational Therapist                   Goals/Plan    No documentation.                  Clinical Impression       Row Name 01/17/25 1541          Pain Assessment    Pretreatment Pain Rating 0/10 - no pain  -MR     Posttreatment Pain Rating 0/10 - no pain  -MR       Row Name 01/17/25 1541          Plan of Care Review    Plan of Care Reviewed With patient  -MR     Progress improving  -MR      Outcome Evaluation Pt demonstrating improvements w/ bed mobility, transfers and ADLs this date. Good effort w/ HH distances of mobility to complete toileting tasks. Pt remains below her functional baseline. Continue to progress as able per current POC.  -MR       Row Name 01/17/25 1541          Therapy Plan Review/Discharge Plan (OT)    Anticipated Discharge Disposition (OT) skilled nursing facility  -MR       Row Name 01/17/25 1541          Vital Signs    O2 Delivery Pre Treatment nasal cannula  -MR     O2 Delivery Intra Treatment nasal cannula  -MR     O2 Delivery Post Treatment nasal cannula  -MR     Pre Patient Position Supine  -MR     Intra Patient Position Standing  -MR     Post Patient Position Standing  -MR       Row Name 01/17/25 1541          Positioning and Restraints    Pre-Treatment Position in bed  -MR     Post Treatment Position bathroom  -MR     Bathroom with PT  -MR               User Key  (r) = Recorded By, (t) = Taken By, (c) = Cosigned By      Initials Name Provider Type    MR MakPam, OT Occupational Therapist                   Outcome Measures       Row Name 01/17/25 1544          How much help from another is currently needed...    Putting on and taking off regular lower body clothing? 2  -MR     Bathing (including washing, rinsing, and drying) 2  -MR     Toileting (which includes using toilet bed pan or urinal) 2  -MR     Putting on and taking off regular upper body clothing 3  -MR     Taking care of personal grooming (such as brushing teeth) 3  -MR     Eating meals 3  -MR     AM-PAC 6 Clicks Score (OT) 15  -MR       Row Name 01/17/25 0800          How much help from another person do you currently need...    Turning from your back to your side while in flat bed without using bedrails? 3  -AC     Moving from lying on back to sitting on the side of a flat bed without bedrails? 3  -AC     Moving to and from a bed to a chair (including a wheelchair)? 3  -AC     Standing up from a chair  using your arms (e.g., wheelchair, bedside chair)? 2  -AC     Climbing 3-5 steps with a railing? 2  -AC     To walk in hospital room? 2  -AC     AM-PAC 6 Clicks Score (PT) 15  -AC     Highest Level of Mobility Goal 4 --> Transfer to chair/commode  -AC       Row Name 01/17/25 1544          Functional Assessment    Outcome Measure Options AM-PAC 6 Clicks Daily Activity (OT)  -MR               User Key  (r) = Recorded By, (t) = Taken By, (c) = Cosigned By      Initials Name Provider Type    AC Beth Schwartz RN Registered Nurse    Pam Henry, OT Occupational Therapist                    Occupational Therapy Education       Title: PT OT SLP Therapies (In Progress)       Topic: Occupational Therapy (In Progress)       Point: ADL training (Done)       Description:   Instruct learner(s) on proper safety adaptation and remediation techniques during self care or transfers.   Instruct in proper use of assistive devices.                  Learning Progress Summary            Patient Acceptance, E, VU,NR by MR at 1/17/2025 1544    Acceptance, E,D, VU,NR by TB at 1/15/2025 1646    Acceptance, E, VU by AN at 1/13/2025 1058                      Point: Home exercise program (Not Started)       Description:   Instruct learner(s) on appropriate technique for monitoring, assisting and/or progressing therapeutic exercises/activities.                  Learner Progress:  Not documented in this visit.              Point: Precautions (Done)       Description:   Instruct learner(s) on prescribed precautions during self-care and functional transfers.                  Learning Progress Summary            Patient Acceptance, E, VU,NR by MR at 1/17/2025 1544    Acceptance, E, VU by AN at 1/13/2025 1058                      Point: Body mechanics (Done)       Description:   Instruct learner(s) on proper positioning and spine alignment during self-care, functional mobility activities and/or exercises.                  Learning Progress Summary             Patient Acceptance, E, VU,NR by MR at 1/17/2025 1544    Acceptance, E, VU by AN at 1/13/2025 1058                                      User Key       Initials Effective Dates Name Provider Type Discipline    TB 07/11/23 -  Lindsey Sandoval, OT Occupational Therapist OT    AN 09/21/21 -  Anne Marie Cid OT Occupational Therapist OT    MR 09/22/22 -  Pam Mak, OT Occupational Therapist OT                  OT Recommendation and Plan     Plan of Care Review  Plan of Care Reviewed With: patient  Progress: improving  Outcome Evaluation: Pt demonstrating improvements w/ bed mobility, transfers and ADLs this date. Good effort w/ HH distances of mobility to complete toileting tasks. Pt remains below her functional baseline. Continue to progress as able per current POC.     Time Calculation:         Time Calculation- OT       Row Name 01/17/25 1544             Time Calculation- OT    OT Start Time 1455  -MR      OT Received On 01/17/25  -MR         Timed Charges    16244 - OT Therapeutic Activity Minutes 5  -MR      52091 - OT Self Care/Mgmt Minutes 10  -MR         Total Minutes    Timed Charges Total Minutes 15  -MR       Total Minutes 15  -MR                User Key  (r) = Recorded By, (t) = Taken By, (c) = Cosigned By      Initials Name Provider Type    MR Pam Mak, OT Occupational Therapist                  Therapy Charges for Today       Code Description Service Date Service Provider Modifiers Qty    [id number] HC OT SELF CARE/MGMT/TRAIN EA 15 MIN 1/17/2025 Pam Mak OT GO 1                 Pam Mak OT  1/17/2025

## 2025-01-17 NOTE — PLAN OF CARE
Goal Outcome Evaluation:           Pt doing well today.  Pt not very compliant with exercise today.  Pt denied treatment stating she was tired and didn't sleep well.  Pt also not getting up to bedside toilet for unexplained reasons.  Lengthy discussion had with pt regarding compliance.  When ask about doing for herself and any incontinence of urine or bowel at home she denies.  Pts son in law was here as well today and stated pt does ADL's without difficulty at home and was unsure why she was being noncompliant with activity.  This was discussed with the pt as well and she agrees to getting up when ask now and will use the bedside toilet when needed.  Call light is within range and bed alarm is on and working.  We will continue to monitor pts progress as we await transfer to SNF.

## 2025-01-17 NOTE — PLAN OF CARE
Goal Outcome Evaluation:  Plan of Care Reviewed With: patient        Progress: improving  Outcome Evaluation: Pt with good effort and increased ambulation distance to 60' w/ min Ax1+1 and RW. She continues to present below baseline with decreased strength, impaired endurance, and SOA. Further IPPT is warrented. PT will progress as able per POC.    Anticipated Discharge Disposition (PT): skilled nursing facility

## 2025-01-18 LAB
GLUCOSE BLDC GLUCOMTR-MCNC: 255 MG/DL (ref 70–130)
GLUCOSE BLDC GLUCOMTR-MCNC: 264 MG/DL (ref 70–130)
GLUCOSE BLDC GLUCOMTR-MCNC: 278 MG/DL (ref 70–130)
GLUCOSE BLDC GLUCOMTR-MCNC: 288 MG/DL (ref 70–130)

## 2025-01-18 PROCEDURE — 63710000001 INSULIN LISPRO (HUMAN) PER 5 UNITS: Performed by: NURSE PRACTITIONER

## 2025-01-18 PROCEDURE — 63710000001 INSULIN GLARGINE PER 5 UNITS: Performed by: NURSE PRACTITIONER

## 2025-01-18 PROCEDURE — 25010000002 HEPARIN (PORCINE) PER 1000 UNITS: Performed by: NURSE PRACTITIONER

## 2025-01-18 PROCEDURE — 82948 REAGENT STRIP/BLOOD GLUCOSE: CPT

## 2025-01-18 PROCEDURE — 99232 SBSQ HOSP IP/OBS MODERATE 35: CPT | Performed by: NURSE PRACTITIONER

## 2025-01-18 PROCEDURE — G0378 HOSPITAL OBSERVATION PER HR: HCPCS

## 2025-01-18 PROCEDURE — 96372 THER/PROPH/DIAG INJ SC/IM: CPT

## 2025-01-18 RX ORDER — IPRATROPIUM BROMIDE AND ALBUTEROL SULFATE 2.5; .5 MG/3ML; MG/3ML
3 SOLUTION RESPIRATORY (INHALATION) EVERY 6 HOURS PRN
Status: DISCONTINUED | OUTPATIENT
Start: 2025-01-18 | End: 2025-01-29 | Stop reason: HOSPADM

## 2025-01-18 RX ORDER — INSULIN LISPRO 100 [IU]/ML
10 INJECTION, SOLUTION INTRAVENOUS; SUBCUTANEOUS
Status: DISCONTINUED | OUTPATIENT
Start: 2025-01-18 | End: 2025-01-28

## 2025-01-18 RX ADMIN — FLUTICASONE PROPIONATE 2 SPRAY: 50 SPRAY, METERED NASAL at 08:30

## 2025-01-18 RX ADMIN — CHOLESTYRAMINE 4 G: 4 POWDER, FOR SUSPENSION ORAL at 08:32

## 2025-01-18 RX ADMIN — INSULIN LISPRO 10 UNITS: 100 INJECTION, SOLUTION INTRAVENOUS; SUBCUTANEOUS at 12:00

## 2025-01-18 RX ADMIN — ACETAMINOPHEN 650 MG: 325 TABLET ORAL at 21:17

## 2025-01-18 RX ADMIN — DONEPEZIL HYDROCHLORIDE 10 MG: 10 TABLET, FILM COATED ORAL at 08:33

## 2025-01-18 RX ADMIN — INSULIN LISPRO 4 UNITS: 100 INJECTION, SOLUTION INTRAVENOUS; SUBCUTANEOUS at 21:18

## 2025-01-18 RX ADMIN — VERAPAMIL HYDROCHLORIDE 240 MG: 240 TABLET, FILM COATED, EXTENDED RELEASE ORAL at 08:33

## 2025-01-18 RX ADMIN — INSULIN LISPRO 4 UNITS: 100 INJECTION, SOLUTION INTRAVENOUS; SUBCUTANEOUS at 08:31

## 2025-01-18 RX ADMIN — INSULIN GLARGINE 25 UNITS: 100 INJECTION, SOLUTION SUBCUTANEOUS at 21:18

## 2025-01-18 RX ADMIN — HEPARIN SODIUM 5000 UNITS: 5000 INJECTION INTRAVENOUS; SUBCUTANEOUS at 08:32

## 2025-01-18 RX ADMIN — HEPARIN SODIUM 5000 UNITS: 5000 INJECTION INTRAVENOUS; SUBCUTANEOUS at 21:18

## 2025-01-18 RX ADMIN — ATORVASTATIN CALCIUM 20 MG: 20 TABLET, FILM COATED ORAL at 08:32

## 2025-01-18 RX ADMIN — ACETAMINOPHEN 650 MG: 325 TABLET ORAL at 13:55

## 2025-01-18 RX ADMIN — INSULIN LISPRO 4 UNITS: 100 INJECTION, SOLUTION INTRAVENOUS; SUBCUTANEOUS at 13:54

## 2025-01-18 RX ADMIN — INSULIN LISPRO 4 UNITS: 100 INJECTION, SOLUTION INTRAVENOUS; SUBCUTANEOUS at 18:08

## 2025-01-18 RX ADMIN — INSULIN LISPRO 10 UNITS: 100 INJECTION, SOLUTION INTRAVENOUS; SUBCUTANEOUS at 18:07

## 2025-01-18 RX ADMIN — BISACODYL 5 MG: 5 TABLET, COATED ORAL at 18:09

## 2025-01-18 RX ADMIN — INSULIN LISPRO 7 UNITS: 100 INJECTION, SOLUTION INTRAVENOUS; SUBCUTANEOUS at 08:30

## 2025-01-18 RX ADMIN — ASPIRIN 81 MG: 81 TABLET, COATED ORAL at 08:32

## 2025-01-18 RX ADMIN — CITALOPRAM HYDROBROMIDE 40 MG: 40 TABLET ORAL at 08:32

## 2025-01-18 RX ADMIN — BUPROPION HYDROCHLORIDE 150 MG: 150 TABLET, EXTENDED RELEASE ORAL at 08:32

## 2025-01-18 RX ADMIN — Medication 1 CAPSULE: at 08:32

## 2025-01-18 NOTE — PROGRESS NOTES
[facility] Medicine Services  PROGRESS NOTE    Patient Name: Masha Chou  : 1945  MRN: [id number]    Date of Admission: 2025  Primary Care Physician: Kary Wu MD    Subjective   Subjective     CC:  Left knee pain    HPI:  Chronic diarrhea has improved some today. Having some sinus complaints and would like a dose of flonase.  Glucoses are running a bit high. Denies any significant shortness of air above her baseline. Using cpap at night and for naps.       Objective   Objective     Vital Signs:   Temp:  [97.4 °F (36.3 °C)-98.3 °F (36.8 °C)] 98.3 °F (36.8 °C)  Heart Rate:  [68-96] 75  Resp:  [16-18] 16  BP: (121-171)/(54-90) 153/73  Flow (L/min) (Oxygen Therapy):  [3] 3     PE:  Constitutional: No acute distress, awake, alert, resting in bed  HENT: NCAT, mucous membranes moist  Respiratory: Clear to auscultation bilaterally, respiratory effort normal on 2LNC  Cardiovascular: RRR  Gastrointestinal: Positive bowel sounds, soft, nontender, nondistended, obese abd  Musculoskeletal: No bilateral ankle edema, obese legs  Psychiatric: Appropriate affect, cooperative  Neurologic: Oriented x 3, ANDRADE, speech clear  Skin: No rashes noted to exposed skin        Results Reviewed:  LAB RESULTS:      Lab 25  0646 25   WBC 8.72 9.27   HEMOGLOBIN 12.3 13.0   HEMATOCRIT 40.3 41.1   PLATELETS 172 183   NEUTROS ABS 4.54 5.98   IMMATURE GRANS (ABS) 0.14* 0.13*   LYMPHS ABS 2.99 2.32   MONOS ABS 0.82 0.71   EOS ABS 0.16 0.09   MCV 95.5 93.6         Lab 01/15/25  1618 25  0646 25   SODIUM 133* 144 139   POTASSIUM 5.1 5.1 5.4*   CHLORIDE 96* 105 100   CO2 27.0 32.0* 27.0   ANION GAP 10.0 7.0 12.0   BUN 24* 31* 31*   CREATININE 1.17* 1.23* 1.16*   EGFR 47.9* 45.1* 48.4*   GLUCOSE 295* 158* 220*   CALCIUM 9.7 9.4 9.7   MAGNESIUM  --  1.9 1.7   HEMOGLOBIN A1C  --  9.00*  --          Lab 25   TOTAL PROTEIN 6.3   ALBUMIN 4.0   GLOBULIN 2.3   ALT  (SGPT) 22   AST (SGOT) 19   BILIRUBIN 0.5   ALK PHOS 83                     Brief Urine Lab Results  (Last result in the past 365 days)        Color   Clarity   Blood   Leuk Est   Nitrite   Protein   CREAT   Urine HCG        01/13/25 0722 Yellow   Clear   Negative   Trace   Negative   Negative                   Microbiology Results Abnormal       None            No radiology results from the last 24 hrs    Results for orders placed during the hospital encounter of 06/25/21    Adult Transthoracic Echo Complete W/ Cont if Necessary Per Protocol    Interpretation Summary  · Estimated left ventricular EF = 70% Left ventricular ejection fraction appears to be 66 - 70%. Left ventricular systolic function is normal.  · Left ventricular diastolic function is consistent with (grade I) impaired relaxation.  · Left ventricular wall thickness is consistent with hypertrophy. Sigmoid-shaped ventricular septum is present.  · LVOT turbulence without gradient.      Current medications:  Scheduled Meds:aspirin, 81 mg, Oral, Daily  atorvastatin, 20 mg, Oral, Daily  buPROPion XL, 150 mg, Oral, Daily  cholestyramine light, 1 packet, Oral, Daily  citalopram, 40 mg, Oral, QAM  donepezil, 10 mg, Oral, Daily  fluticasone, 2 spray, Nasal, Daily  heparin (porcine), 5,000 Units, Subcutaneous, Q12H  insulin glargine, 25 Units, Subcutaneous, Nightly  Insulin Lispro, 10 Units, Subcutaneous, TID With Meals  insulin lispro, 2-7 Units, Subcutaneous, 4x Daily AC & at Bedtime  lactobacillus acidophilus, 1 capsule, Oral, Daily  sodium chloride, 10 mL, Intravenous, Q12H  verapamil SR, 240 mg, Oral, Daily      Continuous Infusions:   PRN Meds:.  acetaminophen **OR** acetaminophen **OR** acetaminophen    albuterol    senna-docusate sodium **AND** polyethylene glycol **AND** bisacodyl **AND** bisacodyl    Calcium Replacement - Follow Nurse / BPA Driven Protocol    dextrose    dextrose    Diclofenac Sodium    glucagon (human recombinant)     ipratropium-albuterol    loperamide    Magnesium Standard Dose Replacement - Follow Nurse / BPA Driven Protocol    nitroglycerin    ondansetron ODT    Phosphorus Replacement - Follow Nurse / BPA Driven Protocol    Potassium Replacement - Follow Nurse / BPA Driven Protocol    sodium chloride    sodium chloride    Assessment & Plan   Assessment & Plan     Active Hospital Problems    Diagnosis  POA    **Falls [R29.6]  Not Applicable    Left knee pain [M25.562]  Unknown    Osteoarthritis of left knee [M17.12]  Unknown    Acute UTI (urinary tract infection) [N39.0]  Unknown    COPD exacerbation [J44.1]  Yes    Type 2 diabetes mellitus [E11.9]  Yes      Resolved Hospital Problems   No resolved problems to display.        Brief Hospital Course to date:  Masha Chou is a 79 y.o. female with type 2 diabetes, hypertension, COPD, breast cancer, pulmonary hypertension, diastolic CHF who presented to the ED after having a near fall.    This patient's problems and plans were partially entered by my partner and updated as appropriate by me 01/18/25.      Left knee pain  Osteoarthritis  Generalized weakness  -- X-ray left knee shows mild to moderate tricompartmental osteoarthritis  -- Fall precautions  -- PT/OT consult, recommending SNF  -- Consult case management  -- Tylenol and Voltaren prn for pain    Diarrhea, chronic  -intermittent chronic diarrhea associated with dietary intake/ anxiety  -continue probiotic  -resumed home imodium prn with improvement.      Acute UTI (POA)  -- UA: Ketones trace, nitrite positive, leukocytes trace, WBC 3-5, bacteria 4+, squamous 3-6  -- Ceftin 5 days completed     CKD  -- baseline creatinine ~ 1.1-1.2   -- at baseline     COPD  JUAN F  Asthma  --Does not appear to be in acute exacerbation.    -- CPAP nightly  -- DuoNebs, will change from as needed to scheduled     T2DM  --increased lantus to 25 units and TID humalog to 10 units.   --will monitor and adjust as needed.      Expected Discharge  Location and Transportation: SNF to long-term care when bed available  Expected Discharge   Expected Discharge Date: 1/17/2025; Expected Discharge Time:      VTE Prophylaxis:  Pharmacologic VTE prophylaxis orders are present.         AM-PAC 6 Clicks Score (PT): 16 (01/17/25 2025)    CODE STATUS:   Code Status and Medical Interventions: CPR (Attempt to Resuscitate); Full Support   Ordered at: 01/12/25 6033     Level Of Support Discussed With:    Patient     Code Status (Patient has no pulse and is not breathing):    CPR (Attempt to Resuscitate)     Medical Interventions (Patient has pulse or is breathing):    Full Support       Shaylee Yang, SUNITA  01/18/25

## 2025-01-18 NOTE — PLAN OF CARE
Goal Outcome Evaluation:  Plan of Care Reviewed With: patient        Progress: improving  Outcome Evaluation: VSS, AOX4. ambulates with X1 asssit with gait belt and walker. has been up to bedside commode. no pain.

## 2025-01-18 NOTE — PLAN OF CARE
Goal Outcome Evaluation:                Alert, oriented x 4, and pleasant. VSS. Ambulates with assist x 1, walker, and gait belt. Voiding well per purewick. No c/o pain. Awaiting placement SNF

## 2025-01-19 LAB
GLUCOSE BLDC GLUCOMTR-MCNC: 259 MG/DL (ref 70–130)
GLUCOSE BLDC GLUCOMTR-MCNC: 259 MG/DL (ref 70–130)
GLUCOSE BLDC GLUCOMTR-MCNC: 315 MG/DL (ref 70–130)
GLUCOSE BLDC GLUCOMTR-MCNC: 334 MG/DL (ref 70–130)

## 2025-01-19 PROCEDURE — 99232 SBSQ HOSP IP/OBS MODERATE 35: CPT | Performed by: INTERNAL MEDICINE

## 2025-01-19 PROCEDURE — 63710000001 INSULIN GLARGINE PER 5 UNITS: Performed by: NURSE PRACTITIONER

## 2025-01-19 PROCEDURE — 63710000001 INSULIN LISPRO (HUMAN) PER 5 UNITS: Performed by: INTERNAL MEDICINE

## 2025-01-19 PROCEDURE — 63710000001 INSULIN LISPRO (HUMAN) PER 5 UNITS: Performed by: NURSE PRACTITIONER

## 2025-01-19 PROCEDURE — 82948 REAGENT STRIP/BLOOD GLUCOSE: CPT

## 2025-01-19 PROCEDURE — 25010000002 HEPARIN (PORCINE) PER 1000 UNITS: Performed by: NURSE PRACTITIONER

## 2025-01-19 PROCEDURE — 96372 THER/PROPH/DIAG INJ SC/IM: CPT

## 2025-01-19 PROCEDURE — G0378 HOSPITAL OBSERVATION PER HR: HCPCS

## 2025-01-19 RX ORDER — INSULIN LISPRO 100 [IU]/ML
2-9 INJECTION, SOLUTION INTRAVENOUS; SUBCUTANEOUS
Status: DISCONTINUED | OUTPATIENT
Start: 2025-01-19 | End: 2025-01-29 | Stop reason: HOSPADM

## 2025-01-19 RX ADMIN — INSULIN LISPRO 4 UNITS: 100 INJECTION, SOLUTION INTRAVENOUS; SUBCUTANEOUS at 08:11

## 2025-01-19 RX ADMIN — INSULIN LISPRO 10 UNITS: 100 INJECTION, SOLUTION INTRAVENOUS; SUBCUTANEOUS at 16:37

## 2025-01-19 RX ADMIN — ASPIRIN 81 MG: 81 TABLET, COATED ORAL at 08:15

## 2025-01-19 RX ADMIN — ACETAMINOPHEN 650 MG: 325 TABLET ORAL at 06:44

## 2025-01-19 RX ADMIN — CITALOPRAM HYDROBROMIDE 40 MG: 40 TABLET ORAL at 08:15

## 2025-01-19 RX ADMIN — INSULIN LISPRO 6 UNITS: 100 INJECTION, SOLUTION INTRAVENOUS; SUBCUTANEOUS at 21:48

## 2025-01-19 RX ADMIN — INSULIN LISPRO 10 UNITS: 100 INJECTION, SOLUTION INTRAVENOUS; SUBCUTANEOUS at 08:11

## 2025-01-19 RX ADMIN — ATORVASTATIN CALCIUM 20 MG: 20 TABLET, FILM COATED ORAL at 08:15

## 2025-01-19 RX ADMIN — HEPARIN SODIUM 5000 UNITS: 5000 INJECTION INTRAVENOUS; SUBCUTANEOUS at 08:13

## 2025-01-19 RX ADMIN — INSULIN LISPRO 10 UNITS: 100 INJECTION, SOLUTION INTRAVENOUS; SUBCUTANEOUS at 11:32

## 2025-01-19 RX ADMIN — HEPARIN SODIUM 5000 UNITS: 5000 INJECTION INTRAVENOUS; SUBCUTANEOUS at 21:48

## 2025-01-19 RX ADMIN — INSULIN GLARGINE 25 UNITS: 100 INJECTION, SOLUTION SUBCUTANEOUS at 21:48

## 2025-01-19 RX ADMIN — FLUTICASONE PROPIONATE 2 SPRAY: 50 SPRAY, METERED NASAL at 08:15

## 2025-01-19 RX ADMIN — INSULIN LISPRO 7 UNITS: 100 INJECTION, SOLUTION INTRAVENOUS; SUBCUTANEOUS at 16:37

## 2025-01-19 RX ADMIN — DONEPEZIL HYDROCHLORIDE 10 MG: 10 TABLET, FILM COATED ORAL at 08:15

## 2025-01-19 RX ADMIN — ACETAMINOPHEN 650 MG: 325 TABLET ORAL at 12:56

## 2025-01-19 RX ADMIN — ACETAMINOPHEN 650 MG: 325 TABLET ORAL at 21:48

## 2025-01-19 RX ADMIN — INSULIN LISPRO 5 UNITS: 100 INJECTION, SOLUTION INTRAVENOUS; SUBCUTANEOUS at 11:32

## 2025-01-19 RX ADMIN — Medication 1 CAPSULE: at 08:15

## 2025-01-19 RX ADMIN — BUPROPION HYDROCHLORIDE 150 MG: 150 TABLET, EXTENDED RELEASE ORAL at 08:15

## 2025-01-19 RX ADMIN — VERAPAMIL HYDROCHLORIDE 240 MG: 240 TABLET, FILM COATED, EXTENDED RELEASE ORAL at 08:15

## 2025-01-19 RX ADMIN — CHOLESTYRAMINE 4 G: 4 POWDER, FOR SUSPENSION ORAL at 08:12

## 2025-01-19 NOTE — PLAN OF CARE
Problem: Adult Inpatient Plan of Care  Goal: Plan of Care Review  Outcome: Progressing     Problem: Skin Injury Risk Increased  Goal: Skin Health and Integrity  Outcome: Progressing  Intervention: Optimize Skin Protection  Recent Flowsheet Documentation  Taken 1/19/2025 1624 by Fior Hills RN  Pressure Reduction Techniques: frequent weight shift encouraged  Head of Bed (HOB) Positioning: [facility] elevated  Pressure Reduction Devices:   positioning supports utilized   pressure-redistributing mattress utilized  Skin Protection: incontinence pads utilized  Taken 1/19/2025 1447 by Fior Hills RN  Pressure Reduction Techniques: frequent weight shift encouraged  Head of Bed (HOB) Positioning: [facility] elevated  Pressure Reduction Devices:   positioning supports utilized   pressure-redistributing mattress utilized  Skin Protection: incontinence pads utilized  Taken 1/19/2025 1207 by Fior Hills RN  Pressure Reduction Techniques: frequent weight shift encouraged  Head of Bed (HOB) Positioning: [facility] elevated  Pressure Reduction Devices:   positioning supports utilized   pressure-redistributing mattress utilized  Skin Protection: incontinence pads utilized  Taken 1/19/2025 1010 by Fior Hills RN  Pressure Reduction Techniques: frequent weight shift encouraged  Head of Bed (HOB) Positioning: [facility] elevated  Pressure Reduction Devices:   positioning supports utilized   pressure-redistributing mattress utilized  Skin Protection: incontinence pads utilized  Taken 1/19/2025 0810 by Fior Hills RN  Pressure Reduction Techniques: frequent weight shift encouraged  Head of Bed (HOB) Positioning: [facility] elevated  Pressure Reduction Devices:   positioning supports utilized   pressure-redistributing mattress utilized  Skin Protection: incontinence pads utilized     Problem: Fall Injury Risk  Goal: Absence of Fall and Fall-Related Injury  Outcome: Progressing  Intervention: Identify and Manage Contributors  Recent Flowsheet  Documentation  Taken 1/19/2025 1624 by Fior Hills RN  Medication Review/Management: medications reviewed  Taken 1/19/2025 1447 by Fior Hills RN  Medication Review/Management: medications reviewed  Taken 1/19/2025 1207 by Fior Hills RN  Medication Review/Management: medications reviewed  Taken 1/19/2025 1010 by Fior Hills RN  Medication Review/Management: medications reviewed  Taken 1/19/2025 0810 by Fior Hills RN  Medication Review/Management: medications reviewed  Intervention: Promote Injury-Free Environment  Recent Flowsheet Documentation  Taken 1/19/2025 1624 by Fior Hills RN  Safety Promotion/Fall Prevention:   activity supervised   assistive device/personal items within reach   clutter free environment maintained   fall prevention program maintained   elopement precautions   gait belt   lighting adjusted   mobility aid in reach   muscle strengthening facilitated   nonskid shoes/slippers when out of bed   safety round/check completed   room organization consistent   toileting scheduled  Taken 1/19/2025 1447 by Fior Hills RN  Safety Promotion/Fall Prevention:   assistive device/personal items within reach   clutter free environment maintained   activity supervised   elopement precautions   fall prevention program maintained   gait belt   lighting adjusted   mobility aid in reach   muscle strengthening facilitated   nonskid shoes/slippers when out of bed   room organization consistent   safety round/check completed   toileting scheduled  Taken 1/19/2025 1207 by Fior Hills RN  Safety Promotion/Fall Prevention:   activity supervised   assistive device/personal items within reach   clutter free environment maintained   elopement precautions   fall prevention program maintained   gait belt   lighting adjusted   mobility aid in reach   muscle strengthening facilitated   nonskid shoes/slippers when out of bed   room organization consistent   safety round/check completed    toileting scheduled  Taken 1/19/2025 1010 by Fior Hills, RN  Safety Promotion/Fall Prevention:   activity supervised   assistive device/personal items within reach   clutter free environment maintained   elopement precautions   fall prevention program maintained   gait belt   lighting adjusted   mobility aid in reach   muscle strengthening facilitated   nonskid shoes/slippers when out of bed   room organization consistent   safety round/check completed   toileting scheduled  Taken 1/19/2025 0810 by Fior Hills, RN  Safety Promotion/Fall Prevention:   activity supervised   assistive device/personal items within reach   clutter free environment maintained   elopement precautions   fall prevention program maintained   gait belt   lighting adjusted   mobility aid in reach   muscle strengthening facilitated   nonskid shoes/slippers when out of bed   room organization consistent   safety round/check completed   toileting scheduled   Goal Outcome Evaluation:

## 2025-01-19 NOTE — PROGRESS NOTES
[facility] Medicine Services  PROGRESS NOTE    Patient Name: Masha Chou  : 1945  MRN: [id number]    Date of Admission: 2025  Primary Care Physician: Kary Wu MD    Subjective   Subjective     CC:  Left knee pain    HPI:  Says she has been having headaches since admission.  HA mild this morning.  Wonders if the HAs are related to her fluctuating glucose levels.    States she wears oxygen chronically at home.      Objective   Objective     Vital Signs:   Temp:  [97.2 °F (36.2 °C)-98.6 °F (37 °C)] 97.7 °F (36.5 °C)  Heart Rate:  [75-92] 92  Resp:  [16-18] 18  BP: (141-160)/(74-96) 148/74  Flow (L/min) (Oxygen Therapy):  [2-4] 2     PE:    Nontoxic but appears chronically deconditioned, in bed  Mucous membranes moist  RRR  Breath sounds diminished bilaterally, some pursed lip breathing  Abdomen soft  Obese  Alert, speech clear  Normal affect       Results Reviewed:  LAB RESULTS:      Lab 25  0646 25   WBC 8.72 9.27   HEMOGLOBIN 12.3 13.0   HEMATOCRIT 40.3 41.1   PLATELETS 172 183   NEUTROS ABS 4.54 5.98   IMMATURE GRANS (ABS) 0.14* 0.13*   LYMPHS ABS 2.99 2.32   MONOS ABS 0.82 0.71   EOS ABS 0.16 0.09   MCV 95.5 93.6         Lab 01/15/25  1618 25  0646 25   SODIUM 133* 144 139   POTASSIUM 5.1 5.1 5.4*   CHLORIDE 96* 105 100   CO2 27.0 32.0* 27.0   ANION GAP 10.0 7.0 12.0   BUN 24* 31* 31*   CREATININE 1.17* 1.23* 1.16*   EGFR 47.9* 45.1* 48.4*   GLUCOSE 295* 158* 220*   CALCIUM 9.7 9.4 9.7   MAGNESIUM  --  1.9 1.7   HEMOGLOBIN A1C  --  9.00*  --          Lab 25   TOTAL PROTEIN 6.3   ALBUMIN 4.0   GLOBULIN 2.3   ALT (SGPT) 22   AST (SGOT) 19   BILIRUBIN 0.5   ALK PHOS 83                     Brief Urine Lab Results  (Last result in the past 365 days)        Color   Clarity   Blood   Leuk Est   Nitrite   Protein   CREAT   Urine HCG        25 0722 Yellow   Clear   Negative   Trace   Negative   Negative                    Microbiology Results Abnormal       None            No radiology results from the last 24 hrs    Results for orders placed during the hospital encounter of 06/25/21    Adult Transthoracic Echo Complete W/ Cont if Necessary Per Protocol    Interpretation Summary  · Estimated left ventricular EF = 70% Left ventricular ejection fraction appears to be 66 - 70%. Left ventricular systolic function is normal.  · Left ventricular diastolic function is consistent with (grade I) impaired relaxation.  · Left ventricular wall thickness is consistent with hypertrophy. Sigmoid-shaped ventricular septum is present.  · LVOT turbulence without gradient.      Current medications:  Scheduled Meds:aspirin, 81 mg, Oral, Daily  atorvastatin, 20 mg, Oral, Daily  buPROPion XL, 150 mg, Oral, Daily  cholestyramine light, 1 packet, Oral, Daily  citalopram, 40 mg, Oral, QAM  donepezil, 10 mg, Oral, Daily  fluticasone, 2 spray, Nasal, Daily  heparin (porcine), 5,000 Units, Subcutaneous, Q12H  insulin glargine, 25 Units, Subcutaneous, Nightly  Insulin Lispro, 10 Units, Subcutaneous, TID With Meals  insulin lispro, 2-7 Units, Subcutaneous, 4x Daily AC & at Bedtime  lactobacillus acidophilus, 1 capsule, Oral, Daily  sodium chloride, 10 mL, Intravenous, Q12H  verapamil SR, 240 mg, Oral, Daily      Continuous Infusions:   PRN Meds:.  acetaminophen **OR** acetaminophen **OR** acetaminophen    albuterol    senna-docusate sodium **AND** polyethylene glycol **AND** bisacodyl **AND** bisacodyl    Calcium Replacement - Follow Nurse / BPA Driven Protocol    dextrose    dextrose    Diclofenac Sodium    glucagon (human recombinant)    ipratropium-albuterol    loperamide    Magnesium Standard Dose Replacement - Follow Nurse / BPA Driven Protocol    nitroglycerin    ondansetron ODT    Phosphorus Replacement - Follow Nurse / BPA Driven Protocol    Potassium Replacement - Follow Nurse / BPA Driven Protocol    sodium chloride    sodium  chloride    Assessment & Plan   Assessment & Plan     Active Hospital Problems    Diagnosis  POA    **Falls [R29.6]  Not Applicable    Left knee pain [M25.562]  Unknown    Osteoarthritis of left knee [M17.12]  Unknown    Acute UTI (urinary tract infection) [N39.0]  Unknown    COPD exacerbation [J44.1]  Yes    Type 2 diabetes mellitus [E11.9]  Yes      Resolved Hospital Problems   No resolved problems to display.        Brief Hospital Course to date:  Masha Chou is a 79 y.o. female with type 2 diabetes, hypertension, COPD, breast cancer, pulmonary hypertension, diastolic CHF who presented to the ED after having a near fall.    This patient's problems and plans were partially entered by my partner and updated as appropriate by me 01/19/25.      Left knee pain  Osteoarthritis  Generalized weakness  -- X-ray left knee shows mild to moderate tricompartmental osteoarthritis  -- Fall precautions  -- PT/OT consult, recommending SNF  -- Consult case management  -- Tylenol and Voltaren prn for pain    Diarrhea, chronic  -intermittent chronic diarrhea associated with dietary intake/ anxiety  -continue probiotic  -resumed home imodium prn with improvement.   -CBC a.m.     Acute UTI (POA)  -- UA: Ketones trace, nitrite positive, leukocytes trace, WBC 3-5, bacteria 4+, squamous 3-6  -- Ceftin 5 days completed     CKD  -- baseline creatinine ~ 1.1-1.2   -- BMP     COPD  JUAN F  Asthma  --Does not appear to be in acute exacerbation.    -- CPAP nightly  -- DuoNebs scheduled     T2DM  -- Continue lantus to 25 units and TID humalog to 10 units.  -- Increase SSI from low to moderate dose scale  -- Glucose reviewed, may need further up titration of insulin therapy    Expected Discharge Location and Transportation: SNF to long-term care when bed available  Expected Discharge   Expected Discharge Date: 1/22/2025; Expected Discharge Time:      VTE Prophylaxis:  Pharmacologic VTE prophylaxis orders are present.         AM-PAC 6 Clicks  Score (PT): 15 (01/18/25 2637)    CODE STATUS:   Code Status and Medical Interventions: CPR (Attempt to Resuscitate); Full Support   Ordered at: 01/12/25 7905     Level Of Support Discussed With:    Patient     Code Status (Patient has no pulse and is not breathing):    CPR (Attempt to Resuscitate)     Medical Interventions (Patient has pulse or is breathing):    Full Support       Salomón Hernández MD  01/19/25

## 2025-01-19 NOTE — PLAN OF CARE
Goal Outcome Evaluation:  Plan of Care Reviewed With: patient        Progress: no change     The patient is pleasant, alert and oriented x4. VSS on 3 liters via nasal cannula and home cpap utilized at night. Voiding spontaneously via purewick. The patient rested well throughout the night. Pain managed with PRN Acetaminophen as ordered. Skin interventions in place. The patient is awaiting placement to a SNF.

## 2025-01-20 LAB
ANION GAP SERPL CALCULATED.3IONS-SCNC: 11 MMOL/L (ref 5–15)
BUN SERPL-MCNC: 18 MG/DL (ref 8–23)
BUN/CREAT SERPL: 19.6 (ref 7–25)
CALCIUM SPEC-SCNC: 9.7 MG/DL (ref 8.6–10.5)
CHLORIDE SERPL-SCNC: 93 MMOL/L (ref 98–107)
CO2 SERPL-SCNC: 30 MMOL/L (ref 22–29)
CREAT SERPL-MCNC: 0.92 MG/DL (ref 0.57–1)
DEPRECATED RDW RBC AUTO: 44.5 FL (ref 37–54)
EGFRCR SERPLBLD CKD-EPI 2021: 63.9 ML/MIN/1.73
ERYTHROCYTE [DISTWIDTH] IN BLOOD BY AUTOMATED COUNT: 13.3 % (ref 12.3–15.4)
GLUCOSE BLDC GLUCOMTR-MCNC: 146 MG/DL (ref 70–130)
GLUCOSE BLDC GLUCOMTR-MCNC: 223 MG/DL (ref 70–130)
GLUCOSE BLDC GLUCOMTR-MCNC: 250 MG/DL (ref 70–130)
GLUCOSE BLDC GLUCOMTR-MCNC: 306 MG/DL (ref 70–130)
GLUCOSE SERPL-MCNC: 194 MG/DL (ref 65–99)
HCT VFR BLD AUTO: 42.6 % (ref 34–46.6)
HGB BLD-MCNC: 13.5 G/DL (ref 12–15.9)
MCH RBC QN AUTO: 29.1 PG (ref 26.6–33)
MCHC RBC AUTO-ENTMCNC: 31.7 G/DL (ref 31.5–35.7)
MCV RBC AUTO: 91.8 FL (ref 79–97)
PLATELET # BLD AUTO: 187 10*3/MM3 (ref 140–450)
PMV BLD AUTO: 11.3 FL (ref 6–12)
POTASSIUM SERPL-SCNC: 4.8 MMOL/L (ref 3.5–5.2)
RBC # BLD AUTO: 4.64 10*6/MM3 (ref 3.77–5.28)
SODIUM SERPL-SCNC: 134 MMOL/L (ref 136–145)
WBC NRBC COR # BLD AUTO: 9.94 10*3/MM3 (ref 3.4–10.8)

## 2025-01-20 PROCEDURE — 25010000002 HEPARIN (PORCINE) PER 1000 UNITS: Performed by: NURSE PRACTITIONER

## 2025-01-20 PROCEDURE — 85027 COMPLETE CBC AUTOMATED: CPT | Performed by: INTERNAL MEDICINE

## 2025-01-20 PROCEDURE — 99232 SBSQ HOSP IP/OBS MODERATE 35: CPT | Performed by: INTERNAL MEDICINE

## 2025-01-20 PROCEDURE — G0378 HOSPITAL OBSERVATION PER HR: HCPCS

## 2025-01-20 PROCEDURE — 63710000001 INSULIN LISPRO (HUMAN) PER 5 UNITS: Performed by: INTERNAL MEDICINE

## 2025-01-20 PROCEDURE — 63710000001 INSULIN LISPRO (HUMAN) PER 5 UNITS: Performed by: NURSE PRACTITIONER

## 2025-01-20 PROCEDURE — 80048 BASIC METABOLIC PNL TOTAL CA: CPT | Performed by: INTERNAL MEDICINE

## 2025-01-20 PROCEDURE — 82948 REAGENT STRIP/BLOOD GLUCOSE: CPT

## 2025-01-20 PROCEDURE — 96372 THER/PROPH/DIAG INJ SC/IM: CPT

## 2025-01-20 PROCEDURE — 63710000001 INSULIN GLARGINE PER 5 UNITS: Performed by: NURSE PRACTITIONER

## 2025-01-20 RX ORDER — NYSTATIN 100000 [USP'U]/G
POWDER TOPICAL EVERY 12 HOURS SCHEDULED
Status: DISCONTINUED | OUTPATIENT
Start: 2025-01-20 | End: 2025-01-29 | Stop reason: HOSPADM

## 2025-01-20 RX ADMIN — INSULIN LISPRO 10 UNITS: 100 INJECTION, SOLUTION INTRAVENOUS; SUBCUTANEOUS at 08:08

## 2025-01-20 RX ADMIN — POLYETHYLENE GLYCOL 3350 17 G: 17 POWDER, FOR SOLUTION ORAL at 22:32

## 2025-01-20 RX ADMIN — VERAPAMIL HYDROCHLORIDE 240 MG: 240 TABLET, FILM COATED, EXTENDED RELEASE ORAL at 08:08

## 2025-01-20 RX ADMIN — ACETAMINOPHEN 650 MG: 325 TABLET ORAL at 14:01

## 2025-01-20 RX ADMIN — INSULIN LISPRO 10 UNITS: 100 INJECTION, SOLUTION INTRAVENOUS; SUBCUTANEOUS at 16:38

## 2025-01-20 RX ADMIN — INSULIN LISPRO 4 UNITS: 100 INJECTION, SOLUTION INTRAVENOUS; SUBCUTANEOUS at 11:27

## 2025-01-20 RX ADMIN — BUPROPION HYDROCHLORIDE 150 MG: 150 TABLET, EXTENDED RELEASE ORAL at 08:08

## 2025-01-20 RX ADMIN — FLUTICASONE PROPIONATE 2 SPRAY: 50 SPRAY, METERED NASAL at 08:10

## 2025-01-20 RX ADMIN — HEPARIN SODIUM 5000 UNITS: 5000 INJECTION INTRAVENOUS; SUBCUTANEOUS at 08:10

## 2025-01-20 RX ADMIN — SENNOSIDES AND DOCUSATE SODIUM 2 TABLET: 50; 8.6 TABLET ORAL at 22:33

## 2025-01-20 RX ADMIN — CITALOPRAM HYDROBROMIDE 40 MG: 40 TABLET ORAL at 08:08

## 2025-01-20 RX ADMIN — DONEPEZIL HYDROCHLORIDE 10 MG: 10 TABLET, FILM COATED ORAL at 08:08

## 2025-01-20 RX ADMIN — ASPIRIN 81 MG: 81 TABLET, COATED ORAL at 08:08

## 2025-01-20 RX ADMIN — INSULIN LISPRO 6 UNITS: 100 INJECTION, SOLUTION INTRAVENOUS; SUBCUTANEOUS at 20:25

## 2025-01-20 RX ADMIN — ATORVASTATIN CALCIUM 20 MG: 20 TABLET, FILM COATED ORAL at 08:08

## 2025-01-20 RX ADMIN — Medication 1 CAPSULE: at 08:08

## 2025-01-20 RX ADMIN — NYSTATIN: 100000 POWDER TOPICAL at 23:20

## 2025-01-20 RX ADMIN — HEPARIN SODIUM 5000 UNITS: 5000 INJECTION INTRAVENOUS; SUBCUTANEOUS at 20:25

## 2025-01-20 RX ADMIN — INSULIN GLARGINE 25 UNITS: 100 INJECTION, SOLUTION SUBCUTANEOUS at 20:25

## 2025-01-20 RX ADMIN — ACETAMINOPHEN 650 MG: 325 TABLET ORAL at 20:25

## 2025-01-20 RX ADMIN — INSULIN LISPRO 10 UNITS: 100 INJECTION, SOLUTION INTRAVENOUS; SUBCUTANEOUS at 11:28

## 2025-01-20 RX ADMIN — CHOLESTYRAMINE 4 G: 4 POWDER, FOR SUSPENSION ORAL at 08:07

## 2025-01-20 RX ADMIN — INSULIN LISPRO 7 UNITS: 100 INJECTION, SOLUTION INTRAVENOUS; SUBCUTANEOUS at 16:38

## 2025-01-20 NOTE — PLAN OF CARE
Problem: Adult Inpatient Plan of Care  Goal: Plan of Care Review  Outcome: Progressing     Problem: Skin Injury Risk Increased  Goal: Skin Health and Integrity  Outcome: Progressing  Intervention: Optimize Skin Protection  Recent Flowsheet Documentation  Taken 1/20/2025 1605 by Fior Hills RN  Pressure Reduction Techniques: frequent weight shift encouraged  Head of Bed (HOB) Positioning: [facility] elevated  Pressure Reduction Devices:   positioning supports utilized   pressure-redistributing mattress utilized  Skin Protection: incontinence pads utilized  Taken 1/20/2025 1405 by Fior Hills RN  Pressure Reduction Techniques: frequent weight shift encouraged  Head of Bed (HOB) Positioning: HOB elevated  Pressure Reduction Devices:   positioning supports utilized   pressure-redistributing mattress utilized  Skin Protection: incontinence pads utilized  Taken 1/20/2025 1245 by Fior Hills RN  Pressure Reduction Techniques: frequent weight shift encouraged  Head of Bed (HOB) Positioning: HOB elevated  Pressure Reduction Devices:   positioning supports utilized   pressure-redistributing mattress utilized  Skin Protection: incontinence pads utilized  Taken 1/20/2025 1031 by Fior Hills RN  Pressure Reduction Techniques: frequent weight shift encouraged  Head of Bed (HOB) Positioning: [facility] elevated  Pressure Reduction Devices:   positioning supports utilized   pressure-redistributing mattress utilized  Skin Protection: incontinence pads utilized  Taken 1/20/2025 0805 by Fior Hills RN  Head of Bed (HOB) Positioning: HOB elevated  Taken 1/20/2025 0700 by Fior Hills RN  Head of Bed ([facility]) Positioning: HOB elevated     Problem: Fall Injury Risk  Goal: Absence of Fall and Fall-Related Injury  Outcome: Progressing  Intervention: Identify and Manage Contributors  Recent Flowsheet Documentation  Taken 1/20/2025 1605 by Fior Hills RN  Medication Review/Management: medications reviewed  Taken 1/20/2025 1405 by  Fior Hills, RN  Medication Review/Management: medications reviewed  Taken 1/20/2025 1245 by Fior Hills, RN  Medication Review/Management: medications reviewed  Taken 1/20/2025 1031 by Fior Hills RN  Medication Review/Management: medications reviewed  Taken 1/20/2025 0805 by Fior Hills RN  Medication Review/Management: medications reviewed  Intervention: Promote Injury-Free Environment  Recent Flowsheet Documentation  Taken 1/20/2025 1605 by Fior Hills, RN  Safety Promotion/Fall Prevention:   activity supervised   assistive device/personal items within reach   clutter free environment maintained   elopement precautions   fall prevention program maintained   gait belt   lighting adjusted   muscle strengthening facilitated   mobility aid in reach   nonskid shoes/slippers when out of bed   room organization consistent   safety round/check completed   toileting scheduled  Taken 1/20/2025 1405 by Fior Hills, RN  Safety Promotion/Fall Prevention:   activity supervised   clutter free environment maintained   assistive device/personal items within reach   elopement precautions   fall prevention program maintained   gait belt   lighting adjusted   mobility aid in reach   muscle strengthening facilitated   nonskid shoes/slippers when out of bed   room organization consistent   safety round/check completed   toileting scheduled  Taken 1/20/2025 1245 by Fior Hills, RN  Safety Promotion/Fall Prevention:   activity supervised   assistive device/personal items within reach   clutter free environment maintained   elopement precautions   fall prevention program maintained   gait belt   lighting adjusted   mobility aid in reach   muscle strengthening facilitated   nonskid shoes/slippers when out of bed   room organization consistent   safety round/check completed   toileting scheduled  Taken 1/20/2025 1031 by Fior Hills, RN  Safety Promotion/Fall Prevention:   activity supervised   assistive  device/personal items within reach   clutter free environment maintained   elopement precautions   fall prevention program maintained   gait belt   lighting adjusted   mobility aid in reach   muscle strengthening facilitated   nonskid shoes/slippers when out of bed   room organization consistent   safety round/check completed   toileting scheduled  Taken 1/20/2025 0805 by Fior Hills RN  Safety Promotion/Fall Prevention:   activity supervised   assistive device/personal items within reach   clutter free environment maintained   elopement precautions   fall prevention program maintained   gait belt   lighting adjusted   muscle strengthening facilitated   mobility aid in reach   nonskid shoes/slippers when out of bed   safety round/check completed   room organization consistent   toileting scheduled   Goal Outcome Evaluation:

## 2025-01-20 NOTE — CASE MANAGEMENT/SOCIAL WORK
Continued Stay Note  [facility]     Patient Name: Masha Chou  MRN: [id number]  Today's Date: 1/20/2025    Admit Date: 1/12/2025    Plan: SNF to LTC   Discharge Plan       Row Name 01/20/25 1114       Plan    Plan Comments CM had a long discussion with pt and daughter about what is needed in order to be considered for a long term care bed including the willingness to work with facility to get the needed insurance and medicaid. Both Pt and daughter are agreeable to this and state they will whatever is required. Daughter reports she can no longer care for her mother and that she will need a long term care bed. CM has given the referral with Felicia with [facility]. Both Pt and daughter are aware that facility may not be in [facility] and both report understanding.                   Discharge Codes    No documentation.                 Expected Discharge Date and Time       Expected Discharge Date Expected Discharge Time    Jan 22, 2025               Lillian Marley RN

## 2025-01-20 NOTE — DISCHARGE PLACEMENT REQUEST
"Masha Chou (79 y.o. Female)       Looking for skilled to long term care      Lillian Marley RN   [phone]            Date of Birth   1945    Social Security Number       Address   [address] [id number]    Home Phone   [phone]    MRN   [id number]       Episcopal   Buddhist    Marital Status                               Admission Date   1/12/25    Admission Type   Emergency    Admitting Provider   Salomón Hernández MD    Attending Provider   Salomón Hernández MD    Department, Room/Bed   [facility] 3H, S375/1       Discharge Date       Discharge Disposition       Discharge Destination                                 Attending Provider: Salomón Hernández MD    Allergies: Contrast Dye (Echo Or Unknown Ct/mr), Shellfish-derived Products, Fluoxetine, Mushroom, Mushroom Extract Complex (Do Not Select), Doxycycline, Sulfamethoxazole-trimethoprim, Bupropion, Levofloxacin, Penicillins, Prednisone & Diphenhydramine    Isolation: None   Infection: None   Code Status: CPR    Ht: 142.2 cm (56\")   Wt: 109 kg (240 lb)    Admission Cmt: None   Principal Problem: Falls [R29.6]                   Active Insurance as of 1/12/2025       Primary Coverage       Payor Plan Insurance Group Employer/Plan Group    ANTHEM MEDICARE REPLACEMENT ANTHEM MED ADV HMO [id number]       Payor Plan Address Payor Plan Phone Number Payor Plan Fax Number Effective Dates    [address] [phone]  1/1/2025 - None Entered    [facility] [id number]         Subscriber Name Subscriber Birth Date Member ID       MASHA CHOU 1945 [id number]                     Emergency Contacts        (Rel.) Home Phone Work Phone Mobile Phone    DONALD NOVA (Daughter) [phone] -- [phone]              Insurance Information                  ANTHEM MEDICARE REPLACEMENT/ANTHEM MED ADV HMO Phone: [phone]    Subscriber: Masha Chou RONIT Subscriber#: [id number]    Group#: [id number] " Precert#: --    Authorization#: -- Effective Date: --             History & Physical        Rosanna MckeonSUNITA at 25       Attestation signed by Eugene James MD at 25 0120    I have reviewed this documentation and agree.                      [facility] Medicine Services  HISTORY AND PHYSICAL    Patient Name: Masha Chou  : 1945  MRN: [id number]  Primary Care Physician: Kary Wu MD  Date of admission: 2025    Subjective  Subjective     Chief Complaint:  Left knee pain    HPI:  Masha Chou is a 79 y.o. female with history of T2DM, HTN, CHF, COPD, breast cancer, pulmonary hypertension, presents to the ED after having a near fall.  Patient reports that she has been experiencing some left knee pain for about a week.  She states that while walking sometimes her left leg will give out but she is able to catch herself on the wall or furniture before she actually falls.  She also reports that she has been experiencing dysuria and urinary frequency for approximately 1 week.  She has dyspnea with exertion, mild cough and wheezing.  No fevers, chills, chest pain, nausea, vomiting, abdominal pain, diarrhea, focal weakness, difficulty with speech, headaches, syncope, or any other complaints at this time.  Of note patient does live with her daughter and the daughter's boyfriend.  They are moving out of their current home and will be staying with friends on the couch for approximately 1 week before getting their new apartment.    UA consistent with UTI.  Chest x-ray shows cardiomegaly and mild interstitial pulmonary edema.  X-ray left knee shows mild to moderate tricompartmental osteoarthritis.  Patient was started on cefuroxime mean in the ED, and is being admitted to the hospitalist for further evaluation and management.    Review of Systems   Constitutional: Negative.    HENT: Negative.     Eyes: Negative.    Respiratory:  Positive for cough,  shortness of breath and wheezing.    Cardiovascular: Negative.    Gastrointestinal: Negative.    Endocrine: Negative.    Genitourinary:  Positive for dysuria and frequency. Negative for difficulty urinating and hematuria.   Musculoskeletal:  Negative for back pain and neck pain.        Left knee pain   Skin: Negative.    Allergic/Immunologic: Negative.    Neurological: Negative.    Hematological: Negative.    Psychiatric/Behavioral: Negative.                  Personal History     Past Medical History:   Diagnosis Date    Asthma     Cancer     BILATERAL BREAST     COPD (chronic obstructive pulmonary disease)     Diabetes mellitus     Heart failure     Hypertension     Pulmonary hypertension              Past Surgical History:   Procedure Laterality Date    BREAST SURGERY      CHOLECYSTECTOMY      HYSTERECTOMY      JOINT REPLACEMENT      RIGHT KNEE    KELOID EXCISION         Family History:  family history includes Cancer in her paternal grandmother; No Known Problems in her father, maternal grandfather, maternal grandmother, mother, paternal grandfather, and sister.     Social History:  reports that she has never smoked. She has never used smokeless tobacco. She reports current alcohol use. She reports that she does not use drugs.  Social History     Social History Narrative    Caffeine: 1 cup of coffee daily        Medications:  acetaminophen, albuterol sulfate HFA, aspirin, atorvastatin, buPROPion XL, citalopram, colestipol, donepezil, fluticasone, furosemide, guaifenesin-dextromethorphan 600-30 mg, insulin glargine, insulin lispro, loperamide, magnesium oxide, nystatin, and verapamil SR    Allergies   Allergen Reactions    Contrast Dye (Echo Or Unknown Ct/Mr) Seizure    Shellfish-Derived Products Nausea And Vomiting    Fluoxetine Hives, Other (See Comments) and Unknown (See Comments)     Other reaction(s): aching    Mushroom Rash    Mushroom Extract Complex (Do Not Select) Rash    Doxycycline Diarrhea     Sulfamethoxazole-Trimethoprim Unknown - Low Severity    Bupropion Rash    Levofloxacin Other (See Comments), Rash and Unknown (See Comments)     Bone Aches    Penicillins Rash     Has tolerated cefdinir    Prednisone & Diphenhydramine Unknown (See Comments)       Objective  Objective     Vital Signs:   Temp:  [99 °F (37.2 °C)] 99 °F (37.2 °C)  Heart Rate:  [93] 93  Resp:  [20] 20  BP: (153)/(73) 153/73  Flow (L/min) (Oxygen Therapy):  [2] 2    Physical Exam   Constitutional: Awake, alert, resting in bed, family at bedside  Eyes: PERRLA, sclerae anicteric, no conjunctival injection  HENT: NCAT, mucous membranes moist  Neck: Supple, no thyromegaly, no lymphadenopathy, trachea midline  Respiratory: Clear to auscultation bilaterally, diminished in the bases, nonlabored respirations   Cardiovascular: RRR, no murmurs, rubs, or gallops, palpable pedal pulses bilaterally  Gastrointestinal: Positive bowel sounds, soft, nontender, nondistended  Musculoskeletal: No bilateral ankle edema, no clubbing or cyanosis to extremities  Psychiatric: Appropriate affect, cooperative  Neurologic: Oriented x 3, strength symmetric in all extremities, Cranial Nerves grossly intact to confrontation, speech clear  Skin: No rashes      Result Review:  I have personally reviewed the results from the time of this admission to 1/12/2025 23:00 EST and agree with these findings:  [x]  Laboratory list / accordion  []  Microbiology  [x]  Radiology  [x]  EKG/Telemetry   []  Cardiology/Vascular   []  Pathology  [x]  Old records  []  Other:  Most notable findings include:     LAB RESULTS:      Lab 01/12/25 2037   WBC 9.27   HEMOGLOBIN 13.0   HEMATOCRIT 41.1   PLATELETS 183   NEUTROS ABS 5.98   IMMATURE GRANS (ABS) 0.13*   LYMPHS ABS 2.32   MONOS ABS 0.71   EOS ABS 0.09   MCV 93.6         Lab 01/12/25 2037   SODIUM 139   POTASSIUM 5.4*   CHLORIDE 100   CO2 27.0   ANION GAP 12.0   BUN 31*   CREATININE 1.16*   EGFR 48.4*   GLUCOSE 220*   CALCIUM 9.7    MAGNESIUM 1.7         Lab 01/12/25 2037   TOTAL PROTEIN 6.3   ALBUMIN 4.0   GLOBULIN 2.3   ALT (SGPT) 22   AST (SGOT) 19   BILIRUBIN 0.5   ALK PHOS 83                     Brief Urine Lab Results  (Last result in the past 365 days)        Color   Clarity   Blood   Leuk Est   Nitrite   Protein   CREAT   Urine HCG        01/12/25 2059 Yellow   Clear   Negative   Trace   Positive   Negative                 Microbiology Results (last 10 days)       ** No results found for the last 240 hours. **            XR Chest 1 View    Result Date: 1/12/2025  XR CHEST 1 VW Date of Exam: 1/12/2025 8:30 PM EST Indication: cough Comparison: 2/12/2024. Findings: The heart appears enlarged. There is mild interstitial pulmonary edema. No pneumothorax or pleural effusion. No focal lung consolidation. Stable prominent left-sided epicardial fat.     Impression: Impression: Cardiomegaly and mild interstitial pulmonary edema. Electronically Signed: Chidi Hooker MD  1/12/2025 9:14 PM EST  Workstation ID: [id number]    XR Knee 1 or 2 View Left    Result Date: 1/12/2025  XR KNEE 1 OR 2 VW LEFT Date of Exam: 1/12/2025 8:30 PM EST Indication: falls Comparison: None available. Findings: There is moderate medial and lateral compartment and mild patellofemoral osteophyte formation. No acute fracture or dislocation. There is mild tricompartmental joint space narrowing. No erosions. No evidence of joint effusion.     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 Electronically Signed: Chidi Hooker MD  1/12/2025 9:14 PM EST  Workstation ID: [id number]     Results for orders placed during the hospital encounter of 06/25/21    Adult Transthoracic Echo Complete W/ Cont if Necessary Per Protocol    Interpretation Summary  · Estimated left ventricular EF = 70% Left ventricular ejection fraction appears to be 66 - 70%. Left ventricular systolic function is normal.  · Left ventricular diastolic function is consistent with (grade  I) impaired relaxation.  · Left ventricular wall thickness is consistent with hypertrophy. Sigmoid-shaped ventricular septum is present.  · LVOT turbulence without gradient.      Assessment & Plan  Assessment & Plan       Falls    Type 2 diabetes mellitus    COPD exacerbation    Left knee pain    Osteoarthritis of left knee    Acute UTI (urinary tract infection)    Masha Chou is a 79 y.o. female with history of T2DM, HTN, CHF, COPD, breast cancer, pulmonary hypertension, presents to the ED after having a near fall.      Assessment and Plan:    Left knee pain  Osteoarthritis  Generalized weakness  -- X-ray left knee shows mild to moderate tricompartmental osteoarthritis  -- Fall precautions  -- PT/OT consult  -- Consult case management  -- Tylenol and Voltaren for pain    Hyperkalemia  Hypomagnesemia  -- K+ 5.4, Mg 1.7  -- Replace Mg per protocol  -- Lokelma x 1 dose  -- BMP, Mg in a.m.    Acute UTI (POA)  -- UA: Ketones trace, nitrite positive, leukocytes trace, WBC 3-5, bacteria 4+, squamous 3-6  -- Urine culture  -- Ceftin    CKD  -- creatinine 1.16  -- baseline creatinine ~ 1.1-1.2   -- bmp in the am    COPD  JUAN F  -- CPAP nightly  -- DuoNebs    T2DM  -- A1c in the a.m.  -- FSBG with SSI  -- Lantus 10 units nightly    DVT prophylaxis: Heparin      CODE STATUS:    Level Of Support Discussed With: Patient  Code Status (Patient has no pulse and is not breathing): CPR (Attempt to Resuscitate)  Medical Interventions (Patient has pulse or is breathing): Full Support      Expected Discharge  TBD  Expected discharge date/ time has not been documented.      This note has been completed as part of a split-shared workflow.     Signature: Electronically signed by SUNITA Cox, 01/12/25, 11:00 PM EST.                   Electronically signed by Eugene James MD at 01/13/25 0120       Current Facility-Administered Medications   Medication Dose Route Frequency Provider Last Rate Last Admin    acetaminophen  (TYLENOL) tablet 650 mg  650 mg Oral Q4H PRN Rosanna Mckeon, APRN   650 mg at 01/19/25 2148    Or    acetaminophen (TYLENOL) 160 MG/5ML oral solution 650 mg  650 mg Oral Q4H PRN Rosanna Mckeon, APRN        Or    acetaminophen (TYLENOL) suppository 650 mg  650 mg Rectal Q4H PRN Rosanna Mckeon, APRN        albuterol (PROVENTIL) nebulizer solution 0.083% 2.5 mg/3mL  2.5 mg Nebulization Q4H PRN Rosanna Mckeon, APRN        aspirin EC tablet 81 mg  81 mg Oral Daily Rosanna Mckeon, APRN   81 mg at 01/20/25 0808    atorvastatin (LIPITOR) tablet 20 mg  20 mg Oral Daily Rosanna Mckeon, APRN   20 mg at 01/20/25 0808    sennosides-docusate (PERICOLACE) 8.6-50 MG per tablet 2 tablet  2 tablet Oral BID PRN Rosanna Mckeon APRN        And    polyethylene glycol (MIRALAX) packet 17 g  17 g Oral Daily PRN Rosanna Mckeon APRN        And    bisacodyl (DULCOLAX) EC tablet 5 mg  5 mg Oral Daily PRN Rosanna Mckeon APRN   5 mg at 01/18/25 1809    And    bisacodyl (DULCOLAX) suppository 10 mg  10 mg Rectal Daily PRN Rosanna Mckeon, APRN        buPROPion XL (WELLBUTRIN XL) 24 hr tablet 150 mg  150 mg Oral Daily Rosanna Mckeon, APRN   150 mg at 01/20/25 0808    Calcium Replacement - Follow Nurse / BPA Driven Protocol   Not Applicable PRN Rosanna Mckeon, APRN        cholestyramine light packet 4 g  1 packet Oral Daily Rosanna Mckeon, APRN   4 g at 01/20/25 0807    citalopram (CeleXA) tablet 40 mg  40 mg Oral QAM Rosanna Mckeon, APRN   40 mg at 01/20/25 0808    dextrose (D50W) (25 g/50 mL) IV injection 25 g  25 g Intravenous Q15 Min PRN Rosanna Mckeon APRN        dextrose (GLUTOSE) oral gel 15 g  15 g Oral Q15 Min PRN Rosanna Mckeon APRN        Diclofenac Sodium (VOLTAREN) 1 % gel 2 g  2 g Topical 4x Daily PRN Rosanna Mckeon APRN        donepezil (ARICEPT) tablet 10 mg  10 mg Oral Daily Rosanna Mckeon APRN   10 mg at 01/20/25 0808     fluticasone (FLONASE) 50 MCG/ACT nasal spray 2 spray  2 spray Nasal Daily Rosanna Mckeon APRN   2 spray at 01/20/25 0810    glucagon (GLUCAGEN) injection 1 mg  1 mg Intramuscular Q15 Min PRN Rosanna Mckeon APRN        heparin (porcine) 5000 UNIT/ML injection 5,000 Units  5,000 Units Subcutaneous Q12H Rosanna Mckeon APRN   5,000 Units at 01/20/25 0810    insulin glargine (LANTUS, SEMGLEE) injection 25 Units  25 Units Subcutaneous Nightly Shaylee Yang APRN   25 Units at 01/19/25 2148    Insulin Lispro (humaLOG) injection 10 Units  10 Units Subcutaneous TID With Meals Shaylee Yang APRN   10 Units at 01/20/25 0808    Insulin Lispro (humaLOG) injection 2-9 Units  2-9 Units Subcutaneous 4x Daily AC & at Bedtime Salomón Hernández MD   6 Units at 01/19/25 2148    ipratropium-albuterol (DUO-NEB) nebulizer solution 3 mL  3 mL Nebulization Q6H PRN Salomón Hernández MD        lactobacillus acidophilus (RISAQUAD) capsule 1 capsule  1 capsule Oral Daily Christine Guevara MD   1 capsule at 01/20/25 0808    loperamide (IMODIUM) capsule 2 mg  2 mg Oral TID PRN Evette Gross APRN   2 mg at 01/17/25 2026    Magnesium Standard Dose Replacement - Follow Nurse / BPA Driven Protocol   Not Applicable PRN Rosanna Mckeon APRN        nitroglycerin (NITROSTAT) SL tablet 0.4 mg  0.4 mg Sublingual Q5 Min PRN Rosanna Mckeon APRN        ondansetron ODT (ZOFRAN-ODT) disintegrating tablet 4 mg  4 mg Translingual Q6H PRN Christine Guevara MD   4 mg at 01/14/25 1231    Phosphorus Replacement - Follow Nurse / BPA Driven Protocol   Not Applicable PRN Rosanna Mckeon APRN        Potassium Replacement - Follow Nurse / BPA Driven Protocol   Not Applicable PRN Rosanna Mckeon APRN        sodium chloride 0.9 % flush 10 mL  10 mL Intravenous Q12H Rosanna Mckeon, SUNITA        sodium chloride 0.9 % flush 10 mL  10 mL Intravenous PRN Rosanna Mckeon, SUNITA        sodium  chloride 0.9 % infusion 40 mL  40 mL Intravenous PRN Rosanna Mckeon APRN        verapamil SR (CALAN-SR) CR tablet 240 mg  240 mg Oral Daily Rosanna Mckeon APRN   240 mg at 25 0808        Physician Progress Notes (most recent note)        Salomón Hernández MD at 25 1242              [facility] Medicine Services  PROGRESS NOTE    Patient Name: Masha Chou  : 1945  MRN: [id number]    Date of Admission: 2025  Primary Care Physician: Kary Wu MD    Subjective   Subjective     CC:  Left knee pain    HPI:  Says she has been having headaches since admission.  HA mild this morning.  Wonders if the HAs are related to her fluctuating glucose levels.    States she wears oxygen chronically at home.      Objective   Objective     Vital Signs:   Temp:  [97.2 °F (36.2 °C)-98.6 °F (37 °C)] 97.7 °F (36.5 °C)  Heart Rate:  [75-92] 92  Resp:  [16-18] 18  BP: (141-160)/(74-96) 148/74  Flow (L/min) (Oxygen Therapy):  [2-4] 2     PE:    Nontoxic but appears chronically deconditioned, in bed  Mucous membranes moist  RRR  Breath sounds diminished bilaterally, some pursed lip breathing  Abdomen soft  Obese  Alert, speech clear  Normal affect       Results Reviewed:  LAB RESULTS:      Lab 25  0646 25   WBC 8.72 9.27   HEMOGLOBIN 12.3 13.0   HEMATOCRIT 40.3 41.1   PLATELETS 172 183   NEUTROS ABS 4.54 5.98   IMMATURE GRANS (ABS) 0.14* 0.13*   LYMPHS ABS 2.99 2.32   MONOS ABS 0.82 0.71   EOS ABS 0.16 0.09   MCV 95.5 93.6         Lab 01/15/25  1618 25  0646 25   SODIUM 133* 144 139   POTASSIUM 5.1 5.1 5.4*   CHLORIDE 96* 105 100   CO2 27.0 32.0* 27.0   ANION GAP 10.0 7.0 12.0   BUN 24* 31* 31*   CREATININE 1.17* 1.23* 1.16*   EGFR 47.9* 45.1* 48.4*   GLUCOSE 295* 158* 220*   CALCIUM 9.7 9.4 9.7   MAGNESIUM  --  1.9 1.7   HEMOGLOBIN A1C  --  9.00*  --          Lab 25   TOTAL PROTEIN 6.3   ALBUMIN 4.0   GLOBULIN 2.3   ALT  (SGPT) 22   AST (SGOT) 19   BILIRUBIN 0.5   ALK PHOS 83                     Brief Urine Lab Results  (Last result in the past 365 days)        Color   Clarity   Blood   Leuk Est   Nitrite   Protein   CREAT   Urine HCG        01/13/25 0722 Yellow   Clear   Negative   Trace   Negative   Negative                   Microbiology Results Abnormal       None            No radiology results from the last 24 hrs    Results for orders placed during the hospital encounter of 06/25/21    Adult Transthoracic Echo Complete W/ Cont if Necessary Per Protocol    Interpretation Summary  · Estimated left ventricular EF = 70% Left ventricular ejection fraction appears to be 66 - 70%. Left ventricular systolic function is normal.  · Left ventricular diastolic function is consistent with (grade I) impaired relaxation.  · Left ventricular wall thickness is consistent with hypertrophy. Sigmoid-shaped ventricular septum is present.  · LVOT turbulence without gradient.      Current medications:  Scheduled Meds:aspirin, 81 mg, Oral, Daily  atorvastatin, 20 mg, Oral, Daily  buPROPion XL, 150 mg, Oral, Daily  cholestyramine light, 1 packet, Oral, Daily  citalopram, 40 mg, Oral, QAM  donepezil, 10 mg, Oral, Daily  fluticasone, 2 spray, Nasal, Daily  heparin (porcine), 5,000 Units, Subcutaneous, Q12H  insulin glargine, 25 Units, Subcutaneous, Nightly  Insulin Lispro, 10 Units, Subcutaneous, TID With Meals  insulin lispro, 2-7 Units, Subcutaneous, 4x Daily AC & at Bedtime  lactobacillus acidophilus, 1 capsule, Oral, Daily  sodium chloride, 10 mL, Intravenous, Q12H  verapamil SR, 240 mg, Oral, Daily      Continuous Infusions:   PRN Meds:.  acetaminophen **OR** acetaminophen **OR** acetaminophen    albuterol    senna-docusate sodium **AND** polyethylene glycol **AND** bisacodyl **AND** bisacodyl    Calcium Replacement - Follow Nurse / BPA Driven Protocol    dextrose    dextrose    Diclofenac Sodium    glucagon (human recombinant)     ipratropium-albuterol    loperamide    Magnesium Standard Dose Replacement - Follow Nurse / BPA Driven Protocol    nitroglycerin    ondansetron ODT    Phosphorus Replacement - Follow Nurse / BPA Driven Protocol    Potassium Replacement - Follow Nurse / BPA Driven Protocol    sodium chloride    sodium chloride    Assessment & Plan   Assessment & Plan     Active Hospital Problems    Diagnosis  POA    **Falls [R29.6]  Not Applicable    Left knee pain [M25.562]  Unknown    Osteoarthritis of left knee [M17.12]  Unknown    Acute UTI (urinary tract infection) [N39.0]  Unknown    COPD exacerbation [J44.1]  Yes    Type 2 diabetes mellitus [E11.9]  Yes      Resolved Hospital Problems   No resolved problems to display.        Brief Hospital Course to date:  Masha Chou is a 79 y.o. female with type 2 diabetes, hypertension, COPD, breast cancer, pulmonary hypertension, diastolic CHF who presented to the ED after having a near fall.    This patient's problems and plans were partially entered by my partner and updated as appropriate by me 01/19/25.      Left knee pain  Osteoarthritis  Generalized weakness  -- X-ray left knee shows mild to moderate tricompartmental osteoarthritis  -- Fall precautions  -- PT/OT consult, recommending SNF  -- Consult case management  -- Tylenol and Voltaren prn for pain    Diarrhea, chronic  -intermittent chronic diarrhea associated with dietary intake/ anxiety  -continue probiotic  -resumed home imodium prn with improvement.   -CBC a.m.     Acute UTI (POA)  -- UA: Ketones trace, nitrite positive, leukocytes trace, WBC 3-5, bacteria 4+, squamous 3-6  -- Ceftin 5 days completed     CKD  -- baseline creatinine ~ 1.1-1.2   -- BMP     COPD  JUAN F  Asthma  --Does not appear to be in acute exacerbation.    -- CPAP nightly  -- DuoNebs scheduled     T2DM  -- Continue lantus to 25 units and TID humalog to 10 units.  -- Increase SSI from low to moderate dose scale  -- Glucose reviewed, may need further up  titration of insulin therapy    Expected Discharge Location and Transportation: SNF to long-term [facility] when bed available  Expected Discharge   Expected Discharge Date: 2025; Expected Discharge Time:      VTE Prophylaxis:  Pharmacologic VTE prophylaxis orders are present.         AM-PAC 6 Clicks Score (PT): 15 (25)    CODE STATUS:   Code Status and Medical Interventions: CPR (Attempt to Resuscitate); Full Support   Ordered at: 25 9860     Level Of Support Discussed With:    Patient     Code Status (Patient has no pulse and is not breathing):    CPR (Attempt to Resuscitate)     Medical Interventions (Patient has pulse or is breathing):    Full Support       Salomón Hernández MD  25        Electronically signed by Salomón Hernández MD at 25 1248          Physical Therapy Notes (most recent note)        Alejandra Farias, PT at 25 1510  Version 1 of 1         Patient Name: Masha Chou  : 1945    MRN: [id number]                              Today's Date: 2025       Admit Date: 2025    Visit Dx:     ICD-10-CM ICD-9-CM   1. Falls  R29.6 V15.88   2. Acute cystitis without hematuria  N30.00 595.0     Patient Active Problem List   Diagnosis    Chest pain    Type 2 diabetes mellitus    Pulmonary HTN    Heart failure    COPD exacerbation    Chronic respiratory failure with hypoxia    Falls    Left knee pain    Osteoarthritis of left knee    Acute UTI (urinary tract infection)     Past Medical History:   Diagnosis Date    Asthma     Cancer     BILATERAL BREAST     COPD (chronic obstructive pulmonary disease)     Diabetes mellitus     Heart failure     Hypertension     Pulmonary hypertension      Past Surgical History:   Procedure Laterality Date    BREAST SURGERY      CHOLECYSTECTOMY      HYSTERECTOMY      JOINT REPLACEMENT      RIGHT KNEE    KELOID EXCISION        General Information       Row Name 25 1539          Physical Therapy Time and Intention    Document  Type therapy note (daily note)  -AB     Mode of Treatment physical therapy  -AB       Row Name 01/17/25 1539          General Information    Patient Profile Reviewed yes  -AB     Existing Precautions/Restrictions fall;oxygen therapy device and L/min;other (see comments)  Incontinent  -AB     Barriers to Rehab previous functional deficit;medically complex  -AB       Row Name 01/17/25 1539          Cognition    Orientation Status (Cognition) oriented x 3  -AB       Row Name 01/17/25 1539          Safety Issues/Impairments Affecting Functional Mobility    Safety Issues Affecting Function (Mobility) awareness of need for assistance;insight into deficits/self-awareness;safety precaution awareness;safety precautions follow-through/compliance;sequencing abilities  -AB     Impairments Affecting Function (Mobility) balance;endurance/activity tolerance;pain;shortness of breath;strength  -AB               User Key  (r) = Recorded By, (t) = Taken By, (c) = Cosigned By      Initials Name Provider Type    AB Alejandra Farias, PT Physical Therapist                   Mobility       Row Name 01/17/25 1543          Bed Mobility    Comment, (Bed Mobility) Pt received standing in bathroom.  -AB       Row Name 01/17/25 1543          Transfers    Comment, (Transfers) Cues for hand placement and sequencing.  -AB       Row Name 01/17/25 1543          Sit-Stand Transfer    Sit-Stand [place] (Transfers) minimum assist (75% patient effort);1 person assist;verbal cues  -AB     Assistive Device (Sit-Stand Transfers) walker, front-wheeled  -AB     Comment, (Sit-Stand Transfer) Cues to push up from bed.  -AB       Row Name 01/17/25 1543          Gait/Stairs (Locomotion)    [place] Level (Gait) verbal cues;minimum assist (75% patient effort);1 person assist  -AB     Assistive Device (Gait) walker, front-wheeled  -AB     Patient was able to Ambulate yes  -AB     Distance in Feet (Gait) 60  -AB     Deviations/Abnormal Patterns (Gait)  bilateral deviations;base of support, wide;leighann decreased;gait speed decreased;stride length decreased  -AB     Bilateral Gait Deviations heel strike decreased;forward flexed posture  -AB     Comment, (Gait/Stairs) Pt ambulated with step through gait pattern at a slowed pace and demonstrated forward flexed posture. Cues provided for PLB and forward gaze. No overt LOB or knee buckling. Min A to steady. O2 sat >90% on RA. Further activity limited by weakness and fatigue.  -AB               User Key  (r) = Recorded By, (t) = Taken By, (c) = Cosigned By      Initials Name Provider Type    AB Alejandra Farias, PT Physical Therapist                   Obj/Interventions       Row Name 01/17/25 1547          Balance    Balance Assessment sitting static balance;sitting dynamic balance;standing static balance;standing dynamic balance  -AB     Static Sitting Balance supervision  -AB     Dynamic Sitting Balance supervision  -AB     Position, Sitting Balance sitting edge of bed;unsupported  -AB     Static Standing Balance contact guard  -AB     Dynamic Standing Balance minimal assist;1-person assist;verbal cues  -AB     Position/Device Used, Standing Balance walker, front-wheeled;supported  -AB     Balance Interventions sitting;standing;sit to stand;supported;static;dynamic;occupation based/functional task  -AB     Comment, Balance No overt LOB. Min A to steady  -AB               User Key  (r) = Recorded By, (t) = Taken By, (c) = Cosigned By      Initials Name Provider Type    Alejandra Jones, PT Physical Therapist                   Goals/Plan    No documentation.                  Clinical Impression       Row Name 01/17/25 1549          Pain    Pretreatment Pain Rating 0/10 - no pain  -AB     Posttreatment Pain Rating 0/10 - no pain  -AB       Row Name 01/17/25 1549          Plan of Care Review    Plan of Care Reviewed With patient  -AB     Progress improving  -AB     Outcome Evaluation Pt with good effort and increased  ambulation distance to 60' w/ min Ax1+1 and RW. She continues to present below baseline with decreased strength, impaired endurance, and SOA. Further IPPT is warrented. PT will progress as able per POC.  -AB       Row Name 01/17/25 1549          Vital Signs    Pre Systolic BP Rehab 132  -AB     Pre Treatment Diastolic BP 70  -AB     O2 Delivery Pre Treatment nasal cannula  -AB     Intra SpO2 (%) 96  -AB     O2 Delivery Intra Treatment nasal cannula  -AB     O2 Delivery Post Treatment nasal cannula  -AB     Pre Patient Position Standing  -AB     Intra Patient Position Standing  -AB     Post Patient Position Sitting  -AB       Row Name 01/17/25 1549          Positioning and Restraints    Pre-Treatment Position bathroom  -AB     Post Treatment Position chair  -AB     In Chair notified nsg;reclined;sitting;call light within reach;encouraged to call for assist;exit alarm on;legs elevated;waffle cushion  -AB               User Key  (r) = Recorded By, (t) = Taken By, (c) = Cosigned By      Initials Name Provider Type    AB Alejandra Farias, PT Physical Therapist                   Outcome Measures       Row Name 01/17/25 1551 01/17/25 0800       How much help from another person do you currently need...    Turning from your back to your side while in flat bed without using bedrails? 3  -AB 3  -AC    Moving from lying on back to sitting on the side of a flat bed without bedrails? 3  -AB 3  -AC    Moving to and from a bed to a chair (including a wheelchair)? 3  -AB 3  -AC    Standing up from a chair using your arms (e.g., wheelchair, bedside chair)? 2  -AB 2  -AC    Climbing 3-5 steps with a railing? 2  -AB 2  -AC    To walk in hospital room? 3  -AB 2  -AC    AM-PAC 6 Clicks Score (PT) 16  -AB 15  -AC    Highest Level of Mobility Goal 5 --> Static standing  -AB 4 --> Transfer to chair/commode  -AC      Row Name 01/17/25 1551 01/17/25 1544       Functional Assessment    Outcome Measure Options AM-PAC 6 Clicks Basic Mobility  (PT)  -AB AM-PAC 6 Clicks Daily Activity (OT)  -MR              User Key  (r) = Recorded By, (t) = Taken By, (c) = Cosigned By      Initials Name Provider Type    AC Beth Schwartz, RN Registered Nurse    Pam Henry, OT Occupational Therapist    AB Alejandra Farias, PT Physical Therapist                                 Physical Therapy Education       Title: PT OT SLP Therapies (In Progress)       Topic: Physical Therapy (In Progress)       Point: Mobility training (Done)       Learning Progress Summary            Patient Acceptance, E,D, VU,NR by AB at 1/17/2025 1551    Acceptance, E, NR by AS at 1/14/2025 1406                      Point: Home exercise program (In Progress)       Learning Progress Summary            Patient Acceptance, E, NR by AS at 1/14/2025 1406    Acceptance, E, VU by CK at 1/13/2025 1056                      Point: Body mechanics (Done)       Learning Progress Summary            Patient Acceptance, E,D, VU,NR by AB at 1/17/2025 1551    Acceptance, E, NR by AS at 1/14/2025 1406    Acceptance, E, VU by CK at 1/13/2025 1056                      Point: Precautions (Done)       Learning Progress Summary            Patient Acceptance, E,D, VU,NR by AB at 1/17/2025 1551    Acceptance, E, NR by AS at 1/14/2025 1406    Acceptance, E, VU by CK at 1/13/2025 1056                                      User Key       Initials Effective Dates Name Provider Type Discipline    AS 12/13/24 -  Alejandra Crowder, PTA Physical Therapist Assistant PT    AB 09/22/22 -  Alejandra Farias, PT Physical Therapist PT    CK 02/06/24 -  Loretta Webber, PT Physical Therapist PT                  PT Recommendation and Plan     Progress: improving  Outcome Evaluation: Pt with good effort and increased ambulation distance to 60' w/ min Ax1+1 and RW. She continues to present below baseline with decreased strength, impaired endurance, and SOA. Further IPPT is warrented. PT will progress as able per POC.     Time Calculation:          PT Charges       Row Name 25 1552             Time Calculation    Start Time 1510  -AB      PT Received On 25  -AB         Timed Charges    43990 - Gait Training Minutes  10  -AB      51167 - PT Therapeutic Activity Minutes 13  -AB         Total Minutes    Timed Charges Total Minutes 23  -AB       Total Minutes 23  -AB                User Key  (r) = Recorded By, (t) = Taken By, (c) = Cosigned By      Initials Name Provider Type    AB Alejandra Farias, PT Physical Therapist                  Therapy Charges for Today       Code Description Service Date Service Provider Modifiers Qty    51738952323 HC GAIT TRAINING EA 15 MIN 2025 Alejandra Farias, PT GP 1    79513395420 HC PT THERAPEUTIC ACT EA 15 MIN 2025 Alejandra Farias, PT GP 1            PT G-Codes  Outcome Measure Options: AM-PAC 6 Clicks Basic Mobility (PT)  AM-PAC 6 Clicks Score (PT): 16  AM-PAC 6 Clicks Score (OT): 15  PT Discharge Summary  Anticipated Discharge Disposition (PT): skilled nursing facility    Alejandra Farias PT  2025      Electronically signed by Alejandra Farias, PT at 25 1553          Occupational Therapy Notes (most recent note)        Pam Mak, OT at 25 1455          Patient Name: Masha Chou  : 1945    MRN: [id number]                              Today's Date: 2025       Admit Date: 2025    Visit Dx:     ICD-10-CM ICD-9-CM   1. Falls  R29.6 V15.88   2. Acute cystitis without hematuria  N30.00 595.0     Patient Active Problem List   Diagnosis    Chest pain    Type 2 diabetes mellitus    Pulmonary HTN    Heart failure    COPD exacerbation    Chronic respiratory failure with hypoxia    Falls    Left knee pain    Osteoarthritis of left knee    Acute UTI (urinary tract infection)     Past Medical History:   Diagnosis Date    Asthma     Cancer     BILATERAL BREAST     COPD (chronic obstructive pulmonary disease)     Diabetes mellitus     Heart failure     Hypertension      Pulmonary hypertension      Past Surgical History:   Procedure Laterality Date    BREAST SURGERY      CHOLECYSTECTOMY      HYSTERECTOMY      JOINT REPLACEMENT      RIGHT KNEE    KELOID EXCISION        General Information       Row Name 01/17/25 1534          OT Time and Intention    Document Type therapy note (daily note)  -MR     Mode of Treatment occupational therapy  -MR       Row Name 01/17/25 1534          General Information    Patient Profile Reviewed yes  -MR     Existing Precautions/Restrictions fall;oxygen therapy device and L/min;other (see comments)  Incontinent  -MR     Barriers to Rehab medically complex;previous functional deficit  -MR       Row Name 01/17/25 1534          Cognition    Orientation Status (Cognition) oriented x 3  -MR       Row Name 01/17/25 1534          Safety Issues/Impairments Affecting Functional Mobility    Safety Issues Affecting Function (Mobility) insight into deficits/self-awareness;awareness of need for assistance;safety precaution awareness;safety precautions follow-through/compliance;sequencing abilities;ability to follow commands  -MR     Impairments Affecting Function (Mobility) balance;endurance/activity tolerance;pain;shortness of breath;strength  -MR     Cognitive Impairments, Mobility Safety/Performance awareness, need for assistance;insight into deficits/self-awareness;safety precaution awareness;safety precaution follow-through;sequencing abilities  -MR               User Key  (r) = Recorded By, (t) = Taken By, (c) = Cosigned By      Initials Name Provider Type    MR Pam Mak, OT Occupational Therapist                     Mobility/ADL's       Row Name 01/17/25 1537          Bed Mobility    Bed Mobility supine-sit  -MR     Supine-Sit [place] (Bed Mobility) standby assist  -MR     Assistive Device (Bed Mobility) head of bed elevated;bed rails  -MR       Row Name 01/17/25 1537          Transfers    Transfers sit-stand transfer;toilet transfer  -MR       Row  Name 01/17/25 1537          Sit-Stand Transfer    Sit-Stand [place] (Transfers) minimum assist (75% patient effort);1 person assist;verbal cues  -MR     Assistive Device (Sit-Stand Transfers) walker, front-wheeled  -MR       Row Name 01/17/25 1537          Toilet Transfer    Type (Toilet Transfer) sit-stand;stand-sit  -MR     [place] Level (Toilet Transfer) contact guard;verbal cues  -MR     Assistive Device (Toilet Transfer) walker, front-wheeled  -MR       Row Name 01/17/25 1537          Functional Mobility    Functional Mobility- Ind. Level minimum assist (75% patient effort);1 person;verbal cues required  -MR     Functional Mobility- Device walker, front-wheeled  -MR     Functional Mobility-Distance (Feet) --  HH distances to restroom  -MR       Row Name 01/17/25 1537          Activities of Daily Living    BADL Assessment/Intervention grooming;upper body dressing;toileting  -MR       Row Name 01/17/25 1537          Upper Body Dressing Assessment/Training    [place] Level (Upper Body Dressing) don;other (see comments)  hospital gown  -MR     Position (Upper Body Dressing) edge of bed sitting  -MR       Row Name 01/17/25 1537          Toileting Assessment/Training    [place] Level (Toileting) adjust/manage clothing;contact guard assist;perform perineal hygiene;maximum assist (25% patient effort)  -MR     Assistive Devices (Toileting) commode  -MR     Position (Toileting) unsupported sitting;supported standing  -MR       Row Name 01/17/25 1537          Grooming Assessment/Training    [place] Level (Grooming) hair care, combing/brushing;maximum assist (25% patient effort);wash face, hands;set up  -MR     Position (Grooming) edge of bed sitting  -MR               User Key  (r) = Recorded By, (t) = Taken By, (c) = Cosigned By      Initials Name Provider Type    Pam Henry, OT Occupational Therapist                   Obj/Interventions       Row Name 01/17/25 1540          Balance     Balance Assessment sitting static balance;sitting dynamic balance;standing static balance;standing dynamic balance  -MR     Static Sitting Balance supervision  -MR     Dynamic Sitting Balance standby assist  -MR     Position, Sitting Balance unsupported;sitting edge of bed;sitting in chair  -MR     Static Standing Balance contact guard  -MR     Dynamic Standing Balance minimal assist  -MR     Position/Device Used, Standing Balance supported;walker, front-wheeled  -MR     Balance Interventions sitting;standing;sit to stand;supported;static;dynamic;minimal challenge;occupation based/functional task  -MR     Comment, Balance No overt LOB noted w/ ADLs and transfers  -MR               User Key  (r) = Recorded By, (t) = Taken By, (c) = Cosigned By      Initials Name Provider Type    MR Pam Mak, OT Occupational Therapist                   Goals/Plan    No documentation.                  Clinical Impression       Row Name 01/17/25 1541          Pain Assessment    Pretreatment Pain Rating 0/10 - no pain  -MR     Posttreatment Pain Rating 0/10 - no pain  -MR       Row Name 01/17/25 1541          Plan of Care Review    Plan of Care Reviewed With patient  -MR     Progress improving  -MR     Outcome Evaluation Pt demonstrating improvements w/ bed mobility, transfers and ADLs this date. Good effort w/ HH distances of mobility to complete toileting tasks. Pt remains below her functional baseline. Continue to progress as able per current POC.  -MR       Row Name 01/17/25 1541          Therapy Plan Review/Discharge Plan (OT)    Anticipated Discharge Disposition (OT) skilled nursing facility  -MR       Row Name 01/17/25 1541          Vital Signs    O2 Delivery Pre Treatment nasal cannula  -MR     O2 Delivery Intra Treatment nasal cannula  -MR     O2 Delivery Post Treatment nasal cannula  -MR     Pre Patient Position Supine  -MR     Intra Patient Position Standing  -MR     Post Patient Position Standing  -MR       Row Name  01/17/25 1541          Positioning and Restraints    Pre-Treatment Position in bed  -MR     Post Treatment Position bathroom  -MR     Bathroom with PT  -MR               User Key  (r) = Recorded By, (t) = Taken By, (c) = Cosigned By      Initials Name Provider Type    Pam Henry OT Occupational Therapist                   Outcome Measures       Row Name 01/17/25 1544          How much help from another is currently needed...    Putting on and taking off regular lower body clothing? 2  -MR     Bathing (including washing, rinsing, and drying) 2  -MR     Toileting (which includes using toilet bed pan or urinal) 2  -MR     Putting on and taking off regular upper body clothing 3  -MR     Taking care of personal grooming (such as brushing teeth) 3  -MR     Eating meals 3  -MR     AM-PAC 6 Clicks Score (OT) 15  -MR       Row Name 01/17/25 0800          How much help from another person do you currently need...    Turning from your back to your side while in flat bed without using bedrails? 3  -AC     Moving from lying on back to sitting on the side of a flat bed without bedrails? 3  -AC     Moving to and from a bed to a chair (including a wheelchair)? 3  -AC     Standing up from a chair using your arms (e.g., wheelchair, bedside chair)? 2  -AC     Climbing 3-5 steps with a railing? 2  -AC     To walk in hospital room? 2  -AC     AM-PAC 6 Clicks Score (PT) 15  -AC     Highest Level of Mobility Goal 4 --> Transfer to chair/commode  -AC       Row Name 01/17/25 1544          Functional Assessment    Outcome Measure Options AM-PAC 6 Clicks Daily Activity (OT)  -MR               User Key  (r) = Recorded By, (t) = Taken By, (c) = Cosigned By      Initials Name Provider Type    Beth Varner RN Registered Nurse    Pam Henry OT Occupational Therapist                    Occupational Therapy Education       Title: PT OT SLP Therapies (In Progress)       Topic: Occupational Therapy (In Progress)       Point: ADL  training (Done)       Description:   Instruct learner(s) on proper safety adaptation and remediation techniques during self care or transfers.   Instruct in proper use of assistive devices.                  Learning Progress Summary            Patient Acceptance, E, VU,NR by MR at 1/17/2025 1544    Acceptance, E,D, VU,NR by TB at 1/15/2025 1646    Acceptance, E, VU by AN at 1/13/2025 1058                      Point: Home exercise program (Not Started)       Description:   Instruct learner(s) on appropriate technique for monitoring, assisting and/or progressing therapeutic exercises/activities.                  Learner Progress:  Not documented in this visit.              Point: Precautions (Done)       Description:   Instruct learner(s) on prescribed precautions during self-care and functional transfers.                  Learning Progress Summary            Patient Acceptance, E, VU,NR by MR at 1/17/2025 1544    Acceptance, E, VU by AN at 1/13/2025 1058                      Point: Body mechanics (Done)       Description:   Instruct learner(s) on proper positioning and spine alignment during self-care, functional mobility activities and/or exercises.                  Learning Progress Summary            Patient Acceptance, E, VU,NR by MR at 1/17/2025 1544    Acceptance, E, VU by AN at 1/13/2025 1058                                      User Key       Initials Effective Dates Name Provider Type Discipline     07/11/23 -  Lindsey Sandoval, OT Occupational Therapist OT    AN 09/21/21 -  Anne Marie Cid, OT Occupational Therapist OT    MR 09/22/22 -  Pam Mak, OT Occupational Therapist OT                  OT Recommendation and Plan     Plan of Care Review  Plan of Care Reviewed With: patient  Progress: improving  Outcome Evaluation: Pt demonstrating improvements w/ bed mobility, transfers and ADLs this date. Good effort w/ HH distances of mobility to complete toileting tasks. Pt remains below her  functional baseline. Continue to progress as able per current POC.     Time Calculation:         Time Calculation- OT       Row Name 01/17/25 1544             Time Calculation- OT    OT Start Time 1455  -MR      OT Received On 01/17/25  -MR         Timed Charges    79257 - OT Therapeutic Activity Minutes 5  -MR      45454 - OT Self Care/Mgmt Minutes 10  -MR         Total Minutes    Timed Charges Total Minutes 15  -MR       Total Minutes 15  -MR                User Key  (r) = Recorded By, (t) = Taken By, (c) = Cosigned By      Initials Name Provider Type    Pam Henry OT Occupational Therapist                  Therapy Charges for Today       Code Description Service Date Service Provider Modifiers Qty    65304744373 HC OT SELF CARE/MGMT/TRAIN EA 15 MIN 1/17/2025 Pam Mak OT GO 1                 Pam Mak OT  1/17/2025    Electronically signed by Pam Mak OT at 01/17/25 1545

## 2025-01-20 NOTE — PROGRESS NOTES
[facility] Medicine Services  PROGRESS NOTE    Patient Name: Masha Chou  : 1945  MRN: [id number]    Date of Admission: 2025  Primary Care Physician: Kary Wu MD    Subjective   Subjective     CC:  Left knee pain    HPI:  Headaches better today      Objective   Objective     Vital Signs:   Temp:  [96.9 °F (36.1 °C)-98.4 °F (36.9 °C)] 98.4 °F (36.9 °C)  Heart Rate:  [77-98] 98  Resp:  [16-18] 16  BP: (104-165)/(54-97) 104/54  Flow (L/min) (Oxygen Therapy):  [2-3] 3     PE:    Non toxic, in bed  MM moist  RRR   Breath sounds clear   Abd soft  Obese  Awake, speech clear  Normal affect       Results Reviewed:  LAB RESULTS:            Lab 01/15/25  1618   SODIUM 133*   POTASSIUM 5.1   CHLORIDE 96*   CO2 27.0   ANION GAP 10.0   BUN 24*   CREATININE 1.17*   EGFR 47.9*   GLUCOSE 295*   CALCIUM 9.7                           Brief Urine Lab Results  (Last result in the past 365 days)        Color   Clarity   Blood   Leuk Est   Nitrite   Protein   CREAT   Urine HCG        25 0722 Yellow   Clear   Negative   Trace   Negative   Negative                   Microbiology Results Abnormal       None            No radiology results from the last 24 hrs    Results for orders placed during the hospital encounter of 21    Adult Transthoracic Echo Complete W/ Cont if Necessary Per Protocol    Interpretation Summary  · Estimated left ventricular EF = 70% Left ventricular ejection fraction appears to be 66 - 70%. Left ventricular systolic function is normal.  · Left ventricular diastolic function is consistent with (grade I) impaired relaxation.  · Left ventricular wall thickness is consistent with hypertrophy. Sigmoid-shaped ventricular septum is present.  · LVOT turbulence without gradient.      Current medications:  Scheduled Meds:aspirin, 81 mg, Oral, Daily  atorvastatin, 20 mg, Oral, Daily  buPROPion XL, 150 mg, Oral, Daily  cholestyramine light, 1 packet, Oral,  Daily  citalopram, 40 mg, Oral, QAM  donepezil, 10 mg, Oral, Daily  fluticasone, 2 spray, Nasal, Daily  heparin (porcine), 5,000 Units, Subcutaneous, Q12H  insulin glargine, 25 Units, Subcutaneous, Nightly  Insulin Lispro, 10 Units, Subcutaneous, TID With Meals  insulin lispro, 2-9 Units, Subcutaneous, 4x Daily AC & at Bedtime  lactobacillus acidophilus, 1 capsule, Oral, Daily  sodium chloride, 10 mL, Intravenous, Q12H  verapamil SR, 240 mg, Oral, Daily      Continuous Infusions:   PRN Meds:.  acetaminophen **OR** acetaminophen **OR** acetaminophen    albuterol    senna-docusate sodium **AND** polyethylene glycol **AND** bisacodyl **AND** bisacodyl    Calcium Replacement - Follow Nurse / BPA Driven Protocol    dextrose    dextrose    Diclofenac Sodium    glucagon (human recombinant)    ipratropium-albuterol    loperamide    Magnesium Standard Dose Replacement - Follow Nurse / BPA Driven Protocol    nitroglycerin    ondansetron ODT    Phosphorus Replacement - Follow Nurse / BPA Driven Protocol    Potassium Replacement - Follow Nurse / BPA Driven Protocol    sodium chloride    sodium chloride    Assessment & Plan   Assessment & Plan     Active Hospital Problems    Diagnosis  POA    **Falls [R29.6]  Not Applicable    Left knee pain [M25.562]  Unknown    Osteoarthritis of left knee [M17.12]  Unknown    Acute UTI (urinary tract infection) [N39.0]  Unknown    COPD exacerbation [J44.1]  Yes    Type 2 diabetes mellitus [E11.9]  Yes      Resolved Hospital Problems   No resolved problems to display.        Brief Hospital Course to date:  Masha Chou is a 79 y.o. female with type 2 diabetes, hypertension, COPD, breast cancer, pulmonary hypertension, diastolic CHF who presented to the ED after having a near fall.        Left knee pain  Osteoarthritis  Generalized weakness  -- X-ray left knee shows mild to moderate tricompartmental osteoarthritis  -- Fall precautions  -- PT/OT consult, recommending SNF  -- Consult case  management  -- Tylenol and Voltaren prn for pain    Diarrhea, chronic  -intermittent chronic diarrhea associated with dietary intake/ anxiety  -continue probiotic  -resumed home imodium prn with improvement.   -CBC today pending     Acute UTI (POA)  -- UA: Ketones trace, nitrite positive, leukocytes trace, WBC 3-5, bacteria 4+, squamous 3-6  -- Ceftin 5 days completed     CKD  -- baseline creatinine ~ 1.1-1.2   -- BMP today pending     COPD  JUAN F  Asthma  --Does not appear to be in acute exacerbation.    -- CPAP nightly  -- DuoNebs scheduled     T2DM  -- Continue lantus to 25 units and TID humalog to 10 units.  -- continue SSI moderate dose scale  -- glucose reviewed    Expected Discharge Location and Transportation: SNF to long-term care when bed available  Expected Discharge   Expected Discharge Date: 1/22/2025; Expected Discharge Time:      VTE Prophylaxis:  Pharmacologic VTE prophylaxis orders are present.         AM-PAC 6 Clicks Score (PT): 17 (01/20/25 4304)    CODE STATUS:   Code Status and Medical Interventions: CPR (Attempt to Resuscitate); Full Support   Ordered at: 01/12/25 2807     Level Of Support Discussed With:    Patient     Code Status (Patient has no pulse and is not breathing):    CPR (Attempt to Resuscitate)     Medical Interventions (Patient has pulse or is breathing):    Full Support       Salomón Hernández MD  01/20/25

## 2025-01-21 LAB
GLUCOSE BLDC GLUCOMTR-MCNC: 175 MG/DL (ref 70–130)
GLUCOSE BLDC GLUCOMTR-MCNC: 203 MG/DL (ref 70–130)
GLUCOSE BLDC GLUCOMTR-MCNC: 255 MG/DL (ref 70–130)
GLUCOSE BLDC GLUCOMTR-MCNC: 258 MG/DL (ref 70–130)
GLUCOSE BLDC GLUCOMTR-MCNC: 262 MG/DL (ref 70–130)

## 2025-01-21 PROCEDURE — 97116 GAIT TRAINING THERAPY: CPT

## 2025-01-21 PROCEDURE — G0378 HOSPITAL OBSERVATION PER HR: HCPCS

## 2025-01-21 PROCEDURE — 97535 SELF CARE MNGMENT TRAINING: CPT

## 2025-01-21 PROCEDURE — 99232 SBSQ HOSP IP/OBS MODERATE 35: CPT | Performed by: INTERNAL MEDICINE

## 2025-01-21 PROCEDURE — 63710000001 INSULIN GLARGINE PER 5 UNITS: Performed by: NURSE PRACTITIONER

## 2025-01-21 PROCEDURE — 97110 THERAPEUTIC EXERCISES: CPT

## 2025-01-21 PROCEDURE — 63710000001 INSULIN LISPRO (HUMAN) PER 5 UNITS: Performed by: INTERNAL MEDICINE

## 2025-01-21 PROCEDURE — 25010000002 HEPARIN (PORCINE) PER 1000 UNITS: Performed by: NURSE PRACTITIONER

## 2025-01-21 PROCEDURE — 96372 THER/PROPH/DIAG INJ SC/IM: CPT

## 2025-01-21 PROCEDURE — 82948 REAGENT STRIP/BLOOD GLUCOSE: CPT

## 2025-01-21 PROCEDURE — 63710000001 INSULIN LISPRO (HUMAN) PER 5 UNITS: Performed by: NURSE PRACTITIONER

## 2025-01-21 RX ADMIN — Medication 1 CAPSULE: at 08:16

## 2025-01-21 RX ADMIN — INSULIN LISPRO 10 UNITS: 100 INJECTION, SOLUTION INTRAVENOUS; SUBCUTANEOUS at 08:18

## 2025-01-21 RX ADMIN — VERAPAMIL HYDROCHLORIDE 240 MG: 240 TABLET, FILM COATED, EXTENDED RELEASE ORAL at 08:16

## 2025-01-21 RX ADMIN — DONEPEZIL HYDROCHLORIDE 10 MG: 10 TABLET, FILM COATED ORAL at 08:16

## 2025-01-21 RX ADMIN — ASPIRIN 81 MG: 81 TABLET, COATED ORAL at 08:33

## 2025-01-21 RX ADMIN — NYSTATIN: 100000 POWDER TOPICAL at 08:17

## 2025-01-21 RX ADMIN — INSULIN LISPRO 4 UNITS: 100 INJECTION, SOLUTION INTRAVENOUS; SUBCUTANEOUS at 16:46

## 2025-01-21 RX ADMIN — INSULIN LISPRO 10 UNITS: 100 INJECTION, SOLUTION INTRAVENOUS; SUBCUTANEOUS at 16:46

## 2025-01-21 RX ADMIN — INSULIN LISPRO 6 UNITS: 100 INJECTION, SOLUTION INTRAVENOUS; SUBCUTANEOUS at 20:34

## 2025-01-21 RX ADMIN — BUPROPION HYDROCHLORIDE 150 MG: 150 TABLET, EXTENDED RELEASE ORAL at 08:16

## 2025-01-21 RX ADMIN — CHOLESTYRAMINE 4 G: 4 POWDER, FOR SUSPENSION ORAL at 08:16

## 2025-01-21 RX ADMIN — NYSTATIN: 100000 POWDER TOPICAL at 20:39

## 2025-01-21 RX ADMIN — CITALOPRAM HYDROBROMIDE 40 MG: 40 TABLET ORAL at 08:16

## 2025-01-21 RX ADMIN — FLUTICASONE PROPIONATE 2 SPRAY: 50 SPRAY, METERED NASAL at 08:17

## 2025-01-21 RX ADMIN — INSULIN LISPRO 6 UNITS: 100 INJECTION, SOLUTION INTRAVENOUS; SUBCUTANEOUS at 12:00

## 2025-01-21 RX ADMIN — INSULIN LISPRO 10 UNITS: 100 INJECTION, SOLUTION INTRAVENOUS; SUBCUTANEOUS at 12:00

## 2025-01-21 RX ADMIN — HEPARIN SODIUM 5000 UNITS: 5000 INJECTION INTRAVENOUS; SUBCUTANEOUS at 08:16

## 2025-01-21 RX ADMIN — HEPARIN SODIUM 5000 UNITS: 5000 INJECTION INTRAVENOUS; SUBCUTANEOUS at 20:34

## 2025-01-21 RX ADMIN — INSULIN GLARGINE 25 UNITS: 100 INJECTION, SOLUTION SUBCUTANEOUS at 20:33

## 2025-01-21 RX ADMIN — ATORVASTATIN CALCIUM 20 MG: 20 TABLET, FILM COATED ORAL at 08:16

## 2025-01-21 RX ADMIN — ACETAMINOPHEN 650 MG: 325 TABLET ORAL at 12:03

## 2025-01-21 RX ADMIN — INSULIN LISPRO 2 UNITS: 100 INJECTION, SOLUTION INTRAVENOUS; SUBCUTANEOUS at 08:17

## 2025-01-21 NOTE — PLAN OF CARE
Goal Outcome Evaluation:  Plan of Care Reviewed With: patient        Progress: improving  Outcome Evaluation: Pt. continues to present below baseline function w/generalized weakness, balance deficits and decreased functional endurance affecting her ability to safely participate in functional mobility. She performed bed mobility, transfers and ambulated 80' w/front wheeled walker, contact guard assist. Activity limited by fatigue, weakness. Pt. tolerated ther-ex well. Continue acute PT POC to progress as tolerated.    Anticipated Discharge Disposition (PT): skilled nursing facility

## 2025-01-21 NOTE — THERAPY TREATMENT NOTE
Patient Name: Masha Chou  : 1945    MRN: [id number]                              Today's Date: 2025       Admit Date: 2025    Visit Dx:     ICD-10-CM ICD-9-CM   1. Falls  R29.6 V15.88   2. Acute cystitis without hematuria  N30.00 595.0     Patient Active Problem List   Diagnosis    Chest pain    Type 2 diabetes mellitus    Pulmonary HTN    Heart failure    COPD exacerbation    Chronic respiratory failure with hypoxia    Falls    Left knee pain    Osteoarthritis of left knee    Acute UTI (urinary tract infection)     Past Medical History:   Diagnosis Date    Asthma     Cancer     BILATERAL BREAST     COPD (chronic obstructive pulmonary disease)     Diabetes mellitus     Heart failure     Hypertension     Pulmonary hypertension      Past Surgical History:   Procedure Laterality Date    BREAST SURGERY      CHOLECYSTECTOMY      HYSTERECTOMY      JOINT REPLACEMENT      RIGHT KNEE    KELOID EXCISION        General Information       Row Name 25 1055          OT Time and Intention    Subjective Information complains of;weakness  -TB     Document Type therapy note (daily note)  -TB     Mode of Treatment occupational therapy;individual therapy  -TB     Patient Effort good  -TB     Symptoms Noted During/After Treatment none  -TB       Row Name 25 1055          General Information    Patient Profile Reviewed yes  -TB     Existing Precautions/Restrictions fall;oxygen therapy device and L/min;other (see comments)  Incontinent  -TB     Barriers to Rehab previous functional deficit;medically complex  -TB       Row Name 25 1055          Cognition    Orientation Status (Cognition) oriented x 4  -TB       Row Name 25 1059          Safety Issues/Impairments Affecting Functional Mobility    Safety Issues Affecting Function (Mobility) insight into deficits/self-awareness;awareness of need for assistance;safety precautions follow-through/compliance;safety precaution awareness;sequencing  abilities  -TB     Impairments Affecting Function (Mobility) balance;endurance/activity tolerance;pain;strength;shortness of breath  -TB     Cognitive Impairments, Mobility Safety/Performance awareness, need for assistance;insight into deficits/self-awareness;problem-solving/reasoning;safety precaution awareness;safety precaution follow-through;sequencing abilities  -TB     Comment, Safety Issues/Impairments (Mobility) Pt is up in room with RW support and CGAx1 using RW.  -TB               User Key  (r) = Recorded By, (t) = Taken By, (c) = Cosigned By      Initials Name Provider Type    TB Lindsey Sandoval, OT Occupational Therapist                     Mobility/ADL's       Row Name 01/21/25 1057          Bed Mobility    Bed Mobility scooting/bridging;sidelying-sit;rolling left  -TB     [place] (Bed Mobility) standby assist;verbal cues  -TB     Scooting/[place]ridging [place] (Bed Mobility) standby assist;verbal cues  -TB     Sidelying-Sit [place] (Bed Mobility) minimum assist (75% patient effort);verbal cues  -TB     Bed Mobility, Safety Issues decreased use of arms for pushing/pulling;impaired trunk control for bed mobility  -TB     Assistive Device (Bed Mobility) head of bed elevated;bed rails  -TB     Comment, (Bed Mobility) Pt transitions to EOB sitting with good effort and time.  -TB       Row Name 01/21/25 1057          Transfers    Transfers sit-stand transfer;stand-sit transfer;toilet transfer;bed-chair transfer  -TB     Comment, (Transfers) Education and cues for hand placement, sequencing, and O2 line mgmt  -TB       Row Name 01/21/25 1057          Bed-Chair Transfer    Bed-[place]air [place] (Transfers) contact guard;1 person assist;verbal cues  -TB     Assistive Device (Bed-Chair Transfers) walker, front-wheeled  -TB       Row Name 01/21/25 1057          Sit-Stand Transfer    Sit-Stand [place] (Transfers) contact guard;1 person assist;verbal cues  -TB     Assistive Device  (Sit-Stand Transfers) walker, front-wheeled  -TB       Row Name 01/21/25 1057          Stand-Sit Transfer    Stand-Sit [place] (Transfers) contact guard;1 person assist;verbal cues  -TB     Assistive Device (Stand-Sit Transfers) walker, front-wheeled  -TB       Row Name 01/21/25 1057          Toilet Transfer    Type (Toilet Transfer) sit-stand;stand-sit  -TB     [place] Level (Toilet Transfer) minimum assist (75% patient effort);1 person assist;verbal cues  -TB     Assistive Device (Toilet Transfer) commode, bedside without drop arms;walker, front-wheeled  -TB       Row Name 01/21/25 1057          Functional Mobility    Functional Mobility- Ind. Level contact guard assist;1 person;verbal cues required  -TB     Functional Mobility- Device walker, front-wheeled  -TB     Functional Mobility-Distance (Feet) 15  -TB     Functional Mobility- Safety Issues sequencing ability decreased;balance decreased during turns  -TB     Functional Mobility- Comment Pt is up in room with good effort and time. No LOB.  -TB     Patient was able to Ambulate yes  -TB       Row Name 01/21/25 1057          Activities of Daily Living    BADL Assessment/Intervention upper body dressing;grooming;toileting  -TB       Row Name 01/21/25 1057          Upper Body Dressing Assessment/Training    [place] Level (Upper Body Dressing) don;pajama/robe;minimum assist (75% patient effort)  -TB     Position (Upper Body Dressing) edge of bed sitting  -TB       Row Name 01/21/25 1057          Toileting Assessment/Training    [place] Level (Toileting) moderate assist (50% patient effort);adjust/manage clothing;dependent (less than 25% patient effort);perform perineal hygiene  -TB     Position (Toileting) unsupported sitting;supported standing  -TB       Row Name 01/21/25 1057          Self-Feeding Assessment/Training    [place] Level (Feeding) independent;liquids to mouth  -TB     Position (Feeding) supported sitting  -TB       Row Name  01/21/25 1057          Grooming Assessment/Training    [place] Level (Grooming) set up;standby assist;wash face, hands;oral care regimen;hair care, combing/brushing  -     Position (Grooming) supported sitting  -               User Key  (r) = Recorded By, (t) = Taken By, (c) = Cosigned By      Initials Name Provider Type     Lindsey Sandoval, OT Occupational Therapist                   Obj/Interventions       Row Name 01/21/25 1100          Shoulder (Therapeutic Exercise)    Shoulder (Therapeutic Exercise) AROM (active range of motion)  -     Shoulder AROM (Therapeutic Exercise) bilateral;flexion;extension;aBduction;aDduction;scapular retraction;sitting;10 repetitions  -TB       Row Name 01/21/25 1100          Elbow/Forearm (Therapeutic Exercise)    Elbow/Forearm (Therapeutic Exercise) AROM (active range of motion)  -TB     Elbow/Forearm AROM (Therapeutic Exercise) bilateral;flexion;extension;sitting;10 repetitions  -       Row Name 01/21/25 1100          Motor Skills    Therapeutic Exercise shoulder;elbow/forearm  BUE ther ex completed to support self-care performance  -       Row Name 01/21/25 1100          Balance    Balance Assessment sitting dynamic balance;sit to stand dynamic balance;standing dynamic balance;standing static balance  -     Dynamic Sitting Balance supervision  -TB     Position, Sitting Balance unsupported;sitting in chair;sitting edge of bed;other (see comments)  [facility] [place]  -     Sit to Stand Dynamic Balance contact guard;1-person assist;verbal cues  -TB     Static Standing Balance contact guard;1-person assist;verbal cues  -TB     Dynamic Standing Balance minimal assist;1-person assist;verbal cues  -TB     Position/Device Used, Standing Balance supported;walker, front-wheeled  -TB     Balance Interventions sitting;standing;sit to stand;supported;static;dynamic;dynamic reaching;occupation based/functional task;UE activity with balance activity  -     Comment, Balance No  LOB  -TB               User Key  (r) = Recorded By, (t) = Taken By, (c) = Cosigned By      Initials Name Provider Type    TB Lindsey Sandoval, OT Occupational Therapist                   Goals/Plan    No documentation.                  Clinical Impression       Row Name 01/21/25 1101          Pain Assessment    Pretreatment Pain Rating 0/10 - no pain  -TB     Posttreatment Pain Rating 0/10 - no pain  -TB     Pre/Posttreatment Pain Comment No c/o pain with OOB activity  -TB       Row Name 01/21/25 1101          Plan of Care Review    Plan of Care Reviewed With patient  -TB     Progress improving  -TB     Outcome Evaluation Pt is A/Ox4 and participates in therapy with good effort and increased time. Remains below her baseline with strength and activity tolerance deficits. Pt is up in room x15' with RW support and CGAx1. Completes BSC transfer with Min Ax1 and chair transfer with CGAx1. Pt continues to require assist with all LB ADLs and toileting. OT will follow IP. Plan is SNF when pt is ready.  -TB       Row Name 01/21/25 1101          Therapy Plan Review/Discharge Plan (OT)    Anticipated Discharge Disposition (OT) skilled nursing facility  -TB       Row Name 01/21/25 1101          Vital Signs    Pre Systolic BP Rehab --  RN cleared OT  -TB     Pre SpO2 (%) 94  -TB     O2 Delivery Pre Treatment supplemental O2  Chronic 3L  -TB     Post SpO2 (%) 95  -TB     O2 Delivery Post Treatment supplemental O2  -TB     Pre Patient Position Supine  -TB     Intra Patient Position Standing  -TB     Post Patient Position Sitting  -TB       Row Name 01/21/25 1101          Positioning and Restraints    Pre-Treatment Position in bed  -TB     Post Treatment Position chair  -TB     In Chair notified nsg;reclined;call light within reach;encouraged to call for assist;exit alarm on;waffle cushion;legs elevated  -TB               User Key  (r) = Recorded By, (t) = Taken By, (c) = Cosigned By      Initials Name Provider Type    TB  Lindsey Sandoval OT Occupational Therapist                   Outcome Measures       Row Name 01/21/25 1105          How much help from another is currently needed...    Putting on and taking off regular lower body clothing? 2  -TB     Bathing (including washing, rinsing, and drying) 2  -TB     Toileting (which includes using toilet bed pan or urinal) 2  -TB     Putting on and taking off regular upper body clothing 3  -TB     Taking care of personal grooming (such as brushing teeth) 3  -TB     Eating meals 4  -TB     AM-PAC 6 Clicks Score (OT) 16  -TB       Row Name 01/21/25 1105          Functional Assessment    Outcome Measure Options AM-PAC 6 Clicks Daily Activity (OT)  -TB               User Key  (r) = Recorded By, (t) = Taken By, (c) = Cosigned By      Initials Name Provider Type    TB Lindsey Sandoval OT Occupational Therapist                    Occupational Therapy Education       Title: PT OT SLP Therapies (In Progress)       Topic: Occupational Therapy (Done)       Point: ADL training (Done)       Description:   Instruct learner(s) on proper safety adaptation and remediation techniques during self care or transfers.   Instruct in proper use of assistive devices.                  Learning Progress Summary            Patient Acceptance, E,D, VU,NR,DU by TB at 1/21/2025 1105    Acceptance, E, VU,NR by MR at 1/17/2025 1544    Acceptance, E,D, VU,NR by TB at 1/15/2025 1646    Acceptance, E, VU by AN at 1/13/2025 1058                      Point: Home exercise program (Done)       Description:   Instruct learner(s) on appropriate technique for monitoring, assisting and/or progressing therapeutic exercises/activities.                  Learning Progress Summary            Patient Acceptance, E,D, VU,NR,DU by TB at 1/21/2025 1105                      Point: Precautions (Done)       Description:   Instruct learner(s) on prescribed precautions during self-care and functional transfers.                   Learning Progress Summary            Patient Acceptance, E, VU,NR by MR at 1/17/2025 1544    Acceptance, E, VU by AN at 1/13/2025 1058                      Point: Body mechanics (Done)       Description:   Instruct learner(s) on proper positioning and spine alignment during self-care, functional mobility activities and/or exercises.                  Learning Progress Summary            Patient Acceptance, E, VU,NR by MR at 1/17/2025 1544    Acceptance, E, VU by AN at 1/13/2025 1058                                      User Key       Initials Effective Dates Name Provider Type Discipline    TB 07/11/23 -  Lindsey Sandoval, OT Occupational Therapist OT    AN 09/21/21 -  Anne Marie Cid, OT Occupational Therapist OT    MR 09/22/22 -  Pam Mak, OT Occupational Therapist OT                  OT Recommendation and Plan     Plan of Care Review  Plan of Care Reviewed With: patient  Progress: improving  Outcome Evaluation: Pt is A/Ox4 and participates in therapy with good effort and increased time. Remains below her baseline with strength and activity tolerance deficits. Pt is up in room x15' with RW support and CGAx1. Completes BSC transfer with Min Ax1 and chair transfer with CGAx1. Pt continues to require assist with all LB ADLs and toileting. OT will follow IP. Plan is SNF when pt is ready.     Time Calculation:         Time Calculation- OT       Row Name 01/21/25 0927             Time Calculation- OT    OT Start Time 0927  -TB      OT Received On 01/21/25  -TB      OT Goal Re-Cert Due Date 01/23/25  -TB         Timed Charges    30158 - OT Self Care/Mgmt Minutes 38  -TB         Total Minutes    Timed Charges Total Minutes 38  -TB       Total Minutes 38  -TB                User Key  (r) = Recorded By, (t) = Taken By, (c) = Cosigned By      Initials Name Provider Type    TB Lindsey Sandoval, OT Occupational Therapist                  Therapy Charges for Today       Code Description Service Date Service  Provider Modifiers Qty    [id number] HC OT SELF CARE/MGMT/TRAIN EA 15 MIN 1/21/2025 Lindsey Sandoval OT GO 3                 Lindsey Sandoval OT  1/21/2025

## 2025-01-21 NOTE — THERAPY TREATMENT NOTE
Patient Name: Masha Chou  : 1945    MRN: [id number]                              Today's Date: 2025       Admit Date: 2025    Visit Dx:     ICD-10-CM ICD-9-CM   1. Falls  R29.6 V15.88   2. Acute cystitis without hematuria  N30.00 595.0     Patient Active Problem List   Diagnosis    Chest pain    Type 2 diabetes mellitus    Pulmonary HTN    Heart failure    COPD exacerbation    Chronic respiratory failure with hypoxia    Falls    Left knee pain    Osteoarthritis of left knee    Acute UTI (urinary tract infection)     Past Medical History:   Diagnosis Date    Asthma     Cancer     BILATERAL BREAST     COPD (chronic obstructive pulmonary disease)     Diabetes mellitus     Heart failure     Hypertension     Pulmonary hypertension      Past Surgical History:   Procedure Laterality Date    BREAST SURGERY      CHOLECYSTECTOMY      HYSTERECTOMY      JOINT REPLACEMENT      RIGHT KNEE    KELOID EXCISION        General Information       Row Name 25 1554          Physical Therapy Time and Intention    Document Type therapy note (daily note)  -     Mode of Treatment physical therapy  -       Row Name 25 1554          General Information    Existing Precautions/Restrictions fall;oxygen therapy device and L/min;other (see comments)  incontinent  -     Barriers to Rehab medically complex;previous functional deficit  -       Row Name 25 1554          Cognition    Orientation Status (Cognition) oriented x 4  -SS       Row Name 25 1554          Safety Issues/Impairments Affecting Functional Mobility    Safety Issues Affecting Function (Mobility) awareness of need for assistance;insight into deficits/self-awareness;judgment;positioning of assistive device;problem-solving;safety precaution awareness;safety precautions follow-through/compliance;sequencing abilities  -     Impairments Affecting Function (Mobility) balance;endurance/activity tolerance;pain;strength;shortness of  breath;postural/trunk control  -               User Key  (r) = Recorded By, (t) = Taken By, (c) = Cosigned By      Initials Name Provider Type    SS Tali Webb, PT Physical Therapist                   Mobility       Row Name 01/21/25 1556          Bed Mobility    Bed Mobility scooting/bridging;supine-sit;rolling left;rolling right  -SS     Rolling Left [place] (Bed Mobility) standby assist;verbal cues  -SS     Rolling Right [place] (Bed Mobility) verbal cues;standby assist  -SS     Scooting/Bridging [place] (Bed Mobility) minimum assist (75% patient effort);verbal cues  -SS     Supine-Sit [place] (Bed Mobility) minimum assist (75% patient effort);verbal cues  -SS     Assistive Device (Bed Mobility) head of bed elevated;bed rails  -     Comment, (Bed Mobility) VC for sequencing  -       Row Name 01/21/25 1556          Sit-Stand Transfer    Sit-Stand [place] (Transfers) contact guard;verbal cues  -     Assistive Device (Sit-Stand Transfers) walker, front-wheeled  -     Comment, (Sit-Stand Transfer) VC for hand placement, appropriate alignment, emphasis on lowering with eccentric control  -       Row Name 01/21/25 1556          Gait/Stairs (Locomotion)    [place] Level (Gait) verbal cues;contact guard  -     Assistive Device (Gait) walker, front-wheeled  -SS     Patient was able to Ambulate yes  -SS     Distance in Feet (Gait) 80  -SS     Deviations/Abnormal Patterns (Gait) bilateral deviations;base of support, wide;leighann decreased;gait speed decreased;stride length decreased  -     Bilateral Gait Deviations heel strike decreased;forward flexed posture  -     Comment, (Gait/Stairs) Pt. ambulated with a step through gait pattern. VC for upright posture, decreased shoulder elevation, pursed lip breathing. Activity limited by fatigue.  -SS               User Key  (r) = Recorded By, (t) = Taken By, (c) = Cosigned By      Initials Name Provider Type    SS Tali Webb,  PT Physical Therapist                   Obj/Interventions       Row Name 01/21/25 1559          Motor Skills    Therapeutic Exercise hip;knee;ankle  -       Row Name 01/21/25 1559          Hip (Therapeutic Exercise)    Hip (Therapeutic Exercise) isometric exercises;strengthening exercise  -     Hip Isometrics (Therapeutic Exercise) bilateral;gluteal sets;10 repetitions  -     Hip Strengthening (Therapeutic Exercise) bilateral;aBduction;aDduction;heel slides;marching while seated;10 repetitions  -       Row Name 01/21/25 1559          Knee (Therapeutic Exercise)    Knee (Therapeutic Exercise) isometric exercises  -     Knee Isometrics (Therapeutic Exercise) bilateral;quad sets;10 repetitions  -     Knee Strengthening (Therapeutic Exercise) bilateral;SLR (straight leg raise);LAQ (long arc quad);10 repetitions  -       Row Name 01/21/25 1559          Ankle (Therapeutic Exercise)    Ankle (Therapeutic Exercise) AROM (active range of motion)  -     Ankle AROM (Therapeutic Exercise) bilateral;plantarflexion;dorsiflexion;10 repetitions  -       Row Name 01/21/25 1559          Balance    Balance Assessment sitting static balance;sitting dynamic balance;standing static balance;standing dynamic balance  -     Static Sitting Balance supervision  -     Dynamic Sitting Balance supervision  -     Position, Sitting Balance unsupported;sitting edge of bed  -     Static Standing Balance contact guard  -     Dynamic Standing Balance minimal assist  -     Position/Device Used, Standing Balance supported;walker, front-wheeled  -     Balance Interventions sitting;standing;sit to stand;supported;static;dynamic  -               User Key  (r) = Recorded By, (t) = Taken By, (c) = Cosigned By      Initials Name Provider Type     Tali Webb, PT Physical Therapist                   Goals/Plan       Row Name 01/21/25 1609          Bed Mobility Goal 1 (PT)    Activity/Assistive Device (Bed Mobility Goal 1,  PT) sit to supine/supine to sit  -     [place] Level/Cues Needed (Bed Mobility Goal 1, PT) independent  -SS     Time Frame (Bed Mobility Goal 1, PT) short term goal (STG);5 days  -     Progress/Outcomes (Bed Mobility Goal 1, PT) good progress toward goal;goal revised this date  -               User Key  (r) = Recorded By, (t) = Taken By, (c) = Cosigned By      Initials Name Provider Type     Tali Webb, PT Physical Therapist                   Clinical Impression       Row Name 01/21/25 1600          Pain    Pretreatment Pain Rating 4/10  -SS     Posttreatment Pain Rating 4/10  -     Pain Location head  -     Pain Side/Orientation generalized  -     Pain Management Interventions activity modification encouraged;movement retraining implemented;exercise or physical activity utilized;positioning techniques utilized  -     Response to Pain Interventions activity level improved;activity participation with tolerable pain  -       Row Name 01/21/25 1600          Plan of Care Review    Plan of Care Reviewed With patient  -     Progress improving  -     Outcome Evaluation Pt. continues to present below baseline function w/generalized weakness, balance deficits and decreased functional endurance affecting her ability to safely participate in functional mobility. She performed bed mobility, transfers and ambulated 80' w/front wheeled walker, contact guard assist. Activity limited by fatigue, weakness. Pt. tolerated ther-ex well. Continue acute PT POC to progress as tolerated.  -       Row Name 01/21/25 1600          Therapy Assessment/Plan (PT)    Rehab Potential (PT) good  -     Criteria for Skilled Interventions Met (PT) yes;meets criteria;skilled treatment is necessary  -     Therapy Frequency (PT) daily  -       Row Name 01/21/25 1600          Vital Signs    Pre Systolic BP Rehab 136  -SS     Pre Treatment Diastolic BP 71  -SS     Pretreatment Heart Rate (beats/min) 73  -SS     Pre  SpO2 (%) 95  -SS     O2 Delivery Pre Treatment nasal cannula  5L  -SS     Pre Patient Position Supine  -SS       Row Name 01/21/25 1600          Positioning and Restraints    Pre-Treatment Position in bed  -SS     Post Treatment Position chair  -SS     In Chair notified nsg;reclined;call light within reach;encouraged to call for assist;exit alarm on;waffle cushion;legs elevated  -SS               User Key  (r) = Recorded By, (t) = Taken By, (c) = Cosigned By      Initials Name Provider Type     Tali Webb, PARRIS Physical Therapist                   Outcome Measures       Row Name 01/21/25 1603          How much help from another person do you currently need...    Turning from your back to your side while in flat bed without using bedrails? 3  -SS     Moving from lying on back to sitting on the side of a flat bed without bedrails? 3  -SS     Moving to and from a bed to a chair (including a wheelchair)? 3  -SS     Standing up from a chair using your arms (e.g., wheelchair, bedside chair)? 3  -SS     Climbing 3-5 steps with a railing? 2  -SS     To walk in hospital room? 3  -SS     AM-PAC 6 Clicks Score (PT) 17  -SS     Highest Level of Mobility Goal 5 --> Static standing  -       Row Name 01/21/25 1603 01/21/25 1105       Functional Assessment    Outcome Measure Options AM-PAC 6 Clicks Basic Mobility (PT)  -SS AM-PAC 6 Clicks Daily Activity (OT)  -TB              User Key  (r) = Recorded By, (t) = Taken By, (c) = Cosigned By      Initials Name Provider Type    TB Lindsey Sandoval, OT Occupational Therapist     Tali Webb PT Physical Therapist                                 Physical Therapy Education       Title: PT OT SLP Therapies (Done)       Topic: Physical Therapy (Done)       Point: Mobility training (Done)       Learning Progress Summary            Patient Eager, E, VU,DU,NR by  at 1/21/2025 1603    Comment: Reviewed safety/technique w/bed mobility, transfers, ambulation, HEP, PT POC     Acceptance, E,D, VU,NR by AB at 1/17/2025 1551    Acceptance, E, NR by AS at 1/14/2025 1406                      Point: Home exercise program (Done)       Learning Progress Summary            Patient Eager, E, VU,DU,NR by  at 1/21/2025 1603    Comment: Reviewed safety/technique w/bed mobility, transfers, ambulation, HEP, PT POC    Acceptance, E, NR by AS at 1/14/2025 1406    Acceptance, E, VU by CK at 1/13/2025 1056                      Point: Body mechanics (Done)       Learning Progress Summary            Patient Eager, E, VU,DU,NR by SS at 1/21/2025 1603    Comment: Reviewed safety/technique w/bed mobility, transfers, ambulation, HEP, PT POC    Acceptance, E,D, VU,NR by AB at 1/17/2025 1551    Acceptance, E, NR by AS at 1/14/2025 1406    Acceptance, E, VU by CK at 1/13/2025 1056                      Point: Precautions (Done)       Learning Progress Summary            Patient Eager, E, VU,DU,NR by  at 1/21/2025 1603    Comment: Reviewed safety/technique w/bed mobility, transfers, ambulation, HEP, PT POC    Acceptance, E,D, VU,NR by AB at 1/17/2025 1551    Acceptance, E, NR by AS at 1/14/2025 1406    Acceptance, E, VU by CK at 1/13/2025 1056                                      User Key       Initials Effective Dates Name Provider Type Discipline    AS 12/13/24 -  Alejandra Crowder, PTA Physical Therapist Assistant PT     06/01/21 -  aTli Webb, PT Physical Therapist PT    AB 09/22/22 -  Alejandra Farias, PT Physical Therapist PT    CK 02/06/24 -  Loretta Webber, PT Physical Therapist PT                  PT Recommendation and Plan     Progress: improving  Outcome Evaluation: Pt. continues to present below baseline function w/generalized weakness, balance deficits and decreased functional endurance affecting her ability to safely participate in functional mobility. She performed bed mobility, transfers and ambulated 80' w/front wheeled walker, contact guard assist. Activity limited by fatigue,  weakness. Pt. tolerated ther-ex well. Continue acute PT POC to progress as tolerated.     Time Calculation:         PT Charges       Row Name 01/21/25 1604             Time Calculation    Start Time 1455  -SS      PT Received On 01/21/25  -SS         Timed Charges    33350 - PT Therapeutic Exercise Minutes 10  -SS      67371 - Gait Training Minutes  10  -SS      36801 - PT Therapeutic Activity Minutes 5  -SS         Total Minutes    Timed Charges Total Minutes 25  -SS       Total Minutes 25  -SS                User Key  (r) = Recorded By, (t) = Taken By, (c) = Cosigned By      Initials Name Provider Type    SS Tali Webb PT Physical Therapist                  Therapy Charges for Today       Code Description Service Date Service Provider Modifiers Qty    82222857390 HC PT THER PROC EA 15 MIN 1/21/2025 Tali Webb, PT GP 1    72744290999 HC GAIT TRAINING EA 15 MIN 1/21/2025 Tali Webb, PT GP 1    91025977491 HC PT THER SUPP EA 15 MIN 1/21/2025 Tali Webb, PT GP 2            PT G-Codes  Outcome Measure Options: AM-PAC 6 Clicks Basic Mobility (PT)  AM-PAC 6 Clicks Score (PT): 17  AM-PAC 6 Clicks Score (OT): 16  PT Discharge Summary  Anticipated Discharge Disposition (PT): skilled nursing facility    Tali Webb PT  1/21/2025

## 2025-01-21 NOTE — PROGRESS NOTES
[facility] Medicine Services  PROGRESS NOTE    Patient Name: Masha Chou  : 1945  MRN: [id number]    Date of Admission: 2025  Primary Care Physician: Kary Wu MD    Subjective   Subjective     CC:  Left knee pain    HPI:  Feels fine today. No headache.  Strength overall improving.      Objective   Objective     Vital Signs:   Temp:  [97.3 °F (36.3 °C)-98.6 °F (37 °C)] 97.3 °F (36.3 °C)  Heart Rate:  [72-85] 85  Resp:  [16-18] 18  BP: (131-156)/(62-93) 146/74  Flow (L/min) (Oxygen Therapy):  [2-3] 2     PE:    Non toxic, in bed  MM moist  RRR  CTAB  Abd soft  Obese  Alert, speech clear  Normal affect       Results Reviewed:  LAB RESULTS:      Lab 25  1246   WBC 9.94   HEMOGLOBIN 13.5   HEMATOCRIT 42.6   PLATELETS 187   MCV 91.8           Lab 25  1246 01/15/25  1618   SODIUM 134* 133*   POTASSIUM 4.8 5.1   CHLORIDE 93* 96*   CO2 30.0* 27.0   ANION GAP 11.0 10.0   BUN 18 24*   CREATININE 0.92 1.17*   EGFR 63.9 47.9*   GLUCOSE 194* 295*   CALCIUM 9.7 9.7                           Brief Urine Lab Results  (Last result in the past 365 days)        Color   Clarity   Blood   Leuk Est   Nitrite   Protein   CREAT   Urine HCG        25 0722 Yellow   Clear   Negative   Trace   Negative   Negative                   Microbiology Results Abnormal       None            No radiology results from the last 24 hrs    Results for orders placed during the hospital encounter of 21    Adult Transthoracic Echo Complete W/ Cont if Necessary Per Protocol    Interpretation Summary  · Estimated left ventricular EF = 70% Left ventricular ejection fraction appears to be 66 - 70%. Left ventricular systolic function is normal.  · Left ventricular diastolic function is consistent with (grade I) impaired relaxation.  · Left ventricular wall thickness is consistent with hypertrophy. Sigmoid-shaped ventricular septum is present.  · LVOT turbulence without  gradient.      Current medications:  Scheduled Meds:aspirin, 81 mg, Oral, Daily  atorvastatin, 20 mg, Oral, Daily  buPROPion XL, 150 mg, Oral, Daily  cholestyramine light, 1 packet, Oral, Daily  citalopram, 40 mg, Oral, QAM  donepezil, 10 mg, Oral, Daily  fluticasone, 2 spray, Nasal, Daily  heparin (porcine), 5,000 Units, Subcutaneous, Q12H  insulin glargine, 25 Units, Subcutaneous, Nightly  Insulin Lispro, 10 Units, Subcutaneous, TID With Meals  insulin lispro, 2-9 Units, Subcutaneous, 4x Daily AC & at Bedtime  lactobacillus acidophilus, 1 capsule, Oral, Daily  nystatin, , Topical, Q12H  sodium chloride, 10 mL, Intravenous, Q12H  verapamil SR, 240 mg, Oral, Daily      Continuous Infusions:   PRN Meds:.  acetaminophen **OR** acetaminophen **OR** acetaminophen    albuterol    senna-docusate sodium **AND** polyethylene glycol **AND** bisacodyl **AND** bisacodyl    Calcium Replacement - Follow Nurse / BPA Driven Protocol    dextrose    dextrose    Diclofenac Sodium    glucagon (human recombinant)    ipratropium-albuterol    loperamide    Magnesium Standard Dose Replacement - Follow Nurse / BPA Driven Protocol    nitroglycerin    ondansetron ODT    Phosphorus Replacement - Follow Nurse / BPA Driven Protocol    Potassium Replacement - Follow Nurse / BPA Driven Protocol    sodium chloride    sodium chloride    Assessment & Plan   Assessment & Plan     Active Hospital Problems    Diagnosis  POA    **Falls [R29.6]  Not Applicable    Left knee pain [M25.562]  Unknown    Osteoarthritis of left knee [M17.12]  Unknown    Acute UTI (urinary tract infection) [N39.0]  Unknown    COPD exacerbation [J44.1]  Yes    Type 2 diabetes mellitus [E11.9]  Yes      Resolved Hospital Problems   No resolved problems to display.        Brief Hospital Course to date:  Masha Chou is a 79 y.o. female with type 2 diabetes, hypertension, COPD, breast cancer, pulmonary hypertension, diastolic CHF who presented to the ED after having a near  fall.        Left knee pain  Osteoarthritis  Generalized weakness  -- X-ray left knee shows mild to moderate tricompartmental osteoarthritis  -- Fall precautions  -- PT/OT consult, recommending SNF  -- Consult case management  -- Tylenol and Voltaren prn for pain    Diarrhea, chronic  -intermittent chronic diarrhea associated with dietary intake/ anxiety  -continue probiotic  -resumed home imodium prn with improvement.   -no current leukocytosis     Acute UTI (POA)  -- UA: Ketones trace, nitrite positive, leukocytes trace, WBC 3-5, bacteria 4+, squamous 3-6  -- Ceftin 5 days completed     CKD  -- baseline creatinine ~ 1.1-1.2      COPD  JUAN F  Asthma  -- Does not appear to be in acute exacerbation.    -- CPAP nightly  -- DuoNebs scheduled     T2DM  -- Continue lantus to 25 units and TID humalog to 10 units.  -- continue SSI moderate dose scale  -- glucose reviewed again today    Expected Discharge Location and Transportation: SNF to long-term [facility] when bed available  Expected Discharge   Expected Discharge Date: 1/22/2025; Expected Discharge Time:      VTE Prophylaxis:  Pharmacologic VTE prophylaxis orders are present.         AM-PAC 6 Clicks Score (PT): 17 (01/21/25 2793)    CODE STATUS:   Code Status and Medical Interventions: CPR (Attempt to Resuscitate); Full Support   Ordered at: 01/12/25 2986     Level Of Support Discussed With:    Patient     Code Status (Patient has no pulse and is not breathing):    CPR (Attempt to Resuscitate)     Medical Interventions (Patient has pulse or is breathing):    Full Support       Salomón Hernández MD  01/21/25

## 2025-01-21 NOTE — PLAN OF CARE
Problem: Adult Inpatient Plan of Care  Goal: Plan of Care Review  Recent Flowsheet Documentation  Taken 1/21/2025 1101 by Lindsey Sandoval OT  Progress: improving  Outcome Evaluation: Pt is A/Ox4 and participates in therapy with good effort and increased time. Remains below her baseline with strength and activity tolerance deficits. Pt is up in room x15' with RW support and CGAx1. Completes BSC transfer with Min Ax1 and chair transfer with CGAx1. Pt continues to require assist with all LB ADLs and toileting. OT will follow IP. Plan is SNF when pt is ready.

## 2025-01-21 NOTE — NURSING NOTE
Patient A&O X4, vss, 2L O2, CPAP while sleeping. Allevyn changed. New rash under Left breast, nystatin ordered. Voiding well, patient reported headache and requested tylenol PO.

## 2025-01-21 NOTE — DISCHARGE PLACEMENT REQUEST
"Masha Chou N (79 y.o. Female)       Updated clinical      Lillian Marley RN   [phone]          Date of Birth   1945    Social Security Number       Address   [address] [place], [place] [address]    Home Phone   [phone]    MRN   [id number]       Faith   Quaker    Marital Status                               Admission Date   1/12/25    Admission Type   Emergency    Admitting Provider   Salomón Hernández MD    Attending Provider   Salomón Hernández MD    Department, Room/Bed   [facility] 3H, S375/1       Discharge Date       Discharge Disposition       Discharge Destination                                 Attending Provider: Salomón Hernández MD    Allergies: Contrast Dye (Echo Or Unknown Ct/mr), Shellfish-derived Products, Fluoxetine, Mushroom, Mushroom Extract Complex (Do Not Select), Doxycycline, Sulfamethoxazole-trimethoprim, Bupropion, Levofloxacin, Penicillins, Prednisone & Diphenhydramine    Isolation: None   Infection: None   Code Status: CPR    Ht: 142.2 cm (56\")   Wt: 109 kg (240 lb)    Admission Cmt: None   Principal Problem: Falls [R29.6]                   Active Insurance as of 1/12/2025       Primary Coverage       Payor Plan Insurance Group Employer/Plan Group    ANTHEM MEDICARE REPLACEMENT ANTHEM MED ADV HMO [id number]       Payor Plan Address Payor Plan Phone Number Payor Plan Fax Number Effective Dates    [address] [phone]  1/1/2025 - None Entered    [facility] [id number]         Subscriber Name Subscriber Birth Date Member ID       MASHA CHOU N 1945 [id number]                     Emergency Contacts        (Rel.) Home Phone Work Phone Mobile Phone    DONALD NOVA SINGH (Daughter) [phone] -- [phone]              Current Facility-Administered Medications   Medication Dose Route Frequency Provider Last Rate Last Admin    acetaminophen (TYLENOL) tablet 650 mg  650 mg Oral Q4H PRN Rosanna Mckeon APRN   650 mg at " 01/21/25 1203    Or    acetaminophen (TYLENOL) 160 MG/5ML oral solution 650 mg  650 mg Oral Q4H PRN Rosanna Mckeon APRN        Or    acetaminophen (TYLENOL) suppository 650 mg  650 mg Rectal Q4H PRN Rosanna Mckeon, APRN        albuterol (PROVENTIL) nebulizer solution 0.083% 2.5 mg/3mL  2.5 mg Nebulization Q4H PRN Rosanna Mckeon, APRN        aspirin EC tablet 81 mg  81 mg Oral Daily Rosanna Mckeon, APRN   81 mg at 01/21/25 0833    atorvastatin (LIPITOR) tablet 20 mg  20 mg Oral Daily Rosanna Mckeon APRN   20 mg at 01/21/25 0816    sennosides-docusate (PERICOLACE) 8.6-50 MG per tablet 2 tablet  2 tablet Oral BID PRN Rosanna Mckeon APRN   2 tablet at 01/20/25 2233    And    polyethylene glycol (MIRALAX) packet 17 g  17 g Oral Daily PRN Rosanna Mckeon APRN   17 g at 01/20/25 2232    And    bisacodyl (DULCOLAX) EC tablet 5 mg  5 mg Oral Daily PRN Rosanna Mckeon APRN   5 mg at 01/18/25 1809    And    bisacodyl (DULCOLAX) suppository 10 mg  10 mg Rectal Daily PRN Rosanna Mckeon APRN        buPROPion XL (WELLBUTRIN XL) 24 hr tablet 150 mg  150 mg Oral Daily Rosanna Mckeon APRN   150 mg at 01/21/25 0816    Calcium Replacement - Follow Nurse / BPA Driven Protocol   Not Applicable PRN Rosanna Mckeon, APRN        cholestyramine light packet 4 g  1 packet Oral Daily Rosanna Mckeon APRN   4 g at 01/21/25 0816    citalopram (CeleXA) tablet 40 mg  40 mg Oral QAM Rosanna Mckeon APRN   40 mg at 01/21/25 0816    dextrose (D50W) (25 g/50 mL) IV injection 25 g  25 g Intravenous Q15 Min PRN Rosanna Mckeon APRN        dextrose (GLUTOSE) oral gel 15 g  15 g Oral Q15 Min PRN Rosanna Mckeon APRN        Diclofenac Sodium (VOLTAREN) 1 % gel 2 g  2 g Topical 4x Daily PRN Rosanna Mckeon APRN        donepezil (ARICEPT) tablet 10 mg  10 mg Oral Daily Rosanna Mckeon APRN   10 mg at 01/21/25 0816    fluticasone (FLONASE) 50 MCG/ACT  nasal spray 2 spray  2 spray Nasal Daily Rosanna Mckeon APRN   2 spray at 01/21/25 0817    glucagon (GLUCAGEN) injection 1 mg  1 mg Intramuscular Q15 Min PRN Rosanna Mckeon APRN        heparin (porcine) 5000 UNIT/ML injection 5,000 Units  5,000 Units Subcutaneous Q12H Rosanna Mckeon APRN   5,000 Units at 01/21/25 0816    insulin glargine (LANTUS, SEMGLEE) injection 25 Units  25 Units Subcutaneous Nightly Shaylee Yang APRN   25 Units at 01/20/25 2025    Insulin Lispro (humaLOG) injection 10 Units  10 Units Subcutaneous TID With Meals Shaylee Yang APRN   10 Units at 01/21/25 1200    Insulin Lispro (humaLOG) injection 2-9 Units  2-9 Units Subcutaneous 4x Daily AC & at Bedtime Salomón Hernández MD   6 Units at 01/21/25 1200    ipratropium-albuterol (DUO-NEB) nebulizer solution 3 mL  3 mL Nebulization Q6H PRN Salomón Hernández MD        lactobacillus acidophilus (RISAQUAD) capsule 1 capsule  1 capsule Oral Daily Christine Guevara MD   1 capsule at 01/21/25 0816    loperamide (IMODIUM) capsule 2 mg  2 mg Oral TID PRN Evette Gross APRN   2 mg at 01/17/25 2026    Magnesium Standard Dose Replacement - Follow Nurse / BPA Driven Protocol   Not Applicable PRN Rosanna Mckeon APRN        nitroglycerin (NITROSTAT) SL tablet 0.4 mg  0.4 mg Sublingual Q5 Min PRN Rosanna Mckeon APRN        nystatin (MYCOSTATIN) powder   Topical Q12H Julia Barrera APRN   Given at 01/21/25 0817    ondansetron ODT (ZOFRAN-ODT) disintegrating tablet 4 mg  4 mg Translingual Q6H PRN Christine Guevaar MD   4 mg at 01/14/25 1231    Phosphorus Replacement - Follow Nurse / BPA Driven Protocol   Not Applicable PRN Rosanna Mckeon APRN        Potassium Replacement - Follow Nurse / BPA Driven Protocol   Not Applicable PRN Rosanna Mckeon APRN        sodium chloride 0.9 % flush 10 mL  10 mL Intravenous Q12H Rosanna Mckeon, SUNITA        sodium chloride 0.9 % flush 10 mL  10  mL Intravenous PRN Rosanna Mckeon APRN        sodium chloride 0.9 % infusion 40 mL  40 mL Intravenous PRN Rosanna Mckeon, APRN        verapamil SR (CALAN-SR) CR tablet 240 mg  240 mg Oral Daily Rosanna Mckeon, APRN   240 mg at 25 0816        Physician Progress Notes (most recent note)        Salomón Hernández MD at 25 1301              [facility] Medicine Services  PROGRESS NOTE    Patient Name: Masha Chou  : 1945  MRN: [id number]    Date of Admission: 2025  Primary Care Physician: Kary Wu MD    Subjective   Subjective     CC:  Left knee pain    HPI:  Headaches better today      Objective   Objective     Vital Signs:   Temp:  [96.9 °F (36.1 °C)-98.4 °F (36.9 °C)] 98.4 °F (36.9 °C)  Heart Rate:  [77-98] 98  Resp:  [16-18] 16  BP: (104-165)/(54-97) 104/54  Flow (L/min) (Oxygen Therapy):  [2-3] 3     PE:    Non toxic, in bed  MM moist  RRR   Breath sounds clear   Abd soft  Obese  Awake, speech clear  Normal affect       Results Reviewed:  LAB RESULTS:            Lab 01/15/25  1618   SODIUM 133*   POTASSIUM 5.1   CHLORIDE 96*   CO2 27.0   ANION GAP 10.0   BUN 24*   CREATININE 1.17*   EGFR 47.9*   GLUCOSE 295*   CALCIUM 9.7                           Brief Urine Lab Results  (Last result in the past 365 days)        Color   Clarity   Blood   Leuk Est   Nitrite   Protein   CREAT   Urine HCG        25 0722 Yellow   Clear   Negative   Trace   Negative   Negative                   Microbiology Results Abnormal       None            No radiology results from the last 24 hrs    Results for orders placed during the hospital encounter of 21    Adult Transthoracic Echo Complete W/ Cont if Necessary Per Protocol    Interpretation Summary  · Estimated left ventricular EF = 70% Left ventricular ejection fraction appears to be 66 - 70%. Left ventricular systolic function is normal.  · Left ventricular diastolic function is consistent with  (grade I) impaired relaxation.  · Left ventricular wall thickness is consistent with hypertrophy. Sigmoid-shaped ventricular septum is present.  · LVOT turbulence without gradient.      Current medications:  Scheduled Meds:aspirin, 81 mg, Oral, Daily  atorvastatin, 20 mg, Oral, Daily  buPROPion XL, 150 mg, Oral, Daily  cholestyramine light, 1 packet, Oral, Daily  citalopram, 40 mg, Oral, QAM  donepezil, 10 mg, Oral, Daily  fluticasone, 2 spray, Nasal, Daily  heparin (porcine), 5,000 Units, Subcutaneous, Q12H  insulin glargine, 25 Units, Subcutaneous, Nightly  Insulin Lispro, 10 Units, Subcutaneous, TID With Meals  insulin lispro, 2-9 Units, Subcutaneous, 4x Daily AC & at Bedtime  lactobacillus acidophilus, 1 capsule, Oral, Daily  sodium chloride, 10 mL, Intravenous, Q12H  verapamil SR, 240 mg, Oral, Daily      Continuous Infusions:   PRN Meds:.  acetaminophen **OR** acetaminophen **OR** acetaminophen    albuterol    senna-docusate sodium **AND** polyethylene glycol **AND** bisacodyl **AND** bisacodyl    Calcium Replacement - Follow Nurse / BPA Driven Protocol    dextrose    dextrose    Diclofenac Sodium    glucagon (human recombinant)    ipratropium-albuterol    loperamide    Magnesium Standard Dose Replacement - Follow Nurse / BPA Driven Protocol    nitroglycerin    ondansetron ODT    Phosphorus Replacement - Follow Nurse / BPA Driven Protocol    Potassium Replacement - Follow Nurse / BPA Driven Protocol    sodium chloride    sodium chloride    Assessment & Plan   Assessment & Plan     Active Hospital Problems    Diagnosis  POA    **Falls [R29.6]  Not Applicable    Left knee pain [M25.562]  Unknown    Osteoarthritis of left knee [M17.12]  Unknown    Acute UTI (urinary tract infection) [N39.0]  Unknown    COPD exacerbation [J44.1]  Yes    Type 2 diabetes mellitus [E11.9]  Yes      Resolved Hospital Problems   No resolved problems to display.        Brief Hospital Course to date:  Masha Chou is a 79 y.o. female  with type 2 diabetes, hypertension, COPD, breast cancer, pulmonary hypertension, diastolic CHF who presented to the ED after having a near fall.        Left knee pain  Osteoarthritis  Generalized weakness  -- X-ray left knee shows mild to moderate tricompartmental osteoarthritis  -- Fall precautions  -- PT/OT consult, recommending SNF  -- Consult case management  -- Tylenol and Voltaren prn for pain    Diarrhea, chronic  -intermittent chronic diarrhea associated with dietary intake/ anxiety  -continue probiotic  -resumed home imodium prn with improvement.   -CBC today pending     Acute UTI (POA)  -- UA: Ketones trace, nitrite positive, leukocytes trace, WBC 3-5, bacteria 4+, squamous 3-6  -- Ceftin 5 days completed     CKD  -- baseline creatinine ~ 1.1-1.2   -- BMP today pending     COPD  JUAN F  Asthma  --Does not appear to be in acute exacerbation.    -- CPAP nightly  -- DuoNebs scheduled     T2DM  -- Continue lantus to 25 units and TID humalog to 10 units.  -- continue SSI moderate dose scale  -- glucose reviewed    Expected Discharge Location and Transportation: SNF to long-term care when bed available  Expected Discharge   Expected Discharge Date: 2025; Expected Discharge Time:      VTE Prophylaxis:  Pharmacologic VTE prophylaxis orders are present.         AM-PAC 6 Clicks Score (PT): 17 (25 9990)    CODE STATUS:   Code Status and Medical Interventions: CPR (Attempt to Resuscitate); Full Support   Ordered at: 25 6380     Level Of Support Discussed With:    Patient     Code Status (Patient has no pulse and is not breathing):    CPR (Attempt to Resuscitate)     Medical Interventions (Patient has pulse or is breathing):    Full Support       Salomón Hernández MD  25        Electronically signed by Salomón Hernández MD at 25 1309          Physical Therapy Notes (most recent note)        Alejandra Farias, PT at 25 1510  Version 1 of 1         Patient Name: Masha Chou  :  1945    MRN: [id number]                              Today's Date: 1/17/2025       Admit Date: 1/12/2025    Visit Dx:     ICD-10-CM ICD-9-CM   1. Falls  R29.6 V15.88   2. Acute cystitis without hematuria  N30.00 595.0     Patient Active Problem List   Diagnosis    Chest pain    Type 2 diabetes mellitus    Pulmonary HTN    Heart failure    COPD exacerbation    Chronic respiratory failure with hypoxia    Falls    Left knee pain    Osteoarthritis of left knee    Acute UTI (urinary tract infection)     Past Medical History:   Diagnosis Date    Asthma     Cancer     BILATERAL BREAST     COPD (chronic obstructive pulmonary disease)     Diabetes mellitus     Heart failure     Hypertension     Pulmonary hypertension      Past Surgical History:   Procedure Laterality Date    BREAST SURGERY      CHOLECYSTECTOMY      HYSTERECTOMY      JOINT REPLACEMENT      RIGHT KNEE    KELOID EXCISION        General Information       Row Name 01/17/25 1539          Physical Therapy Time and Intention    Document Type therapy note (daily note)  -AB     Mode of Treatment physical therapy  -AB       Row Name 01/17/25 1539          General Information    Patient Profile Reviewed yes  -AB     Existing Precautions/Restrictions fall;oxygen therapy device and L/min;other (see comments)  Incontinent  -AB     Barriers to Rehab previous functional deficit;medically complex  -AB       Row Name 01/17/25 1539          Cognition    Orientation Status (Cognition) oriented x 3  -AB       Row Name 01/17/25 1539          Safety Issues/Impairments Affecting Functional Mobility    Safety Issues Affecting Function (Mobility) awareness of need for assistance;insight into deficits/self-awareness;safety precaution awareness;safety precautions follow-through/compliance;sequencing abilities  -AB     Impairments Affecting Function (Mobility) balance;endurance/activity tolerance;pain;shortness of breath;strength  -AB               User Key  (r) = Recorded By, (t) =  Taken By, (c) = Cosigned By      Initials Name Provider Type    AB Alejandra Farias, PT Physical Therapist                   Mobility       Row Name 01/17/25 1543          Bed Mobility    Comment, (Bed Mobility) Pt received standing in bathroom.  -AB       Row Name 01/17/25 1543          Transfers    Comment, (Transfers) Cues for hand placement and sequencing.  -AB       Row Name 01/17/25 1543          Sit-Stand Transfer    Sit-Stand [place] (Transfers) minimum assist (75% patient effort);1 person assist;verbal cues  -AB     Assistive Device (Sit-Stand Transfers) walker, front-wheeled  -AB     Comment, (Sit-Stand Transfer) Cues to push up from bed.  -AB       Row Name 01/17/25 1543          Gait/Stairs (Locomotion)    [place] Level (Gait) verbal cues;minimum assist (75% patient effort);1 person assist  -AB     Assistive Device (Gait) walker, front-wheeled  -AB     Patient was able to Ambulate yes  -AB     Distance in Feet (Gait) 60  -AB     Deviations/Abnormal Patterns (Gait) bilateral deviations;base of support, wide;leighann decreased;gait speed decreased;stride length decreased  -AB     Bilateral Gait Deviations heel strike decreased;forward flexed posture  -AB     Comment, (Gait/Stairs) Pt ambulated with step through gait pattern at a slowed pace and demonstrated forward flexed posture. Cues provided for PLB and forward gaze. No overt LOB or knee buckling. Min A to steady. O2 sat >90% on RA. Further activity limited by weakness and fatigue.  -AB               User Key  (r) = Recorded By, (t) = Taken By, (c) = Cosigned By      Initials Name Provider Type    Alejandra Jones, PT Physical Therapist                   Obj/Interventions       Row Name 01/17/25 1547          Balance    Balance Assessment sitting static balance;sitting dynamic balance;standing static balance;standing dynamic balance  -AB     Static Sitting Balance supervision  -AB     Dynamic Sitting Balance supervision  -AB     Position,  Sitting Balance sitting edge of bed;unsupported  -AB     Static Standing Balance contact guard  -AB     Dynamic Standing Balance minimal assist;1-person assist;verbal cues  -AB     Position/Device Used, Standing Balance walker, front-wheeled;supported  -AB     Balance Interventions sitting;standing;sit to stand;supported;static;dynamic;occupation based/functional task  -AB     Comment, Balance No overt LOB. Min A to steady  -AB               User Key  (r) = Recorded By, (t) = Taken By, (c) = Cosigned By      Initials Name Provider Type    AB Alejandra Farias, PT Physical Therapist                   Goals/Plan    No documentation.                  Clinical Impression       Row Name 01/17/25 1549          Pain    Pretreatment Pain Rating 0/10 - no pain  -AB     Posttreatment Pain Rating 0/10 - no pain  -AB       Row Name 01/17/25 1549          Plan of Care Review    Plan of Care Reviewed With patient  -AB     Progress improving  -AB     Outcome Evaluation Pt with good effort and increased ambulation distance to 60' w/ min Ax1+1 and RW. She continues to present below baseline with decreased strength, impaired endurance, and SOA. Further IPPT is warrented. PT will progress as able per POC.  -AB       Row Name 01/17/25 1549          Vital Signs    Pre Systolic BP Rehab 132  -AB     Pre Treatment Diastolic BP 70  -AB     O2 Delivery Pre Treatment nasal cannula  -AB     Intra SpO2 (%) 96  -AB     O2 Delivery Intra Treatment nasal cannula  -AB     O2 Delivery Post Treatment nasal cannula  -AB     Pre Patient Position Standing  -AB     Intra Patient Position Standing  -AB     Post Patient Position Sitting  -AB       Row Name 01/17/25 1549          Positioning and Restraints    Pre-Treatment Position bathroom  -AB     Post Treatment Position chair  -AB     In Chair notified nsg;reclined;sitting;call light within reach;encouraged to call for assist;exit alarm on;legs elevated;waffle cushion  -AB               User Key  (r) =  Recorded By, (t) = Taken By, (c) = Cosigned By      Initials Name Provider Type    Alejandra Jones, PT Physical Therapist                   Outcome Measures       Row Name 01/17/25 1551 01/17/25 0800       How much help from another person do you currently need...    Turning from your back to your side while in flat bed without using bedrails? 3  -AB 3  -AC    Moving from lying on back to sitting on the side of a flat bed without bedrails? 3  -AB 3  -AC    Moving to and from a bed to a chair (including a wheelchair)? 3  -AB 3  -AC    Standing up from a chair using your arms (e.g., wheelchair, bedside chair)? 2  -AB 2  -AC    Climbing 3-5 steps with a railing? 2  -AB 2  -AC    To walk in hospital room? 3  -AB 2  -AC    AM-PAC 6 Clicks Score (PT) 16  -AB 15  -AC    Highest Level of Mobility Goal 5 --> Static standing  -AB 4 --> Transfer to chair/commode  -AC      Row Name 01/17/25 1551 01/17/25 1544       Functional Assessment    Outcome Measure Options AM-PAC 6 Clicks Basic Mobility (PT)  -AB AM-PAC 6 Clicks Daily Activity (OT)  -MR              User Key  (r) = Recorded By, (t) = Taken By, (c) = Cosigned By      Initials Name Provider Type    Beth Varner, CHARO Registered Nurse     Pam Mak, OT Occupational Therapist    Alejandra Jones, PT Physical Therapist                                 Physical Therapy Education       Title: PT OT SLP Therapies (In Progress)       Topic: Physical Therapy (In Progress)       Point: Mobility training (Done)       Learning Progress Summary            Patient Acceptance, E,D, VU,NR by AB at 1/17/2025 1551    Acceptance, E, NR by AS at 1/14/2025 1406                      Point: Home exercise program (In Progress)       Learning Progress Summary            Patient Acceptance, E, NR by AS at 1/14/2025 1406    Acceptance, E, VU by CK at 1/13/2025 1056                      Point: Body mechanics (Done)       Learning Progress Summary            Patient Acceptance, E,D, VU,NR  by AB at 1/17/2025 1551    Acceptance, E, NR by AS at 1/14/2025 1406    Acceptance, E, VU by CK at 1/13/2025 1056                      Point: Precautions (Done)       Learning Progress Summary            Patient Acceptance, E,D, VU,NR by AB at 1/17/2025 1551    Acceptance, E, NR by AS at 1/14/2025 1406    Acceptance, E, VU by CK at 1/13/2025 1056                                      User Key       Initials Effective Dates Name Provider Type Discipline    AS 12/13/24 -  Alejandra Crowder, PTA Physical Therapist Assistant PT    AB 09/22/22 -  Alejandra Farias, PT Physical Therapist PT    CK 02/06/24 -  Loretta Webber, PT Physical Therapist PT                  PT Recommendation and Plan     Progress: improving  Outcome Evaluation: Pt with good effort and increased ambulation distance to 60' w/ min Ax1+1 and RW. She continues to present below baseline with decreased strength, impaired endurance, and SOA. Further IPPT is warrented. PT will progress as able per POC.     Time Calculation:         PT Charges       Row Name 01/17/25 1552             Time Calculation    Start Time 1510  -AB      PT Received On 01/17/25  -AB         Timed Charges    32904 - Gait Training Minutes  10  -AB      43288 - PT Therapeutic Activity Minutes 13  -AB         Total Minutes    Timed Charges Total Minutes 23  -AB       Total Minutes 23  -AB                User Key  (r) = Recorded By, (t) = Taken By, (c) = Cosigned By      Initials Name Provider Type    AB Alejandra Farias, PT Physical Therapist                  Therapy Charges for Today       Code Description Service Date Service Provider Modifiers Qty    19426612862 HC GAIT TRAINING EA 15 MIN 1/17/2025 Alejandra Farias, PT GP 1    00007585433 HC PT THERAPEUTIC ACT EA 15 MIN 1/17/2025 Alejandra Farias, PT GP 1            PT G-Codes  Outcome Measure Options: AM-PAC 6 Clicks Basic Mobility (PT)  AM-PAC 6 Clicks Score (PT): 16  AM-PAC 6 Clicks Score (OT): 15  PT Discharge  Summary  Anticipated Discharge Disposition (PT): skilled nursing facility    Alejandra Farias, PT  2025      Electronically signed by Alejandra Farias, PT at 25 3774          Occupational Therapy Notes (most recent note)        Lindsey Sandoval, OT at 25 0927          Patient Name: Masha Chou  : 1945    MRN: [id number]                              Today's Date: 2025       Admit Date: 2025    Visit Dx:     ICD-10-CM ICD-9-CM   1. Falls  R29.6 V15.88   2. Acute cystitis without hematuria  N30.00 595.0     Patient Active Problem List   Diagnosis    Chest pain    Type 2 diabetes mellitus    Pulmonary HTN    Heart failure    COPD exacerbation    Chronic respiratory failure with hypoxia    Falls    Left knee pain    Osteoarthritis of left knee    Acute UTI (urinary tract infection)     Past Medical History:   Diagnosis Date    Asthma     Cancer     BILATERAL BREAST     COPD (chronic obstructive pulmonary disease)     Diabetes mellitus     Heart failure     Hypertension     Pulmonary hypertension      Past Surgical History:   Procedure Laterality Date    BREAST SURGERY      CHOLECYSTECTOMY      HYSTERECTOMY      JOINT REPLACEMENT      RIGHT KNEE    KELOID EXCISION        General Information       Row Name 25 1055          OT Time and Intention    Subjective Information complains of;weakness  -TB     Document Type therapy note (daily note)  -TB     Mode of Treatment occupational therapy;individual therapy  -TB     Patient Effort good  -TB     Symptoms Noted During/After Treatment none  -TB       Row Name 25 1055          General Information    Patient Profile Reviewed yes  -TB     Existing Precautions/Restrictions fall;oxygen therapy device and L/min;other (see comments)  Incontinent  -TB     Barriers to Rehab previous functional deficit;medically complex  -TB       Row Name 25 1056          Cognition    Orientation Status (Cognition) oriented x 4  -TB       Row  Name 01/21/25 1055          Safety Issues/Impairments Affecting Functional Mobility    Safety Issues Affecting Function (Mobility) insight into deficits/self-awareness;awareness of need for assistance;safety precautions follow-through/compliance;safety precaution awareness;sequencing abilities  -TB     Impairments Affecting Function (Mobility) balance;endurance/activity tolerance;pain;strength;shortness of breath  -TB     Cognitive Impairments, Mobility Safety/Performance awareness, need for assistance;insight into deficits/self-awareness;problem-solving/reasoning;safety precaution awareness;safety precaution follow-through;sequencing abilities  -TB     Comment, Safety Issues/Impairments (Mobility) Pt is up in room with RW support and CGAx1 using RW.  -TB               User Key  (r) = Recorded By, (t) = Taken By, (c) = Cosigned By      Initials Name Provider Type    TB Lindsey Sandoval, OT Occupational Therapist                     Mobility/ADL's       Row Name 01/21/25 1057          Bed Mobility    Bed Mobility scooting/bridging;sidelying-sit;rolling left  -TB     Rolling Left [place] (Bed Mobility) standby assist;verbal cues  -TB     Scooting/Bridging [place] (Bed Mobility) standby assist;verbal cues  -TB     Sidelying-Sit [place] (Bed Mobility) minimum assist (75% patient effort);verbal cues  -TB     Bed Mobility, Safety Issues decreased use of arms for pushing/pulling;impaired trunk control for bed mobility  -TB     Assistive Device (Bed Mobility) head of bed elevated;bed rails  -TB     Comment, (Bed Mobility) Pt transitions to EOB sitting with good effort and time.  -TB       Row Name 01/21/25 1057          Transfers    Transfers sit-stand transfer;stand-sit transfer;toilet transfer;bed-chair transfer  -TB     Comment, (Transfers) Education and cues for hand placement, sequencing, and O2 line mgmt  -TB       Row Name 01/21/25 1057          Bed-Chair Transfer    Bed-Chair [place]  (Transfers) contact guard;1 person assist;verbal cues  -TB     Assistive Device (Bed-Chair Transfers) walker, front-wheeled  -TB       Row Name 01/21/25 1057          Sit-Stand Transfer    Sit-Stand [place] (Transfers) contact guard;1 person assist;verbal cues  -TB     Assistive Device (Sit-Stand Transfers) walker, front-wheeled  -TB       Row Name 01/21/25 1057          Stand-Sit Transfer    Stand-Sit [place] (Transfers) contact guard;1 person assist;verbal cues  -TB     Assistive Device (Stand-Sit Transfers) walker, front-wheeled  -TB       Row Name 01/21/25 1057          Toilet Transfer    Type (Toilet Transfer) sit-stand;stand-sit  -TB     [place] Level (Toilet Transfer) minimum assist (75% patient effort);1 person assist;verbal cues  -TB     Assistive Device (Toilet Transfer) commode, bedside without drop arms;walker, front-wheeled  -TB       Row Name 01/21/25 1057          Functional Mobility    Functional Mobility- Ind. Level contact guard assist;1 person;verbal cues required  -TB     Functional Mobility- Device walker, front-wheeled  -TB     Functional Mobility-Distance (Feet) 15  -TB     Functional Mobility- Safety Issues sequencing ability decreased;balance decreased during turns  -TB     Functional Mobility- Comment Pt is up in room with good effort and time. No LOB.  -TB     Patient was able to Ambulate yes  -TB       Row Name 01/21/25 1057          Activities of Daily Living    BADL Assessment/Intervention upper body dressing;grooming;toileting  -TB       Row Name 01/21/25 1057          Upper Body Dressing Assessment/Training    [place] Level (Upper Body Dressing) don;pajama/robe;minimum assist (75% patient effort)  -TB     Position (Upper Body Dressing) edge of bed sitting  -TB       Row Name 01/21/25 1057          Toileting Assessment/Training    [place] Level (Toileting) moderate assist (50% patient effort);adjust/manage clothing;dependent (less than 25% patient  effort);perform perineal hygiene  -TB     Position (Toileting) unsupported sitting;supported standing  -TB       Row Name 01/21/25 1057          Self-Feeding Assessment/Training    [place] Level (Feeding) independent;liquids to mouth  -     Position (Feeding) supported sitting  -TB       Row Name 01/21/25 1057          Grooming Assessment/Training    [place] Level (Grooming) set up;standby assist;wash face, hands;oral care regimen;hair care, combing/brushing  -TB     Position (Grooming) supported sitting  -TB               User Key  (r) = Recorded By, (t) = Taken By, (c) = Cosigned By      Initials Name Provider Type    TB Lindsey Sandoval, OT Occupational Therapist                   Obj/Interventions       Row Name 01/21/25 1100          Shoulder (Therapeutic Exercise)    Shoulder (Therapeutic Exercise) AROM (active range of motion)  -TB     Shoulder AROM (Therapeutic Exercise) bilateral;flexion;extension;aBduction;aDduction;scapular retraction;sitting;10 repetitions  -TB       Row Name 01/21/25 1100          Elbow/Forearm (Therapeutic Exercise)    Elbow/Forearm (Therapeutic Exercise) AROM (active range of motion)  -TB     Elbow/Forearm AROM (Therapeutic Exercise) bilateral;flexion;extension;sitting;10 repetitions  -TB       Row Name 01/21/25 1100          Motor Skills    Therapeutic Exercise shoulder;elbow/forearm  BUE ther ex completed to support self-care performance  -       Row Name 01/21/25 1100          Balance    Balance Assessment sitting dynamic balance;sit to stand dynamic balance;standing dynamic balance;standing static balance  -TB     Dynamic Sitting Balance supervision  -TB     Position, Sitting Balance unsupported;sitting in chair;sitting edge of bed;other (see comments)  BSC  -TB     Sit to Stand Dynamic Balance contact guard;1-person assist;verbal cues  -TB     Static Standing Balance contact guard;1-person assist;verbal cues  -TB     Dynamic Standing Balance minimal  assist;1-person assist;verbal cues  -TB     Position/Device Used, Standing Balance supported;walker, front-wheeled  -TB     Balance Interventions sitting;standing;sit to stand;supported;static;dynamic;dynamic reaching;occupation based/functional task;UE activity with balance activity  -TB     Comment, Balance No LOB  -TB               User Key  (r) = Recorded By, (t) = Taken By, (c) = Cosigned By      Initials Name Provider Type    TB Lindsey Sandoval, OT Occupational Therapist                   Goals/Plan    No documentation.                  Clinical Impression       Row Name 01/21/25 1101          Pain Assessment    Pretreatment Pain Rating 0/10 - no pain  -TB     Posttreatment Pain Rating 0/10 - no pain  -TB     Pre/Posttreatment Pain Comment No c/o pain with OOB activity  -TB       Row Name 01/21/25 1101          Plan of Care Review    Plan of Care Reviewed With patient  -TB     Progress improving  -TB     Outcome Evaluation Pt is A/Ox4 and participates in therapy with good effort and increased time. Remains below her baseline with strength and activity tolerance deficits. Pt is up in room x15' with RW support and CGAx1. Completes BSC transfer with Min Ax1 and chair transfer with CGAx1. Pt continues to require assist with all LB ADLs and toileting. OT will follow IP. Plan is SNF when pt is ready.  -TB       Row Name 01/21/25 1101          Therapy Plan Review/Discharge Plan (OT)    Anticipated Discharge Disposition (OT) skilled nursing facility  -TB       Row Name 01/21/25 1101          Vital Signs    Pre Systolic BP Rehab --  RN cleared OT  -TB     Pre SpO2 (%) 94  -TB     O2 Delivery Pre Treatment supplemental O2  Chronic 3L  -TB     Post SpO2 (%) 95  -TB     O2 Delivery Post Treatment supplemental O2  -TB     Pre Patient Position Supine  -TB     Intra Patient Position Standing  -TB     Post Patient Position Sitting  -TB       Row Name 01/21/25 1101          Positioning and Restraints    Pre-Treatment  Position in bed  -TB     Post Treatment Position chair  -TB     In Chair notified nsg;reclined;call light within reach;encouraged to call for assist;exit alarm on;waffle cushion;legs elevated  -TB               User Key  (r) = Recorded By, (t) = Taken By, (c) = Cosigned By      Initials Name Provider Type    Lindsey Ye OT Occupational Therapist                   Outcome Measures       Row Name 01/21/25 1105          How much help from another is currently needed...    Putting on and taking off regular lower body clothing? 2  -TB     Bathing (including washing, rinsing, and drying) 2  -TB     Toileting (which includes using toilet bed pan or urinal) 2  -TB     Putting on and taking off regular upper body clothing 3  -TB     Taking care of personal grooming (such as brushing teeth) 3  -TB     Eating meals 4  -TB     AM-PAC 6 Clicks Score (OT) 16  -TB       Row Name 01/21/25 1105          Functional Assessment    Outcome Measure Options AM-PAC 6 Clicks Daily Activity (OT)  -TB               User Key  (r) = Recorded By, (t) = Taken By, (c) = Cosigned By      Initials Name Provider Type    Lindsey Ye OT Occupational Therapist                    Occupational Therapy Education       Title: PT OT SLP Therapies (In Progress)       Topic: Occupational Therapy (Done)       Point: ADL training (Done)       Description:   Instruct learner(s) on proper safety adaptation and remediation techniques during self care or transfers.   Instruct in proper use of assistive devices.                  Learning Progress Summary            Patient Acceptance, E,D, VU,NR,DU by TB at 1/21/2025 1105    Acceptance, E, VU,NR by MR at 1/17/2025 1544    Acceptance, E,D, VU,NR by TB at 1/15/2025 1646    Acceptance, E, VU by AN at 1/13/2025 1058                      Point: Home exercise program (Done)       Description:   Instruct learner(s) on appropriate technique for monitoring, assisting and/or progressing  therapeutic exercises/activities.                  Learning Progress Summary            Patient Acceptance, E,D, VU,NR,DU by TB at 1/21/2025 1105                      Point: Precautions (Done)       Description:   Instruct learner(s) on prescribed precautions during self-care and functional transfers.                  Learning Progress Summary            Patient Acceptance, E, VU,NR by MR at 1/17/2025 1544    Acceptance, E, VU by AN at 1/13/2025 1058                      Point: Body mechanics (Done)       Description:   Instruct learner(s) on proper positioning and spine alignment during self-care, functional mobility activities and/or exercises.                  Learning Progress Summary            Patient Acceptance, E, VU,NR by MR at 1/17/2025 1544    Acceptance, E, VU by AN at 1/13/2025 1058                                      User Key       Initials Effective Dates Name Provider Type Discipline     07/11/23 -  Lindsey Sandoval, OT Occupational Therapist OT    AN 09/21/21 -  Anne Marie Cid, OT Occupational Therapist OT    MR 09/22/22 -  Pam Mak, OT Occupational Therapist OT                  OT Recommendation and Plan     Plan of Care Review  Plan of Care Reviewed With: patient  Progress: improving  Outcome Evaluation: Pt is A/Ox4 and participates in therapy with good effort and increased time. Remains below her baseline with strength and activity tolerance deficits. Pt is up in room x15' with RW support and CGAx1. Completes BSC transfer with Min Ax1 and chair transfer with CGAx1. Pt continues to require assist with all LB ADLs and toileting. OT will follow IP. Plan is SNF when pt is ready.     Time Calculation:         Time Calculation- OT       Row Name 01/21/25 0927             Time Calculation- OT    OT Start Time 0927  -TB      OT Received On 01/21/25  -      OT Goal Re-Cert Due Date 01/23/25  -TB         Timed Charges    30480 - OT Self Care/Mgmt Minutes 38  -TB         Total Minutes     Timed Charges Total Minutes 38  -TB       Total Minutes 38  -TB                User Key  (r) = Recorded By, (t) = Taken By, (c) = Cosigned By      Initials Name Provider Type    TB Lindsey Sandoval OT Occupational Therapist                  Therapy Charges for Today       Code Description Service Date Service Provider Modifiers Qty    [id number]  OT SELF CARE/MGMT/TRAIN EA 15 MIN 1/21/2025 Lindsey Sandoval OT GO 3                 Lindsey Sandoval OT  1/21/2025    Electronically signed by Lindsey Sandoval OT at 01/21/25 1106

## 2025-01-21 NOTE — PROGRESS NOTES
[facility] Medicine Services  PROGRESS NOTE    Patient Name: Masha Chou  : 1945  MRN: [id number]    Date of Admission: 2025  Primary Care Physician: Kary Wu MD    Subjective   Subjective     CC:  Left knee pain    HPI:  Headaches better today      Objective   Objective     Vital Signs:   Temp:  [97.3 °F (36.3 °C)-98.6 °F (37 °C)] 97.3 °F (36.3 °C)  Heart Rate:  [72-85] 85  Resp:  [16-18] 18  BP: (131-156)/(62-93) 146/74  Flow (L/min) (Oxygen Therapy):  [2-3] 2     PE:    Non toxic, in bed  MM moist  RRR   Breath sounds clear   Abd soft  Obese  Awake, speech clear  Normal affect       Results Reviewed:  LAB RESULTS:      Lab 25  1246   WBC 9.94   HEMOGLOBIN 13.5   HEMATOCRIT 42.6   PLATELETS 187   MCV 91.8           Lab 25  1246 01/15/25  1618   SODIUM 134* 133*   POTASSIUM 4.8 5.1   CHLORIDE 93* 96*   CO2 30.0* 27.0   ANION GAP 11.0 10.0   BUN 18 24*   CREATININE 0.92 1.17*   EGFR 63.9 47.9*   GLUCOSE 194* 295*   CALCIUM 9.7 9.7                           Brief Urine Lab Results  (Last result in the past 365 days)        Color   Clarity   Blood   Leuk Est   Nitrite   Protein   CREAT   Urine HCG        25 0722 Yellow   Clear   Negative   Trace   Negative   Negative                   Microbiology Results Abnormal       None            No radiology results from the last 24 hrs    Results for orders placed during the hospital encounter of 21    Adult Transthoracic Echo Complete W/ Cont if Necessary Per Protocol    Interpretation Summary  · Estimated left ventricular EF = 70% Left ventricular ejection fraction appears to be 66 - 70%. Left ventricular systolic function is normal.  · Left ventricular diastolic function is consistent with (grade I) impaired relaxation.  · Left ventricular wall thickness is consistent with hypertrophy. Sigmoid-shaped ventricular septum is present.  · LVOT turbulence without gradient.      Current  medications:  Scheduled Meds:aspirin, 81 mg, Oral, Daily  atorvastatin, 20 mg, Oral, Daily  buPROPion XL, 150 mg, Oral, Daily  cholestyramine light, 1 packet, Oral, Daily  citalopram, 40 mg, Oral, QAM  donepezil, 10 mg, Oral, Daily  fluticasone, 2 spray, Nasal, Daily  heparin (porcine), 5,000 Units, Subcutaneous, Q12H  insulin glargine, 25 Units, Subcutaneous, Nightly  Insulin Lispro, 10 Units, Subcutaneous, TID With Meals  insulin lispro, 2-9 Units, Subcutaneous, 4x Daily AC & at Bedtime  lactobacillus acidophilus, 1 capsule, Oral, Daily  nystatin, , Topical, Q12H  sodium chloride, 10 mL, Intravenous, Q12H  verapamil SR, 240 mg, Oral, Daily      Continuous Infusions:   PRN Meds:.  acetaminophen **OR** acetaminophen **OR** acetaminophen    albuterol    senna-docusate sodium **AND** polyethylene glycol **AND** bisacodyl **AND** bisacodyl    Calcium Replacement - Follow Nurse / BPA Driven Protocol    dextrose    dextrose    Diclofenac Sodium    glucagon (human recombinant)    ipratropium-albuterol    loperamide    Magnesium Standard Dose Replacement - Follow Nurse / BPA Driven Protocol    nitroglycerin    ondansetron ODT    Phosphorus Replacement - Follow Nurse / BPA Driven Protocol    Potassium Replacement - Follow Nurse / BPA Driven Protocol    sodium chloride    sodium chloride    Assessment & Plan   Assessment & Plan     Active Hospital Problems    Diagnosis  POA    **Falls [R29.6]  Not Applicable    Left knee pain [M25.562]  Unknown    Osteoarthritis of left knee [M17.12]  Unknown    Acute UTI (urinary tract infection) [N39.0]  Unknown    COPD exacerbation [J44.1]  Yes    Type 2 diabetes mellitus [E11.9]  Yes      Resolved Hospital Problems   No resolved problems to display.        Brief Hospital Course to date:  Masha Chou is a 79 y.o. female with type 2 diabetes, hypertension, COPD, breast cancer, pulmonary hypertension, diastolic CHF who presented to the ED after having a near fall.        Left knee  pain  Osteoarthritis  Generalized weakness  -- X-ray left knee shows mild to moderate tricompartmental osteoarthritis  -- Fall precautions  -- PT/OT consult, recommending SNF  -- Consult case management  -- Tylenol and Voltaren prn for pain    Diarrhea, chronic  -intermittent chronic diarrhea associated with dietary intake/ anxiety  -continue probiotic  -resumed home imodium prn with improvement.   -CBC today pending     Acute UTI (POA)  -- UA: Ketones trace, nitrite positive, leukocytes trace, WBC 3-5, bacteria 4+, squamous 3-6  -- Ceftin 5 days completed     CKD  -- baseline creatinine ~ 1.1-1.2   -- BMP today pending     COPD  JUAN F  Asthma  --Does not appear to be in acute exacerbation.    -- CPAP nightly  -- DuoNebs scheduled     T2DM  -- Continue lantus to 25 units and TID humalog to 10 units.  -- continue SSI moderate dose scale  -- glucose reviewed    Expected Discharge Location and Transportation: SNF to long-term [place] when bed available  Expected Discharge   Expected Discharge Date: 1/22/2025; Expected Discharge Time:      VTE Prophylaxis:  Pharmacologic VTE prophylaxis orders are present.         AM-PAC 6 Clicks Score (PT): 17 (01/21/25 1603)    CODE STATUS:   Code Status and Medical Interventions: CPR (Attempt to Resuscitate); Full Support   Ordered at: 01/12/25 7077     Level Of Support Discussed With:    Patient     Code Status (Patient has no pulse and is not breathing):    CPR (Attempt to Resuscitate)     Medical Interventions (Patient has pulse or is breathing):    Full Support       Salomón Hernández MD  01/21/25

## 2025-01-22 LAB
GLUCOSE BLDC GLUCOMTR-MCNC: 167 MG/DL (ref 70–130)
GLUCOSE BLDC GLUCOMTR-MCNC: 265 MG/DL (ref 70–130)
GLUCOSE BLDC GLUCOMTR-MCNC: 275 MG/DL (ref 70–130)
GLUCOSE BLDC GLUCOMTR-MCNC: 285 MG/DL (ref 70–130)

## 2025-01-22 PROCEDURE — G0378 HOSPITAL OBSERVATION PER HR: HCPCS

## 2025-01-22 PROCEDURE — 25010000002 HEPARIN (PORCINE) PER 1000 UNITS: Performed by: NURSE PRACTITIONER

## 2025-01-22 PROCEDURE — 82948 REAGENT STRIP/BLOOD GLUCOSE: CPT

## 2025-01-22 PROCEDURE — 99232 SBSQ HOSP IP/OBS MODERATE 35: CPT | Performed by: INTERNAL MEDICINE

## 2025-01-22 PROCEDURE — 96372 THER/PROPH/DIAG INJ SC/IM: CPT

## 2025-01-22 PROCEDURE — 63710000001 INSULIN GLARGINE PER 5 UNITS: Performed by: NURSE PRACTITIONER

## 2025-01-22 PROCEDURE — 63710000001 INSULIN LISPRO (HUMAN) PER 5 UNITS: Performed by: NURSE PRACTITIONER

## 2025-01-22 PROCEDURE — 63710000001 INSULIN LISPRO (HUMAN) PER 5 UNITS: Performed by: INTERNAL MEDICINE

## 2025-01-22 RX ADMIN — NYSTATIN: 100000 POWDER TOPICAL at 21:18

## 2025-01-22 RX ADMIN — HEPARIN SODIUM 5000 UNITS: 5000 INJECTION INTRAVENOUS; SUBCUTANEOUS at 21:18

## 2025-01-22 RX ADMIN — FLUTICASONE PROPIONATE 2 SPRAY: 50 SPRAY, METERED NASAL at 09:04

## 2025-01-22 RX ADMIN — INSULIN LISPRO 10 UNITS: 100 INJECTION, SOLUTION INTRAVENOUS; SUBCUTANEOUS at 12:28

## 2025-01-22 RX ADMIN — INSULIN LISPRO 10 UNITS: 100 INJECTION, SOLUTION INTRAVENOUS; SUBCUTANEOUS at 09:02

## 2025-01-22 RX ADMIN — INSULIN GLARGINE 25 UNITS: 100 INJECTION, SOLUTION SUBCUTANEOUS at 21:17

## 2025-01-22 RX ADMIN — INSULIN LISPRO 2 UNITS: 100 INJECTION, SOLUTION INTRAVENOUS; SUBCUTANEOUS at 08:56

## 2025-01-22 RX ADMIN — BUPROPION HYDROCHLORIDE 150 MG: 150 TABLET, EXTENDED RELEASE ORAL at 08:56

## 2025-01-22 RX ADMIN — INSULIN LISPRO 6 UNITS: 100 INJECTION, SOLUTION INTRAVENOUS; SUBCUTANEOUS at 12:27

## 2025-01-22 RX ADMIN — INSULIN LISPRO 6 UNITS: 100 INJECTION, SOLUTION INTRAVENOUS; SUBCUTANEOUS at 21:16

## 2025-01-22 RX ADMIN — NYSTATIN: 100000 POWDER TOPICAL at 09:04

## 2025-01-22 RX ADMIN — CHOLESTYRAMINE 4 G: 4 POWDER, FOR SUSPENSION ORAL at 08:57

## 2025-01-22 RX ADMIN — ACETAMINOPHEN 650 MG: 325 TABLET ORAL at 09:32

## 2025-01-22 RX ADMIN — LOPERAMIDE HYDROCHLORIDE 2 MG: 2 CAPSULE ORAL at 13:30

## 2025-01-22 RX ADMIN — DONEPEZIL HYDROCHLORIDE 10 MG: 10 TABLET, FILM COATED ORAL at 08:57

## 2025-01-22 RX ADMIN — ASPIRIN 81 MG: 81 TABLET, COATED ORAL at 09:32

## 2025-01-22 RX ADMIN — ATORVASTATIN CALCIUM 20 MG: 20 TABLET, FILM COATED ORAL at 08:56

## 2025-01-22 RX ADMIN — VERAPAMIL HYDROCHLORIDE 240 MG: 240 TABLET, FILM COATED, EXTENDED RELEASE ORAL at 08:57

## 2025-01-22 RX ADMIN — ACETAMINOPHEN 650 MG: 325 TABLET ORAL at 14:50

## 2025-01-22 RX ADMIN — CITALOPRAM HYDROBROMIDE 40 MG: 40 TABLET ORAL at 08:57

## 2025-01-22 RX ADMIN — Medication 1 CAPSULE: at 08:56

## 2025-01-22 RX ADMIN — INSULIN LISPRO 6 UNITS: 100 INJECTION, SOLUTION INTRAVENOUS; SUBCUTANEOUS at 17:02

## 2025-01-22 RX ADMIN — INSULIN LISPRO 10 UNITS: 100 INJECTION, SOLUTION INTRAVENOUS; SUBCUTANEOUS at 17:02

## 2025-01-22 RX ADMIN — HEPARIN SODIUM 5000 UNITS: 5000 INJECTION INTRAVENOUS; SUBCUTANEOUS at 08:56

## 2025-01-22 NOTE — CASE MANAGEMENT/SOCIAL WORK
Continued Stay Note  [facility]     Patient Name: Masha Chou  MRN: [id number]  Today's Date: 1/22/2025    Admit Date: 1/12/2025    Plan: SNF to LTC   Discharge Plan       Row Name 01/22/25 1059       Plan    Plan Comments MSW contacted by CM to provide pt with some housing resources and information in case pt will need. MSW spoke with pt at bedside and provided housing resources. MSW remains available.                   Discharge Codes    No documentation.                 Expected Discharge Date and Time       Expected Discharge Date Expected Discharge Time    Jan 22, 2025               AKIRA Baldwin

## 2025-01-22 NOTE — CASE MANAGEMENT/SOCIAL WORK
Continued Stay Note  [facility]     Patient Name: Masha Chou  MRN: [id number]  Today's Date: 1/22/2025    Admit Date: 1/12/2025    Plan: SNF to LTC   Discharge Plan       Row Name 01/22/25 1524       Plan    Plan Comments Per Felicia the bed they were looking at for pt is no longer available. She [facility] does not have any beds [facility]. SHEY has asked SW to get involved for ongoing social issues involved in pts care.      Row Name 01/22/25 1500       Plan    Plan Comments Pt now reports if she is unable to go to SNF to long term she does have a friend she can stay with named Esha. Pt has a portable O2 concentrator with her and is unsure when she will have access to her other concentrator which is currently in storage. SHEY has left a VM with her daughter to discuss how to get that out of storage for pt. Felicia with [facility] reports they are unsure if pt will qualify for a long term medicaid bed due to a selling of a home in 2021 but they have asked pts daughter to bring copies of bank statements so they can further investigate. SHEY has left a VM with Felicia to look into the possibility of starting precert for possible SNF placement with plan to discharge to friends home while they continue to investigate possible long term options.                   Discharge Codes    No documentation.                 Expected Discharge Date and Time       Expected Discharge Date Expected Discharge Time    Jan 22, 2025               Lillian Marley RN

## 2025-01-22 NOTE — PLAN OF CARE
Problem: Adult Inpatient Plan of Care  Goal: Plan of Care Review  Outcome: Progressing  Flowsheets  Taken 1/22/2025 0318 by Ray Harmon RN  Progress: improving  Taken 1/21/2025 1600 by Tali Webb PT  Plan of Care Reviewed With: patient     Problem: Skin Injury Risk Increased  Goal: Skin Health and Integrity  Outcome: Progressing  Intervention: Optimize Skin Protection  Recent Flowsheet Documentation  Taken 1/22/2025 0100 by Ray Harmon RN  Head of Bed (HOB) Positioning: HOB elevated  Taken 1/21/2025 1956 by Ray Harmon RN  Head of Bed (HOB) Positioning: HOB elevated     Problem: Fall Injury Risk  Goal: Absence of Fall and Fall-Related Injury  Outcome: Progressing  Intervention: Promote Injury-Free Environment  Recent Flowsheet Documentation  Taken 1/22/2025 0156 by Ray Harmon RN  Safety Promotion/Fall Prevention: safety round/check completed  Taken 1/22/2025 0000 by Ray Harmon RN  Safety Promotion/Fall Prevention: safety round/check completed  Taken 1/21/2025 2200 by Ray Harmon RN  Safety Promotion/Fall Prevention: safety round/check completed  Taken 1/21/2025 1956 by Ray Harmon RN  Safety Promotion/Fall Prevention: safety round/check completed   Goal Outcome Evaluation:           Progress: improving

## 2025-01-22 NOTE — PROGRESS NOTES
[facility] Medicine Services  PROGRESS NOTE    Patient Name: Masha Chou  : 1945  MRN: [id number]    Date of Admission: 2025  Primary Care Physician: Kary Wu MD    Subjective   Subjective     CC:  Left knee pain    HPI:  No complaints today, say she walked in [place] with therapy yesterday.        Objective   Objective     Vital Signs:   Temp:  [98 °F (36.7 °C)-98.8 °F (37.1 °C)] 98 °F (36.7 °C)  Heart Rate:  [73-96] 90  Resp:  [16-18] 18  BP: (126-163)/(53-83) 163/53  Flow (L/min) (Oxygen Therapy):  [2] 2     PE:    Non toxic appearing, in bed  MM moist  RRR  Breath sounds grossly clear  Abdomen soft  Obese  Alert, speech clear  Normal affect       Results Reviewed:  LAB RESULTS:      Lab 25  1246   WBC 9.94   HEMOGLOBIN 13.5   HEMATOCRIT 42.6   PLATELETS 187   MCV 91.8           Lab 25  1246 01/15/25  1618   SODIUM 134* 133*   POTASSIUM 4.8 5.1   CHLORIDE 93* 96*   CO2 30.0* 27.0   ANION GAP 11.0 10.0   BUN 18 24*   CREATININE 0.92 1.17*   EGFR 63.9 47.9*   GLUCOSE 194* 295*   CALCIUM 9.7 9.7                           Brief Urine Lab Results  (Last result in the past 365 days)        Color   Clarity   Blood   Leuk Est   Nitrite   Protein   CREAT   Urine HCG        25 0722 Yellow   Clear   Negative   Trace   Negative   Negative                   Microbiology Results Abnormal       None            No radiology results from the last 24 hrs    Results for orders placed during the hospital encounter of 21    Adult Transthoracic Echo Complete W/ Cont if Necessary Per Protocol    Interpretation Summary  · Estimated left ventricular EF = 70% Left ventricular ejection fraction appears to be 66 - 70%. Left ventricular systolic function is normal.  · Left ventricular diastolic function is consistent with (grade I) impaired relaxation.  · Left ventricular wall thickness is consistent with hypertrophy. Sigmoid-shaped ventricular septum is present.  ·  LVOT turbulence without gradient.      Current medications:  Scheduled Meds:aspirin, 81 mg, Oral, Daily  atorvastatin, 20 mg, Oral, Daily  buPROPion XL, 150 mg, Oral, Daily  cholestyramine light, 1 packet, Oral, Daily  citalopram, 40 mg, Oral, QAM  donepezil, 10 mg, Oral, Daily  fluticasone, 2 spray, Nasal, Daily  heparin (porcine), 5,000 Units, Subcutaneous, Q12H  insulin glargine, 25 Units, Subcutaneous, Nightly  Insulin Lispro, 10 Units, Subcutaneous, TID With Meals  insulin lispro, 2-9 Units, Subcutaneous, 4x Daily AC & at Bedtime  lactobacillus acidophilus, 1 capsule, Oral, Daily  nystatin, , Topical, Q12H  sodium chloride, 10 mL, Intravenous, Q12H  verapamil SR, 240 mg, Oral, Daily      Continuous Infusions:   PRN Meds:.  acetaminophen **OR** acetaminophen **OR** acetaminophen    albuterol    senna-docusate sodium **AND** polyethylene glycol **AND** bisacodyl **AND** bisacodyl    Calcium Replacement - Follow Nurse / BPA Driven Protocol    dextrose    dextrose    Diclofenac Sodium    glucagon (human recombinant)    ipratropium-albuterol    loperamide    Magnesium Standard Dose Replacement - Follow Nurse / BPA Driven Protocol    nitroglycerin    ondansetron ODT    Phosphorus Replacement - Follow Nurse / BPA Driven Protocol    Potassium Replacement - Follow Nurse / BPA Driven Protocol    sodium chloride    sodium chloride    Assessment & Plan   Assessment & Plan     Active Hospital Problems    Diagnosis  POA    **Falls [R29.6]  Not Applicable    Left knee pain [M25.562]  Unknown    Osteoarthritis of left knee [M17.12]  Unknown    Acute UTI (urinary tract infection) [N39.0]  Unknown    COPD exacerbation [J44.1]  Yes    Type 2 diabetes mellitus [E11.9]  Yes      Resolved Hospital Problems   No resolved problems to display.        Brief Hospital Course to date:  Masha Chou is a 79 y.o. female with type 2 diabetes, hypertension, COPD, breast cancer, pulmonary hypertension, diastolic CHF who presented to the ED  after having a near fall.        Left knee pain  Osteoarthritis  Generalized weakness  -- X-ray left knee shows mild to moderate tricompartmental osteoarthritis  -- Fall precautions  -- PT/OT consult, recommending SNF  -- Consult case management - rehab referral  -- Tylenol and Voltaren prn for pain    Diarrhea, chronic  -intermittent chronic diarrhea associated with dietary intake/ anxiety  -continue probiotic  -resumed home imodium prn with improvement.   -no current leukocytosis     Acute UTI (POA)  -- UA: Ketones trace, nitrite positive, leukocytes trace, WBC 3-5, bacteria 4+, squamous 3-6  -- Ceftin 5 days completed     CKD  -- baseline creatinine ~ 1.1-1.2      COPD  JUAN F  Asthma  -- Does not appear to be in acute exacerbation.    -- CPAP nightly  -- DuoNebs scheduled     T2DM  -- Continue lantus to 25 units and TID humalog to 10 units.  -- continue SSI moderate dose scale  -- glucose reviewed    Expected Discharge Location and Transportation: SNF to long-term care when bed available -- if unable to find placement social work providing information on housing resources.  Expected Discharge   Expected Discharge Date: 1/22/2025; Expected Discharge Time:      VTE Prophylaxis:  Pharmacologic VTE prophylaxis orders are present.         AM-PAC 6 Clicks Score (PT): 17 (01/21/25 2340)    CODE STATUS:   Code Status and Medical Interventions: CPR (Attempt to Resuscitate); Full Support   Ordered at: 01/12/25 4622     Level Of Support Discussed With:    Patient     Code Status (Patient has no pulse and is not breathing):    CPR (Attempt to Resuscitate)     Medical Interventions (Patient has pulse or is breathing):    Full Support       Salomón Hernández MD  01/22/25

## 2025-01-22 NOTE — CASE MANAGEMENT/SOCIAL WORK
Continued Stay Note  [facility]     Patient Name: Masha Chou  MRN: [id number]  Today's Date: 1/22/2025    Admit Date: 1/12/2025    Plan: SNF to LTC   Discharge Plan       Row Name 01/22/25 1500       Plan    Plan Comments Pt now reports if she is unable to go to SNF to long term she does have a friend she can stay with named Esha. Pt has a portable O2 concentrator with her and is unsure when she will have access to her other concentrator which is currently in storage. SHEY has left a VM with her daughter to discuss how to get that out of storage for pt. Felicia with [facility] reports they are unsure if pt will qualify for a long term medicaid bed due to a selling of a home in 2021 but they have asked pts daughter to bring copies of bank statements so they can further investigate. SHEY has left a VM with Felicia to look into the possibility of starting precert for possible SNF placement with plan to discharge to friends home while they continue to investigate possible long term options.                   Discharge Codes    No documentation.                 Expected Discharge Date and Time       Expected Discharge Date Expected Discharge Time    Jan 22, 2025               Lillian Marley RN

## 2025-01-23 LAB
GLUCOSE BLDC GLUCOMTR-MCNC: 150 MG/DL (ref 70–130)
GLUCOSE BLDC GLUCOMTR-MCNC: 187 MG/DL (ref 70–130)
GLUCOSE BLDC GLUCOMTR-MCNC: 196 MG/DL (ref 70–130)
GLUCOSE BLDC GLUCOMTR-MCNC: 202 MG/DL (ref 70–130)

## 2025-01-23 PROCEDURE — 99232 SBSQ HOSP IP/OBS MODERATE 35: CPT | Performed by: NURSE PRACTITIONER

## 2025-01-23 PROCEDURE — G0378 HOSPITAL OBSERVATION PER HR: HCPCS

## 2025-01-23 PROCEDURE — 63710000001 INSULIN LISPRO (HUMAN) PER 5 UNITS: Performed by: INTERNAL MEDICINE

## 2025-01-23 PROCEDURE — 25010000002 HEPARIN (PORCINE) PER 1000 UNITS: Performed by: NURSE PRACTITIONER

## 2025-01-23 PROCEDURE — 63710000001 INSULIN GLARGINE PER 5 UNITS: Performed by: NURSE PRACTITIONER

## 2025-01-23 PROCEDURE — 63710000001 INSULIN LISPRO (HUMAN) PER 5 UNITS: Performed by: NURSE PRACTITIONER

## 2025-01-23 PROCEDURE — 82948 REAGENT STRIP/BLOOD GLUCOSE: CPT

## 2025-01-23 PROCEDURE — 97530 THERAPEUTIC ACTIVITIES: CPT

## 2025-01-23 PROCEDURE — 96372 THER/PROPH/DIAG INJ SC/IM: CPT

## 2025-01-23 PROCEDURE — 97535 SELF CARE MNGMENT TRAINING: CPT

## 2025-01-23 RX ADMIN — INSULIN LISPRO 10 UNITS: 100 INJECTION, SOLUTION INTRAVENOUS; SUBCUTANEOUS at 09:13

## 2025-01-23 RX ADMIN — DONEPEZIL HYDROCHLORIDE 10 MG: 10 TABLET, FILM COATED ORAL at 09:07

## 2025-01-23 RX ADMIN — ATORVASTATIN CALCIUM 20 MG: 20 TABLET, FILM COATED ORAL at 09:07

## 2025-01-23 RX ADMIN — CITALOPRAM HYDROBROMIDE 40 MG: 40 TABLET ORAL at 09:07

## 2025-01-23 RX ADMIN — NYSTATIN: 100000 POWDER TOPICAL at 09:10

## 2025-01-23 RX ADMIN — INSULIN LISPRO 2 UNITS: 100 INJECTION, SOLUTION INTRAVENOUS; SUBCUTANEOUS at 12:33

## 2025-01-23 RX ADMIN — INSULIN GLARGINE 10 UNITS: 100 INJECTION, SOLUTION SUBCUTANEOUS at 09:07

## 2025-01-23 RX ADMIN — VERAPAMIL HYDROCHLORIDE 240 MG: 240 TABLET, FILM COATED, EXTENDED RELEASE ORAL at 09:08

## 2025-01-23 RX ADMIN — ACETAMINOPHEN 650 MG: 325 TABLET ORAL at 09:45

## 2025-01-23 RX ADMIN — HEPARIN SODIUM 5000 UNITS: 5000 INJECTION INTRAVENOUS; SUBCUTANEOUS at 20:48

## 2025-01-23 RX ADMIN — FLUTICASONE PROPIONATE 2 SPRAY: 50 SPRAY, METERED NASAL at 09:10

## 2025-01-23 RX ADMIN — BUPROPION HYDROCHLORIDE 150 MG: 150 TABLET, EXTENDED RELEASE ORAL at 09:07

## 2025-01-23 RX ADMIN — ASPIRIN 81 MG: 81 TABLET, COATED ORAL at 09:45

## 2025-01-23 RX ADMIN — INSULIN LISPRO 2 UNITS: 100 INJECTION, SOLUTION INTRAVENOUS; SUBCUTANEOUS at 20:47

## 2025-01-23 RX ADMIN — HEPARIN SODIUM 5000 UNITS: 5000 INJECTION INTRAVENOUS; SUBCUTANEOUS at 09:06

## 2025-01-23 RX ADMIN — NYSTATIN: 100000 POWDER TOPICAL at 20:48

## 2025-01-23 RX ADMIN — INSULIN LISPRO 2 UNITS: 100 INJECTION, SOLUTION INTRAVENOUS; SUBCUTANEOUS at 09:07

## 2025-01-23 RX ADMIN — Medication 1 CAPSULE: at 09:06

## 2025-01-23 RX ADMIN — INSULIN LISPRO 10 UNITS: 100 INJECTION, SOLUTION INTRAVENOUS; SUBCUTANEOUS at 17:46

## 2025-01-23 RX ADMIN — INSULIN LISPRO 4 UNITS: 100 INJECTION, SOLUTION INTRAVENOUS; SUBCUTANEOUS at 17:45

## 2025-01-23 RX ADMIN — INSULIN GLARGINE 25 UNITS: 100 INJECTION, SOLUTION SUBCUTANEOUS at 20:56

## 2025-01-23 RX ADMIN — INSULIN LISPRO 10 UNITS: 100 INJECTION, SOLUTION INTRAVENOUS; SUBCUTANEOUS at 12:34

## 2025-01-23 NOTE — THERAPY PROGRESS REPORT/RE-CERT
Patient Name: Masha Chou  : 1945    MRN: [id number]                              Today's Date: 2025       Admit Date: 2025    Visit Dx:     ICD-10-CM ICD-9-CM   1. Falls  R29.6 V15.88   2. Acute cystitis without hematuria  N30.00 595.0     Patient Active Problem List   Diagnosis    Chest pain    Type 2 diabetes mellitus    Pulmonary HTN    Heart failure    COPD exacerbation    Chronic respiratory failure with hypoxia    Falls    Left knee pain    Osteoarthritis of left knee    Acute UTI (urinary tract infection)     Past Medical History:   Diagnosis Date    Asthma     Cancer     BILATERAL BREAST     COPD (chronic obstructive pulmonary disease)     Diabetes mellitus     Heart failure     Hypertension     Pulmonary hypertension      Past Surgical History:   Procedure Laterality Date    BREAST SURGERY      CHOLECYSTECTOMY      HYSTERECTOMY      JOINT REPLACEMENT      RIGHT KNEE    KELOID EXCISION        General Information       Row Name 25 1004          OT Time and Intention    Document Type progress note/recertification  -KF     Mode of Treatment occupational therapy;individual therapy  -KF       Row Name 25 1004          General Information    Patient Profile Reviewed yes  -KF     Existing Precautions/Restrictions fall;oxygen therapy device and L/min;other (see comments)  urinary incontinence  -KF     Barriers to Rehab medically complex;previous functional deficit  -KF       Row Name 25 1004          Cognition    Orientation Status (Cognition) oriented x 4  -KF       Row Name 25 1004          Safety Issues/Impairments Affecting Functional Mobility    Safety Issues Affecting Function (Mobility) awareness of need for assistance;insight into deficits/self-awareness;safety precaution awareness;safety precautions follow-through/compliance;sequencing abilities  -KF     Impairments Affecting Function (Mobility) balance;endurance/activity tolerance;pain;strength;shortness of  breath;postural/trunk control  -               User Key  (r) = Recorded By, (t) = Taken By, (c) = Cosigned By      Initials Name Provider Type    KF Marly Perkins OT Occupational Therapist                     Mobility/ADL's       Row Name 01/23/25 1004          Bed Mobility    Bed Mobility supine-sit;sit-supine  -     Rolling Right [place] (Bed Mobility) standby assist  -     Sit-Supine [place] (Bed Mobility) standby assist  -     Assistive Device (Bed Mobility) bed rails;head of bed elevated  -       Row Name 01/23/25 1004          Transfers    Transfers sit-stand transfer;stand-sit transfer;toilet transfer  -     Comment, (Transfers) Good safety awareness demo'd during hand placement  -       Row Name 01/23/25 1004          Bed-Chair Transfer    Comment, (Bed-Chair Transfer) Pt declined sitting up in chair this AM.  -       Row Name 01/23/25 1004          Sit-Stand Transfer    Sit-Stand [place] (Transfers) contact guard  -     Assistive Device (Sit-Stand Transfers) walker, front-wheeled  -     Comment, (Sit-Stand Transfer) STS x1 from EOB, x1 from commode  -[facility] Name 01/23/25 1004          Stand-Sit Transfer    Stand-Sit [place] (Transfers) contact guard  -     Assistive Device (Stand-Sit Transfers) walker, front-wheeled  -[facility] Name 01/23/25 1004          Toilet Transfer    Type (Toilet Transfer) sit-stand;stand-sit  -     [place] Level (Toilet Transfer) contact guard  -     Assistive Device (Toilet Transfer) walker, front-wheeled;commode;grab bars/safety frame  -       Row Name 01/23/25 1004          Functional Mobility    Functional Mobility- Ind. Level contact guard assist  -     Functional Mobility- Device walker, front-wheeled  -     Functional Mobility-Distance (Feet) --  to/from the bathroom  -     Patient was able to Ambulate yes  -       Row Name 01/23/25 1004          Activities of Daily Living    BADL Assessment/Intervention  bathing;lower body dressing;feeding;toileting  -KF       Row Name 01/23/25 1004          Lower Body Dressing Assessment/Training    Assistive Devices (Lower Body Dressing) long-handled shoe horn;reacher;sock-aid  -KF     Comment, (Lower Body Dressing) Pt provided and educated on long handled shoe horn, reacher, and sock aid. Pt verbalized understanding, but recommend additional training at next session.  -       Row Name 01/23/25 1004          Bathing Assessment/Intervention    Assistive Devices (Bathing) long-handled sponge  -KF     Comment, (Bathing) Pt provided and educated on use of a long handled sponge to assist in bathing. Pt verbalized understanding.  -       Row Name 01/23/25 1004          Toileting Assessment/Training    [place] Level (Toileting) adjust/manage clothing;perform perineal hygiene;dependent (less than 25% patient effort)  -     Assistive Devices (Toileting) commode;toilet paper aid  -KF     Position (Toileting) unsupported sitting;supported standing  -KF     Comment, (Toileting) Pt needing totalA for julio care following BM today. Pt then provided and educated on use of a toileting aid. Pt verbalized understanding.  -       Row Name 01/23/25 1004          Self-Feeding Assessment/Training    [place] Level (Feeding) liquids to mouth;independent  -KF     Position (Feeding) sitting up in bed  -               User Key  (r) = Recorded By, (t) = Taken By, (c) = Cosigned By      Initials Name Provider Type    KF Marly Perkins OT Occupational Therapist                   Obj/Interventions       Row Name 01/23/25 1047          Balance    Balance Assessment sitting static balance;sitting dynamic balance;sit to stand dynamic balance;standing static balance;standing dynamic balance  -KF     Static Sitting Balance supervision  -     Dynamic Sitting Balance standby assist  -KF     Position, Sitting Balance unsupported;sitting edge of bed;other (see comments)  commode  -     Sit to  Stand Dynamic Balance contact guard  -KF     Static Standing Balance contact guard  -KF     Dynamic Standing Balance contact guard  -KF     Position/Device Used, Standing Balance supported;walker, front-wheeled  -KF     Balance Interventions sitting;standing;sit to stand;supported;static;dynamic;occupation based/functional task  -KF               User Key  (r) = Recorded By, (t) = Taken By, (c) = Cosigned By      Initials Name Provider Type    KF Marly Perkins OT Occupational Therapist                   Goals/Plan       Row Name 01/23/25 1101          Transfer Goal 1 (OT)    Activity/Assistive Device (Transfer Goal 1, OT) sit-to-stand/stand-to-sit;toilet;cane, straight  -KF     [place] Level/Cues Needed (Transfer Goal 1, OT) standby assist  -KF     Time Frame (Transfer Goal 1, OT) long term goal (LTG);1 week  -KF     Progress/Outcome (Transfer Goal 1, OT) goal ongoing  -KF       Row Name 01/23/25 1101          Dressing Goal 1 (OT)    Activity/Device (Dressing Goal 1, OT) lower body dressing  -KF     [place]/Cues Needed (Dressing Goal 1, OT) minimum assist (75% or more patient effort)  -KF     Time Frame (Dressing Goal 1, OT) long term goal (LTG);1 week  -KF     Progress/Outcome (Dressing Goal 1, OT) goal ongoing  -KF       Row Name 01/23/25 1101          Toileting Goal 1 (OT)    Activity/Device (Toileting Goal 1, OT) adjust/manage clothing;perform perineal hygiene;commode  -KF     [place] Level/Cues Needed (Toileting Goal 1, OT) minimum assist (75% or more patient effort)  -KF     Time Frame (Toileting Goal 1, OT) long term goal (LTG);1 week  -KF     Progress/Outcome (Toileting Goal 1, OT) goal ongoing  -KF       Row Name 01/23/25 1101          Grooming Goal 1 (OT)    Activity/Device (Grooming Goal 1, OT) oral care;wash face, hands  -KF     [place] (Grooming Goal 1, OT) standby assist  -KF     Time Frame (Grooming Goal 1, OT) short term goal (STG);3 days  -KF     Progress/Outcome (Grooming  Goal 1, OT) goal ongoing  -KF               User Key  (r) = Recorded By, (t) = Taken By, (c) = Cosigned By      Initials Name Provider Type    Marly Burkett, REMY Occupational Therapist                   Clinical Impression       Row Name 01/23/25 1047          Pain Assessment    Pretreatment Pain Rating 4/10  -KF     Posttreatment Pain Rating 4/10  -KF     Pain Location head  -KF     Pain Side/Orientation generalized  -KF     Pain Management Interventions activity modification encouraged;exercise or physical activity utilized;positioning techniques utilized;nursing notified  -KF     Response to Pain Interventions activity participation with tolerable pain  -KF       Row Name 01/23/25 1047          Plan of Care Review    Plan of Care Reviewed With patient  -KF     Progress improving  -KF     Outcome Evaluation Pt with good participation during OT session, however remains below baseline with generalized weakness, decreased activity tolerance, and balance deficits warranting continued IP OT services. Pt ambulated to/from the bathroom using a RWx with CGA. Pt required depA for hygiene following toileting. Pt provided and educated on use of ADL AE, as well as a toileting assist. Pt verbalized understanding, but recommed continued practice using AE. Continue to rec a d/c to SNF.  -KF       Row Name 01/23/25 1047          Therapy Assessment/Plan (OT)    Rehab Potential (OT) good  -KF     Criteria for Skilled Therapeutic Interventions Met (OT) yes;skilled treatment is necessary  -KF     Therapy Frequency (OT) daily  -KF       Row Name 01/23/25 1047          Therapy Plan Review/Discharge Plan (OT)    Anticipated Discharge Disposition (OT) skilled nursing facility  -KF       Row Name 01/23/25 1047          Vital Signs    Pre Systolic BP Rehab 122  -KF     Pre Treatment Diastolic BP 71  -KF     Pre SpO2 (%) 92  3L  -KF     O2 Delivery Pre Treatment nasal cannula  -KF     Pre Patient Position Supine  -KF     Intra Patient  Position Standing  -KF     Post Patient Position Supine  -KF       Row Name 01/23/25 1047          Positioning and Restraints    Pre-Treatment Position in bed  -KF     Post Treatment Position bed  -KF     In Bed notified nsg;supine;call light within reach;encouraged to call for assist;exit alarm on  -KF               User Key  (r) = Recorded By, (t) = Taken By, (c) = Cosigned By      Initials Name Provider Type    Marly Bukrett OT Occupational Therapist                   Outcome Measures       Row Name 01/23/25 1101          How much help from another is currently needed...    Putting on and taking off regular lower body clothing? 2  -KF     Bathing (including washing, rinsing, and drying) 2  -KF     Toileting (which includes using toilet bed pan or urinal) 2  -KF     Putting on and taking off regular upper body clothing 3  -KF     Taking care of personal grooming (such as brushing teeth) 3  -KF     Eating meals 4  -KF     AM-PAC 6 Clicks Score (OT) 16  -KF       Row Name 01/23/25 1101          Functional Assessment    Outcome Measure Options AM-PAC 6 Clicks Daily Activity (OT)  -KF               User Key  (r) = Recorded By, (t) = Taken By, (c) = Cosigned By      Initials Name Provider Type    Marly Burkett OT Occupational Therapist                    Occupational Therapy Education       Title: PT OT SLP Therapies (Done)       Topic: Occupational Therapy (Done)       Point: ADL training (Done)       Description:   Instruct learner(s) on proper safety adaptation and remediation techniques during self care or transfers.   Instruct in proper use of assistive devices.                  Learning Progress Summary            Patient Acceptance, E,TB, VU,DU by KF at 1/23/2025 0912    Acceptance, E,D, VU,NR,DU by TB at 1/21/2025 1105    Acceptance, E, VU,NR by MR at 1/17/2025 1544    Acceptance, E,D, VU,NR by TB at 1/15/2025 1646    Acceptance, E, VU by AN at 1/13/2025 1058                      Point: Home  exercise program (Done)       Description:   Instruct learner(s) on appropriate technique for monitoring, assisting and/or progressing therapeutic exercises/activities.                  Learning Progress Summary            Patient Acceptance, E,D, VU,NR,DU by  at 1/21/2025 1105                      Point: Precautions (Done)       Description:   Instruct learner(s) on prescribed precautions during self-care and functional transfers.                  Learning Progress Summary            Patient Acceptance, E,TB, VU,DU by  at 1/23/2025 0912    Acceptance, E, VU,NR by MR at 1/17/2025 1544    Acceptance, E, VU by AN at 1/13/2025 1058                      Point: Body mechanics (Done)       Description:   Instruct learner(s) on proper positioning and spine alignment during self-care, functional mobility activities and/or exercises.                  Learning Progress Summary            Patient Acceptance, E,TB, VU,DU by  at 1/23/2025 0912    Acceptance, E, VU,NR by MR at 1/17/2025 1544    Acceptance, E, VU by AN at 1/13/2025 1058                                      User Key       Initials Effective Dates Name Provider Type Discipline     07/11/23 -  Lindsey Sandoval, OT Occupational Therapist OT    AN 09/21/21 -  Anne Marie Cid, OT Occupational Therapist OT    MR 09/22/22 -  Pam Mak, OT Occupational Therapist OT     08/09/23 -  Marly Perkins, OT Occupational Therapist OT                  OT Recommendation and Plan  Therapy Frequency (OT): daily  Plan of Care Review  Plan of Care Reviewed With: patient  Progress: improving  Outcome Evaluation: Pt with good participation during OT session, however remains below baseline with generalized weakness, decreased activity tolerance, and balance deficits warranting continued IP OT services. Pt ambulated to/from the bathroom using a RWx with CGA. Pt required depA for hygiene following toileting. Pt provided and educated on use of ADL AE, as well as a  toileting assist. Pt verbalized understanding, but recommed continued practice using AE. Continue to rec a d/c to SNF.     Time Calculation:         Time Calculation- OT       Row Name 01/23/25 1101             Time Calculation- OT    OT Start Time 0912  -KF      OT Received On 01/23/25  -KF      OT Goal Re-Cert Due Date 02/02/25  -KF         Timed Charges    61942 - OT Therapeutic Activity Minutes 8  -KF      42291 - OT Self Care/Mgmt Minutes 16  -KF         Total Minutes    Timed Charges Total Minutes 24  -KF       Total Minutes 24  -KF                User Key  (r) = Recorded By, (t) = Taken By, (c) = Cosigned By      Initials Name Provider Type    KF Marly Perkins OT Occupational Therapist                  Therapy Charges for Today       Code Description Service Date Service Provider Modifiers Qty    15051774597 HC OT THERAPEUTIC ACT EA 15 MIN 1/23/2025 Marly Perkins OT GO 1    79141134383 HC OT SELF CARE/MGMT/TRAIN EA 15 MIN 1/23/2025 Marly Perkins OT GO 1                 Marly Perkins OT  1/23/2025

## 2025-01-23 NOTE — PROGRESS NOTES
"                  Clinical Nutrition     Patient Name: Masha Chou  YOB: 1945  MRN: [id number]  Date of Encounter: 01/22/25 19:52 EST  Admission date: 1/12/2025  Reason for Visit: LOS, Need for education    Assessment   Nutrition Assessment   Admission Diagnosis:  Falls [R29.6]    Problem List:    Falls    Type 2 diabetes mellitus    COPD exacerbation    Left knee pain    Osteoarthritis of left knee    Acute UTI (urinary tract infection)      PMH:   She  has a past medical history of Asthma, Cancer, COPD (chronic obstructive pulmonary disease), Diabetes mellitus, Heart failure, Hypertension, and Pulmonary hypertension.    PSH:  She  has a past surgical history that includes Breast surgery; Keloid excision; Cholecystectomy; Hysterectomy; and Joint replacement.      Labs    Labs Reviewed: Yes    Results from last 7 days   Lab Units 01/20/25  1246   GLUCOSE mg/dL 194*   BUN mg/dL 18   CREATININE mg/dL 0.92   SODIUM mmol/L 134*   CHLORIDE mmol/L 93*   POTASSIUM mmol/L 4.8             Invalid input(s): \"PLAT\"    Results from last 7 days   Lab Units 01/22/25  1639 01/22/25  1122 01/22/25  0739 01/21/25  2013 01/21/25  1814 01/21/25  1606   GLUCOSE mg/dL 285* 275* 167* 255* 262* 203*     Lab Results   Lab Value Date/Time    HGBA1C 9.00 (H) 01/13/2025 0646    HGBA1C 7.0 11/16/2022 0854    HGBA1C 7.2 06/22/2022 1042               Medications    Medications Reviewed: yes    Scheduled Meds:aspirin, 81 mg, Oral, Daily  atorvastatin, 20 mg, Oral, Daily  buPROPion XL, 150 mg, Oral, Daily  cholestyramine light, 1 packet, Oral, Daily  citalopram, 40 mg, Oral, QAM  donepezil, 10 mg, Oral, Daily  fluticasone, 2 spray, Nasal, Daily  heparin (porcine), 5,000 Units, Subcutaneous, Q12H  insulin glargine, 25 Units, Subcutaneous, Nightly  Insulin Lispro, 10 Units, Subcutaneous, TID With Meals  insulin lispro, 2-9 Units, Subcutaneous, 4x Daily AC & at Bedtime  lactobacillus acidophilus, 1 capsule, Oral, Daily  nystatin, , " "Topical, Q12H  sodium chloride, 10 mL, Intravenous, Q12H  verapamil SR, 240 mg, Oral, Daily      Continuous Infusions:   PRN Meds:.  acetaminophen **OR** acetaminophen **OR** acetaminophen    albuterol    senna-docusate sodium **AND** polyethylene glycol **AND** bisacodyl **AND** bisacodyl    Calcium Replacement - Follow Nurse / BPA Driven Protocol    dextrose    dextrose    Diclofenac Sodium    glucagon (human recombinant)    ipratropium-albuterol    loperamide    Magnesium Standard Dose Replacement - Follow Nurse / BPA Driven Protocol    nitroglycerin    ondansetron ODT    Phosphorus Replacement - Follow Nurse / BPA Driven Protocol    Potassium Replacement - Follow Nurse / BPA Driven Protocol    sodium chloride    sodium chloride    Intake/Ouptut 24 hrs (0701 - 0700)   I&O's Reviewed: yes    Intake & Output (last day)         01/22 0701 01/23 0700    P.O. 240    Total Intake(mL/kg) 240 (2.2)    Urine (mL/kg/hr) 1000 (0.7)    Stool 0    Total Output 1000    Net -760         Stool Unmeasured Occurrence 2 x           Anthropometrics     Height: Height: 142.2 cm (56\")  Last Filed Weight: Weight: 109 kg (240 lb) (01/12/25 1957)  Method: Weight Method: Stated  BMI: BMI (Calculated): 53.8    UBW: 240lb  Weight change: No significant changes     Weight       Weight (kg) Weight (lbs) Weight Method Visit Report   7/19/2020 108.863 kg  240 lb  Stated     3/11/2021 105.235 kg  232 lb  Stated     4/1/2021 112.038 kg  247 lb  Stated     6/25/2021 113.399 kg  250 lb  Stated      113.399 kg  250 lb      7/1/2021 110.394 kg  243 lb 6 oz   --     110.394 kg  243 lb 6 oz   --    7/2/2021 110 kg  242 lb 8.1 oz      10/17/2021 110.678 kg  244 lb  Stated     8/11/2022 110.678 kg  244 lb  Stated     3/4/2023 106.595 kg  235 lb  Stated     8/29/2023 105.688 kg  233 lb  Stated     2/12/2024 105.688 kg  233 lb      6/2/2024 68.04 kg  150 lb  Stated     1/12/2025 108.863 kg  240 lb  Stated         Nutrition Focused Physical Exam     Date: "     Unable to perform due to Defer pending indication     Subjective   Reported/Observed/Food/Nutrition Related History:     Pt resting in bed, reports fair appetite, states she has a history of swallowing problems, had dysphagia test  several years ago, the tech has been chopping her meats for her, and she is tolerating this, hernandez not want to have meats pre-chopped, pt states she tries to follow diabetic diet at home, her Ha1c used to be ~ 6, she has lost her instructions on how to calculate her sliding scale insulin and would like instruction on this, she typically gives herself 2 shots of insulin per day    Current Nutrition Prescription     PO: Diet: Cardiac, Diabetic; Healthy Heart (2-3 Na+); Consistent Carbohydrate; Fluid Consistency: Thin (IDDSI 0)  Oral Nutrition Supplement:  Intake:  82% of 11 meals    Assessment & Plan   Nutrition Diagnosis   Date: 1-22-25 Updated:   Problem Food and nutrition knowledge deficit    Etiology Uncontrolled DM   Signs/Symptoms Ha1c= 9   Status: New      Goal:   Nutrition to support treatment and Tolerate PO, Maintain intake      Nutrition Intervention      Follow treatment progress, Care plan reviewed, Advised available snacks, Interview for preferences, Encourage intake, Education provided    Suggest consult SLP if any S/S of dysphagia, pt reports h/o dysphagia, but she is tolerating current diet at present    DM Diet Education provided    Pt reports she has lost her instructions on how to calculate sliding scale insulin, will consult DM Educator to provide assistance      Monitoring/Evaluation:   Per protocol, I&O, PO intake, Pertinent labs, Weight, Skin status, GI status, Symptoms, Swallow function    Pam Hope RD  Time Spent: 45min

## 2025-01-23 NOTE — CASE MANAGEMENT/SOCIAL WORK
Continued Stay Note  [facility]     Patient Name: Masha Chou  MRN: [id number]  Today's Date: 1/23/2025    Admit Date: 1/12/2025    Plan: SNF to LTC   Discharge Plan       Row Name 01/23/25 1551       Plan    Plan Comments MSW notified by SUNITA that pt stated she may not be able to go to her friend Esha's home. MSW consulted today for help with discharge planning. MSW had multiple conversations today with Pt at bedside, pt's daughter, pt's RN, and the [facility] facility liaison as well as pt's friend Esha. Pt's friend Esha reports she ultimately cannot take pt right now as her daughter and son had to move in with her and she is having construction done and just ultimately can't take pt home at this time. Pt is also currently on home O2 and has a cpap and her own cpap is currently in the room. Pt had explained that she does have a little portable concentrator (which is also currently in room) but it doesn't have all of the adaptors and plug ins. Pt explained that with the eviction, some of her things had been moved into storage and her daughter would coordinate getting it out of storage when she is discharged. MSW did also confirm this with the daughter that she will bring it all to wherever pt needs and when she needs, she just has to be kept updated. MSW had also updated pt at bedside about not being able to stay with Esha at this time and discussed if needed and able, the [facility] of [facility]. MSW unaware if the [facility] able to accommodate pt's CPAP needs. Pt able to use a portable concentrator there as long as it is usable. [facility] cannot [facility] however. Pt reports her oxygen is from [employer]. (This has been bought out by [employer]). MSW spoke with  Mikel with [employer] and pt not currently in their system but he can look at pt's oxygen supplies to verify and see what pt can get replaced if needed. While MSW in room with pt at one point, pt's daughter called  pt and reports that a Alecia with [facility] has been trying to reach pt to get worked out the financial piece to see if [facility] facility can accept pt and assist with longterm Medicaid. In order to do this, they need to to request her last 3 bank statements. MSW did also verify this with [facility] facility liaison, Felicia, the Alecia does need to speak with pt about this to see if able to accept. MSW assisted pt with calling Alecia. Pt was on the phone in the room with Alecia trying to work all of this out with [facility]. Pt reports she is agreeable and wanting to pursue longterm care at a facility and is agreeable to a facility outside of [place] if needed. Pt reports she knows she had a bed offer previously but was confused at the time                   Discharge Codes    No documentation.                 Expected Discharge Date and Time       Expected Discharge Date Expected Discharge Time    Jan 24, 2025               AKIRA Baldwin

## 2025-01-23 NOTE — PLAN OF CARE
Problem: Adult Inpatient Plan of Care  Goal: Plan of Care Review  Outcome: Progressing  Flowsheets  Taken 1/23/2025 0513 by Ray Harmon RN  Progress: no change  Taken 1/21/2025 1600 by Tali Webb PT  Plan of Care Reviewed With: patient     Problem: Skin Injury Risk Increased  Goal: Skin Health and Integrity  Outcome: Progressing  Intervention: Optimize Skin Protection  Recent Flowsheet Documentation  Taken 1/22/2025 2030 by Ray Harmon RN  Head of Bed (HOB) Positioning: HOB elevated     Problem: Fall Injury Risk  Goal: Absence of Fall and Fall-Related Injury  Outcome: Progressing  Intervention: Promote Injury-Free Environment  Recent Flowsheet Documentation  Taken 1/23/2025 0200 by Ray Harmon RN  Safety Promotion/Fall Prevention: safety round/check completed  Taken 1/23/2025 0000 by Ray Harmon RN  Safety Promotion/Fall Prevention: safety round/check completed  Taken 1/22/2025 2200 by Ray Harmon RN  Safety Promotion/Fall Prevention: safety round/check completed  Taken 1/22/2025 2030 by Ray Harmon RN  Safety Promotion/Fall Prevention: safety round/check completed   Goal Outcome Evaluation:           Progress: no change     VSS, NELL overnight, plan of care ongoing

## 2025-01-23 NOTE — PLAN OF CARE
Goal Outcome Evaluation:  Plan of Care Reviewed With: patient        Progress: improving  Outcome Evaluation: Pt with good participation during OT session, however remains below baseline with generalized weakness, decreased activity tolerance, and balance deficits warranting continued IP OT services. Pt ambulated to/from the bathroom using a RWx with CGA. Pt required depA for hygiene following toileting. Pt provided and educated on use of ADL AE, as well as a toileting assist. Pt verbalized understanding, but recommed continued practice using AE. Continue to rec a d/c to SNF.    Anticipated Discharge Disposition (OT): skilled nursing facility

## 2025-01-23 NOTE — PROGRESS NOTES
[facility] Medicine Services  PROGRESS NOTE    Patient Name: Masha Chou  : 1945  MRN: [id number]    Date of Admission: 2025  Primary Care Physician: Kary Wu MD    Subjective   Subjective     CC:  Left knee pain    HPI:  Getting up on her own some. No overnight issues  Some headache related to high glucose per her report      Objective   Objective     Vital Signs:   Temp:  [97.6 °F (36.4 °C)-98.8 °F (37.1 °C)] 98.8 °F (37.1 °C)  Heart Rate:  [72-94] 94  Resp:  [16-18] 18  BP: (122-163)/(53-73) 122/71  Flow (L/min) (Oxygen Therapy):  [3] 3     PE:  Constitutional: No acute distress, awake, alert, obese female resting in bed  HENT: NCAT, mucous membranes moist  Respiratory: Clear to auscultation bilaterally, respiratory effort normal   Cardiovascular: RRR  Gastrointestinal: Positive bowel sounds, soft, nontender, nondistended  Musculoskeletal: No bilateral ankle edema  Psychiatric: Appropriate affect, cooperative  Neurologic: Oriented x 3, ANDRADE, speech clear  Skin: No rashes         Results Reviewed:  LAB RESULTS:      Lab 25  1246   WBC 9.94   HEMOGLOBIN 13.5   HEMATOCRIT 42.6   PLATELETS 187   MCV 91.8           Lab 25  1246   SODIUM 134*   POTASSIUM 4.8   CHLORIDE 93*   CO2 30.0*   ANION GAP 11.0   BUN 18   CREATININE 0.92   EGFR 63.9   GLUCOSE 194*   CALCIUM 9.7                           Brief Urine Lab Results  (Last result in the past 365 days)        Color   Clarity   Blood   Leuk Est   Nitrite   Protein   CREAT   Urine HCG        25 0722 Yellow   Clear   Negative   Trace   Negative   Negative                   Microbiology Results Abnormal       None            No radiology results from the last 24 hrs    Results for orders placed during the hospital encounter of 21    Adult Transthoracic Echo Complete W/ Cont if Necessary Per Protocol    Interpretation Summary  · Estimated left ventricular EF = 70% Left ventricular ejection  fraction appears to be 66 - 70%. Left ventricular systolic function is normal.  · Left ventricular diastolic function is consistent with (grade I) impaired relaxation.  · Left ventricular wall thickness is consistent with hypertrophy. Sigmoid-shaped ventricular septum is present.  · LVOT turbulence without gradient.      Current medications:  Scheduled Meds:aspirin, 81 mg, Oral, Daily  atorvastatin, 20 mg, Oral, Daily  buPROPion XL, 150 mg, Oral, Daily  cholestyramine light, 1 packet, Oral, Daily  citalopram, 40 mg, Oral, QAM  donepezil, 10 mg, Oral, Daily  fluticasone, 2 spray, Nasal, Daily  heparin (porcine), 5,000 Units, Subcutaneous, Q12H  insulin glargine, 10 Units, Subcutaneous, Daily  insulin glargine, 25 Units, Subcutaneous, Nightly  Insulin Lispro, 10 Units, Subcutaneous, TID With Meals  insulin lispro, 2-9 Units, Subcutaneous, 4x Daily AC & at Bedtime  lactobacillus acidophilus, 1 capsule, Oral, Daily  nystatin, , Topical, Q12H  sodium chloride, 10 mL, Intravenous, Q12H  verapamil SR, 240 mg, Oral, Daily      Continuous Infusions:   PRN Meds:.  acetaminophen **OR** acetaminophen **OR** acetaminophen    albuterol    senna-docusate sodium **AND** polyethylene glycol **AND** bisacodyl **AND** bisacodyl    Calcium Replacement - Follow Nurse / BPA Driven Protocol    dextrose    dextrose    Diclofenac Sodium    glucagon (human recombinant)    ipratropium-albuterol    loperamide    Magnesium Standard Dose Replacement - Follow Nurse / BPA Driven Protocol    nitroglycerin    ondansetron ODT    Phosphorus Replacement - Follow Nurse / BPA Driven Protocol    Potassium Replacement - Follow Nurse / BPA Driven Protocol    sodium chloride    sodium chloride    Assessment & Plan   Assessment & Plan     Active Hospital Problems    Diagnosis  POA    **Falls [R29.6]  Not Applicable    Left knee pain [M25.562]  Unknown    Osteoarthritis of left knee [M17.12]  Unknown    Acute UTI (urinary tract infection) [N39.0]  Unknown     COPD exacerbation [J44.1]  Yes    Type 2 diabetes mellitus [E11.9]  Yes      Resolved Hospital Problems   No resolved problems to display.        Brief Hospital Course to date:  Masha Chou is a 79 y.o. female with type 2 diabetes, hypertension, COPD, breast cancer, pulmonary hypertension, diastolic CHF who presented to the ED after having a near fall.    This patient's problems and plans were partially entered by my partner and updated as appropriate by me 01/23/25.    Left knee pain  Osteoarthritis  Generalized weakness  -- X-ray left knee shows mild to moderate tricompartmental osteoarthritis  -- Fall precautions  -- PT/OT consult, recommending SNF  -- Tylenol and Voltaren prn for pain    Diarrhea, chronic  -intermittent chronic diarrhea associated with dietary intake/ anxiety  -continue probiotic  -resumed home imodium prn with improvement.   -no current leukocytosis     Acute UTI (POA)  -- UA: Ketones trace, nitrite positive, leukocytes trace, WBC 3-5, bacteria 4+, squamous 3-6  -- Ceftin 5 days completed     CKD  -- baseline creatinine ~ 1.1-1.2      COPD  JUAN F  Asthma  -- Does not appear to be in acute exacerbation.    -- CPAP nightly  -- DuoNebs scheduled     T2DM  -- Continue lantus to 25 units nightly and add 10 units daytime  - continue TID humalog   -- continue SSI moderate dose scale    Medically ready pending safe disposition, hopefully rehab    Expected Discharge Location and Transportation: SNF to long-term care when bed available -- if unable to find placement social work providing information on housing resources.  Expected Discharge   Expected Discharge Date: 1/24/2025; Expected Discharge Time:      VTE Prophylaxis:  Pharmacologic VTE prophylaxis orders are present.         AM-PAC 6 Clicks Score (PT): 17 (01/21/25 8233)    CODE STATUS:   Code Status and Medical Interventions: CPR (Attempt to Resuscitate); Full Support   Ordered at: 01/12/25 8194     Level Of Support Discussed With:    Patient      Code Status (Patient has no pulse and is not breathing):    CPR (Attempt to Resuscitate)     Medical Interventions (Patient has pulse or is breathing):    Full Support       Evette Gross, APRN  01/23/25

## 2025-01-23 NOTE — CONSULTS
Diabetes Education    Patient Name:  Masha Chou  YOB: 1945  MRN: [id number]  Admit Date:  1/12/2025        Consult received for diabetes education. Met with patient and daughter at bedside. Daughter was there briefly before leaving. They are wanting patient to go to a SNF at discharge. Reviewed A1c of 9%. She said she had it around 7% at one time. She had a meter and would check her blood sugar 3 times per day. Her daughter assisted with her insulin calculation based on a sliding scale. The patient said she does not know the sliding scale and finds it very confusing to figure out on her own how much to administer. She can administer her own injections and check her own blood sugar. She takes 50 units of Lantus in the morning. Assisted patient with calling  Endocrinology (Dr. Stanford) to find out her Humalog sliding scale. Message left.   Reviewed target blood sugar per ADA. Symptoms and treatment for hypoglycemia and hyperglycemia were reviewed. She was knowledgeable on hypoglycemia treatment using Rule of 15.   Last Endocrinology appointment on 11/24 listed Humalog 4 units with meals plus correction. Correction was 1 unit for 60 > 120; use up to max dose of 50 units per day. Will confirm with patient.    Patient said that all of her stuff was thrown away. She will ask Endocrinology for new prescription for her meter and supplies, and her insulin pens.       Electronically signed by:  Madelaine Chowdhury RN  01/23/25 14:35 EST

## 2025-01-23 NOTE — CASE MANAGEMENT/SOCIAL WORK
Continued Stay Note  [facility]     Patient Name: Masha Chou  MRN: [id number]  Today's Date: 1/23/2025    Admit Date: 1/12/2025    Plan: SNF to LTC   Discharge Plan       Row Name 01/23/25 7183       Plan    Plan Comments Pt agreeable to placement still at this time and was working with financial person with [facility] in order to see if they can accept and that pt would be able to get the longterm Medicaid. Pt's daughter reports she is also bringing pt's oxygen to pt's bedside so that she will have it.      Row Name 01/23/25 8031       Plan    Plan Comments MSW notified by APRN that pt stated she may not be able to go to her friend Esha's home. MSW consulted today for help with discharge planning. MSW had multiple conversations today with Pt at bedside, pt's daughter, pt's RN, and the [facility] facility liaison as well as pt's friend Esha. Pt's friend Esha reports she ultimately cannot take pt right now as her daughter and son had to move in with her and she is having construction done and just ultimately can't take pt home at this time. Pt is also currently on home O2 and has a cpap and her own cpap is currently in the room. Pt had explained that she does have a little portable concentrator (which is also currently in room) but it doesn't have all of the adaptors and plug ins. Pt explained that with the eviction, some of her things had been moved into storage and her daughter would coordinate getting it out of storage when she is discharged. MSW did also confirm this with the daughter that she will bring it all to wherever pt needs and when she needs, she just has to be kept updated. MSW had also updated pt at bedside about not being able to stay with Esha at this time and discussed if needed and able, the possibility of [facility]. MSW unaware if the [facility] able to accommodate pt's CPAP needs. Pt able to use a portable concentrator there as long as it is usable. Pt cannot take  oxygen tanks to the [employer] [facility] however. Pt reports her oxygen is from [employer]. (This has been bought out by [employer]). MSW spoke with  Mikel with [employer] and pt not currently in their system but he can look at pt's oxygen supplies to verify and see what pt can get replaced if needed. While MSW in room with pt at one point, pt's daughter called pt and reports that a Alecia with [facility] has been trying to reach pt to get worked out the financial piece to see if [facility] can accept pt and assist with longterm Medicaid. In order to do this, they need to to request her last 3 bank statements. MSW did also verify this with [facility] facility liaison, Felicia, the Alecia does need to speak with pt about this to see if able to accept. MSW assisted pt with calling Alecia. Pt was on the phone in the room with Alecia trying to work all of this out with [facility]. Pt reports she is agreeable and wanting to pursue longterm care at a facility and is agreeable to a facility outside of [place] if needed. Pt reports she knows she had a bed offer previously but was confused at the time                   Discharge Codes    No documentation.                 Expected Discharge Date and Time       Expected Discharge Date Expected Discharge Time    Jan 24, 2025               AKIRA Baldwin

## 2025-01-24 LAB
GLUCOSE BLDC GLUCOMTR-MCNC: 128 MG/DL (ref 70–130)
GLUCOSE BLDC GLUCOMTR-MCNC: 152 MG/DL (ref 70–130)
GLUCOSE BLDC GLUCOMTR-MCNC: 221 MG/DL (ref 70–130)
GLUCOSE BLDC GLUCOMTR-MCNC: 260 MG/DL (ref 70–130)

## 2025-01-24 PROCEDURE — 63710000001 INSULIN GLARGINE PER 5 UNITS: Performed by: NURSE PRACTITIONER

## 2025-01-24 PROCEDURE — 96372 THER/PROPH/DIAG INJ SC/IM: CPT

## 2025-01-24 PROCEDURE — G0378 HOSPITAL OBSERVATION PER HR: HCPCS

## 2025-01-24 PROCEDURE — 63710000001 INSULIN LISPRO (HUMAN) PER 5 UNITS: Performed by: INTERNAL MEDICINE

## 2025-01-24 PROCEDURE — 97110 THERAPEUTIC EXERCISES: CPT

## 2025-01-24 PROCEDURE — 97116 GAIT TRAINING THERAPY: CPT

## 2025-01-24 PROCEDURE — 63710000001 INSULIN LISPRO (HUMAN) PER 5 UNITS: Performed by: NURSE PRACTITIONER

## 2025-01-24 PROCEDURE — 25010000002 HEPARIN (PORCINE) PER 1000 UNITS: Performed by: NURSE PRACTITIONER

## 2025-01-24 PROCEDURE — 99232 SBSQ HOSP IP/OBS MODERATE 35: CPT | Performed by: INTERNAL MEDICINE

## 2025-01-24 PROCEDURE — 82948 REAGENT STRIP/BLOOD GLUCOSE: CPT

## 2025-01-24 RX ADMIN — ASPIRIN 81 MG: 81 TABLET, COATED ORAL at 08:25

## 2025-01-24 RX ADMIN — INSULIN LISPRO 2 UNITS: 100 INJECTION, SOLUTION INTRAVENOUS; SUBCUTANEOUS at 17:19

## 2025-01-24 RX ADMIN — INSULIN LISPRO 6 UNITS: 100 INJECTION, SOLUTION INTRAVENOUS; SUBCUTANEOUS at 21:21

## 2025-01-24 RX ADMIN — INSULIN GLARGINE 25 UNITS: 100 INJECTION, SOLUTION SUBCUTANEOUS at 21:22

## 2025-01-24 RX ADMIN — INSULIN GLARGINE 10 UNITS: 100 INJECTION, SOLUTION SUBCUTANEOUS at 08:31

## 2025-01-24 RX ADMIN — INSULIN LISPRO 4 UNITS: 100 INJECTION, SOLUTION INTRAVENOUS; SUBCUTANEOUS at 12:42

## 2025-01-24 RX ADMIN — BUPROPION HYDROCHLORIDE 150 MG: 150 TABLET, EXTENDED RELEASE ORAL at 08:25

## 2025-01-24 RX ADMIN — INSULIN LISPRO 10 UNITS: 100 INJECTION, SOLUTION INTRAVENOUS; SUBCUTANEOUS at 08:26

## 2025-01-24 RX ADMIN — NYSTATIN: 100000 POWDER TOPICAL at 21:24

## 2025-01-24 RX ADMIN — CITALOPRAM HYDROBROMIDE 40 MG: 40 TABLET ORAL at 08:25

## 2025-01-24 RX ADMIN — Medication 1 CAPSULE: at 08:24

## 2025-01-24 RX ADMIN — HEPARIN SODIUM 5000 UNITS: 5000 INJECTION INTRAVENOUS; SUBCUTANEOUS at 21:22

## 2025-01-24 RX ADMIN — NYSTATIN: 100000 POWDER TOPICAL at 08:32

## 2025-01-24 RX ADMIN — BENZOCAINE 1 APPLICATION: 0.1 GEL TOPICAL at 17:52

## 2025-01-24 RX ADMIN — ACETAMINOPHEN 650 MG: 325 TABLET ORAL at 21:22

## 2025-01-24 RX ADMIN — ATORVASTATIN CALCIUM 20 MG: 20 TABLET, FILM COATED ORAL at 08:24

## 2025-01-24 RX ADMIN — INSULIN LISPRO 10 UNITS: 100 INJECTION, SOLUTION INTRAVENOUS; SUBCUTANEOUS at 17:19

## 2025-01-24 RX ADMIN — DONEPEZIL HYDROCHLORIDE 10 MG: 10 TABLET, FILM COATED ORAL at 08:25

## 2025-01-24 RX ADMIN — HEPARIN SODIUM 5000 UNITS: 5000 INJECTION INTRAVENOUS; SUBCUTANEOUS at 08:23

## 2025-01-24 RX ADMIN — VERAPAMIL HYDROCHLORIDE 240 MG: 240 TABLET, FILM COATED, EXTENDED RELEASE ORAL at 08:25

## 2025-01-24 RX ADMIN — ACETAMINOPHEN 650 MG: 325 TABLET ORAL at 14:51

## 2025-01-24 RX ADMIN — CHOLESTYRAMINE 4 G: 4 POWDER, FOR SUSPENSION ORAL at 08:24

## 2025-01-24 RX ADMIN — INSULIN LISPRO 10 UNITS: 100 INJECTION, SOLUTION INTRAVENOUS; SUBCUTANEOUS at 12:42

## 2025-01-24 NOTE — CONSULTS
Diabetes Education    Patient Name:  Masha Chou  YOB: 1945  MRN: [id number]  Admit Date:  1/12/2025    Diabetes education follow up. Met with patient about her sliding scale for Humalog. She had a faxed paper from  with her Humalog sliding scale settings. Reviewed scale with patient. She is to take 4 units with meals plus correction (150-210=1 unit, 211-270=2 units, 271-330=3 units, 331-390= 4 units, 391-450=5 units, 450-hi=6 units). I also had her take a picture of her Humalog sliding scale prescription in case she loses her papers. Patient had difficulty finding how much insulin she needed to take. Went over several examples but she could not do independently. She said her daughter used to take care of everything. Patient called  to request a refill on all her diabetes medication. She said she could have a friend  her supplies.      Electronically signed by:  Madelaine Chowdhury RN  01/24/25 14:58 EST

## 2025-01-24 NOTE — CASE MANAGEMENT/SOCIAL WORK
Continued Stay Note  [facility]     Patient Name: Masha Chou  MRN: [id number]  Today's Date: 1/24/2025    Admit Date: 1/12/2025    Plan: SNF to LTC   Discharge Plan       Row Name 01/24/25 1408       Plan    Plan Comments MSW also spoke with pt at bedside this afternoon as there had been concerns of pt's daughter having pt's debit card and using it and spedning pt's money. Pt reports she now has her debit card, her daughter brought it up to her yesterday, along with her oxygen supplies. Oxygen supplies in the room show pt's oxygen company as patient aides. MSW did address money concern with pt. MSW offered to make an APS report. Pt declines APS report. MSW did also explain to pt that if she is wanting to, she can call and file a police report as well. Pt denies current concerns about this.                                      Discharge Codes    No documentation.                 Expected Discharge Date and Time       Expected Discharge Date Expected Discharge Time    Jan 24, 2025               AKIRA Baldwin

## 2025-01-24 NOTE — PLAN OF CARE
Goal Outcome Evaluation:  Plan of Care Reviewed With: patient        Progress: improving  Outcome Evaluation: Pt. continues to present below baseline function w/generalized weakness, balance deficits and decreased functional endurance affecting her ability to safely participate in functional mobility. She performed bed mobility, transfers and ambulated 60' w/front wheeled walker, contact guard assist. Activity limited by fatigue. Pt. tolerated ther-ex well. Continue acute PT POC to progress as tolerated.    Anticipated Discharge Disposition (PT): skilled nursing facility

## 2025-01-24 NOTE — THERAPY TREATMENT NOTE
Patient Name: Masha Chou  : 1945    MRN: [id number]                              Today's Date: 2025       Admit Date: 2025    Visit Dx:     ICD-10-CM ICD-9-CM   1. Falls  R29.6 V15.88   2. Acute cystitis without hematuria  N30.00 595.0     Patient Active Problem List   Diagnosis    Chest pain    Type 2 diabetes mellitus    Pulmonary HTN    Heart failure    COPD exacerbation    Chronic respiratory failure with hypoxia    Falls    Left knee pain    Osteoarthritis of left knee    Acute UTI (urinary tract infection)     Past Medical History:   Diagnosis Date    Asthma     Cancer     BILATERAL BREAST     COPD (chronic obstructive pulmonary disease)     Diabetes mellitus     Heart failure     Hypertension     Pulmonary hypertension      Past Surgical History:   Procedure Laterality Date    BREAST SURGERY      CHOLECYSTECTOMY      HYSTERECTOMY      JOINT REPLACEMENT      RIGHT KNEE    KELOID EXCISION        General Information       Row Name 25 1520          Physical Therapy Time and Intention    Document Type progress note/recertification  -     Mode of Treatment physical therapy  -       Row Name 25 1520          General Information    Patient Profile Reviewed yes  -SS     Existing Precautions/Restrictions fall;oxygen therapy device and L/min;other (see comments)  urinary incontinence  -     Barriers to Rehab medically complex;previous functional deficit  -SS       Row Name 25 1520          Cognition    Orientation Status (Cognition) oriented x 4  -SS       Row Name 25 1520          Safety Issues/Impairments Affecting Functional Mobility    Safety Issues Affecting Function (Mobility) awareness of need for assistance;insight into deficits/self-awareness;positioning of assistive device;safety precaution awareness;safety precautions follow-through/compliance;sequencing abilities  -SS     Impairments Affecting Function (Mobility) balance;endurance/activity  tolerance;pain;strength;postural/trunk control  -               User Key  (r) = Recorded By, (t) = Taken By, (c) = Cosigned By      Initials Name Provider Type    SS Tali Webb, PT Physical Therapist                   Mobility       Row Name 01/24/25 1521          Bed Mobility    Bed Mobility scooting/bridging;supine-sit;sit-supine  -SS     Scooting/Bridging Carter (Bed Mobility) standby assist;verbal cues  -SS     Supine-Sit Carter (Bed Mobility) standby assist;verbal cues  -SS     Sit-Supine Carter (Bed Mobility) standby assist;verbal cues  -SS     Assistive Device (Bed Mobility) bed rails;head of bed elevated  -     Comment, (Bed Mobility) VC for sequencing; pt. reliant on momentum and bed rails  -       Row Name 01/24/25 1521          Sit-Stand Transfer    Sit-Stand Carter (Transfers) contact guard;verbal cues  -     Assistive Device (Sit-Stand Transfers) walker, front-wheeled  -     Comment, (Sit-Stand Transfer) VC for hand placement, appropriate alignment, emphasis on lowering with eccentric control; pt. performed from bed and bedside commode  -       Row Name 01/24/25 1521          Gait/Stairs (Locomotion)    Carter Level (Gait) verbal cues;contact guard  -     Assistive Device (Gait) walker, front-wheeled  -     Patient was able to Ambulate yes  -     Distance in Feet (Gait) 60  -SS     Deviations/Abnormal Patterns (Gait) bilateral deviations;base of support, wide;leighann decreased;gait speed decreased;stride length decreased  -     Bilateral Gait Deviations heel strike decreased;forward flexed posture  -     Comment, (Gait/Stairs) Pt. ambulated with a step through gait pattern. VC for upright posture, decreased shoulder elevation, pursed lip breathing. Activity limited by fatigue.  -               User Key  (r) = Recorded By, (t) = Taken By, (c) = Cosigned By      Initials Name Provider Type    SS Tali Webb, PT Physical Therapist                    Obj/Interventions       Row Name 01/24/25 1523          Motor Skills    Motor Skills functional endurance  -     Functional Endurance modified RPE: 4/10  -     Therapeutic Exercise hip;knee;ankle  -       Row Name 01/24/25 1523          Hip (Therapeutic Exercise)    Hip (Therapeutic Exercise) isometric exercises;strengthening exercise  -     Hip Isometrics (Therapeutic Exercise) bilateral;gluteal sets;10 repetitions  -     Hip Strengthening (Therapeutic Exercise) bilateral;aBduction;aDduction;heel slides;marching while seated;10 repetitions  -       Row Name 01/24/25 1523          Knee (Therapeutic Exercise)    Knee (Therapeutic Exercise) isometric exercises;strengthening exercise  -     Knee Isometrics (Therapeutic Exercise) bilateral;quad sets;10 repetitions  -     Knee Strengthening (Therapeutic Exercise) bilateral;SLR (straight leg raise);LAQ (long arc quad);10 repetitions  -       Row Name 01/24/25 1523          Ankle (Therapeutic Exercise)    Ankle (Therapeutic Exercise) AROM (active range of motion)  -     Ankle AROM (Therapeutic Exercise) bilateral;dorsiflexion;plantarflexion;10 repetitions  -       Row Name 01/24/25 1523          Balance    Balance Assessment sitting static balance;sitting dynamic balance;standing static balance;standing dynamic balance  -     Static Sitting Balance supervision  -     Dynamic Sitting Balance standby assist  -     Position, Sitting Balance unsupported;sitting edge of bed  -     Static Standing Balance contact guard  -     Dynamic Standing Balance contact guard  -     Position/Device Used, Standing Balance supported;walker, front-wheeled  -     Balance Interventions sitting;standing;sit to stand;supported;static;dynamic  -               User Key  (r) = Recorded By, (t) = Taken By, (c) = Cosigned By      Initials Name Provider Type     Tali Webb PT Physical Therapist                   Goals/Plan       Row Name 01/24/25 1527           Bed Mobility Goal 1 (PT)    Activity/Assistive Device (Bed Mobility Goal 1, PT) sit to supine/supine to sit  -SS     [place] Level/Cues Needed (Bed Mobility Goal 1, PT) independent  -SS     Time Frame (Bed Mobility Goal 1, PT) short term goal (STG);5 days  -SS     Progress/Outcomes (Bed Mobility Goal 1, PT) good progress toward goal;goal ongoing  -SS       Row Name 01/24/25 1527          Transfer Goal 1 (PT)    Activity/Assistive Device (Transfer Goal 1, PT) sit-to-stand/stand-to-sit;bed-to-chair/chair-to-bed;walker, rolling  -SS     [place] Level/Cues Needed (Transfer Goal 1, PT) standby assist  -SS     Time Frame (Transfer Goal 1, PT) long term goal (LTG);10 days  -SS     Progress/Outcome (Transfer Goal 1, PT) good progress toward goal;goal ongoing  -SS       Row Name 01/24/25 1527          Gait Training Goal 1 (PT)    Activity/Assistive Device (Gait Training Goal 1, PT) gait (walking locomotion);assistive device use;cane, straight  -SS     [place] Level (Gait Training Goal 1, PT) contact guard required  -SS     Distance (Gait Training Goal 1, PT) 100'  -SS     Time Frame (Gait Training Goal 1, PT) long term goal (LTG);10 days  -SS     Progress/Outcome (Gait Training Goal 1, PT) good progress toward goal;goal ongoing  -SS       Row Name 01/24/25 1527          Balance Goal 1 (PT)    Activity/Assistive Device (Balance Goal) standing dynamic balance;standing static balance  -SS     [place] Level/Cues Needed (Balance Goal 1, PT) modified independence  -SS     Time Frame (Balance Goal 1, PT) long-term goal (LTG);1 week  -SS       Row Name 01/24/25 1527          Stairs Goal 1 (PT)    Activity/Assistive Device (Stairs Goal 1, PT) ascending stairs;descending stairs;using handrail, left;cane, straight  -SS     Number of Stairs (Stairs Goal 1, PT) 1+1  -SS     Time Frame (Stairs Goal 1, PT) long term goal (LTG);10 days  -SS     Progress/Outcome (Stairs Goal 1, PT) progress slower than expected;goal  ongoing  -               User Key  (r) = Recorded By, (t) = Taken By, (c) = Cosigned By      Initials Name Provider Type     Tali Webb, PT Physical Therapist                   Clinical Impression       Row Name 01/24/25 1524          Pain    Pretreatment Pain Rating 6/10  -SS     Posttreatment Pain Rating 6/10  -SS     Pain Location head  -     Pain Side/Orientation generalized  -     Pain Management Interventions activity modification encouraged;breathing exercises utilized;complementary health approaches promoted;movement retraining implemented;exercise or physical activity utilized;positioning techniques utilized  -     Response to Pain Interventions activity level improved;activity participation with tolerable pain  -     Pre/Posttreatment Pain Comment pt reports feeling stressed regarding discharge planning  -       Row Name 01/24/25 1524          Plan of Care Review    Plan of Care Reviewed With patient  -SS     Progress improving  -     Outcome Evaluation Pt. continues to present below baseline function w/generalized weakness, balance deficits and decreased functional endurance affecting her ability to safely participate in functional mobility. She performed bed mobility, transfers and ambulated 60' w/front wheeled walker, contact guard assist. Activity limited by fatigue. Pt. tolerated ther-ex well. Continue acute PT POC to progress as tolerated.  -       Row Name 01/24/25 1524          Therapy Assessment/Plan (PT)    Rehab Potential (PT) good  -     Criteria for Skilled Interventions Met (PT) yes;meets criteria;skilled treatment is necessary  -     Therapy Frequency (PT) daily  -       Row Name 01/24/25 1524          Vital Signs    Pre Systolic BP Rehab 164  -SS     Pre Treatment Diastolic BP 84  -SS     Pre Patient Position Supine  -[facility] Name 01/24/25 1524          Positioning and Restraints    Pre-Treatment Position in bed  -     Post Treatment Position bed  -      In Bed notified nsg;fowlers;call light within reach;encouraged to call for assist;legs elevated;exit alarm on  -               User Key  (r) = Recorded By, (t) = Taken By, (c) = Cosigned By      Initials Name Provider Type    Tali Roldan PT Physical Therapist                   Outcome Measures       Row Name 01/24/25 1528 01/24/25 0824       How much help from another person do you currently need...    Turning from your back to your side while in flat bed without using bedrails? 3  -SS 3  -AP    Moving from lying on back to sitting on the side of a flat bed without bedrails? 3  -SS 3  -AP    Moving to and from a bed to a chair (including a wheelchair)? 3  -SS 3  -AP    Standing up from a chair using your arms (e.g., wheelchair, bedside chair)? 3  -SS 3  -AP    Climbing 3-5 steps with a railing? 3  -SS 2  -AP    To walk in hospital room? 3  -SS 3  -AP    AM-PAC 6 Clicks Score (PT) 18  -SS 17  -AP    Highest Level of Mobility Goal 6 --> Walk 10 steps or more  -SS 5 --> Static standing  -AP      Row Name 01/24/25 1528          Functional Assessment    Outcome Measure Options AM-PAC 6 Clicks Basic Mobility (PT)  -               User Key  (r) = Recorded By, (t) = Taken By, (c) = Cosigned By      Initials Name Provider Type    Alejandra Angela, RN Registered Nurse    Tali Roldan PT Physical Therapist                                 Physical Therapy Education       Title: PT OT SLP Therapies (Done)       Topic: Physical Therapy (Done)       Point: Mobility training (Done)       Learning Progress Summary            Patient Eager, NIKKI, VU,DU,NR by  at 1/24/2025 1528    Comment: Reviewed safety/technique w/bed mobility, transfers, ambulation, HEP, PT POC    Eager, E, VU,DU,NR by  at 1/21/2025 1603    Comment: Reviewed safety/technique w/bed mobility, transfers, ambulation, HEP, PT POC    Acceptance, E,D, VU,NR by AB at 1/17/2025 1551    Acceptance, E, NR by AS at 1/14/2025 1406                       Point: Home exercise program (Done)       Learning Progress Summary            Patient Eager, E, VU,DU,NR by  at 1/24/2025 1528    Comment: Reviewed safety/technique w/bed mobility, transfers, ambulation, HEP, PT POC    Eager, E, VU,DU,NR by  at 1/21/2025 1603    Comment: Reviewed safety/technique w/bed mobility, transfers, ambulation, HEP, PT POC    Acceptance, E, NR by AS at 1/14/2025 1406    Acceptance, E, VU by CK at 1/13/2025 1056                      Point: Body mechanics (Done)       Learning Progress Summary            Patient Eager, E, VU,DU,NR by  at 1/24/2025 1528    Comment: Reviewed safety/technique w/bed mobility, transfers, ambulation, HEP, PT POC    Eager, E, VU,DU,NR by  at 1/21/2025 1603    Comment: Reviewed safety/technique w/bed mobility, transfers, ambulation, HEP, PT POC    Acceptance, E,D, VU,NR by AB at 1/17/2025 1551    Acceptance, E, NR by AS at 1/14/2025 1406    Acceptance, E, VU by CK at 1/13/2025 1056                      Point: Precautions (Done)       Learning Progress Summary            Patient Eager, E, VU,DU,NR by  at 1/24/2025 1528    Comment: Reviewed safety/technique w/bed mobility, transfers, ambulation, HEP, PT POC    Eager, E, VU,DU,NR by  at 1/21/2025 1603    Comment: Reviewed safety/technique w/bed mobility, transfers, ambulation, HEP, PT POC    Acceptance, E,D, VU,NR by AB at 1/17/2025 1551    Acceptance, E, NR by AS at 1/14/2025 1406    Acceptance, E, VU by CK at 1/13/2025 1056                                      User Key       Initials Effective Dates Name Provider Type Discipline    AS 12/13/24 -  Alejandra Crowder, PTA Physical Therapist Assistant PT    SS 06/01/21 -  Tali Webb, PT Physical Therapist PT    AB 09/22/22 -  Alejandra Farias, PT Physical Therapist PT    CK 02/06/24 -  Loretta Webber, PT Physical Therapist PT                  PT Recommendation and Plan     Progress: improving  Outcome Evaluation: Pt. continues to present below baseline  function w/generalized weakness, balance deficits and decreased functional endurance affecting her ability to safely participate in functional mobility. She performed bed mobility, transfers and ambulated 60' w/front wheeled walker, contact guard assist. Activity limited by fatigue. Pt. tolerated ther-ex well. Continue acute PT POC to progress as tolerated.     Time Calculation:         PT Charges       Row Name 01/24/25 1529             Time Calculation    Start Time 1443  -SS      PT Received On 01/24/25  -SS      PT Goal Re-Cert Due Date 02/03/25  -SS         Timed Charges    76443 - PT Therapeutic Exercise Minutes 10  -SS      33941 - Gait Training Minutes  10  -SS      11233 - PT Therapeutic Activity Minutes 3  -SS         Total Minutes    Timed Charges Total Minutes 23  -SS       Total Minutes 23  -SS                User Key  (r) = Recorded By, (t) = Taken By, (c) = Cosigned By      Initials Name Provider Type    SS Tali Webb, PARRIS Physical Therapist                  Therapy Charges for Today       Code Description Service Date Service Provider Modifiers Qty    22743830992 HC PT THER PROC EA 15 MIN 1/24/2025 Tali Webb, PT GP 1    10618756592 HC GAIT TRAINING EA 15 MIN 1/24/2025 Tali Webb, PT GP 1            PT G-Codes  Outcome Measure Options: AM-PAC 6 Clicks Basic Mobility (PT)  AM-PAC 6 Clicks Score (PT): 18  AM-PAC 6 Clicks Score (OT): 16  PT Discharge Summary  Anticipated Discharge Disposition (PT): skilled nursing facility    Tali Webb PT  1/24/2025

## 2025-01-24 NOTE — PROGRESS NOTES
[facility] Medicine Services  PROGRESS NOTE    Patient Name: Masha Chou  : 1945  MRN: [id number]    Date of Admission: 2025  Primary Care Physician: Kary Wu MD    Subjective   Subjective     CC:  Left knee pain    HPI:  No new issues, no dyspnea, no chest pain, no n/v/d      Objective   Objective     Vital Signs:   Temp:  [96.3 °F (35.7 °C)-98.5 °F (36.9 °C)] 98.4 °F (36.9 °C)  Heart Rate:  [74-93] 93  Resp:  [16-18] 18  BP: (139-172)/(68-89) 160/89  Flow (L/min) (Oxygen Therapy):  [3] 3     PE:  Constitutional: No acute distress, awake, alert, obese female resting in bed  HENT: NCAT, mucous membranes moist  Respiratory: ctab  Cardiovascular: rrr  Gastrointestinal: Positive bowel sounds, soft, nontender, nondistended  Musculoskeletal: No bilateral ankle edema  Psychiatric: Appropriate affect, cooperative  Neurologic: Oriented x 3, ANDRADE, speech clear  Skin: No rashes         Results Reviewed:  LAB RESULTS:      Lab 25  1246   WBC 9.94   HEMOGLOBIN 13.5   HEMATOCRIT 42.6   PLATELETS 187   MCV 91.8           Lab 25  1246   SODIUM 134*   POTASSIUM 4.8   CHLORIDE 93*   CO2 30.0*   ANION GAP 11.0   BUN 18   CREATININE 0.92   EGFR 63.9   GLUCOSE 194*   CALCIUM 9.7                           Brief Urine Lab Results  (Last result in the past 365 days)        Color   Clarity   Blood   Leuk Est   Nitrite   Protein   CREAT   Urine HCG        25 0722 Yellow   Clear   Negative   Trace   Negative   Negative                   Microbiology Results Abnormal       None            No radiology results from the last 24 hrs    Results for orders placed during the hospital encounter of 21    Adult Transthoracic Echo Complete W/ Cont if Necessary Per Protocol    Interpretation Summary  · Estimated left ventricular EF = 70% Left ventricular ejection fraction appears to be 66 - 70%. Left ventricular systolic function is normal.  · Left ventricular diastolic  function is consistent with (grade I) impaired relaxation.  · Left ventricular wall thickness is consistent with hypertrophy. Sigmoid-shaped ventricular septum is present.  · LVOT turbulence without gradient.      Current medications:  Scheduled Meds:aspirin, 81 mg, Oral, Daily  atorvastatin, 20 mg, Oral, Daily  buPROPion XL, 150 mg, Oral, Daily  cholestyramine light, 1 packet, Oral, Daily  citalopram, 40 mg, Oral, QAM  donepezil, 10 mg, Oral, Daily  fluticasone, 2 spray, Nasal, Daily  heparin (porcine), 5,000 Units, Subcutaneous, Q12H  insulin glargine, 10 Units, Subcutaneous, Daily  insulin glargine, 25 Units, Subcutaneous, Nightly  Insulin Lispro, 10 Units, Subcutaneous, TID With Meals  insulin lispro, 2-9 Units, Subcutaneous, 4x Daily AC & at Bedtime  lactobacillus acidophilus, 1 capsule, Oral, Daily  nystatin, , Topical, Q12H  sodium chloride, 10 mL, Intravenous, Q12H  verapamil SR, 240 mg, Oral, Daily      Continuous Infusions:   PRN Meds:.  acetaminophen **OR** acetaminophen **OR** acetaminophen    albuterol    senna-docusate sodium **AND** polyethylene glycol **AND** bisacodyl **AND** bisacodyl    Calcium Replacement - Follow Nurse / BPA Driven Protocol    dextrose    dextrose    Diclofenac Sodium    glucagon (human recombinant)    ipratropium-albuterol    loperamide    Magnesium Standard Dose Replacement - Follow Nurse / BPA Driven Protocol    nitroglycerin    ondansetron ODT    Phosphorus Replacement - Follow Nurse / BPA Driven Protocol    Potassium Replacement - Follow Nurse / BPA Driven Protocol    sodium chloride    sodium chloride    Assessment & Plan   Assessment & Plan     Active Hospital Problems    Diagnosis  POA    **Falls [R29.6]  Not Applicable    Left knee pain [M25.562]  Unknown    Osteoarthritis of left knee [M17.12]  Unknown    Acute UTI (urinary tract infection) [N39.0]  Unknown    COPD exacerbation [J44.1]  Yes    Type 2 diabetes mellitus [E11.9]  Yes      Resolved Hospital Problems   No  resolved problems to display.        Brief Hospital Course to date:  Masha Chou is a 79 y.o. female with type 2 diabetes, hypertension, COPD, breast cancer, pulmonary hypertension, diastolic CHF who presented to the ED after having a near fall.    This patient's problems and plans were partially entered by my partner and updated as appropriate by me 01/24/25.    Left knee pain  Osteoarthritis  Generalized weakness  -- X-ray left knee shows mild to moderate tricompartmental osteoarthritis  -- Fall precautions  -- PT/OT consult, recommending SNF  -- Tylenol and Voltaren prn for pain    Diarrhea, chronic  -intermittent chronic diarrhea associated with dietary intake/ anxiety  -continue probiotic  -resumed home imodium prn with improvement.   -no current leukocytosis     Acute UTI (POA)  -- UA: Ketones trace, nitrite positive, leukocytes trace, WBC 3-5, bacteria 4+, squamous 3-6  -- Ceftin 5 days completed     CKD  -- baseline creatinine ~ 1.1-1.2      COPD  JUAN F  Asthma  -- Does not appear to be in acute exacerbation.    -- CPAP nightly  -- DuoNebs scheduled     T2DM  -- continue basal bolus insulin, titrate prn      Am labs: cbc,cmp,mag    Expected Discharge Location and Transportation: SNF to long-term care when bed available -- if unable to find placement social work providing information on housing resources.  Expected Discharge   Expected Discharge Date: 1/24/2025; Expected Discharge Time:      VTE Prophylaxis:  Pharmacologic VTE prophylaxis orders are present.         AM-PAC 6 Clicks Score (PT): 17 (01/24/25 9080)    CODE STATUS:   Code Status and Medical Interventions: CPR (Attempt to Resuscitate); Full Support   Ordered at: 01/12/25 3092     Level Of Support Discussed With:    Patient     Code Status (Patient has no pulse and is not breathing):    CPR (Attempt to Resuscitate)     Medical Interventions (Patient has pulse or is breathing):    Full Support       Pavan Cruz MD  01/24/25

## 2025-01-24 NOTE — CASE MANAGEMENT/SOCIAL WORK
Continued Stay Note  [facility]     Patient Name: Masha Chou  MRN: [id number]  Today's Date: 1/24/2025    Admit Date: 1/12/2025    Plan: SNF to LTC   Discharge Plan       Row Name 01/24/25 1222       Plan    Plan Comments MSW had spoken again with Felicia with [facility]. Pt had provided everything needed, including last 3 bank statements to the yvette pryor with the [facility] and they are checking into everything to see if pt does qualify for Longterm Medicaid. In the case that pt does not qualify, MSW did call and leave a voicemail with pt's friend Esha to check into if pt could try to go to shortterm rehab and then to Esha's after that for a little while and apply for housing in the meantime. MSW had also previously provided pt with housing reseources. Pt reports she is wanting to apply for the [facility] apartments when able to but is aware they have a long waiting list.                   Discharge Codes    No documentation.                 Expected Discharge Date and Time       Expected Discharge Date Expected Discharge Time    Jan 24, 2025               AKIRA Baldwin

## 2025-01-24 NOTE — PLAN OF CARE
Goal Outcome Evaluation:  Plan of Care Reviewed With: patient        Progress: improving  Outcome Evaluation: Slept well throughout the night on cpap. Denies pain at this time. Vitals stable. No overnight concerns or events to report. Will continue care per POC

## 2025-01-25 LAB
ALBUMIN SERPL-MCNC: 3.9 G/DL (ref 3.5–5.2)
ALBUMIN/GLOB SERPL: 2 G/DL
ALP SERPL-CCNC: 98 U/L (ref 39–117)
ALT SERPL W P-5'-P-CCNC: 42 U/L (ref 1–33)
ANION GAP SERPL CALCULATED.3IONS-SCNC: 11 MMOL/L (ref 5–15)
AST SERPL-CCNC: 30 U/L (ref 1–32)
BILIRUB SERPL-MCNC: 0.6 MG/DL (ref 0–1.2)
BUN SERPL-MCNC: 18 MG/DL (ref 8–23)
BUN/CREAT SERPL: 18.6 (ref 7–25)
CALCIUM SPEC-SCNC: 9.5 MG/DL (ref 8.6–10.5)
CHLORIDE SERPL-SCNC: 98 MMOL/L (ref 98–107)
CO2 SERPL-SCNC: 29 MMOL/L (ref 22–29)
CREAT SERPL-MCNC: 0.97 MG/DL (ref 0.57–1)
DEPRECATED RDW RBC AUTO: 49 FL (ref 37–54)
EGFRCR SERPLBLD CKD-EPI 2021: 59.9 ML/MIN/1.73
ERYTHROCYTE [DISTWIDTH] IN BLOOD BY AUTOMATED COUNT: 14 % (ref 12.3–15.4)
GLOBULIN UR ELPH-MCNC: 2 GM/DL
GLUCOSE BLDC GLUCOMTR-MCNC: 126 MG/DL (ref 70–130)
GLUCOSE BLDC GLUCOMTR-MCNC: 167 MG/DL (ref 70–130)
GLUCOSE BLDC GLUCOMTR-MCNC: 180 MG/DL (ref 70–130)
GLUCOSE BLDC GLUCOMTR-MCNC: 208 MG/DL (ref 70–130)
GLUCOSE BLDC GLUCOMTR-MCNC: 267 MG/DL (ref 70–130)
GLUCOSE SERPL-MCNC: 147 MG/DL (ref 65–99)
HCT VFR BLD AUTO: 42.2 % (ref 34–46.6)
HGB BLD-MCNC: 12.8 G/DL (ref 12–15.9)
MAGNESIUM SERPL-MCNC: 1.6 MG/DL (ref 1.6–2.4)
MCH RBC QN AUTO: 29.4 PG (ref 26.6–33)
MCHC RBC AUTO-ENTMCNC: 30.3 G/DL (ref 31.5–35.7)
MCV RBC AUTO: 96.8 FL (ref 79–97)
PLATELET # BLD AUTO: 170 10*3/MM3 (ref 140–450)
PMV BLD AUTO: 11.1 FL (ref 6–12)
POTASSIUM SERPL-SCNC: 4.7 MMOL/L (ref 3.5–5.2)
PROT SERPL-MCNC: 5.9 G/DL (ref 6–8.5)
RBC # BLD AUTO: 4.36 10*6/MM3 (ref 3.77–5.28)
SODIUM SERPL-SCNC: 138 MMOL/L (ref 136–145)
WBC NRBC COR # BLD AUTO: 7.09 10*3/MM3 (ref 3.4–10.8)

## 2025-01-25 PROCEDURE — G0378 HOSPITAL OBSERVATION PER HR: HCPCS

## 2025-01-25 PROCEDURE — 63710000001 INSULIN GLARGINE PER 5 UNITS: Performed by: NURSE PRACTITIONER

## 2025-01-25 PROCEDURE — 85027 COMPLETE CBC AUTOMATED: CPT | Performed by: INTERNAL MEDICINE

## 2025-01-25 PROCEDURE — 63710000001 INSULIN LISPRO (HUMAN) PER 5 UNITS: Performed by: NURSE PRACTITIONER

## 2025-01-25 PROCEDURE — 96372 THER/PROPH/DIAG INJ SC/IM: CPT

## 2025-01-25 PROCEDURE — 25010000002 HEPARIN (PORCINE) PER 1000 UNITS: Performed by: NURSE PRACTITIONER

## 2025-01-25 PROCEDURE — 99232 SBSQ HOSP IP/OBS MODERATE 35: CPT | Performed by: INTERNAL MEDICINE

## 2025-01-25 PROCEDURE — 82948 REAGENT STRIP/BLOOD GLUCOSE: CPT

## 2025-01-25 PROCEDURE — 80053 COMPREHEN METABOLIC PANEL: CPT | Performed by: INTERNAL MEDICINE

## 2025-01-25 PROCEDURE — 63710000001 INSULIN LISPRO (HUMAN) PER 5 UNITS: Performed by: INTERNAL MEDICINE

## 2025-01-25 PROCEDURE — 83735 ASSAY OF MAGNESIUM: CPT | Performed by: INTERNAL MEDICINE

## 2025-01-25 RX ADMIN — INSULIN GLARGINE 10 UNITS: 100 INJECTION, SOLUTION SUBCUTANEOUS at 08:47

## 2025-01-25 RX ADMIN — HEPARIN SODIUM 5000 UNITS: 5000 INJECTION INTRAVENOUS; SUBCUTANEOUS at 08:42

## 2025-01-25 RX ADMIN — Medication 1 CAPSULE: at 08:42

## 2025-01-25 RX ADMIN — VERAPAMIL HYDROCHLORIDE 240 MG: 240 TABLET, FILM COATED, EXTENDED RELEASE ORAL at 08:43

## 2025-01-25 RX ADMIN — ATORVASTATIN CALCIUM 20 MG: 20 TABLET, FILM COATED ORAL at 08:43

## 2025-01-25 RX ADMIN — FLUTICASONE PROPIONATE 2 SPRAY: 50 SPRAY, METERED NASAL at 08:42

## 2025-01-25 RX ADMIN — CITALOPRAM HYDROBROMIDE 40 MG: 40 TABLET ORAL at 08:42

## 2025-01-25 RX ADMIN — INSULIN LISPRO 6 UNITS: 100 INJECTION, SOLUTION INTRAVENOUS; SUBCUTANEOUS at 20:29

## 2025-01-25 RX ADMIN — NYSTATIN: 100000 POWDER TOPICAL at 08:42

## 2025-01-25 RX ADMIN — INSULIN LISPRO 2 UNITS: 100 INJECTION, SOLUTION INTRAVENOUS; SUBCUTANEOUS at 17:38

## 2025-01-25 RX ADMIN — BUPROPION HYDROCHLORIDE 150 MG: 150 TABLET, EXTENDED RELEASE ORAL at 08:43

## 2025-01-25 RX ADMIN — INSULIN LISPRO 4 UNITS: 100 INJECTION, SOLUTION INTRAVENOUS; SUBCUTANEOUS at 11:38

## 2025-01-25 RX ADMIN — INSULIN LISPRO 10 UNITS: 100 INJECTION, SOLUTION INTRAVENOUS; SUBCUTANEOUS at 11:38

## 2025-01-25 RX ADMIN — ACETAMINOPHEN 650 MG: 325 TABLET ORAL at 20:28

## 2025-01-25 RX ADMIN — HEPARIN SODIUM 5000 UNITS: 5000 INJECTION INTRAVENOUS; SUBCUTANEOUS at 20:28

## 2025-01-25 RX ADMIN — ASPIRIN 81 MG: 81 TABLET, COATED ORAL at 08:42

## 2025-01-25 RX ADMIN — LOPERAMIDE HYDROCHLORIDE 2 MG: 2 CAPSULE ORAL at 10:58

## 2025-01-25 RX ADMIN — INSULIN LISPRO 10 UNITS: 100 INJECTION, SOLUTION INTRAVENOUS; SUBCUTANEOUS at 17:38

## 2025-01-25 RX ADMIN — CHOLESTYRAMINE 4 G: 4 POWDER, FOR SUSPENSION ORAL at 08:43

## 2025-01-25 RX ADMIN — INSULIN LISPRO 10 UNITS: 100 INJECTION, SOLUTION INTRAVENOUS; SUBCUTANEOUS at 08:49

## 2025-01-25 RX ADMIN — INSULIN GLARGINE 25 UNITS: 100 INJECTION, SOLUTION SUBCUTANEOUS at 20:29

## 2025-01-25 RX ADMIN — NYSTATIN: 100000 POWDER TOPICAL at 20:29

## 2025-01-25 RX ADMIN — DONEPEZIL HYDROCHLORIDE 10 MG: 10 TABLET, FILM COATED ORAL at 08:43

## 2025-01-25 NOTE — PLAN OF CARE
"Goal Outcome Evaluation:  Plan of Care Reviewed With: patient        Progress: improving  Outcome Evaluation: Pt transferred from [facility] at approx 1330 this afternoon. Jovita, A&Ox4. Dtr called to floor and stated pt needed a GI consult bc pt has been having diarrhea that \"it's impeding her life\". Clarified with pt how her diarrhea has been. Pt reported she had 2 BM loose, liquid stools today. She was given PRN Immodium. Pt also reports that she occasionally has loose, liquid stools and she ends up dripping stool before getting to the toilet. Reports that she is incontinent of bladded and wears Depends at home. Set up pt's home CPAP (window side of bed) as well as 3LNC because pt states that she likes to interchange during the day, so she can wear CPAP while she naps. Pt states she is able to walk with a walker (pt came to floor with cane). Awaiting disposition at this time.         Problem: Adult Inpatient Plan of Care  Goal: Absence of Hospital-Acquired Illness or Injury  Intervention: Identify and Manage Fall Risk  Recent Flowsheet Documentation  Taken 1/25/2025 1330 by Janis Paniagua, RN  Safety Promotion/Fall Prevention:   safety round/check completed   nonskid shoes/slippers when out of bed   fall prevention program maintained   assistive device/personal items within reach   activity supervised  Intervention: Prevent Skin Injury  Recent Flowsheet Documentation  Taken 1/25/2025 1330 by Janis Paniagua, RN  Body Position:   turned   left  Skin Protection: incontinence pads utilized     Problem: Skin Injury Risk Increased  Goal: Skin Health and Integrity  Intervention: Optimize Skin Protection  Recent Flowsheet Documentation  Taken 1/25/2025 1330 by Janis Paniagua, RN  Activity Management: activity encouraged  Pressure Reduction Techniques:   weight shift assistance provided   pressure points protected   heels elevated off bed  Pressure Reduction Devices:   pressure-redistributing mattress utilized   positioning supports " utilized  Skin Protection: incontinence pads utilized     Problem: Skin Injury Risk Increased  Goal: Skin Health and Integrity  Outcome: Progressing  Intervention: Optimize Skin Protection  Recent Flowsheet Documentation  Taken 1/25/2025 1330 by Janis Paniagua RN  Activity Management: activity encouraged  Pressure Reduction Techniques:   weight shift assistance provided   pressure points protected   heels elevated off bed  Pressure Reduction Devices:   pressure-redistributing mattress utilized   positioning supports utilized  Skin Protection: incontinence pads utilized     Problem: Fall Injury Risk  Goal: Absence of Fall and Fall-Related Injury  Intervention: Promote Injury-Free Environment  Recent Flowsheet Documentation  Taken 1/25/2025 1330 by Janis Paniagua, RN  Safety Promotion/Fall Prevention:   safety round/check completed   nonskid shoes/slippers when out of bed   fall prevention program maintained   assistive device/personal items within reach   activity supervised

## 2025-01-25 NOTE — PROGRESS NOTES
[facility] Medicine Services  PROGRESS NOTE    Patient Name: Masha Chou  : 1945  MRN: [id number]    Date of Admission: 2025  Primary Care Physician: Kary Wu MD    Subjective   Subjective     CC:  Left knee pain    HPI:  No new issues overnight; no dyspnea, no palpitations      Objective   Objective     Vital Signs:   Temp:  [97 °F (36.1 °C)-98.6 °F (37 °C)] 97 °F (36.1 °C)  Heart Rate:  [] 88  Resp:  [18-20] 20  BP: (137-155)/(52-81) 145/55  Flow (L/min) (Oxygen Therapy):  [3] 3     PE:  Constitutional: No acute distress, awake, alert, obese female resting in bed  HENT: NCAT, mucous membranes moist  Respiratory: ctab  Cardiovascular: rrr  Gastrointestinal: abd soft, nontender  Musculoskeletal: No bilateral ankle edema  Psychiatric: Appropriate affect, cooperative  Neurologic: Oriented x 3, ANDRADE, speech clear  Skin: No rashes         Results Reviewed:  LAB RESULTS:      Lab 25  0757 25  1246   WBC 7.09 9.94   HEMOGLOBIN 12.8 13.5   HEMATOCRIT 42.2 42.6   PLATELETS 170 187   MCV 96.8 91.8           Lab 25  0757 25  1246   SODIUM 138 134*   POTASSIUM 4.7 4.8   CHLORIDE 98 93*   CO2 29.0 30.0*   ANION GAP 11.0 11.0   BUN 18 18   CREATININE 0.97 0.92   EGFR 59.9* 63.9   GLUCOSE 147* 194*   CALCIUM 9.5 9.7   MAGNESIUM 1.6  --          Lab 25  0757   TOTAL PROTEIN 5.9*   ALBUMIN 3.9   GLOBULIN 2.0   ALT (SGPT) 42*   AST (SGOT) 30   BILIRUBIN 0.6   ALK PHOS 98                       Brief Urine Lab Results  (Last result in the past 365 days)        Color   Clarity   Blood   Leuk Est   Nitrite   Protein   CREAT   Urine HCG        25 0722 Yellow   Clear   Negative   Trace   Negative   Negative                   Microbiology Results Abnormal       None            No radiology results from the last 24 hrs    Results for orders placed during the hospital encounter of 21    Adult Transthoracic Echo Complete W/ Cont if Necessary  Per Protocol    Interpretation Summary  · Estimated left ventricular EF = 70% Left ventricular ejection fraction appears to be 66 - 70%. Left ventricular systolic function is normal.  · Left ventricular diastolic function is consistent with (grade I) impaired relaxation.  · Left ventricular wall thickness is consistent with hypertrophy. Sigmoid-shaped ventricular septum is present.  · LVOT turbulence without gradient.      Current medications:  Scheduled Meds:aspirin, 81 mg, Oral, Daily  atorvastatin, 20 mg, Oral, Daily  buPROPion XL, 150 mg, Oral, Daily  cholestyramine light, 1 packet, Oral, Daily  citalopram, 40 mg, Oral, QAM  donepezil, 10 mg, Oral, Daily  fluticasone, 2 spray, Nasal, Daily  heparin (porcine), 5,000 Units, Subcutaneous, Q12H  insulin glargine, 10 Units, Subcutaneous, Daily  insulin glargine, 25 Units, Subcutaneous, Nightly  Insulin Lispro, 10 Units, Subcutaneous, TID With Meals  insulin lispro, 2-9 Units, Subcutaneous, 4x Daily AC & at Bedtime  lactobacillus acidophilus, 1 capsule, Oral, Daily  nystatin, , Topical, Q12H  sodium chloride, 10 mL, Intravenous, Q12H  verapamil SR, 240 mg, Oral, Daily      Continuous Infusions:   PRN Meds:.  acetaminophen **OR** acetaminophen **OR** acetaminophen    albuterol    benzocaine    Calcium Replacement - Follow Nurse / BPA Driven Protocol    dextrose    dextrose    Diclofenac Sodium    glucagon (human recombinant)    ipratropium-albuterol    loperamide    Magnesium Standard Dose Replacement - Follow Nurse / BPA Driven Protocol    nitroglycerin    ondansetron ODT    Phosphorus Replacement - Follow Nurse / BPA Driven Protocol    Potassium Replacement - Follow Nurse / BPA Driven Protocol    sodium chloride    sodium chloride    Assessment & Plan   Assessment & Plan     Active Hospital Problems    Diagnosis  POA    **Falls [R29.6]  Not Applicable    Left knee pain [M25.562]  Unknown    Osteoarthritis of left knee [M17.12]  Unknown    Acute UTI (urinary tract  infection) [N39.0]  Unknown    COPD exacerbation [J44.1]  Yes    Type 2 diabetes mellitus [E11.9]  Yes      Resolved Hospital Problems   No resolved problems to display.        Brief Hospital Course to date:  Masha Chou is a 79 y.o. female with type 2 diabetes, hypertension, COPD, breast cancer, pulmonary hypertension, diastolic CHF who presented to the ED after having a near fall.      Left knee pain  Osteoarthritis  Generalized weakness  -- X-ray left knee shows mild to moderate tricompartmental osteoarthritis  -- Fall precautions  -- PT/OT consult, recommending SNF  -- Tylenol and Voltaren prn for pain    Diarrhea, chronic  -intermittent chronic diarrhea associated with dietary intake/ anxiety  -continue probiotic  -resumed home imodium prn with improvement.   -no current leukocytosis     Acute UTI (POA)  -- UA: Ketones trace, nitrite positive, leukocytes trace, WBC 3-5, bacteria 4+, squamous 3-6  -- Ceftin 5 days completed     CKD  -- baseline creatinine ~ 1.1-1.2      COPD, w/ chronic 3L o2 dependence  JUAN F  Asthma  -- Does not appear to be in acute exacerbation.    -- CPAP nightly  -- DuoNebs scheduled     T2DM  -- continue basal bolus insulin, titrate prn        Expected Discharge Location and Transportation: SNF to long-term care when bed available -- if unable to find placement social work providing information on housing resources.  Expected Discharge   Expected Discharge Date: 1/24/2025; Expected Discharge Time:      VTE Prophylaxis:  Pharmacologic VTE prophylaxis orders are present.         AM-PAC 6 Clicks Score (PT): 18 (01/25/25 7542)    CODE STATUS:   Code Status and Medical Interventions: CPR (Attempt to Resuscitate); Full Support   Ordered at: 01/12/25 1092     Level Of Support Discussed With:    Patient     Code Status (Patient has no pulse and is not breathing):    CPR (Attempt to Resuscitate)     Medical Interventions (Patient has pulse or is breathing):    Full Support       Pavan DE JESUS  MD Anthony  01/25/25

## 2025-01-25 NOTE — PLAN OF CARE
Problem: Adult Inpatient Plan of Care  Goal: Plan of Care Review  Outcome: Progressing  Flowsheets  Taken 1/25/2025 0434 by Ray Harmon RN  Progress: no change  Taken 1/24/2025 1524 by Tali Webb PT  Plan of Care Reviewed With: patient     Problem: Skin Injury Risk Increased  Goal: Skin Health and Integrity  Outcome: Progressing  Intervention: Optimize Skin Protection  Recent Flowsheet Documentation  Taken 1/24/2025 2024 by Ray Harmon RN  Head of Bed (HOB) Positioning: HOB elevated     Problem: Fall Injury Risk  Goal: Absence of Fall and Fall-Related Injury  Outcome: Progressing  Intervention: Promote Injury-Free Environment  Recent Flowsheet Documentation  Taken 1/25/2025 0400 by Ray Harmon RN  Safety Promotion/Fall Prevention: safety round/check completed  Taken 1/25/2025 0200 by Ray Harmon RN  Safety Promotion/Fall Prevention: safety round/check completed  Taken 1/25/2025 0000 by Ray Harmon RN  Safety Promotion/Fall Prevention: safety round/check completed  Taken 1/24/2025 2200 by Ray Harmon RN  Safety Promotion/Fall Prevention: safety round/check completed  Taken 1/24/2025 2024 by Ray Harmon RN  Safety Promotion/Fall Prevention: safety round/check completed   Goal Outcome Evaluation:           Progress: no change

## 2025-01-26 LAB
GLUCOSE BLDC GLUCOMTR-MCNC: 138 MG/DL (ref 70–130)
GLUCOSE BLDC GLUCOMTR-MCNC: 174 MG/DL (ref 70–130)
GLUCOSE BLDC GLUCOMTR-MCNC: 174 MG/DL (ref 70–130)
GLUCOSE BLDC GLUCOMTR-MCNC: 178 MG/DL (ref 70–130)

## 2025-01-26 PROCEDURE — 63710000001 INSULIN LISPRO (HUMAN) PER 5 UNITS: Performed by: NURSE PRACTITIONER

## 2025-01-26 PROCEDURE — G0378 HOSPITAL OBSERVATION PER HR: HCPCS

## 2025-01-26 PROCEDURE — 99232 SBSQ HOSP IP/OBS MODERATE 35: CPT | Performed by: STUDENT IN AN ORGANIZED HEALTH CARE EDUCATION/TRAINING PROGRAM

## 2025-01-26 PROCEDURE — 96372 THER/PROPH/DIAG INJ SC/IM: CPT

## 2025-01-26 PROCEDURE — 82948 REAGENT STRIP/BLOOD GLUCOSE: CPT

## 2025-01-26 PROCEDURE — 63710000001 INSULIN GLARGINE PER 5 UNITS: Performed by: NURSE PRACTITIONER

## 2025-01-26 PROCEDURE — 63710000001 INSULIN LISPRO (HUMAN) PER 5 UNITS: Performed by: INTERNAL MEDICINE

## 2025-01-26 PROCEDURE — 25010000002 HEPARIN (PORCINE) PER 1000 UNITS: Performed by: NURSE PRACTITIONER

## 2025-01-26 RX ADMIN — ACETAMINOPHEN 650 MG: 325 TABLET ORAL at 09:11

## 2025-01-26 RX ADMIN — CITALOPRAM HYDROBROMIDE 40 MG: 40 TABLET ORAL at 09:12

## 2025-01-26 RX ADMIN — INSULIN LISPRO 10 UNITS: 100 INJECTION, SOLUTION INTRAVENOUS; SUBCUTANEOUS at 09:13

## 2025-01-26 RX ADMIN — INSULIN LISPRO 10 UNITS: 100 INJECTION, SOLUTION INTRAVENOUS; SUBCUTANEOUS at 17:42

## 2025-01-26 RX ADMIN — INSULIN LISPRO 2 UNITS: 100 INJECTION, SOLUTION INTRAVENOUS; SUBCUTANEOUS at 12:34

## 2025-01-26 RX ADMIN — HEPARIN SODIUM 5000 UNITS: 5000 INJECTION INTRAVENOUS; SUBCUTANEOUS at 09:14

## 2025-01-26 RX ADMIN — INSULIN GLARGINE 10 UNITS: 100 INJECTION, SOLUTION SUBCUTANEOUS at 09:13

## 2025-01-26 RX ADMIN — HEPARIN SODIUM 5000 UNITS: 5000 INJECTION INTRAVENOUS; SUBCUTANEOUS at 21:19

## 2025-01-26 RX ADMIN — ACETAMINOPHEN 650 MG: 325 TABLET ORAL at 21:19

## 2025-01-26 RX ADMIN — VERAPAMIL HYDROCHLORIDE 240 MG: 240 TABLET, FILM COATED, EXTENDED RELEASE ORAL at 09:12

## 2025-01-26 RX ADMIN — BUPROPION HYDROCHLORIDE 150 MG: 150 TABLET, EXTENDED RELEASE ORAL at 09:12

## 2025-01-26 RX ADMIN — LOPERAMIDE HYDROCHLORIDE 2 MG: 2 CAPSULE ORAL at 12:33

## 2025-01-26 RX ADMIN — ATORVASTATIN CALCIUM 20 MG: 20 TABLET, FILM COATED ORAL at 09:12

## 2025-01-26 RX ADMIN — CHOLESTYRAMINE 4 G: 4 POWDER, FOR SUSPENSION ORAL at 09:13

## 2025-01-26 RX ADMIN — INSULIN LISPRO 2 UNITS: 100 INJECTION, SOLUTION INTRAVENOUS; SUBCUTANEOUS at 17:42

## 2025-01-26 RX ADMIN — ASPIRIN 81 MG: 81 TABLET, COATED ORAL at 09:11

## 2025-01-26 RX ADMIN — Medication 10 ML: at 21:20

## 2025-01-26 RX ADMIN — INSULIN LISPRO 10 UNITS: 100 INJECTION, SOLUTION INTRAVENOUS; SUBCUTANEOUS at 12:33

## 2025-01-26 RX ADMIN — NYSTATIN: 100000 POWDER TOPICAL at 09:15

## 2025-01-26 RX ADMIN — INSULIN LISPRO 2 UNITS: 100 INJECTION, SOLUTION INTRAVENOUS; SUBCUTANEOUS at 21:20

## 2025-01-26 RX ADMIN — DONEPEZIL HYDROCHLORIDE 10 MG: 10 TABLET, FILM COATED ORAL at 09:12

## 2025-01-26 RX ADMIN — Medication 1 CAPSULE: at 09:12

## 2025-01-26 RX ADMIN — INSULIN GLARGINE 25 UNITS: 100 INJECTION, SOLUTION SUBCUTANEOUS at 21:19

## 2025-01-26 RX ADMIN — NYSTATIN: 100000 POWDER TOPICAL at 21:19

## 2025-01-26 NOTE — PLAN OF CARE
Goal Outcome Evaluation:              Outcome Evaluation: Pt A/Ox4. Purewick in place. Adequate UOP throughout shift. PRN tylenol given for headache. Continue with current POC. Awaiting placement.

## 2025-01-26 NOTE — PLAN OF CARE
Goal Outcome Evaluation:  Plan of Care Reviewed With: patient        Progress: no change  Outcome Evaluation: A&O x4. Pleasant. PRN tylenol given for headache with good releif. Pt utilizing home CPAP unit and 3L NC interchangeably. Pure wick in place. Adequate UOP. No BM this shift. Sleeping well between care.

## 2025-01-26 NOTE — PROGRESS NOTES
[facility] Medicine Services  PROGRESS NOTE    Patient Name: Masha Chou  : 1945  MRN: [id number]    Date of Admission: 2025  Primary Care Physician: Kary Wu MD    Subjective   Subjective     CC:  Left knee pain    HPI:  Acute complaints this morning.  Patient is getting ready to take a nap with her CPAP.  She reports feeling well      Objective   Objective     Vital Signs:   Temp:  [95.7 °F (35.4 °C)-97.9 °F (36.6 °C)] 96.7 °F (35.9 °C)  Heart Rate:  [72-98] 81  Resp:  [20] 20  BP: (153-182)/(52-77) 180/77  Flow (L/min) (Oxygen Therapy):  [3] 3     PE:  Constitutional: No acute distress, awake, alert, obese female resting in bed  HENT: NCAT, mucous membranes moist  Respiratory: ctab  Cardiovascular: rrr  Gastrointestinal: Positive bowel sounds, soft, nontender, nondistended  Musculoskeletal: No bilateral ankle edema  Psychiatric: Appropriate affect, cooperative  Neurologic: Oriented x 3, ANDRADE, speech clear  Skin: No rashes         Results Reviewed:  LAB RESULTS:      Lab 25  0757 25  1246   WBC 7.09 9.94   HEMOGLOBIN 12.8 13.5   HEMATOCRIT 42.2 42.6   PLATELETS 170 187   MCV 96.8 91.8           Lab 25  0757 25  1246   SODIUM 138 134*   POTASSIUM 4.7 4.8   CHLORIDE 98 93*   CO2 29.0 30.0*   ANION GAP 11.0 11.0   BUN 18 18   CREATININE 0.97 0.92   EGFR 59.9* 63.9   GLUCOSE 147* 194*   CALCIUM 9.5 9.7   MAGNESIUM 1.6  --          Lab 25  0757   TOTAL PROTEIN 5.9*   ALBUMIN 3.9   GLOBULIN 2.0   ALT (SGPT) 42*   AST (SGOT) 30   BILIRUBIN 0.6   ALK PHOS 98                       Brief Urine Lab Results  (Last result in the past 365 days)        Color   Clarity   Blood   Leuk Est   Nitrite   Protein   CREAT   Urine HCG        25 0722 Yellow   Clear   Negative   Trace   Negative   Negative                   Microbiology Results Abnormal       None            No radiology results from the last 24 hrs    Results for orders placed during  the hospital encounter of 06/25/21    Adult Transthoracic Echo Complete W/ Cont if Necessary Per Protocol    Interpretation Summary  · Estimated left ventricular EF = 70% Left ventricular ejection fraction appears to be 66 - 70%. Left ventricular systolic function is normal.  · Left ventricular diastolic function is consistent with (grade I) impaired relaxation.  · Left ventricular wall thickness is consistent with hypertrophy. Sigmoid-shaped ventricular septum is present.  · LVOT turbulence without gradient.      Current medications:  Scheduled Meds:aspirin, 81 mg, Oral, Daily  atorvastatin, 20 mg, Oral, Daily  buPROPion XL, 150 mg, Oral, Daily  cholestyramine light, 1 packet, Oral, Daily  citalopram, 40 mg, Oral, QAM  donepezil, 10 mg, Oral, Daily  fluticasone, 2 spray, Nasal, Daily  heparin (porcine), 5,000 Units, Subcutaneous, Q12H  insulin glargine, 10 Units, Subcutaneous, Daily  insulin glargine, 25 Units, Subcutaneous, Nightly  Insulin Lispro, 10 Units, Subcutaneous, TID With Meals  insulin lispro, 2-9 Units, Subcutaneous, 4x Daily AC & at Bedtime  lactobacillus acidophilus, 1 capsule, Oral, Daily  nystatin, , Topical, Q12H  sodium chloride, 10 mL, Intravenous, Q12H  verapamil SR, 240 mg, Oral, Daily      Continuous Infusions:   PRN Meds:.  acetaminophen **OR** acetaminophen **OR** acetaminophen    albuterol    benzocaine    Calcium Replacement - Follow Nurse / BPA Driven Protocol    dextrose    dextrose    Diclofenac Sodium    glucagon (human recombinant)    ipratropium-albuterol    loperamide    Magnesium Standard Dose Replacement - Follow Nurse / BPA Driven Protocol    nitroglycerin    ondansetron ODT    Phosphorus Replacement - Follow Nurse / BPA Driven Protocol    Potassium Replacement - Follow Nurse / BPA Driven Protocol    sodium chloride    sodium chloride    Assessment & Plan   Assessment & Plan     Active Hospital Problems    Diagnosis  POA    **Falls [R29.6]  Not Applicable    Left knee pain  [M25.562]  Unknown    Osteoarthritis of left knee [M17.12]  Unknown    Acute UTI (urinary tract infection) [N39.0]  Unknown    COPD exacerbation [J44.1]  Yes    Type 2 diabetes mellitus [E11.9]  Yes      Resolved Hospital Problems   No resolved problems to display.        Brief Hospital Course to date:  Masha Chou is a 79 y.o. female with type 2 diabetes, hypertension, COPD, breast cancer, pulmonary hypertension, diastolic CHF who presented to the ED after having a near fall.    This patient's problems and plans were partially entered by my partner and updated as appropriate by me 01/26/25.    Left knee pain  Osteoarthritis  Generalized weakness  -- X-ray left knee shows mild to moderate tricompartmental osteoarthritis  -- Fall precautions  -- PT/OT consult, recommending SNF  -- Tylenol and Voltaren prn for pain    Diarrhea, chronic  -intermittent chronic diarrhea associated with dietary intake/ anxiety  -continue probiotic  -resumed home imodium prn with improvement.   -no current leukocytosis     Acute UTI (POA)  -- UA: Ketones trace, nitrite positive, leukocytes trace, WBC 3-5, bacteria 4+, squamous 3-6  -- Ceftin 5 days completed     CKD  -- baseline creatinine ~ 1.1-1.2      COPD  JUAN F  Asthma  -- Does not appear to be in acute exacerbation.    -- CPAP nightly  -- DuoNebs scheduled     T2DM  -- continue basal bolus insulin, titrate prn      Am labs: cbc,cmp,mag    Expected Discharge Location and Transportation: SNF to long-term care when bed available -- if unable to find placement social work providing information on housing resources.  Expected Discharge   Expected Discharge Date: 1/24/2025; Expected Discharge Time:      VTE Prophylaxis:  Pharmacologic VTE prophylaxis orders are present.         AM-PAC 6 Clicks Score (PT): 18 (01/26/25 0900)    CODE STATUS:   Code Status and Medical Interventions: CPR (Attempt to Resuscitate); Full Support   Ordered at: 01/12/25 8317     Level Of Support Discussed With:     Patient     Code Status (Patient has no pulse and is not breathing):    CPR (Attempt to Resuscitate)     Medical Interventions (Patient has pulse or is breathing):    Full Support       Christine Guevara MD  01/26/25

## 2025-01-27 LAB
GLUCOSE BLDC GLUCOMTR-MCNC: 177 MG/DL (ref 70–130)
GLUCOSE BLDC GLUCOMTR-MCNC: 179 MG/DL (ref 70–130)
GLUCOSE BLDC GLUCOMTR-MCNC: 204 MG/DL (ref 70–130)
GLUCOSE BLDC GLUCOMTR-MCNC: 260 MG/DL (ref 70–130)

## 2025-01-27 PROCEDURE — 63710000001 INSULIN LISPRO (HUMAN) PER 5 UNITS: Performed by: NURSE PRACTITIONER

## 2025-01-27 PROCEDURE — G0378 HOSPITAL OBSERVATION PER HR: HCPCS

## 2025-01-27 PROCEDURE — 25010000002 HEPARIN (PORCINE) PER 1000 UNITS: Performed by: NURSE PRACTITIONER

## 2025-01-27 PROCEDURE — 99232 SBSQ HOSP IP/OBS MODERATE 35: CPT | Performed by: NURSE PRACTITIONER

## 2025-01-27 PROCEDURE — 63710000001 INSULIN LISPRO (HUMAN) PER 5 UNITS: Performed by: INTERNAL MEDICINE

## 2025-01-27 PROCEDURE — 82948 REAGENT STRIP/BLOOD GLUCOSE: CPT

## 2025-01-27 PROCEDURE — 96372 THER/PROPH/DIAG INJ SC/IM: CPT

## 2025-01-27 PROCEDURE — 63710000001 INSULIN GLARGINE PER 5 UNITS: Performed by: NURSE PRACTITIONER

## 2025-01-27 RX ORDER — HYDRALAZINE HYDROCHLORIDE 10 MG/1
10 TABLET, FILM COATED ORAL ONCE
Status: COMPLETED | OUTPATIENT
Start: 2025-01-27 | End: 2025-01-27

## 2025-01-27 RX ORDER — ACETAMINOPHEN 325 MG/1
650 TABLET ORAL ONCE
Status: COMPLETED | OUTPATIENT
Start: 2025-01-27 | End: 2025-01-27

## 2025-01-27 RX ORDER — LIDOCAINE 4 G/G
2 PATCH TOPICAL
Status: DISCONTINUED | OUTPATIENT
Start: 2025-01-27 | End: 2025-01-29 | Stop reason: HOSPADM

## 2025-01-27 RX ORDER — LISINOPRIL 20 MG/1
20 TABLET ORAL
Status: DISCONTINUED | OUTPATIENT
Start: 2025-01-27 | End: 2025-01-29 | Stop reason: HOSPADM

## 2025-01-27 RX ADMIN — INSULIN GLARGINE 25 UNITS: 100 INJECTION, SOLUTION SUBCUTANEOUS at 21:06

## 2025-01-27 RX ADMIN — VERAPAMIL HYDROCHLORIDE 240 MG: 240 TABLET, FILM COATED, EXTENDED RELEASE ORAL at 09:16

## 2025-01-27 RX ADMIN — INSULIN LISPRO 2 UNITS: 100 INJECTION, SOLUTION INTRAVENOUS; SUBCUTANEOUS at 13:01

## 2025-01-27 RX ADMIN — LISINOPRIL 20 MG: 20 TABLET ORAL at 15:12

## 2025-01-27 RX ADMIN — HEPARIN SODIUM 5000 UNITS: 5000 INJECTION INTRAVENOUS; SUBCUTANEOUS at 09:17

## 2025-01-27 RX ADMIN — FLUTICASONE PROPIONATE 2 SPRAY: 50 SPRAY, METERED NASAL at 09:17

## 2025-01-27 RX ADMIN — INSULIN LISPRO 2 UNITS: 100 INJECTION, SOLUTION INTRAVENOUS; SUBCUTANEOUS at 09:18

## 2025-01-27 RX ADMIN — INSULIN LISPRO 10 UNITS: 100 INJECTION, SOLUTION INTRAVENOUS; SUBCUTANEOUS at 09:17

## 2025-01-27 RX ADMIN — NYSTATIN: 100000 POWDER TOPICAL at 21:07

## 2025-01-27 RX ADMIN — DONEPEZIL HYDROCHLORIDE 10 MG: 10 TABLET, FILM COATED ORAL at 09:16

## 2025-01-27 RX ADMIN — NYSTATIN: 100000 POWDER TOPICAL at 09:18

## 2025-01-27 RX ADMIN — INSULIN LISPRO 10 UNITS: 100 INJECTION, SOLUTION INTRAVENOUS; SUBCUTANEOUS at 18:11

## 2025-01-27 RX ADMIN — INSULIN LISPRO 10 UNITS: 100 INJECTION, SOLUTION INTRAVENOUS; SUBCUTANEOUS at 13:00

## 2025-01-27 RX ADMIN — Medication 1 CAPSULE: at 09:15

## 2025-01-27 RX ADMIN — HYDRALAZINE HYDROCHLORIDE 10 MG: 10 TABLET ORAL at 04:38

## 2025-01-27 RX ADMIN — LIDOCAINE 2 PATCH: 4 PATCH TOPICAL at 15:12

## 2025-01-27 RX ADMIN — BUPROPION HYDROCHLORIDE 150 MG: 150 TABLET, EXTENDED RELEASE ORAL at 09:15

## 2025-01-27 RX ADMIN — Medication 10 ML: at 21:07

## 2025-01-27 RX ADMIN — ACETAMINOPHEN 650 MG: 325 TABLET ORAL at 15:12

## 2025-01-27 RX ADMIN — INSULIN LISPRO 4 UNITS: 100 INJECTION, SOLUTION INTRAVENOUS; SUBCUTANEOUS at 18:12

## 2025-01-27 RX ADMIN — ASPIRIN 81 MG: 81 TABLET, COATED ORAL at 09:15

## 2025-01-27 RX ADMIN — ATORVASTATIN CALCIUM 20 MG: 20 TABLET, FILM COATED ORAL at 09:16

## 2025-01-27 RX ADMIN — INSULIN GLARGINE 10 UNITS: 100 INJECTION, SOLUTION SUBCUTANEOUS at 09:17

## 2025-01-27 RX ADMIN — ACETAMINOPHEN 650 MG: 325 TABLET ORAL at 03:59

## 2025-01-27 RX ADMIN — CITALOPRAM HYDROBROMIDE 40 MG: 40 TABLET ORAL at 09:16

## 2025-01-27 RX ADMIN — CHOLESTYRAMINE 4 G: 4 POWDER, FOR SUSPENSION ORAL at 09:15

## 2025-01-27 RX ADMIN — INSULIN LISPRO 6 UNITS: 100 INJECTION, SOLUTION INTRAVENOUS; SUBCUTANEOUS at 21:07

## 2025-01-27 RX ADMIN — HEPARIN SODIUM 5000 UNITS: 5000 INJECTION INTRAVENOUS; SUBCUTANEOUS at 21:06

## 2025-01-27 NOTE — PROGRESS NOTES
[facility] Medicine Services  PROGRESS NOTE    Patient Name: Masha Chou  : 1945  MRN: [id number]    Date of Admission: 2025  Primary Care Physician: Kary Wu MD    Subjective   Subjective     CC:  Left knee pain    HPI:  Patient up in bed. Reports headache and left shoulder pain.   Asking about oxygen at dc      Objective   Objective     Vital Signs:   Temp:  [96.3 °F (35.7 °C)-98.5 °F (36.9 °C)] 97 °F (36.1 °C)  Heart Rate:  [72-90] 90  Resp:  [18-20] 18  BP: (155-183)/(58-98) 173/98  Flow (L/min) (Oxygen Therapy):  [3] 3     PE:  Constitutional: No acute distress, awake, alert, obese female, up in bed  HENT: NCAT, mucous membranes moist  Respiratory: Clear to auscultation bilaterally, respiratory effort normal  3LNC  Cardiovascular: RRR  Gastrointestinal: Positive bowel sounds, soft, nontender, nondistended  Musculoskeletal: No bilateral ankle edema  Psychiatric: Appropriate affect, cooperative  Neurologic: Oriented x 3, ANDRADE, speech clear  Skin: No rashes           Results Reviewed:  LAB RESULTS:      Lab 25  0757   WBC 7.09   HEMOGLOBIN 12.8   HEMATOCRIT 42.2   PLATELETS 170   MCV 96.8           Lab 25  0757   SODIUM 138   POTASSIUM 4.7   CHLORIDE 98   CO2 29.0   ANION GAP 11.0   BUN 18   CREATININE 0.97   EGFR 59.9*   GLUCOSE 147*   CALCIUM 9.5   MAGNESIUM 1.6         Lab 25  0757   TOTAL PROTEIN 5.9*   ALBUMIN 3.9   GLOBULIN 2.0   ALT (SGPT) 42*   AST (SGOT) 30   BILIRUBIN 0.6   ALK PHOS 98                       Brief Urine Lab Results  (Last result in the past 365 days)        Color   Clarity   Blood   Leuk Est   Nitrite   Protein   CREAT   Urine HCG        25 0722 Yellow   Clear   Negative   Trace   Negative   Negative                   Microbiology Results Abnormal       None            No radiology results from the last 24 hrs    Results for orders placed during the hospital encounter of 21    Adult Transthoracic Echo  Complete W/ Cont if Necessary Per Protocol    Interpretation Summary  · Estimated left ventricular EF = 70% Left ventricular ejection fraction appears to be 66 - 70%. Left ventricular systolic function is normal.  · Left ventricular diastolic function is consistent with (grade I) impaired relaxation.  · Left ventricular wall thickness is consistent with hypertrophy. Sigmoid-shaped ventricular septum is present.  · LVOT turbulence without gradient.      Current medications:  Scheduled Meds:acetaminophen, 650 mg, Oral, Once  aspirin, 81 mg, Oral, Daily  atorvastatin, 20 mg, Oral, Daily  buPROPion XL, 150 mg, Oral, Daily  cholestyramine light, 1 packet, Oral, Daily  citalopram, 40 mg, Oral, QAM  donepezil, 10 mg, Oral, Daily  fluticasone, 2 spray, Nasal, Daily  heparin (porcine), 5,000 Units, Subcutaneous, Q12H  insulin glargine, 10 Units, Subcutaneous, Daily  insulin glargine, 25 Units, Subcutaneous, Nightly  Insulin Lispro, 10 Units, Subcutaneous, TID With Meals  insulin lispro, 2-9 Units, Subcutaneous, 4x Daily AC & at Bedtime  lactobacillus acidophilus, 1 capsule, Oral, Daily  Lidocaine, 2 patch, Transdermal, Q24H  lisinopril, 20 mg, Oral, Q24H  nystatin, , Topical, Q12H  sodium chloride, 10 mL, Intravenous, Q12H  verapamil SR, 240 mg, Oral, Daily      Continuous Infusions:   PRN Meds:.  acetaminophen **OR** acetaminophen **OR** acetaminophen    albuterol    benzocaine    Calcium Replacement - Follow Nurse / BPA Driven Protocol    dextrose    dextrose    Diclofenac Sodium    glucagon (human recombinant)    ipratropium-albuterol    loperamide    Magnesium Standard Dose Replacement - Follow Nurse / BPA Driven Protocol    nitroglycerin    ondansetron ODT    Phosphorus Replacement - Follow Nurse / BPA Driven Protocol    Potassium Replacement - Follow Nurse / BPA Driven Protocol    sodium chloride    sodium chloride    Assessment & Plan   Assessment & Plan     Active Hospital Problems    Diagnosis  POA    **Falls  [R29.6]  Not Applicable    Left knee pain [M25.562]  Unknown    Osteoarthritis of left knee [M17.12]  Unknown    Acute UTI (urinary tract infection) [N39.0]  Unknown    COPD exacerbation [J44.1]  Yes    Type 2 diabetes mellitus [E11.9]  Yes      Resolved Hospital Problems   No resolved problems to display.        Brief Hospital Course to date:  Masha Chou is a 79 y.o. female with type 2 diabetes, hypertension, COPD, breast cancer, pulmonary hypertension, diastolic CHF who presented to the ED after having a near fall.    This patient's problems and plans were partially entered by my partner and updated as appropriate by me 01/27/25.    Left knee pain  Osteoarthritis  Generalized weakness  -- X-ray left knee shows mild to moderate tricompartmental osteoarthritis  -- Fall precautions  -- PT/OT consult, recommending SNF-complex placement. Planning LTC  -- Tylenol and Voltaren prn for pain    HTN  -- persistent BP elevation, hydralazine one time dose given this am without improvement  -- add lisinopril  -- continue verapamil     Diarrhea, chronic  -intermittent chronic diarrhea associated with dietary intake/ anxiety  -continue probiotic  -resumed home imodium prn with improvement.   -no current leukocytosis     Acute UTI (POA)  -- UA: Ketones trace, nitrite positive, leukocytes trace, WBC 3-5, bacteria 4+, squamous 3-6  -- Ceftin 5 days completed     CKD  -- baseline creatinine ~ 1.1-1.2, creatinine 0.97     COPD  JUAN F  Asthma  -- Does not appear to be in acute exacerbation.    -- CPAP nightly  -- DuoNebs scheduled     T2DM  -- continue bid basal, prandial insulin and ssi- no change      Am labs: cbc,cmp,mag    Expected Discharge Location and Transportation: SNF to long-term care when bed available -- if unable to find placement social work providing information on housing resources.  Expected Discharge   Expected Discharge Date: 1/24/2025; Expected Discharge Time:      VTE Prophylaxis:  Pharmacologic VTE prophylaxis  orders are present.         AM-PAC 6 Clicks Score (PT): 19 (01/26/25 2000)    CODE STATUS:   Code Status and Medical Interventions: CPR (Attempt to Resuscitate); Full Support   Ordered at: 01/12/25 9051     Level Of Support Discussed With:    Patient     Code Status (Patient has no pulse and is not breathing):    CPR (Attempt to Resuscitate)     Medical Interventions (Patient has pulse or is breathing):    Full Support       Evette Gross, APRN  01/27/25

## 2025-01-27 NOTE — PLAN OF CARE
Goal Outcome Evaluation:                                          VSS on 3L nc. CPAP at night. Tolerating pills whole.Aox4. Reports headache, see PRN. Resting well tonight. No acute events.

## 2025-01-27 NOTE — PLAN OF CARE
Goal Outcome Evaluation:              Outcome Evaluation: VSS during shift. PRN tylenol given and lidocaine patched applied to upper back for pain. A/Ox4. Purewick in place. No changes to current POC.

## 2025-01-27 NOTE — DISCHARGE PLACEMENT REQUEST
"Masha Chou N (79 y.o. Female)      BH contact Nell Gross [phone]     Seeking LTC Medicaid pending bed     Date of Birth   1945    Social Security Number       Address   [address] [address]    Home Phone   [phone]    MRN   [id number]       Jain   Anabaptism    Marital Status                               Admission Date   1/12/25    Admission Type   Emergency    Admitting Provider   Christine Guevara MD    Attending Provider   Christine Guevara MD    Department, Room/Bed   [facility] 5B, N529/1       Discharge Date       Discharge Disposition       Discharge Destination                                 Attending Provider: Christine Guevara MD    Allergies: Contrast Dye (Echo Or Unknown Ct/mr), Shellfish-derived Products, Fluoxetine, Mushroom, Mushroom Extract Complex (Do Not Select), Doxycycline, Sulfamethoxazole-trimethoprim, Bupropion, Levofloxacin, Penicillins, Prednisone & Diphenhydramine    Isolation: None   Infection: None   Code Status: CPR    Ht: 142.2 cm (56\")   Wt: 109 kg (240 lb)    Admission Cmt: None   Principal Problem: Falls [R29.6]                   Active Insurance as of 1/12/2025       Primary Coverage       Payor Plan Insurance Group Employer/Plan Group    ANTHEM MEDICARE REPLACEMENT ANTHEM MED ADV HMO [id number]       Payor Plan Address Payor Plan Phone Number Payor Plan Fax Number Effective Dates    [address] [phone]  1/1/2025 - None Entered    [facility] [address]         Subscriber Name Subscriber Birth Date Member ID       MASHA CHOU N 1945 [id number]                     Emergency Contacts        (Rel.) Home Phone Work Phone Mobile Phone    DONALD NOVA (Daughter) [phone] -- [phone]                 Physician Progress Notes (most recent note)        Christine Guevara MD at 01/26/25 [facility]4              [facility] " Hospital Medicine Services  PROGRESS NOTE    Patient Name: Masha Chou  : 1945  MRN: [id number]    Date of Admission: 2025  Primary Care Physician: Kary Wu MD    Subjective   Subjective     CC:  Left knee pain    HPI:  Acute complaints this morning.  Patient is getting ready to take a nap with her CPAP.  She reports feeling well      Objective   Objective     Vital Signs:   Temp:  [95.7 °F (35.4 °C)-97.9 °F (36.6 °C)] 96.7 °F (35.9 °C)  Heart Rate:  [72-98] 81  Resp:  [20] 20  BP: (153-182)/(52-77) 180/77  Flow (L/min) (Oxygen Therapy):  [3] 3     PE:  Constitutional: No acute distress, awake, alert, obese female resting in bed  HENT: NCAT, mucous membranes moist  Respiratory: ctab  Cardiovascular: rrr  Gastrointestinal: Positive bowel sounds, soft, nontender, nondistended  Musculoskeletal: No bilateral ankle edema  Psychiatric: Appropriate affect, cooperative  Neurologic: Oriented x 3, ANDRADE, speech clear  Skin: No rashes         Results Reviewed:  LAB RESULTS:      Lab 25  0757 25  1246   WBC 7.09 9.94   HEMOGLOBIN 12.8 13.5   HEMATOCRIT 42.2 42.6   PLATELETS 170 187   MCV 96.8 91.8           Lab 25  0757 25  1246   SODIUM 138 134*   POTASSIUM 4.7 4.8   CHLORIDE 98 93*   CO2 29.0 30.0*   ANION GAP 11.0 11.0   BUN 18 18   CREATININE 0.97 0.92   EGFR 59.9* 63.9   GLUCOSE 147* 194*   CALCIUM 9.5 9.7   MAGNESIUM 1.6  --          Lab 25  0757   TOTAL PROTEIN 5.9*   ALBUMIN 3.9   GLOBULIN 2.0   ALT (SGPT) 42*   AST (SGOT) 30   BILIRUBIN 0.6   ALK PHOS 98                       Brief Urine Lab Results  (Last result in the past 365 days)        Color   Clarity   Blood   Leuk Est   Nitrite   Protein   CREAT   Urine HCG        25 0722 Yellow   Clear   Negative   Trace   Negative   Negative                   Microbiology Results Abnormal       None            No radiology results from the last 24 hrs    Results for orders placed during the hospital encounter of  06/25/21    Adult Transthoracic Echo Complete W/ Cont if Necessary Per Protocol    Interpretation Summary  · Estimated left ventricular EF = 70% Left ventricular ejection fraction appears to be 66 - 70%. Left ventricular systolic function is normal.  · Left ventricular diastolic function is consistent with (grade I) impaired relaxation.  · Left ventricular wall thickness is consistent with hypertrophy. Sigmoid-shaped ventricular septum is present.  · LVOT turbulence without gradient.      Current medications:  Scheduled Meds:aspirin, 81 mg, Oral, Daily  atorvastatin, 20 mg, Oral, Daily  buPROPion XL, 150 mg, Oral, Daily  cholestyramine light, 1 packet, Oral, Daily  citalopram, 40 mg, Oral, QAM  donepezil, 10 mg, Oral, Daily  fluticasone, 2 spray, Nasal, Daily  heparin (porcine), 5,000 Units, Subcutaneous, Q12H  insulin glargine, 10 Units, Subcutaneous, Daily  insulin glargine, 25 Units, Subcutaneous, Nightly  Insulin Lispro, 10 Units, Subcutaneous, TID With Meals  insulin lispro, 2-9 Units, Subcutaneous, 4x Daily AC & at Bedtime  lactobacillus acidophilus, 1 capsule, Oral, Daily  nystatin, , Topical, Q12H  sodium chloride, 10 mL, Intravenous, Q12H  verapamil SR, 240 mg, Oral, Daily      Continuous Infusions:   PRN Meds:.  acetaminophen **OR** acetaminophen **OR** acetaminophen    albuterol    benzocaine    Calcium Replacement - Follow Nurse / BPA Driven Protocol    dextrose    dextrose    Diclofenac Sodium    glucagon (human recombinant)    ipratropium-albuterol    loperamide    Magnesium Standard Dose Replacement - Follow Nurse / BPA Driven Protocol    nitroglycerin    ondansetron ODT    Phosphorus Replacement - Follow Nurse / BPA Driven Protocol    Potassium Replacement - Follow Nurse / BPA Driven Protocol    sodium chloride    sodium chloride    Assessment & Plan   Assessment & Plan     Active Hospital Problems    Diagnosis  POA    **Falls [R29.6]  Not Applicable    Left knee pain [M25.562]  Unknown     Osteoarthritis of left knee [M17.12]  Unknown    Acute UTI (urinary tract infection) [N39.0]  Unknown    COPD exacerbation [J44.1]  Yes    Type 2 diabetes mellitus [E11.9]  Yes      Resolved Hospital Problems   No resolved problems to display.        Brief Hospital Course to date:  Masha Chou is a 79 y.o. female with type 2 diabetes, hypertension, COPD, breast cancer, pulmonary hypertension, diastolic CHF who presented to the ED after having a near fall.    This patient's problems and plans were partially entered by my partner and updated as appropriate by me 01/26/25.    Left knee pain  Osteoarthritis  Generalized weakness  -- X-ray left knee shows mild to moderate tricompartmental osteoarthritis  -- Fall precautions  -- PT/OT consult, recommending SNF  -- Tylenol and Voltaren prn for pain    Diarrhea, chronic  -intermittent chronic diarrhea associated with dietary intake/ anxiety  -continue probiotic  -resumed home imodium prn with improvement.   -no current leukocytosis     Acute UTI (POA)  -- UA: Ketones trace, nitrite positive, leukocytes trace, WBC 3-5, bacteria 4+, squamous 3-6  -- Ceftin 5 days completed     CKD  -- baseline creatinine ~ 1.1-1.2      COPD  JUAN F  Asthma  -- Does not appear to be in acute exacerbation.    -- CPAP nightly  -- DuoNebs scheduled     T2DM  -- continue basal bolus insulin, titrate prn      Am labs: cbc,cmp,mag    Expected Discharge Location and Transportation: SNF to long-term care when bed available -- if unable to find placement social work providing information on housing resources.  Expected Discharge   Expected Discharge Date: 1/24/2025; Expected Discharge Time:      VTE Prophylaxis:  Pharmacologic VTE prophylaxis orders are present.         AM-PAC 6 Clicks Score (PT): 18 (01/26/25 0900)    CODE STATUS:   Code Status and Medical Interventions: CPR (Attempt to Resuscitate); Full Support   Ordered at: 01/12/25 1995     Level Of Support Discussed With:    Patient     Code  Status (Patient has no pulse and is not breathing):    CPR (Attempt to Resuscitate)     Medical Interventions (Patient has pulse or is breathing):    Full Support       Christine Guevara MD  25        Electronically signed by Christine Guevara MD at 25 7624          Physical Therapy Notes (most recent note)        Tali Webb, PT at 25 1443  Version 1 of 1         Patient Name: Masha Chou  : 1945    MRN: [id number]                              Today's Date: 2025       Admit Date: 2025    Visit Dx:     ICD-10-CM ICD-9-CM   1. Falls  R29.6 V15.88   2. Acute cystitis without hematuria  N30.00 595.0     Patient Active Problem List   Diagnosis    Chest pain    Type 2 diabetes mellitus    Pulmonary HTN    Heart failure    COPD exacerbation    Chronic respiratory failure with hypoxia    Falls    Left knee pain    Osteoarthritis of left knee    Acute UTI (urinary tract infection)     Past Medical History:   Diagnosis Date    Asthma     Cancer     BILATERAL BREAST     COPD (chronic obstructive pulmonary disease)     Diabetes mellitus     Heart failure     Hypertension     Pulmonary hypertension      Past Surgical History:   Procedure Laterality Date    BREAST SURGERY      CHOLECYSTECTOMY      HYSTERECTOMY      JOINT REPLACEMENT      RIGHT KNEE    KELOID EXCISION        General Information       Row Name 25 1520          Physical Therapy Time and Intention    Document Type progress note/recertification  -SS     Mode of Treatment physical therapy  -SS       Row Name 25 1520          General Information    Patient Profile Reviewed yes  -SS     Existing Precautions/Restrictions fall;oxygen therapy device and L/min;other (see comments)  urinary incontinence  -SS     Barriers to Rehab medically complex;previous functional deficit  -SS       Row Name 25 1520          Cognition    Orientation Status (Cognition) oriented x 4  -SS       Row Name  01/24/25 1520          Safety Issues/Impairments Affecting Functional Mobility    Safety Issues Affecting Function (Mobility) awareness of need for assistance;insight into deficits/self-awareness;positioning of assistive device;safety precaution awareness;safety precautions follow-through/compliance;sequencing abilities  -     Impairments Affecting Function (Mobility) balance;endurance/activity tolerance;pain;strength;postural/trunk control  -               User Key  (r) = Recorded By, (t) = Taken By, (c) = Cosigned By      Initials Name Provider Type     Tali Webb, PARRIS Physical Therapist                   Mobility       Row Name 01/24/25 1521          Bed Mobility    Bed Mobility scooting/bridging;supine-sit;sit-supine  -SS     Scooting/Bridging [place] (Bed Mobility) standby assist;verbal cues  -     Supine-Sit [place] (Bed Mobility) standby assist;verbal cues  -     Sit-Supine [place] (Bed Mobility) standby assist;verbal cues  -     Assistive Device (Bed Mobility) bed rails;head of bed elevated  -     Comment, (Bed Mobility) VC for sequencing; pt. reliant on momentum and bed rails  -       Row Name 01/24/25 1521          Sit-Stand Transfer    Sit-Stand [place] (Transfers) contact guard;verbal cues  -     Assistive Device (Sit-Stand Transfers) walker, front-wheeled  -     Comment, (Sit-Stand Transfer) VC for hand placement, appropriate alignment, emphasis on lowering with eccentric control; pt. performed from bed and bedside commode  -       Row Name 01/24/25 1521          Gait/Stairs (Locomotion)    [place] Level (Gait) verbal cues;contact guard  -     Assistive Device (Gait) walker, front-wheeled  -     Patient was able to Ambulate yes  -     Distance in Feet (Gait) 60  -SS     Deviations/Abnormal Patterns (Gait) bilateral deviations;base of support, wide;leighann decreased;gait speed decreased;stride length decreased  -     Bilateral Gait Deviations heel  strike decreased;forward flexed posture  -     Comment, (Gait/Stairs) Pt. ambulated with a step through gait pattern. VC for upright posture, decreased shoulder elevation, pursed lip breathing. Activity limited by fatigue.  -               User Key  (r) = Recorded By, (t) = Taken By, (c) = Cosigned By      Initials Name Provider Type     Tali Webb, PT Physical Therapist                   Obj/Interventions       Row Name 01/24/25 1523          Motor Skills    Motor Skills functional endurance  -     Functional Endurance modified RPE: 4/10  -     Therapeutic Exercise hip;knee;ankle  -       Row Name 01/24/25 1523          Hip (Therapeutic Exercise)    Hip (Therapeutic Exercise) isometric exercises;strengthening exercise  -     Hip Isometrics (Therapeutic Exercise) bilateral;gluteal sets;10 repetitions  -     Hip Strengthening (Therapeutic Exercise) bilateral;aBduction;aDduction;heel slides;marching while seated;10 repetitions  -       Row Name 01/24/25 1523          Knee (Therapeutic Exercise)    Knee (Therapeutic Exercise) isometric exercises;strengthening exercise  -     Knee Isometrics (Therapeutic Exercise) bilateral;quad sets;10 repetitions  -     Knee Strengthening (Therapeutic Exercise) bilateral;SLR (straight leg raise);LAQ (long arc quad);10 repetitions  -       Row Name 01/24/25 1523          Ankle (Therapeutic Exercise)    Ankle (Therapeutic Exercise) AROM (active range of motion)  -     Ankle AROM (Therapeutic Exercise) bilateral;dorsiflexion;plantarflexion;10 repetitions  -       Row Name 01/24/25 1523          Balance    Balance Assessment sitting static balance;sitting dynamic balance;standing static balance;standing dynamic balance  -     Static Sitting Balance supervision  -     Dynamic Sitting Balance standby assist  -     Position, Sitting Balance unsupported;sitting edge of bed  -     Static Standing Balance contact guard  -     Dynamic Standing Balance  contact guard  -SS     Position/Device Used, Standing Balance supported;walker, front-wheeled  -SS     Balance Interventions sitting;standing;sit to stand;supported;static;dynamic  -SS               User Key  (r) = Recorded By, (t) = Taken By, (c) = Cosigned By      Initials Name Provider Type    Tali Roldan, PT Physical Therapist                   Goals/Plan       Row Name 01/24/25 1527          Bed Mobility Goal 1 (PT)    Activity/Assistive Device (Bed Mobility Goal 1, PT) sit to supine/supine to sit  -SS     [place] Level/Cues Needed (Bed Mobility Goal 1, PT) independent  -SS     Time Frame (Bed Mobility Goal 1, PT) short term goal (STG);5 days  -SS     Progress/Outcomes (Bed Mobility Goal 1, PT) good progress toward goal;goal ongoing  -       Row Name 01/24/25 1527          Transfer Goal 1 (PT)    Activity/Assistive Device (Transfer Goal 1, PT) sit-to-stand/stand-to-sit;bed-to-chair/chair-to-bed;walker, rolling  -SS     [place] Level/Cues Needed (Transfer Goal 1, PT) standby assist  -SS     Time Frame (Transfer Goal 1, PT) long term goal (LTG);10 days  -SS     Progress/Outcome (Transfer Goal 1, PT) good progress toward goal;goal ongoing  -       Row Name 01/24/25 1527          Gait Training Goal 1 (PT)    Activity/Assistive Device (Gait Training Goal 1, PT) gait (walking locomotion);assistive device use;cane, straight  -SS     [place] Level (Gait Training Goal 1, PT) contact guard required  -SS     Distance (Gait Training Goal 1, PT) 100'  -SS     Time Frame (Gait Training Goal 1, PT) long term goal (LTG);10 days  -SS     Progress/Outcome (Gait Training Goal 1, PT) good progress toward goal;goal ongoing  -       Row Name 01/24/25 1527          Balance Goal 1 (PT)    Activity/Assistive Device (Balance Goal) standing dynamic balance;standing static balance  -SS     [place] Level/Cues Needed (Balance Goal 1, PT) modified independence  -SS     Time Frame (Balance Goal 1, PT) long-term  goal (LTG);1 week  -       Row Name 01/24/25 1527          Stairs Goal 1 (PT)    Activity/Assistive Device (Stairs Goal 1, PT) ascending stairs;descending stairs;using handrail, left;cane, straight  -     Number of Stairs (Stairs Goal 1, PT) 1+1  -SS     Time Frame (Stairs Goal 1, PT) long term goal (LTG);10 days  -SS     Progress/Outcome (Stairs Goal 1, PT) progress slower than expected;goal ongoing  -               User Key  (r) = Recorded By, (t) = Taken By, (c) = Cosigned By      Initials Name Provider Type     Tali Webb, PT Physical Therapist                   Clinical Impression       Row Name 01/24/25 1524          Pain    Pretreatment Pain Rating 6/10  -SS     Posttreatment Pain Rating 6/10  -     Pain Location head  -     Pain Side/Orientation generalized  -     Pain Management Interventions activity modification encouraged;breathing exercises utilized;complementary health approaches promoted;movement retraining implemented;exercise or physical activity utilized;positioning techniques utilized  -     Response to Pain Interventions activity level improved;activity participation with tolerable pain  -     Pre/Posttreatment Pain Comment pt reports feeling stressed regarding discharge planning  -       Row Name 01/24/25 1524          Plan of Care Review    Plan of Care Reviewed With patient  -     Progress improving  -     Outcome Evaluation Pt. continues to present below baseline function w/generalized weakness, balance deficits and decreased functional endurance affecting her ability to safely participate in functional mobility. She performed bed mobility, transfers and ambulated 60' w/front wheeled walker, contact guard assist. Activity limited by fatigue. Pt. tolerated ther-ex well. Continue acute PT POC to progress as tolerated.  -       Row Name 01/24/25 1524          Therapy Assessment/Plan (PT)    Rehab Potential (PT) good  -     Criteria for Skilled Interventions Met  (PT) yes;meets criteria;skilled treatment is necessary  -     Therapy Frequency (PT) daily  -SS       Row Name 01/24/25 1524          Vital Signs    Pre Systolic BP Rehab 164  -SS     Pre Treatment Diastolic BP 84  -SS     Pre Patient Position Supine  -       Row Name 01/24/25 1524          Positioning and Restraints    Pre-Treatment Position in bed  -SS     Post Treatment Position bed  -SS     In Bed notified nsg;fowlers;call light within reach;encouraged to call for assist;legs elevated;exit alarm on  -SS               User Key  (r) = Recorded By, (t) = Taken By, (c) = Cosigned By      Initials Name Provider Type    Tali Roldan PT Physical Therapist                   Outcome Measures       Row Name 01/24/25 1528 01/24/25 0824       How much help from another person do you currently need...    Turning from your back to your side while in flat bed without using bedrails? 3  -SS 3  -AP    Moving from lying on back to sitting on the side of a flat bed without bedrails? 3  -SS 3  -AP    Moving to and from a bed to a chair (including a wheelchair)? 3  -SS 3  -AP    Standing up from a chair using your arms (e.g., wheelchair, bedside chair)? 3  -SS 3  -AP    Climbing 3-5 steps with a railing? 3  -SS 2  -AP    To walk in hospital room? 3  -SS 3  -AP    AM-PAC 6 Clicks Score (PT) 18  -SS 17  -AP    Highest Level of Mobility Goal 6 --> Walk 10 steps or more  - 5 --> Static standing  -AP      Row Name 01/24/25 1528          Functional Assessment    Outcome Measure Options AM-PAC 6 Clicks Basic Mobility (PT)  -               User Key  (r) = Recorded By, (t) = Taken By, (c) = Cosigned By      Initials Name Provider Type    Alejandra Angela RN Registered Nurse    Tali Roldan PT Physical Therapist                                 Physical Therapy Education       Title: PT OT SLP Therapies (Done)       Topic: Physical Therapy (Done)       Point: Mobility training (Done)       Learning Progress Summary             Patient Eager, E, VU,DU,NR by SS at 1/24/2025 1528    Comment: Reviewed safety/technique w/bed mobility, transfers, ambulation, HEP, PT POC    Eager, E, VU,DU,NR by SS at 1/21/2025 1603    Comment: Reviewed safety/technique w/bed mobility, transfers, ambulation, HEP, PT POC    Acceptance, E,D, VU,NR by AB at 1/17/2025 1551    Acceptance, E, NR by AS at 1/14/2025 1406                      Point: Home exercise program (Done)       Learning Progress Summary            Patient Eager, E, VU,DU,NR by SS at 1/24/2025 1528    Comment: Reviewed safety/technique w/bed mobility, transfers, ambulation, HEP, PT POC    Eager, E, VU,DU,NR by SS at 1/21/2025 1603    Comment: Reviewed safety/technique w/bed mobility, transfers, ambulation, HEP, PT POC    Acceptance, E, NR by AS at 1/14/2025 1406    Acceptance, E, VU by CK at 1/13/2025 1056                      Point: Body mechanics (Done)       Learning Progress Summary            Patient Eager, E, VU,DU,NR by SS at 1/24/2025 1528    Comment: Reviewed safety/technique w/bed mobility, transfers, ambulation, HEP, PT POC    Eager, E, VU,DU,NR by SS at 1/21/2025 1603    Comment: Reviewed safety/technique w/bed mobility, transfers, ambulation, HEP, PT POC    Acceptance, E,D, VU,NR by AB at 1/17/2025 1551    Acceptance, E, NR by AS at 1/14/2025 1406    Acceptance, E, VU by CK at 1/13/2025 1056                      Point: Precautions (Done)       Learning Progress Summary            Patient Eager, E, VU,DU,NR by SS at 1/24/2025 1528    Comment: Reviewed safety/technique w/bed mobility, transfers, ambulation, HEP, PT POC    Eager, E, VU,DU,NR by SS at 1/21/2025 1603    Comment: Reviewed safety/technique w/bed mobility, transfers, ambulation, HEP, PT POC    Acceptance, E,D, VU,NR by AB at 1/17/2025 1551    Acceptance, E, NR by AS at 1/14/2025 1406    Acceptance, E, VU by CK at 1/13/2025 1056                                      User Key       Initials Effective Dates Name Provider  Type Discipline    AS 12/13/24 -  Alejandra Crowder, PTA Physical Therapist Assistant PT    SS 06/01/21 -  Tali Webb, PT Physical Therapist PT    AB 09/22/22 -  Alejandra Farias, PT Physical Therapist PT    CK 02/06/24 -  Loretta Webber, PARRIS Physical Therapist PT                  PT Recommendation and Plan     Progress: improving  Outcome Evaluation: Pt. continues to present below baseline function w/generalized weakness, balance deficits and decreased functional endurance affecting her ability to safely participate in functional mobility. She performed bed mobility, transfers and ambulated 60' w/front wheeled walker, contact guard assist. Activity limited by fatigue. Pt. tolerated ther-ex well. Continue acute PT POC to progress as tolerated.     Time Calculation:         PT Charges       Row Name 01/24/25 1529             Time Calculation    Start Time 1443  -SS      PT Received On 01/24/25  -SS      PT Goal Re-Cert Due Date 02/03/25  -SS         Timed Charges    80496 - PT Therapeutic Exercise Minutes 10  -SS      71188 - Gait Training Minutes  10  -SS      75931 - PT Therapeutic Activity Minutes 3  -SS         Total Minutes    Timed Charges Total Minutes 23  -SS       Total Minutes 23  -SS                User Key  (r) = Recorded By, (t) = Taken By, (c) = Cosigned By      Initials Name Provider Type    SS Tali Webb, PT Physical Therapist                  Therapy Charges for Today       Code Description Service Date Service Provider Modifiers Qty    09789299379 HC PT THER PROC EA 15 MIN 1/24/2025 Tali Webb, PT GP 1    [id number] HC GAIT TRAINING EA 15 MIN 1/24/2025 Tali Webb, PT GP 1            PT G-Codes  Outcome Measure Options: AM-PAC 6 Clicks Basic Mobility (PT)  AM-PAC 6 Clicks Score (PT): 18  AM-PAC 6 Clicks Score (OT): 16  PT Discharge Summary  Anticipated Discharge Disposition (PT): skilled nursing facility    Tali Webb PT  1/24/2025      Electronically signed by Tracey  Tali, PT at 25 1531          Occupational Therapy Notes (most recent note)        Marly Perkins, OT at 25 0912          Patient Name: Masha Chou  : 1945    MRN: [id number]                              Today's Date: 2025       Admit Date: 2025    Visit Dx:     ICD-10-CM ICD-9-CM   1. Falls  R29.6 V15.88   2. Acute cystitis without hematuria  N30.00 595.0     Patient Active Problem List   Diagnosis    Chest pain    Type 2 diabetes mellitus    Pulmonary HTN    Heart failure    COPD exacerbation    Chronic respiratory failure with hypoxia    Falls    Left knee pain    Osteoarthritis of left knee    Acute UTI (urinary tract infection)     Past Medical History:   Diagnosis Date    Asthma     Cancer     BILATERAL BREAST     COPD (chronic obstructive pulmonary disease)     Diabetes mellitus     Heart failure     Hypertension     Pulmonary hypertension      Past Surgical History:   Procedure Laterality Date    BREAST SURGERY      CHOLECYSTECTOMY      HYSTERECTOMY      JOINT REPLACEMENT      RIGHT KNEE    KELOID EXCISION        General Information       Row Name 25 1004          OT Time and Intention    Document Type progress note/recertification  -KF     Mode of Treatment occupational therapy;individual therapy  -KF       Row Name 25 1004          General Information    Patient Profile Reviewed yes  -KF     Existing Precautions/Restrictions fall;oxygen therapy device and L/min;other (see comments)  urinary incontinence  -KF     Barriers to Rehab medically complex;previous functional deficit  -KF       Row Name 25 1004          Cognition    Orientation Status (Cognition) oriented x 4  -KF       Row Name 25 1004          Safety Issues/Impairments Affecting Functional Mobility    Safety Issues Affecting Function (Mobility) awareness of need for assistance;insight into deficits/self-awareness;safety precaution awareness;safety precautions  follow-through/compliance;sequencing abilities  -     Impairments Affecting Function (Mobility) balance;endurance/activity tolerance;pain;strength;shortness of breath;postural/trunk control  -               User Key  (r) = Recorded By, (t) = Taken By, (c) = Cosigned By      Initials Name Provider Type    KF Marly Perkins OT Occupational Therapist                     Mobility/ADL's       Row Name 01/23/25 1004          Bed Mobility    Bed Mobility supine-sit;sit-supine  -     Rolling Right [place] (Bed Mobility) standby assist  -     Sit-Supine [place] (Bed Mobility) standby assist  -     Assistive Device (Bed Mobility) bed rails;head of bed elevated  -       Row Name 01/23/25 1004          Transfers    Transfers sit-stand transfer;stand-sit transfer;toilet transfer  -     Comment, (Transfers) Good safety awareness demo'd during hand placement  -       Row Name 01/23/25 1004          Bed-Chair Transfer    Comment, (Bed-Chair Transfer) Pt declined sitting up in chair this AM.  -       Row Name 01/23/25 1004          Sit-Stand Transfer    Sit-Stand [place] (Transfers) contact guard  -     Assistive Device (Sit-Stand Transfers) walker, front-wheeled  -     Comment, (Sit-Stand Transfer) STS x1 from EOB, x1 from commode  -       Row Name 01/23/25 1004          Stand-Sit Transfer    Stand-Sit [place] (Transfers) contact guard  -     Assistive Device (Stand-Sit Transfers) walker, front-wheeled  -       Row Name 01/23/25 1004          Toilet Transfer    Type (Toilet Transfer) sit-stand;stand-sit  -     [place] Level (Toilet Transfer) contact guard  -     Assistive Device (Toilet Transfer) walker, front-wheeled;commode;grab bars/safety frame  -       Row Name 01/23/25 1004          Functional Mobility    Functional Mobility- Ind. Level contact guard assist  -     Functional Mobility- Device walker, front-wheeled  -     Functional Mobility-Distance (Feet) --   to/from the bathroom  -     Patient was able to Ambulate yes  -       Row Name 01/23/25 1004          Activities of Daily Living    BADL Assessment/Intervention bathing;lower body dressing;feeding;toileting  -       Row Name 01/23/25 1004          Lower Body Dressing Assessment/Training    Assistive Devices (Lower Body Dressing) long-handled shoe horn;reacher;sock-aid  -     Comment, (Lower Body Dressing) Pt provided and educated on long handled shoe horn, reacher, and sock aid. Pt verbalized understanding, but recommend additional training at next session.  -       Row Name 01/23/25 1004          Bathing Assessment/Intervention    Assistive Devices (Bathing) long-handled sponge  -     Comment, (Bathing) Pt provided and educated on use of a long handled sponge to assist in bathing. Pt verbalized understanding.  -       Row Name 01/23/25 1004          Toileting Assessment/Training    [place] Level (Toileting) adjust/manage clothing;perform perineal hygiene;dependent (less than 25% patient effort)  -     Assistive Devices (Toileting) commode;toilet paper aid  -     Position (Toileting) unsupported sitting;supported standing  -     Comment, (Toileting) Pt needing totalA for julio care following BM today. Pt then provided and educated on use of a toileting aid. Pt verbalized understanding.  -       Row Name 01/23/25 1004          Self-Feeding Assessment/Training    [place] Level (Feeding) liquids to mouth;independent  -     Position (Feeding) sitting up in bed  -               User Key  (r) = Recorded By, (t) = Taken By, (c) = Cosigned By      Initials Name Provider Type    Marly Burkett OT Occupational Therapist                   Obj/Interventions       Row Name 01/23/25 1049          Balance    Balance Assessment sitting static balance;sitting dynamic balance;sit to stand dynamic balance;standing static balance;standing dynamic balance  -     Static Sitting Balance  supervision  -KF     Dynamic Sitting Balance standby assist  -KF     Position, Sitting Balance unsupported;sitting edge of bed;other (see comments)  commode  -KF     Sit to Stand Dynamic Balance contact guard  -KF     Static Standing Balance contact guard  -KF     Dynamic Standing Balance contact guard  -KF     Position/Device Used, Standing Balance supported;walker, front-wheeled  -KF     Balance Interventions sitting;standing;sit to stand;supported;static;dynamic;occupation based/functional task  -KF               User Key  (r) = Recorded By, (t) = Taken By, (c) = Cosigned By      Initials Name Provider Type    KF Marly Perkins OT Occupational Therapist                   Goals/Plan       Row Name 01/23/25 1101          Transfer Goal 1 (OT)    Activity/Assistive Device (Transfer Goal 1, OT) sit-to-stand/stand-to-sit;toilet;cane, straight  -KF     [place] Level/Cues Needed (Transfer Goal 1, OT) standby assist  -KF     Time Frame (Transfer Goal 1, OT) long term goal (LTG);1 week  -KF     Progress/Outcome (Transfer Goal 1, OT) goal ongoing  -KF       Row Name 01/23/25 1101          Dressing Goal 1 (OT)    Activity/Device (Dressing Goal 1, OT) lower body dressing  -KF     [place]/Cues Needed (Dressing Goal 1, OT) minimum assist (75% or more patient effort)  -KF     Time Frame (Dressing Goal 1, OT) long term goal (LTG);1 week  -KF     Progress/Outcome (Dressing Goal 1, OT) goal ongoing  -KF       Row Name 01/23/25 1101          Toileting Goal 1 (OT)    Activity/Device (Toileting Goal 1, OT) adjust/manage clothing;perform perineal hygiene;commode  -KF     [place] Level/Cues Needed (Toileting Goal 1, OT) minimum assist (75% or more patient effort)  -KF     Time Frame (Toileting Goal 1, OT) long term goal (LTG);1 week  -KF     Progress/Outcome (Toileting Goal 1, OT) goal ongoing  -KF       Row Name 01/23/25 1101          Grooming Goal 1 (OT)    Activity/Device (Grooming Goal 1, OT) oral care;wash face,  hands  -KF     [place] (Grooming Goal 1, OT) standby assist  -KF     Time Frame (Grooming Goal 1, OT) short term goal (STG);3 days  -KF     Progress/Outcome (Grooming Goal 1, OT) goal ongoing  -KF               User Key  (r) = Recorded By, (t) = Taken By, (c) = Cosigned By      Initials Name Provider Type    KF Marly Perkins, OT Occupational Therapist                   Clinical Impression       Row Name 01/23/25 1047          Pain Assessment    Pretreatment Pain Rating 4/10  -KF     Posttreatment Pain Rating 4/10  -KF     Pain Location head  -KF     Pain Side/Orientation generalized  -KF     Pain Management Interventions activity modification encouraged;exercise or physical activity utilized;positioning techniques utilized;nursing notified  -KF     Response to Pain Interventions activity participation with tolerable pain  -KF       Row Name 01/23/25 1047          Plan of Care Review    Plan of Care Reviewed With patient  -KF     Progress improving  -KF     Outcome Evaluation Pt with good participation during OT session, however remains below baseline with generalized weakness, decreased activity tolerance, and balance deficits warranting continued IP OT services. Pt ambulated to/from the bathroom using a RWx with CGA. Pt required depA for hygiene following toileting. Pt provided and educated on use of ADL AE, as well as a toileting assist. Pt verbalized understanding, but recommed continued practice using AE. Continue to rec a d/c to SNF.  -       Row Name 01/23/25 1047          Therapy Assessment/Plan (OT)    Rehab Potential (OT) good  -KF     Criteria for Skilled Therapeutic Interventions Met (OT) yes;skilled treatment is necessary  -KF     Therapy Frequency (OT) daily  -KF       Row Name 01/23/25 1047          Therapy Plan Review/Discharge Plan (OT)    Anticipated Discharge Disposition (OT) skilled nursing facility  -       Row Name 01/23/25 1047          Vital Signs    Pre Systolic BP Rehab 122  -KF      Pre Treatment Diastolic BP 71  -KF     Pre SpO2 (%) 92  3L  -KF     O2 Delivery Pre Treatment nasal cannula  -KF     Pre Patient Position Supine  -KF     Intra Patient Position Standing  -KF     Post Patient Position Supine  -KF       Row Name 01/23/25 1047          Positioning and Restraints    Pre-Treatment Position in bed  -KF     Post Treatment Position bed  -KF     In Bed notified nsg;supine;call light within reach;encouraged to call for assist;exit alarm on  -KF               User Key  (r) = Recorded By, (t) = Taken By, (c) = Cosigned By      Initials Name Provider Type    Marly Burkett OT Occupational Therapist                   Outcome Measures       Row Name 01/23/25 1101          How much help from another is currently needed...    Putting on and taking off regular lower body clothing? 2  -KF     Bathing (including washing, rinsing, and drying) 2  -KF     Toileting (which includes using toilet bed pan or urinal) 2  -KF     Putting on and taking off regular upper body clothing 3  -KF     Taking care of personal grooming (such as brushing teeth) 3  -KF     Eating meals 4  -KF     AM-PAC 6 Clicks Score (OT) 16  -KF       Row Name 01/23/25 1101          Functional Assessment    Outcome Measure Options AM-PAC 6 Clicks Daily Activity (OT)  -KF               User Key  (r) = Recorded By, (t) = Taken By, (c) = Cosigned By      Initials Name Provider Type    Marly Burkett OT Occupational Therapist                    Occupational Therapy Education       Title: PT OT SLP Therapies (Done)       Topic: Occupational Therapy (Done)       Point: ADL training (Done)       Description:   Instruct learner(s) on proper safety adaptation and remediation techniques during self care or transfers.   Instruct in proper use of assistive devices.                  Learning Progress Summary            Patient Acceptance, E,TB, VU,DU by ESPINOZA at 1/23/2025 0912    Acceptance, E,D, VU,NR,DU by TB at 1/21/2025 1105     Acceptance, E, VU,NR by MR at 1/17/2025 1544    Acceptance, E,D, VU,NR by  at 1/15/2025 1646    Acceptance, E, VU by AN at 1/13/2025 1058                      Point: Home exercise program (Done)       Description:   Instruct learner(s) on appropriate technique for monitoring, assisting and/or progressing therapeutic exercises/activities.                  Learning Progress Summary            Patient Acceptance, E,D, VU,NR,DU by  at 1/21/2025 1105                      Point: Precautions (Done)       Description:   Instruct learner(s) on prescribed precautions during self-care and functional transfers.                  Learning Progress Summary            Patient Acceptance, E,TB, VU,DU by  at 1/23/2025 0912    Acceptance, E, VU,NR by MR at 1/17/2025 1544    Acceptance, E, VU by AN at 1/13/2025 1058                      Point: Body mechanics (Done)       Description:   Instruct learner(s) on proper positioning and spine alignment during self-care, functional mobility activities and/or exercises.                  Learning Progress Summary            Patient Acceptance, E,TB, VU,DU by  at 1/23/2025 0912    Acceptance, E, VU,NR by MR at 1/17/2025 1544    Acceptance, E, VU by AN at 1/13/2025 1058                                      User Key       Initials Effective Dates Name Provider Type Discipline     07/11/23 -  Lindsey Sandoval, OT Occupational Therapist OT    AN 09/21/21 -  Anne Marie Cid OT Occupational Therapist OT     09/22/22 -  Pam Mak OT Occupational Therapist OT     08/09/23 -  Marly Perkins OT Occupational Therapist OT                  OT Recommendation and Plan  Therapy Frequency (OT): daily  Plan of Care Review  Plan of Care Reviewed With: patient  Progress: improving  Outcome Evaluation: Pt with good participation during OT session, however remains below baseline with generalized weakness, decreased activity tolerance, and balance deficits warranting continued IP OT  services. Pt ambulated to/from the bathroom using a RWx with CGA. Pt required depA for hygiene following toileting. Pt provided and educated on use of ADL AE, as well as a toileting assist. Pt verbalized understanding, but recommed continued practice using AE. Continue to rec a d/c to SNF.     Time Calculation:         Time Calculation- OT       Row Name 01/23/25 1101             Time Calculation- OT    OT Start Time 0912  -KF      OT Received On 01/23/25  -KF      OT Goal Re-Cert Due Date 02/02/25  -KF         Timed Charges    45736 - OT Therapeutic Activity Minutes 8  -KF      39457 - OT Self Care/Mgmt Minutes 16  -KF         Total Minutes    Timed Charges Total Minutes 24  -KF       Total Minutes 24  -KF                User Key  (r) = Recorded By, (t) = Taken By, (c) = Cosigned By      Initials Name Provider Type    KF Marly Perkins OT Occupational Therapist                  Therapy Charges for Today       Code Description Service Date Service Provider Modifiers Qty    04970570056  OT THERAPEUTIC ACT EA 15 MIN 1/23/2025 Marly Perkins OT GO 1    [id number]  OT SELF CARE/MGMT/TRAIN EA 15 MIN 1/23/2025 Marly Perkins OT GO 1                 Marly Perkins OT  1/23/2025    Electronically signed by Marly Perkins OT at 01/23/25 1103

## 2025-01-27 NOTE — CASE MANAGEMENT/SOCIAL WORK
Continued Stay Note  [facility]     Patient Name: Masha Chou  MRN: [id number]  Today's Date: 1/27/2025    Admit Date: 1/12/2025    Plan: Seeking LTC bed   Discharge Plan       Row Name 01/27/25 0904       Plan    Plan Seeking LTC bed    Plan Comments SW'er met with patient at bedside. SW'er called [employer] rep to inquire about status of financial clearance, rep explains they are still reviewing; will inform of what documents are needed. SW'er will fax referrals for Medicaid pending bed. Plan is LTC via EMS.    @12:50 Spoke with Russell who is reviewing patient.                    Discharge Codes    No documentation.                 Expected Discharge Date and Time       Expected Discharge Date Expected Discharge Time    Jan 24, 2025               Jennifer Roberts) AKIRA Gross

## 2025-01-28 LAB
GLUCOSE BLDC GLUCOMTR-MCNC: 113 MG/DL (ref 70–130)
GLUCOSE BLDC GLUCOMTR-MCNC: 131 MG/DL (ref 70–130)
GLUCOSE BLDC GLUCOMTR-MCNC: 137 MG/DL (ref 70–130)
GLUCOSE BLDC GLUCOMTR-MCNC: 188 MG/DL (ref 70–130)

## 2025-01-28 PROCEDURE — 96372 THER/PROPH/DIAG INJ SC/IM: CPT

## 2025-01-28 PROCEDURE — 99232 SBSQ HOSP IP/OBS MODERATE 35: CPT | Performed by: NURSE PRACTITIONER

## 2025-01-28 PROCEDURE — 63710000001 INSULIN LISPRO (HUMAN) PER 5 UNITS: Performed by: NURSE PRACTITIONER

## 2025-01-28 PROCEDURE — 97116 GAIT TRAINING THERAPY: CPT

## 2025-01-28 PROCEDURE — 82948 REAGENT STRIP/BLOOD GLUCOSE: CPT

## 2025-01-28 PROCEDURE — G0378 HOSPITAL OBSERVATION PER HR: HCPCS

## 2025-01-28 PROCEDURE — 25010000002 HEPARIN (PORCINE) PER 1000 UNITS: Performed by: NURSE PRACTITIONER

## 2025-01-28 PROCEDURE — 97530 THERAPEUTIC ACTIVITIES: CPT

## 2025-01-28 PROCEDURE — 63710000001 INSULIN GLARGINE PER 5 UNITS: Performed by: NURSE PRACTITIONER

## 2025-01-28 PROCEDURE — 97535 SELF CARE MNGMENT TRAINING: CPT

## 2025-01-28 PROCEDURE — 63710000001 INSULIN LISPRO (HUMAN) PER 5 UNITS: Performed by: INTERNAL MEDICINE

## 2025-01-28 RX ORDER — INSULIN LISPRO 100 [IU]/ML
12 INJECTION, SOLUTION INTRAVENOUS; SUBCUTANEOUS
Status: DISCONTINUED | OUTPATIENT
Start: 2025-01-28 | End: 2025-01-29 | Stop reason: HOSPADM

## 2025-01-28 RX ADMIN — INSULIN GLARGINE 14 UNITS: 100 INJECTION, SOLUTION SUBCUTANEOUS at 09:15

## 2025-01-28 RX ADMIN — FLUTICASONE PROPIONATE 2 SPRAY: 50 SPRAY, METERED NASAL at 09:15

## 2025-01-28 RX ADMIN — Medication 10 ML: at 09:16

## 2025-01-28 RX ADMIN — NYSTATIN: 100000 POWDER TOPICAL at 20:58

## 2025-01-28 RX ADMIN — INSULIN LISPRO 12 UNITS: 100 INJECTION, SOLUTION INTRAVENOUS; SUBCUTANEOUS at 07:41

## 2025-01-28 RX ADMIN — VERAPAMIL HYDROCHLORIDE 240 MG: 240 TABLET, FILM COATED, EXTENDED RELEASE ORAL at 09:15

## 2025-01-28 RX ADMIN — INSULIN LISPRO 12 UNITS: 100 INJECTION, SOLUTION INTRAVENOUS; SUBCUTANEOUS at 12:42

## 2025-01-28 RX ADMIN — ATORVASTATIN CALCIUM 20 MG: 20 TABLET, FILM COATED ORAL at 09:14

## 2025-01-28 RX ADMIN — CHOLESTYRAMINE 4 G: 4 POWDER, FOR SUSPENSION ORAL at 09:14

## 2025-01-28 RX ADMIN — LIDOCAINE 2 PATCH: 4 PATCH TOPICAL at 09:15

## 2025-01-28 RX ADMIN — Medication 1 CAPSULE: at 09:14

## 2025-01-28 RX ADMIN — HEPARIN SODIUM 5000 UNITS: 5000 INJECTION INTRAVENOUS; SUBCUTANEOUS at 20:57

## 2025-01-28 RX ADMIN — DONEPEZIL HYDROCHLORIDE 10 MG: 10 TABLET, FILM COATED ORAL at 09:14

## 2025-01-28 RX ADMIN — NYSTATIN: 100000 POWDER TOPICAL at 09:15

## 2025-01-28 RX ADMIN — BUPROPION HYDROCHLORIDE 150 MG: 150 TABLET, EXTENDED RELEASE ORAL at 09:14

## 2025-01-28 RX ADMIN — ASPIRIN 81 MG: 81 TABLET, COATED ORAL at 09:14

## 2025-01-28 RX ADMIN — CITALOPRAM HYDROBROMIDE 40 MG: 40 TABLET ORAL at 09:14

## 2025-01-28 RX ADMIN — INSULIN LISPRO 12 UNITS: 100 INJECTION, SOLUTION INTRAVENOUS; SUBCUTANEOUS at 17:55

## 2025-01-28 RX ADMIN — INSULIN LISPRO 2 UNITS: 100 INJECTION, SOLUTION INTRAVENOUS; SUBCUTANEOUS at 20:57

## 2025-01-28 RX ADMIN — Medication 10 ML: at 20:58

## 2025-01-28 RX ADMIN — LISINOPRIL 20 MG: 20 TABLET ORAL at 09:14

## 2025-01-28 RX ADMIN — HEPARIN SODIUM 5000 UNITS: 5000 INJECTION INTRAVENOUS; SUBCUTANEOUS at 09:14

## 2025-01-28 RX ADMIN — INSULIN GLARGINE 25 UNITS: 100 INJECTION, SOLUTION SUBCUTANEOUS at 20:57

## 2025-01-28 NOTE — PLAN OF CARE
Goal Outcome Evaluation:  Plan of Care Reviewed With: patient        Progress: no change  Outcome Evaluation: Patient gave good effort towards mobility training but continues to be limited by weakness, decreased endurance, and dyspnea on exertion. Continue per PT POC to support increased independence. Recommend SNF at d/c.    Anticipated Discharge Disposition (PT): skilled nursing facility

## 2025-01-28 NOTE — PLAN OF CARE
Goal Outcome Evaluation:  Plan of Care Reviewed With: patient        Progress: improving  Outcome Evaluation: Pt demo improved activity tolerance for task, but remains below baseline with ADL's and mobility due to decreased strength, balance, activity tolerance and SOA. Recommend continued skilled OT services and transfer to SNF at d/c.

## 2025-01-28 NOTE — THERAPY TREATMENT NOTE
Patient Name: Masha Chou  : 1945    MRN: [id number]                              Today's Date: 2025       Admit Date: 2025    Visit Dx:     ICD-10-CM ICD-9-CM   1. Falls  R29.6 V15.88   2. Acute cystitis without hematuria  N30.00 595.0     Patient Active Problem List   Diagnosis    Chest pain    Type 2 diabetes mellitus    Pulmonary HTN    Heart failure    COPD exacerbation    Chronic respiratory failure with hypoxia    Falls    Left knee pain    Osteoarthritis of left knee    Acute UTI (urinary tract infection)     Past Medical History:   Diagnosis Date    Asthma     Cancer     BILATERAL BREAST     COPD (chronic obstructive pulmonary disease)     Diabetes mellitus     Heart failure     Hypertension     Pulmonary hypertension      Past Surgical History:   Procedure Laterality Date    BREAST SURGERY      CHOLECYSTECTOMY      HYSTERECTOMY      JOINT REPLACEMENT      RIGHT KNEE    KELOID EXCISION        General Information       Row Name 25 [facility]          Physical Therapy Time and Intention    Document Type therapy note (daily note)  -NS     Mode of Treatment physical therapy  -NS       Row Name 25 [facility]          General Information    Patient Profile Reviewed yes  -NS     Existing Precautions/Restrictions fall;oxygen therapy device and L/min  -NS       Row Name 25 [facility]          Cognition    Orientation Status (Cognition) oriented x 4  -NS       Row Name 25 [facility]          Safety Issues/Impairments Affecting Functional Mobility    Safety Issues Affecting Function (Mobility) safety precaution awareness;safety precautions follow-through/compliance;insight into deficits/self-awareness  -NS     Impairments Affecting Function (Mobility) balance;endurance/activity tolerance;shortness of breath;strength;postural/trunk control  -NS               User Key  (r) = Recorded By, (t) = Taken By, (c) = Cosigned By      Initials Name Provider Type    Dixie Villa PT Physical Therapist                    Mobility       Row Name 01/28/25 1351          Bed Mobility    Comment, (Bed Mobility) Pt UIC  -NS       Row Name 01/28/25 135          Transfers    Comment, (Transfers) Cues for hand placement from [facility]. Dep for pericare.  -NS       Row Name 01/28/25 1351          Bed-Chair Transfer    Bed-Chair [place] (Transfers) contact guard;verbal cues  -NS     Assistive Device (Bed-Chair Transfers) walker, front-wheeled  -NS       Row Name 01/28/25 135          Sit-Stand Transfer    Sit-Stand [place] (Transfers) contact guard;verbal cues  -NS     Assistive Device (Sit-Stand Transfers) walker, front-wheeled  -NS       Row Name 01/28/25 135          Gait/Stairs (Locomotion)    [place] Level (Gait) contact guard  -NS     Assistive Device (Gait) walker, front-wheeled  -NS     Distance in Feet (Gait) 30  -NS     Deviations/Abnormal Patterns (Gait) leighann decreased;stride length decreased;base of support, wide;gait speed decreased  -NS     Bilateral Gait Deviations heel strike decreased;forward flexed posture  -NS     Comment, (Gait/Stairs) Patient ambulated at slow pace with cues for staying close to RW for safety.  -NS               User Key  (r) = Recorded By, (t) = Taken By, (c) = Cosigned By      Initials Name Provider Type    Dixie Villa PT Physical Therapist                   Obj/Interventions       Row Name 01/28/25 [facility]          Motor Skills    Therapeutic Exercise hip  -NS       Row Name 01/28/25 [facility]          Hip (Therapeutic Exercise)    Hip (Therapeutic Exercise) --  5x STS, 2 sets  -NS       Row Name 01/28/25 [facility]          Balance    Balance Assessment sitting static balance;sitting dynamic balance;standing static balance;standing dynamic balance  -NS     Static Sitting Balance standby assist  -NS     Dynamic Sitting Balance standby assist  -NS     Position, Sitting Balance unsupported;sitting in chair  -NS     Static Standing Balance contact guard  -NS     Dynamic Standing  Balance contact guard  -NS     Position/Device Used, Standing Balance supported;walker, front-wheeled  -NS               User Key  (r) = Recorded By, (t) = Taken By, (c) = Cosigned By      Initials Name Provider Type    Dixie Villa, PT Physical Therapist                   Goals/Plan    No documentation.                  Clinical Impression       Row Name 01/28/25 [facility]          Pain    Pretreatment Pain Rating 0/10 - no pain  -NS     Posttreatment Pain Rating 0/10 - no pain  -NS       Row Name 01/28/25 [facility]          Plan of Care Review    Plan of Care Reviewed With patient  -NS     Progress no change  -NS     Outcome Evaluation Patient gave good effort towards mobility training but continues to be limited by weakness, decreased endurance, and dyspnea on exertion. Continue per PT POC to support increased independence. Recommend SNF at d/c.  -NS       Row Name 01/28/25 [facility]          Vital Signs    Pre Patient Position Sitting  -NS     Intra Patient Position Standing  -NS     Post Patient Position Sitting  -NS       Row Name 01/28/25 [facility]          Positioning and Restraints    Pre-Treatment Position sitting in chair/recliner  -NS     Post Treatment Position chair  -NS     In Chair notified nsg;reclined;call light within reach;encouraged to call for assist;exit alarm on;waffle cushion;legs elevated  -NS               User Key  (r) = Recorded By, (t) = Taken By, (c) = Cosigned By      Initials Name Provider Type    Dixie Villa PT Physical Therapist                   Outcome Measures       Row Name 01/28/25 [facility]          How much help from another person do you currently need...    Turning from your back to your side while in flat bed without using bedrails? 3  -NS     Moving from lying on back to sitting on the side of a flat bed without bedrails? 3  -NS     Moving to and from a bed to a chair (including a wheelchair)? 3  -NS     Standing up from a chair using your arms (e.g., wheelchair, bedside chair)? 3  -NS      Climbing 3-5 steps with a railing? 2  -NS     To walk in hospital room? 3  -NS     AM-PAC 6 Clicks Score (PT) 17  -NS     Highest Level of Mobility Goal 5 --> Static standing  -NS       Row Name 01/28/25 1351 01/28/25 0951       Functional Assessment    Outcome Measure Options AM-PAC 6 Clicks Basic Mobility (PT)  -NS AM-PAC 6 Clicks Daily Activity (OT)  -              User Key  (r) = Recorded By, (t) = Taken By, (c) = Cosigned By      Initials Name Provider Type    Urszula Mccullough, OT Occupational Therapist    Dixie Villa, PT Physical Therapist                                 Physical Therapy Education       Title: PT OT SLP Therapies (Done)       Topic: Physical Therapy (Done)       Point: Mobility training (Done)       Learning Progress Summary            Patient Acceptance, E, VU by NS at 1/28/2025 1452    Eager, E, VU,DU,NR by SS at 1/24/2025 1528    Comment: Reviewed safety/technique w/bed mobility, transfers, ambulation, HEP, PT POC    Eager, E, VU,DU,NR by SS at 1/21/2025 1603    Comment: Reviewed safety/technique w/bed mobility, transfers, ambulation, HEP, PT POC    Acceptance, E,D, VU,NR by AB at 1/17/2025 1551    Acceptance, E, NR by AS at 1/14/2025 1406                      Point: Home exercise program (Done)       Learning Progress Summary            Patient Acceptance, E, VU by NS at 1/28/2025 1452    Eager, E, VU,DU,NR by SS at 1/24/2025 1528    Comment: Reviewed safety/technique w/bed mobility, transfers, ambulation, HEP, PT POC    Eager, E, VU,DU,NR by SS at 1/21/2025 1603    Comment: Reviewed safety/technique w/bed mobility, transfers, ambulation, HEP, PT POC    Acceptance, E, NR by AS at 1/14/2025 1406    Acceptance, E, VU by CK at 1/13/2025 1056                      Point: Body mechanics (Done)       Learning Progress Summary            Patient Acceptance, E, VU by NS at 1/28/2025 1452    Eager, E, VU,DU,NR by SS at 1/24/2025 1528    Comment: Reviewed safety/technique w/bed  mobility, transfers, ambulation, HEP, PT POC    Eager, E, VU,DU,NR by SS at 1/21/2025 1603    Comment: Reviewed safety/technique w/bed mobility, transfers, ambulation, HEP, PT POC    Acceptance, E,D, VU,NR by AB at 1/17/2025 1551    Acceptance, E, NR by AS at 1/14/2025 1406    Acceptance, E, VU by CK at 1/13/2025 1056                      Point: Precautions (Done)       Learning Progress Summary            Patient Acceptance, E, VU by NS at 1/28/2025 1452    Eager, E, VU,DU,NR by SS at 1/24/2025 1528    Comment: Reviewed safety/technique w/bed mobility, transfers, ambulation, HEP, PT POC    Eager, E, VU,DU,NR by SS at 1/21/2025 1603    Comment: Reviewed safety/technique w/bed mobility, transfers, ambulation, HEP, PT POC    Acceptance, E,D, VU,NR by AB at 1/17/2025 1551    Acceptance, E, NR by AS at 1/14/2025 1406    Acceptance, E, VU by CK at 1/13/2025 1056                                      User Key       Initials Effective Dates Name Provider Type Discipline    AS 12/13/24 -  Alejandra Crowder, PTA Physical Therapist Assistant PT    NS 06/16/21 -  Dixie Mckeon, PT Physical Therapist PT    SS 06/01/21 -  Tali Webb, PT Physical Therapist PT    AB 09/22/22 -  Alejandra Farias, PT Physical Therapist PT    CK 02/06/24 -  Loretta Webber, PT Physical Therapist PT                  PT Recommendation and Plan     Progress: no change  Outcome Evaluation: Patient gave good effort towards mobility training but continues to be limited by weakness, decreased endurance, and dyspnea on exertion. Continue per PT POC to support increased independence. Recommend SNF at d/c.     Time Calculation:         PT Charges       Row Name 01/28/25 1351             Time Calculation    Start Time 1351  -NS      PT Received On 01/28/25  -NS      PT Goal Re-Cert Due Date 02/03/25  -NS         Timed Charges    15234 - Gait Training Minutes  10  -NS      64240 - PT Therapeutic Activity Minutes 19  -NS         Total Minutes    Timed  Charges Total Minutes 29  -NS       Total Minutes 29  -NS                User Key  (r) = Recorded By, (t) = Taken By, (c) = Cosigned By      Initials Name Provider Type    Dixie Villa, PARRIS Physical Therapist                  Therapy Charges for Today       Code Description Service Date Service Provider Modifiers Qty    10030476872  PT THERAPEUTIC ACT EA 15 MIN 1/28/2025 Dixie Mckeon, PT GP 1    90441834396 HC GAIT TRAINING EA 15 MIN 1/28/2025 Dixie Mckeon, PT GP 1            PT G-Codes  Outcome Measure Options: AM-PAC 6 Clicks Basic Mobility (PT)  AM-PAC 6 Clicks Score (PT): 17  AM-PAC 6 Clicks Score (OT): 15  PT Discharge Summary  Anticipated Discharge Disposition (PT): skilled nursing facility    Dixie Mckeon PT  1/28/2025

## 2025-01-28 NOTE — THERAPY TREATMENT NOTE
Patient Name: Masha Chou  : 1945    MRN: [id number]                              Today's Date: 2025       Admit Date: 2025    Visit Dx:     ICD-10-CM ICD-9-CM   1. Falls  R29.6 V15.88   2. Acute cystitis without hematuria  N30.00 595.0     Patient Active Problem List   Diagnosis    Chest pain    Type 2 diabetes mellitus    Pulmonary HTN    Heart failure    COPD exacerbation    Chronic respiratory failure with hypoxia    Falls    Left knee pain    Osteoarthritis of left knee    Acute UTI (urinary tract infection)     Past Medical History:   Diagnosis Date    Asthma     Cancer     BILATERAL BREAST     COPD (chronic obstructive pulmonary disease)     Diabetes mellitus     Heart failure     Hypertension     Pulmonary hypertension      Past Surgical History:   Procedure Laterality Date    BREAST SURGERY      CHOLECYSTECTOMY      HYSTERECTOMY      JOINT REPLACEMENT      RIGHT KNEE    KELOID EXCISION        General Information       Row Name 25 0942          OT Time and Intention    Document Type therapy note (daily note)  -     Mode of Treatment occupational therapy  -JR       Row Name 25 0942          General Information    Patient Profile Reviewed yes  -JR     Existing Precautions/Restrictions fall;oxygen therapy device and L/min  urinary incontinence  -JR     Barriers to Rehab medically complex;previous functional deficit  -JR       Row Name 25 0942          Cognition    Orientation Status (Cognition) oriented x 4  -JR       Row Name 25 0942          Safety Issues/Impairments Affecting Functional Mobility    Safety Issues Affecting Function (Mobility) awareness of need for assistance;insight into deficits/self-awareness;safety precaution awareness;safety precautions follow-through/compliance;sequencing abilities  -JR     Impairments Affecting Function (Mobility) balance;endurance/activity tolerance;strength;shortness of breath;postural/trunk control  -JR     Cognitive  Impairments, Mobility Safety/Performance awareness, need for assistance;insight into deficits/self-awareness;safety precaution awareness;safety precaution follow-through;sequencing abilities  -               User Key  (r) = Recorded By, (t) = Taken By, (c) = Cosigned By      Initials Name Provider Type    JR Urszula Jade OT Occupational Therapist                     Mobility/ADL's       Row Name 01/28/25 0944          Bed Mobility    Supine-Sit [place] (Bed Mobility) standby assist;verbal cues  -     Bed Mobility, Safety Issues decreased use of arms for pushing/pulling;decreased use of legs for bridging/pushing  -     Assistive Device (Bed Mobility) bed rails;head of bed elevated  -     Comment, (Bed Mobility) Pt required increased time, able to come to EOB without physical assist this date.  -       Row Name 01/28/25 0944          Transfers    Transfers sit-stand transfer  -       Row Name 01/28/25 0944          Bed-Chair Transfer    Bed-Chair [place] (Transfers) contact guard;verbal cues  -     Assistive Device (Bed-Chair Transfers) walker, front-wheeled  -     Comment, (Bed-Chair Transfer) Verbal cues for hand placement with transfers  -       Row Name 01/28/25 0944          Sit-Stand Transfer    Sit-Stand [place] (Transfers) contact guard;verbal cues  -     Assistive Device (Sit-Stand Transfers) walker, front-wheeled  -       Row Name 01/28/25 0944          Functional Mobility    Functional Mobility- Ind. Level contact guard assist;verbal cues required  -     Functional Mobility- Device walker, front-wheeled  -     Functional Mobility-Distance (Feet) --  in hallway  -     Functional Mobility- Safety Issues supplemental O2  -     Functional Mobility- Comment No LOB noted, 2 standing rest periods with verbal cues for PLB. Distance limited due to fatigue and limited activity tolerance for task as well as pain.  -       Row Name 01/28/25 0944          Activities  of Daily Living    BADL Assessment/Intervention lower body dressing;grooming;feeding;upper body dressing  -JR       Row Name 01/28/25 0944          Upper Body Dressing Assessment/Training    [place] Level (Upper Body Dressing) don;front opening garment;maximum assist (25% patient effort);verbal cues  -JR     Position (Upper Body Dressing) edge of bed sitting  -JR       Row Name 01/28/25 0944          Lower Body Dressing Assessment/Training    [place] Level (Lower Body Dressing) don;pants/bottoms;dependent (less than 25% patient effort);verbal cues  -JR     Position (Lower Body Dressing) edge of bed sitting;supported standing  -JR     Comment, (Lower Body Dressing) Donned depends prior to mobility this date  -JR       Row Name 01/28/25 0944          Self-Feeding Assessment/Training    [place] Level (Feeding) liquids to mouth;finger foods;independent  -JR     Position (Feeding) edge of bed sitting  -JR       Row Name 01/28/25 0944          Grooming Assessment/Training    [place] Level (Grooming) hair care, combing/brushing;wash face, hands;set up;verbal cues  -JR     Position (Grooming) supported sitting  -JR               User Key  (r) = Recorded By, (t) = Taken By, (c) = Cosigned By      Initials Name Provider Type    Urszula Mccullough OT Occupational Therapist                   Obj/Interventions       Row Name 01/28/25 0948          Balance    Balance Assessment sitting static balance;standing dynamic balance  -JR     Static Sitting Balance supervision  -JR     Dynamic Standing Balance contact guard;verbal cues  -JR     Position/Device Used, Standing Balance supported;walker, rolling  -JR               User Key  (r) = Recorded By, (t) = Taken By, (c) = Cosigned By      Initials Name Provider Type    Urszula Mccullough OT Occupational Therapist                   Goals/Plan    No documentation.                  Clinical Impression       Row Name 01/28/25 0948          Pain Assessment     Pretreatment Pain Rating 0/10 - no pain  -JR     Posttreatment Pain Rating 0/10 - no pain  -JR       Row Name 01/28/25 0948          Plan of Care Review    Plan of Care Reviewed With patient  -JR     Progress improving  -JR     Outcome Evaluation Pt demo improved activity tolerance for task, but remains below baseline with ADL's and mobility due to decreased strength, balance, activity tolerance and SOA. Recommend continued skilled OT services and transfer to SNF at d/c.  -JR       Row Name 01/28/25 0948          Positioning and Restraints    Pre-Treatment Position in bed  -JR     Post Treatment Position chair  -JR     In Chair notified nsg;reclined;call light within reach;encouraged to call for assist;exit alarm on;waffle cushion  -               User Key  (r) = Recorded By, (t) = Taken By, (c) = Cosigned By      Initials Name Provider Type    Urszula Mccullough OT Occupational Therapist                   Outcome Measures       Row Name 01/28/25 0951          How much help from another is currently needed...    Putting on and taking off regular lower body clothing? 1  -JR     Bathing (including washing, rinsing, and drying) 2  -JR     Toileting (which includes using toilet bed pan or urinal) 2  -JR     Putting on and taking off regular upper body clothing 3  -JR     Taking care of personal grooming (such as brushing teeth) 3  -JR     Eating meals 4  -JR     AM-PAC 6 Clicks Score (OT) 15  -JR       Row Name 01/28/25 0951          Functional Assessment    Outcome Measure Options AM-PAC 6 Clicks Daily Activity (OT)  -JR               User Key  (r) = Recorded By, (t) = Taken By, (c) = Cosigned By      Initials Name Provider Type    Urszula Mccullough OT Occupational Therapist                    Occupational Therapy Education       Title: PT OT SLP Therapies (Done)       Topic: Occupational Therapy (Done)       Point: ADL training (Done)       Description:   Instruct learner(s) on proper safety adaptation and  remediation techniques during self care or transfers.   Instruct in proper use of assistive devices.                  Learning Progress Summary            Patient Acceptance, E, VU,NR by  at 1/28/2025 0857    Comment: role of therapy, ongoing treatment plan, PLB, WS/EC    Acceptance, E,TB, VU,DU by KF at 1/23/2025 0912    Acceptance, E,D, VU,NR,DU by TB at 1/21/2025 1105    Acceptance, E, VU,NR by MR at 1/17/2025 1544    Acceptance, E,D, VU,NR by TB at 1/15/2025 1646    Acceptance, E, VU by AN at 1/13/2025 1058                      Point: Home exercise program (Done)       Description:   Instruct learner(s) on appropriate technique for monitoring, assisting and/or progressing therapeutic exercises/activities.                  Learning Progress Summary            Patient Acceptance, E,D, VU,NR,DU by TB at 1/21/2025 1105                      Point: Precautions (Done)       Description:   Instruct learner(s) on prescribed precautions during self-care and functional transfers.                  Learning Progress Summary            Patient Acceptance, E, VU,NR by  at 1/28/2025 0857    Comment: role of therapy, ongoing treatment plan, PLB, WS/EC    Acceptance, E,TB, VU,DU by  at 1/23/2025 0912    Acceptance, E, VU,NR by MR at 1/17/2025 1544    Acceptance, E, VU by AN at 1/13/2025 1058                      Point: Body mechanics (Done)       Description:   Instruct learner(s) on proper positioning and spine alignment during self-care, functional mobility activities and/or exercises.                  Learning Progress Summary            Patient Acceptance, E,TB, VU,DU by  at 1/23/2025 0912    Acceptance, E, VU,NR by MR at 1/17/2025 1544    Acceptance, E, VU by AN at 1/13/2025 1058                                      User Key       Initials Effective Dates Name Provider Type Discipline     07/11/23 -  Lindsey Sandoval, OT Occupational Therapist OT     02/03/23 -  Urszula Jade OT Occupational Therapist  OT    AN 09/21/21 -  Anne Marie Cid, OT Occupational Therapist OT    MR 09/22/22 -  Pam Mak, OT Occupational Therapist OT    KF 08/09/23 -  Marly Perkins, OT Occupational Therapist OT                  OT Recommendation and Plan     Plan of Care Review  Plan of Care Reviewed With: patient  Progress: improving  Outcome Evaluation: Pt demo improved activity tolerance for task, but remains below baseline with ADL's and mobility due to decreased strength, balance, activity tolerance and SOA. Recommend continued skilled OT services and transfer to SNF at d/c.     Time Calculation:         Time Calculation- OT       Row Name 01/28/25 0952             Time Calculation- OT    OT Start Time 0857  -JR      OT Received On 01/28/25  -JR         Timed Charges    07962 - OT Therapeutic Activity Minutes 14  -JR      33462 - OT Self Care/Mgmt Minutes 30  -JR         Total Minutes    Timed Charges Total Minutes 44  -JR       Total Minutes 44  -JR                User Key  (r) = Recorded By, (t) = Taken By, (c) = Cosigned By      Initials Name Provider Type     Urszula Jade OT Occupational Therapist                  Therapy Charges for Today       Code Description Service Date Service Provider Modifiers Qty    12044574584 HC OT THERAPEUTIC ACT EA 15 MIN 1/28/2025 Urszula Jade OT  1    09575071058 HC OT SELF CARE/MGMT/TRAIN EA 15 MIN 1/28/2025 Urszula Jade OT  2                 Urszula Jade OT  1/28/2025

## 2025-01-28 NOTE — PROGRESS NOTES
[facility] Medicine Services  PROGRESS NOTE    Patient Name: Masha Chou  : 1945  MRN: [id number]    Date of Admission: 2025  Primary Care Physician: Kary Wu MD    Subjective   Subjective     CC:  Left knee pain    HPI:  Feeling much better today. Denies pain. BP improved.  Worked with therapy      Objective   Objective     Vital Signs:   Temp:  [96.9 °F (36.1 °C)-97.8 °F (36.6 °C)] 97.6 °F (36.4 °C)  Heart Rate:  [66-77] 70  Resp:  [16-20] 18  BP: (117-165)/(55-74) 117/66  Flow (L/min) (Oxygen Therapy):  [3] 3     PE:  Constitutional: No acute distress, awake, alert, obese female, up in chair  HENT: NCAT, mucous membranes moist  Respiratory: Clear to auscultation bilaterally, respiratory effort normal  on CPAP  Cardiovascular: RRR  Gastrointestinal: Positive bowel sounds, soft, nontender, nondistended, obese abd  Musculoskeletal: No bilateral ankle edema  Psychiatric: Appropriate affect, cooperative  Neurologic: Oriented x 3, ANDRADE, speech clear  Skin: No rashes           Results Reviewed:  LAB RESULTS:      Lab 25  0757   WBC 7.09   HEMOGLOBIN 12.8   HEMATOCRIT 42.2   PLATELETS 170   MCV 96.8           Lab 25  0757   SODIUM 138   POTASSIUM 4.7   CHLORIDE 98   CO2 29.0   ANION GAP 11.0   BUN 18   CREATININE 0.97   EGFR 59.9*   GLUCOSE 147*   CALCIUM 9.5   MAGNESIUM 1.6         Lab 25  0757   TOTAL PROTEIN 5.9*   ALBUMIN 3.9   GLOBULIN 2.0   ALT (SGPT) 42*   AST (SGOT) 30   BILIRUBIN 0.6   ALK PHOS 98                       Brief Urine Lab Results  (Last result in the past 365 days)        Color   Clarity   Blood   Leuk Est   Nitrite   Protein   CREAT   Urine HCG        25 0722 Yellow   Clear   Negative   Trace   Negative   Negative                   Microbiology Results Abnormal       None            No radiology results from the last 24 hrs    Results for orders placed during the hospital encounter of 21    Adult Transthoracic Echo  Complete W/ Cont if Necessary Per Protocol    Interpretation Summary  · Estimated left ventricular EF = 70% Left ventricular ejection fraction appears to be 66 - 70%. Left ventricular systolic function is normal.  · Left ventricular diastolic function is consistent with (grade I) impaired relaxation.  · Left ventricular wall thickness is consistent with hypertrophy. Sigmoid-shaped ventricular septum is present.  · LVOT turbulence without gradient.      Current medications:  Scheduled Meds:aspirin, 81 mg, Oral, Daily  atorvastatin, 20 mg, Oral, Daily  buPROPion XL, 150 mg, Oral, Daily  cholestyramine light, 1 packet, Oral, Daily  citalopram, 40 mg, Oral, QAM  donepezil, 10 mg, Oral, Daily  fluticasone, 2 spray, Nasal, Daily  heparin (porcine), 5,000 Units, Subcutaneous, Q12H  insulin glargine, 14 Units, Subcutaneous, Daily  insulin glargine, 25 Units, Subcutaneous, Nightly  Insulin Lispro, 12 Units, Subcutaneous, TID With Meals  insulin lispro, 2-9 Units, Subcutaneous, 4x Daily AC & at Bedtime  lactobacillus acidophilus, 1 capsule, Oral, Daily  Lidocaine, 2 patch, Transdermal, Q24H  lisinopril, 20 mg, Oral, Q24H  nystatin, , Topical, Q12H  sodium chloride, 10 mL, Intravenous, Q12H  verapamil SR, 240 mg, Oral, Daily      Continuous Infusions:   PRN Meds:.  acetaminophen **OR** acetaminophen **OR** acetaminophen    albuterol    benzocaine    Calcium Replacement - Follow Nurse / BPA Driven Protocol    dextrose    dextrose    Diclofenac Sodium    glucagon (human recombinant)    ipratropium-albuterol    loperamide    Magnesium Standard Dose Replacement - Follow Nurse / BPA Driven Protocol    nitroglycerin    ondansetron ODT    Phosphorus Replacement - Follow Nurse / BPA Driven Protocol    Potassium Replacement - Follow Nurse / BPA Driven Protocol    sodium chloride    sodium chloride    Assessment & Plan   Assessment & Plan     Active Hospital Problems    Diagnosis  POA    **Falls [R29.6]  Not Applicable    Left knee pain  [M25.562]  Unknown    Osteoarthritis of left knee [M17.12]  Unknown    Acute UTI (urinary tract infection) [N39.0]  Unknown    COPD exacerbation [J44.1]  Yes    Type 2 diabetes mellitus [E11.9]  Yes      Resolved Hospital Problems   No resolved problems to display.        Brief Hospital Course to date:  Masha Chou is a 79 y.o. female with type 2 diabetes, hypertension, COPD, breast cancer, pulmonary hypertension, diastolic CHF who presented to the ED after having a near fall.    This patient's problems and plans were partially entered by my partner and updated as appropriate by me 01/28/25.    Left knee pain  Osteoarthritis  Generalized weakness  -- X-ray left knee shows mild to moderate tricompartmental osteoarthritis  -- Fall precautions  -- PT/OT consult, recommending SNF-complex placement. Planning LTC  -- Tylenol and Voltaren prn for pain  -- lidoderm for posterior shoulder pain    HTN  -- started on lisinopril  -- continue verapamil     Diarrhea, chronic  -intermittent chronic diarrhea associated with dietary intake/ anxiety  -continue probiotic  -resumed home imodium prn with improvement.   -no current leukocytosis     Acute UTI (POA)  -- UA: Ketones trace, nitrite positive, leukocytes trace, WBC 3-5, bacteria 4+, squamous 3-6  -- Ceftin 5 days completed     CKD  -- baseline creatinine ~ 1.1-1.2, creatinine 0.97     COPD  JUAN F  Asthma  -- Does not appear to be in acute exacerbation.    -- CPAP nightly/ prn rest  -- DuoNebs scheduled     T2DM  -- continue ssi, increase am basal to 14 units and prandial to 12 units        Expected Discharge Location and Transportation: SNF to long-term care when bed available - Medically ready  Expected Discharge   Expected Discharge Date: 1/30/2025; Expected Discharge Time:      VTE Prophylaxis:  Pharmacologic VTE prophylaxis orders are present.         AM-PAC 6 Clicks Score (PT): 19 (01/27/25 2000)    CODE STATUS:   Code Status and Medical Interventions: CPR (Attempt to  Resuscitate); Full Support   Ordered at: 01/12/25 0120     Level Of Support Discussed With:    Patient     Code Status (Patient has no pulse and is not breathing):    CPR (Attempt to Resuscitate)     Medical Interventions (Patient has pulse or is breathing):    Full Support       Evette Gross, APRN  01/28/25

## 2025-01-28 NOTE — PLAN OF CARE
Goal Outcome Evaluation:              Outcome Evaluation: Vitals stable. Pt on 3L nasal cannula. No PRNs given. Insulin coverage required. Purewick remains in place. A&Ox4. Pt resting well this shift with cpap on. Plan of care ongoing.

## 2025-01-28 NOTE — PLAN OF CARE
Goal Outcome Evaluation:           Progress: improving  Outcome Evaluation: VSS on 3LNC. Pt has been up in chair and ambulated to toilet this shift with walker and one assist. Good oral intake with adequite UOP noted in flowsheets. Patent purewick in place to low wall suction. Continuing POC and reporting as needed.

## 2025-01-28 NOTE — CASE MANAGEMENT/SOCIAL WORK
Continued Stay Note   [facility]     Patient Name: Masha Chou  MRN: [id number]  Today's Date: 1/28/2025    Admit Date: 1/12/2025    Plan: skilled to Medicaid pending LTC   Discharge Plan       Row Name 01/28/25 1445       Plan    Plan Skilled to Medicaid pending LTC    Plan Comments SW'er met with patient at bedside. Patient completed Medicaid pre-screener with [facility].  Signature following. Patient is working with PT/ OT to start pre-cert. Plan is skilled to Medicaid pending LTC                   Discharge Codes    No documentation.                 Expected Discharge Date and Time       Expected Discharge Date Expected Discharge Time    Jan 30, 2025               Jennifer Roberts) AKIRA Gross

## 2025-01-29 VITALS
DIASTOLIC BLOOD PRESSURE: 60 MMHG | SYSTOLIC BLOOD PRESSURE: 106 MMHG | BODY MASS INDEX: 53.99 KG/M2 | TEMPERATURE: 99.2 F | HEART RATE: 91 BPM | HEIGHT: 56 IN | RESPIRATION RATE: 16 BRPM | WEIGHT: 240 LBS | OXYGEN SATURATION: 99 %

## 2025-01-29 PROBLEM — N39.0 ACUTE UTI (URINARY TRACT INFECTION): Status: RESOLVED | Noted: 2025-01-12 | Resolved: 2025-01-29

## 2025-01-29 LAB
GLUCOSE BLDC GLUCOMTR-MCNC: 113 MG/DL (ref 70–130)
GLUCOSE BLDC GLUCOMTR-MCNC: 151 MG/DL (ref 70–130)
GLUCOSE BLDC GLUCOMTR-MCNC: 173 MG/DL (ref 70–130)
GLUCOSE BLDC GLUCOMTR-MCNC: 184 MG/DL (ref 70–130)

## 2025-01-29 PROCEDURE — 96372 THER/PROPH/DIAG INJ SC/IM: CPT

## 2025-01-29 PROCEDURE — 63710000001 INSULIN GLARGINE PER 5 UNITS: Performed by: NURSE PRACTITIONER

## 2025-01-29 PROCEDURE — 63710000001 INSULIN LISPRO (HUMAN) PER 5 UNITS: Performed by: INTERNAL MEDICINE

## 2025-01-29 PROCEDURE — 82948 REAGENT STRIP/BLOOD GLUCOSE: CPT

## 2025-01-29 PROCEDURE — 63710000001 INSULIN LISPRO (HUMAN) PER 5 UNITS: Performed by: NURSE PRACTITIONER

## 2025-01-29 PROCEDURE — 99239 HOSP IP/OBS DSCHRG MGMT >30: CPT | Performed by: NURSE PRACTITIONER

## 2025-01-29 PROCEDURE — G0378 HOSPITAL OBSERVATION PER HR: HCPCS

## 2025-01-29 PROCEDURE — 25010000002 HEPARIN (PORCINE) PER 1000 UNITS: Performed by: NURSE PRACTITIONER

## 2025-01-29 RX ORDER — INSULIN LISPRO 100 [IU]/ML
12 INJECTION, SOLUTION INTRAVENOUS; SUBCUTANEOUS
Start: 2025-01-29

## 2025-01-29 RX ORDER — INSULIN LISPRO 100 [IU]/ML
2-9 INJECTION, SOLUTION INTRAVENOUS; SUBCUTANEOUS
Start: 2025-01-29

## 2025-01-29 RX ORDER — LIDOCAINE 4 G/G
2 PATCH TOPICAL
Start: 2025-01-30

## 2025-01-29 RX ORDER — CHOLESTYRAMINE LIGHT 4 G/5.7G
1 POWDER, FOR SUSPENSION ORAL DAILY
Start: 2025-01-30

## 2025-01-29 RX ORDER — LISINOPRIL 20 MG/1
20 TABLET ORAL
Start: 2025-01-30

## 2025-01-29 RX ORDER — NYSTATIN 100000 [USP'U]/G
POWDER TOPICAL EVERY 12 HOURS SCHEDULED
Start: 2025-01-29

## 2025-01-29 RX ORDER — LOPERAMIDE HYDROCHLORIDE 2 MG/1
2 CAPSULE ORAL 3 TIMES DAILY PRN
Start: 2025-01-29

## 2025-01-29 RX ORDER — L.ACID,PARA/B.BIFIDUM/S.THERM 8B CELL
1 CAPSULE ORAL DAILY
Start: 2025-01-30

## 2025-01-29 RX ORDER — IPRATROPIUM BROMIDE AND ALBUTEROL SULFATE 2.5; .5 MG/3ML; MG/3ML
3 SOLUTION RESPIRATORY (INHALATION) EVERY 6 HOURS PRN
Start: 2025-01-29

## 2025-01-29 RX ADMIN — INSULIN LISPRO 12 UNITS: 100 INJECTION, SOLUTION INTRAVENOUS; SUBCUTANEOUS at 09:08

## 2025-01-29 RX ADMIN — ASPIRIN 81 MG: 81 TABLET, COATED ORAL at 09:10

## 2025-01-29 RX ADMIN — HEPARIN SODIUM 5000 UNITS: 5000 INJECTION INTRAVENOUS; SUBCUTANEOUS at 09:09

## 2025-01-29 RX ADMIN — LOPERAMIDE HYDROCHLORIDE 2 MG: 2 CAPSULE ORAL at 13:42

## 2025-01-29 RX ADMIN — DONEPEZIL HYDROCHLORIDE 10 MG: 10 TABLET, FILM COATED ORAL at 09:09

## 2025-01-29 RX ADMIN — INSULIN LISPRO 2 UNITS: 100 INJECTION, SOLUTION INTRAVENOUS; SUBCUTANEOUS at 20:12

## 2025-01-29 RX ADMIN — INSULIN GLARGINE 14 UNITS: 100 INJECTION, SOLUTION SUBCUTANEOUS at 09:08

## 2025-01-29 RX ADMIN — INSULIN LISPRO 2 UNITS: 100 INJECTION, SOLUTION INTRAVENOUS; SUBCUTANEOUS at 13:01

## 2025-01-29 RX ADMIN — LISINOPRIL 20 MG: 20 TABLET ORAL at 09:10

## 2025-01-29 RX ADMIN — INSULIN LISPRO 12 UNITS: 100 INJECTION, SOLUTION INTRAVENOUS; SUBCUTANEOUS at 18:04

## 2025-01-29 RX ADMIN — CITALOPRAM HYDROBROMIDE 40 MG: 40 TABLET ORAL at 09:10

## 2025-01-29 RX ADMIN — Medication 10 ML: at 09:11

## 2025-01-29 RX ADMIN — FLUTICASONE PROPIONATE 2 SPRAY: 50 SPRAY, METERED NASAL at 09:08

## 2025-01-29 RX ADMIN — ACETAMINOPHEN 650 MG: 325 TABLET ORAL at 19:32

## 2025-01-29 RX ADMIN — LIDOCAINE 2 PATCH: 4 PATCH TOPICAL at 09:10

## 2025-01-29 RX ADMIN — Medication 1 CAPSULE: at 09:10

## 2025-01-29 RX ADMIN — INSULIN GLARGINE 25 UNITS: 100 INJECTION, SOLUTION SUBCUTANEOUS at 20:12

## 2025-01-29 RX ADMIN — NYSTATIN: 100000 POWDER TOPICAL at 09:08

## 2025-01-29 RX ADMIN — INSULIN LISPRO 12 UNITS: 100 INJECTION, SOLUTION INTRAVENOUS; SUBCUTANEOUS at 13:02

## 2025-01-29 RX ADMIN — ATORVASTATIN CALCIUM 20 MG: 20 TABLET, FILM COATED ORAL at 09:10

## 2025-01-29 RX ADMIN — VERAPAMIL HYDROCHLORIDE 240 MG: 240 TABLET, FILM COATED, EXTENDED RELEASE ORAL at 09:09

## 2025-01-29 RX ADMIN — BUPROPION HYDROCHLORIDE 150 MG: 150 TABLET, EXTENDED RELEASE ORAL at 09:10

## 2025-01-29 RX ADMIN — INSULIN LISPRO 2 UNITS: 100 INJECTION, SOLUTION INTRAVENOUS; SUBCUTANEOUS at 18:04

## 2025-01-29 RX ADMIN — CHOLESTYRAMINE 4 G: 4 POWDER, FOR SUSPENSION ORAL at 09:09

## 2025-01-29 NOTE — PROGRESS NOTES
[facility] Medicine Services  PROGRESS NOTE    Patient Name: Masha Chou  : 1945  MRN: [id number]    Date of Admission: 2025  Primary Care Physician: Kary Wu MD    Subjective   Subjective     CC:  F/u left knee pain    HPI:  Patient resting in bed. Says she wears home O2 at 3L at all times. Denies SOA. Denies concerns. Says she had a BM this morning. Is hoping to hear about rehab soon.      Objective   Objective     Vital Signs:   Temp:  [97.1 °F (36.2 °C)-98 °F (36.7 °C)] 97.3 °F (36.3 °C)  Heart Rate:  [60-76] 60  Resp:  [16-18] 16  BP: (117-165)/(54-73) 123/68  Flow (L/min) (Oxygen Therapy):  [3] 3     Physical Exam:  Constitutional: No acute distress, awake, alert  HENT: NCAT, mucous membranes moist  Respiratory: Clear to auscultation bilaterally, respiratory effort normal, 3L NC  Cardiovascular: RRR, no murmurs, rubs, or gallops  Gastrointestinal: Positive bowel sounds, soft, nontender, nondistended  Musculoskeletal: No bilateral ankle edema  Psychiatric: Appropriate affect, cooperative  Neurologic: Oriented x 3, moves all extremities, speech clear  Skin: No rashes      Results Reviewed:  LAB RESULTS:      Lab 25  0757   WBC 7.09   HEMOGLOBIN 12.8   HEMATOCRIT 42.2   PLATELETS 170   MCV 96.8         Lab 25  0757   SODIUM 138   POTASSIUM 4.7   CHLORIDE 98   CO2 29.0   ANION GAP 11.0   BUN 18   CREATININE 0.97   EGFR 59.9*   GLUCOSE 147*   CALCIUM 9.5   MAGNESIUM 1.6         Lab 25  0757   TOTAL PROTEIN 5.9*   ALBUMIN 3.9   GLOBULIN 2.0   ALT (SGPT) 42*   AST (SGOT) 30   BILIRUBIN 0.6   ALK PHOS 98                     Brief Urine Lab Results  (Last result in the past 365 days)        Color   Clarity   Blood   Leuk Est   Nitrite   Protein   CREAT   Urine HCG        25 0722 Yellow   Clear   Negative   Trace   Negative   Negative                   Microbiology Results Abnormal       None            No radiology results from the last 24  hrs    Results for orders placed during the hospital encounter of 06/25/21    Adult Transthoracic Echo Complete W/ Cont if Necessary Per Protocol    Interpretation Summary  · Estimated left ventricular EF = 70% Left ventricular ejection fraction appears to be 66 - 70%. Left ventricular systolic function is normal.  · Left ventricular diastolic function is consistent with (grade I) impaired relaxation.  · Left ventricular wall thickness is consistent with hypertrophy. Sigmoid-shaped ventricular septum is present.  · LVOT turbulence without gradient.      Current medications:  Scheduled Meds:aspirin, 81 mg, Oral, Daily  atorvastatin, 20 mg, Oral, Daily  buPROPion XL, 150 mg, Oral, Daily  cholestyramine light, 1 packet, Oral, Daily  citalopram, 40 mg, Oral, QAM  donepezil, 10 mg, Oral, Daily  fluticasone, 2 spray, Nasal, Daily  heparin (porcine), 5,000 Units, Subcutaneous, Q12H  insulin glargine, 14 Units, Subcutaneous, Daily  insulin glargine, 25 Units, Subcutaneous, Nightly  Insulin Lispro, 12 Units, Subcutaneous, TID With Meals  insulin lispro, 2-9 Units, Subcutaneous, 4x Daily AC & at Bedtime  lactobacillus acidophilus, 1 capsule, Oral, Daily  Lidocaine, 2 patch, Transdermal, Q24H  lisinopril, 20 mg, Oral, Q24H  nystatin, , Topical, Q12H  sodium chloride, 10 mL, Intravenous, Q12H  verapamil SR, 240 mg, Oral, Daily      Continuous Infusions:   PRN Meds:.  acetaminophen **OR** acetaminophen **OR** acetaminophen    albuterol    benzocaine    Calcium Replacement - Follow Nurse / BPA Driven Protocol    dextrose    dextrose    Diclofenac Sodium    glucagon (human recombinant)    ipratropium-albuterol    loperamide    Magnesium Standard Dose Replacement - Follow Nurse / BPA Driven Protocol    nitroglycerin    ondansetron ODT    Phosphorus Replacement - Follow Nurse / BPA Driven Protocol    Potassium Replacement - Follow Nurse / BPA Driven Protocol    sodium chloride    sodium chloride    Assessment & Plan   Assessment &  Plan     Active Hospital Problems    Diagnosis  POA    **Falls [R29.6]  Not Applicable    Left knee pain [M25.562]  Unknown    Osteoarthritis of left knee [M17.12]  Unknown    Acute UTI (urinary tract infection) [N39.0]  Unknown    COPD exacerbation [J44.1]  Yes    Type 2 diabetes mellitus [E11.9]  Yes      Resolved Hospital Problems   No resolved problems to display.        Brief Hospital Course to date:  Masha Chou is a 79 y.o. female with type 2 diabetes, hypertension, COPD, breast cancer, pulmonary hypertension, diastolic CHF who presented to the ED after having a near fall and complaints of dysuria and urinary frequency x 1 week.     This patient's problems and plans were partially entered by my partner and updated as appropriate by me 01/29/25.       Left knee pain  Osteoarthritis  Generalized weakness  -- X-ray left knee shows mild to moderate tricompartmental osteoarthritis  -- Fall precautions  -- PT/OT consult, recommending SNF. Complex placement. Planning LTC  -- Tylenol and Voltaren prn for pain  -- lidoderm for posterior shoulder pain     HTN  HLD  -- started on lisinopril 1/27  -- continue verapamil   -- BP improved, stable  -- continue statin     Diarrhea, chronic  -intermittent chronic diarrhea associated with dietary intake/ anxiety  -continue probiotic  -resumed home imodium prn with improvement.   -no current leukocytosis     Acute UTI (POA)  -- UA: Ketones trace, nitrite positive, leukocytes trace, WBC 3-5, bacteria 4+, squamous 3-6  -- Completed Ceftin x  5 days     CKD  -- baseline creatinine ~ 1.1-1.2, creatinine 0.97     COPD  JUAN F  Asthma  -- Does not appear to be in acute exacerbation.    -- CPAP nightly/ prn rest, home O2 at 3L continuously  -- DuoNebs scheduled     T2DM  -- A1c 9  -- continue SSI, increased AM basal to 14 units and prandial to 12 units. BG improved.      Expected Discharge Location and Transportation: [facility]  Expected Discharge pending insurance  approval  Expected Discharge Date: 1/30/2025; Expected Discharge Time:      VTE Prophylaxis:  Pharmacologic VTE prophylaxis orders are present.         AM-PAC 6 Clicks Score (PT): 17 (01/28/25 7241)    CODE STATUS:   Code Status and Medical Interventions: CPR (Attempt to Resuscitate); Full Support   Ordered at: 01/12/25 1544     Level Of Support Discussed With:    Patient     Code Status (Patient has no pulse and is not breathing):    CPR (Attempt to Resuscitate)     Medical Interventions (Patient has pulse or is breathing):    Full Support       Esther Booker, APRN  01/29/25

## 2025-01-29 NOTE — CASE MANAGEMENT/SOCIAL WORK
Continued Stay Note  [facility]     Patient Name: Masha Chou  MRN: [id number]  Today's Date: 1/29/2025    Admit Date: 1/12/2025    Plan: Rehab to LTC via  EMS   Discharge Plan       Row Name 01/29/25 1019       Plan    Plan Rehab to LTC via  EMS    Plan Comments SW'er met with patient at bedside[phone] who is starting pre-cert today. Awaiting pre-cert decision. Plan is rehab to LTC via  EMS    Final Discharge Disposition Code 03 - skilled nursing facility (SNF)                   Discharge Codes    No documentation.                 Expected Discharge Date and Time       Expected Discharge Date Expected Discharge Time    Jan 30, 2025               AKIRA Delgado (Kay)

## 2025-01-29 NOTE — CASE MANAGEMENT/SOCIAL WORK
Case Management Discharge Note      Final Note: SW'er met with patient at bedside. Patient has been accepted by [facility]. Patient is agreeable to go. Report can be given to [phone] fax  [phone]. Pharmacy updated [facility] - [place], [place] - [address] - [phone]  - [phone] FX . SW'er called patents daughter Machelle to informed of discharge plan; agreeable. Plan is [facility] via  EMS at 19:30.    Provided Post Acute Provider List?: Yes  Post Acute Provider List: Inpatient Rehab  Provided Post Acute Provider Quality & Resource List?: Yes  Post Acute Provider Quality and Resource List: Inpatient Rehab  Delivered To: Patient  Method of Delivery: In person    Selected Continued Care - Admitted Since 1/12/2025       Destination Coordination complete.      Service Provider Services Address Phone Fax Patient Preferred    [facility] [address] [address] [phone] --       Internal Comment last updated by Jennifer Gross MSW 1/28/2025 1441    1/28 Completed Medicaid screener.                         Durable Medical Equipment    No services have been selected for the patient.                Dialysis/Infusion    No services have been selected for the patient.                Home Medical Care    No services have been selected for the patient.                Therapy    No services have been selected for the patient.                Community Resources    No services have been selected for the patient.                Community & DME    No services have been selected for the patient.                         Final Discharge Disposition Code: 03 - skilled nursing facility (SNF)

## 2025-01-29 NOTE — PROGRESS NOTES
"                  Clinical Nutrition     Patient Name: Masha Chou  YOB: 1945  MRN: [id number]  Date of Encounter: 01/29/25 10:27 EST  Admission date: 1/12/2025  Reason for Visit: Follow-up protocol    Assessment   Nutrition Assessment   Admission Diagnosis:  Falls [R29.6]    Problem List:    Falls    Type 2 diabetes mellitus    COPD exacerbation    Left knee pain    Osteoarthritis of left knee    Acute UTI (urinary tract infection)      PMH:   She  has a past medical history of Asthma, Cancer, COPD (chronic obstructive pulmonary disease), Diabetes mellitus, Heart failure, Hypertension, and Pulmonary hypertension.    PSH:  She  has a past surgical history that includes Breast surgery; Keloid excision; Cholecystectomy; Hysterectomy; and Joint replacement.    Anthropometrics     Height: Height: 142.2 cm (56\")  Last Filed Weight: Weight: 109 kg (240 lb) (01/12/25 1957)  Method: Weight Method: Stated  BMI: BMI (Calculated): 53.8    UBW: 240lb  Weight change: No significant changes     Weight       Weight (kg) Weight (lbs) Weight Method Visit Report   7/19/2020 108.863 kg  240 lb  Stated     3/11/2021 105.235 kg  232 lb  Stated     4/1/2021 112.038 kg  247 lb  Stated     6/25/2021 113.399 kg  250 lb  Stated      113.399 kg  250 lb      7/1/2021 110.394 kg  243 lb 6 oz   --     110.394 kg  243 lb 6 oz   --    7/2/2021 110 kg  242 lb 8.1 oz      10/17/2021 110.678 kg  244 lb  Stated     8/11/2022 110.678 kg  244 lb  Stated     3/4/2023 106.595 kg  235 lb  Stated     8/29/2023 105.688 kg  233 lb  Stated     2/12/2024 105.688 kg  233 lb      6/2/2024 68.04 kg  150 lb  Stated     1/12/2025 108.863 kg  240 lb  Stated         Nutrition Focused Physical Exam     Date: 1/29    Pt does not meet criteria for malnutrition diagnosis, at this time.      Subjective   Reported/Observed/Food/Nutrition Related History:     1/29) Visited with patient at bedside, fully consumed lunch tray in front of her. Tells me " everything with her diet has been wonderful and she has no complaints, but would do better with meats chopped. States she has no further questions/concerns regarding diabetes. States her chronic diarrhea has improved t/o admission, unsure how to eat to continue to manage this. RD reviewed low fiber diet when having flares and benefits dietary fiber for maintenance. Pt receptive, denies further nutritional concerns or dietary needs at this time.     1/22) Pt resting in bed, reports fair appetite, states she has a history of swallowing problems, had dysphagia test  several years ago, the tech has been chopping her meats for her, and she is tolerating this, hernandez not want to have meats pre-chopped, pt states she tries to follow diabetic diet at home, her Ha1c used to be ~ 6, she has lost her instructions on how to calculate her sliding scale insulin and would like instruction on this, she typically gives herself 2 shots of insulin per day    Current Nutrition Prescription     PO: Diet: Cardiac, Diabetic; Healthy Heart (2-3 Na+); Consistent Carbohydrate; Fluid Consistency: Thin (IDDSI 0)  Oral Nutrition Supplement:  Intake: 84% X 35 meals documented    Assessment & Plan   Nutrition Diagnosis   Date: 1-22-25 Updated:   Problem Food and nutrition knowledge deficit    Etiology Uncontrolled DM   Signs/Symptoms Ha1c= 9   Status: Active    Goal:   Nutrition to support treatment and Maintain intake    Nutrition Intervention      Follow treatment progress, Care plan reviewed, Advise alternate selection, Advised available snacks, Interview for preferences, Encourage intake, Education provided for managing chronic diarrhea with diet    Pt has continued eating very well t/o admission.     Diarrhea improved, reviewed dietary strategies to manage.     Diet altered to soft/chop per pt request, okay to return to regular textures if pt does not like this.     No further nutritional concerns, will s/o at this time, please consult if  needed.     Monitoring/Evaluation:   Per protocol    Toya Mclaughlin RD  Time Spent: 25m

## 2025-01-29 NOTE — DISCHARGE SUMMARY
[facility] Medicine Services  DISCHARGE SUMMARY    Patient Name: Masha Chou  : 1945  MRN: [id number]    Date of Admission: 2025  8:00 PM  Date of Discharge:  2025  Primary Care Physician: Kary Wu MD    Consults       No orders found from 2024 to 2025.            Hospital Course     Presenting Problem: Left knee pain    Active Hospital Problems    Diagnosis  POA    **Falls [R29.6]  Not Applicable    Left knee pain [M25.562]  Yes    Osteoarthritis of left knee [M17.12]  Yes    COPD exacerbation [J44.1]  Yes    Type 2 diabetes mellitus [E11.9]  Yes      Resolved Hospital Problems    Diagnosis Date Resolved POA    Acute UTI (urinary tract infection) [N39.0] 2025 Yes          Hospital Course:  Masha Chou is a 79 y.o. female with type 2 diabetes, hypertension, COPD, breast cancer, pulmonary hypertension, diastolic CHF who presented to the ED after having a near fall and complaints of dysuria and urinary frequency x 1 week. UA was concerning for UTI patient was treated with 5 days of Ceftin p.o.  Workup of left knee pain showed arthritis.  PT and OT followed and recommended skilled rehab.  Patient will transferred to [facility] today for skilled rehab and possible eventual long-term care.       Left knee pain  Osteoarthritis  Generalized weakness  -- X-ray left knee shows mild to moderate tricompartmental osteoarthritis   -- Tylenol and Voltaren prn for pain  -- Lidoderm for posterior shoulder pain     Acute UTI (POA)  -- UA: Ketones trace, nitrite positive, leukocytes trace, WBC 3-5, bacteria 4+, squamous 3-6  -- Completed Ceftin x 5 days     HTN  HLD  -- continue home verapamil   -- started on lisinopril   -- BP improved, stable  -- continue statin     Diarrhea, chronic  -intermittent chronic diarrhea associated with dietary intake/ anxiety  -continue cholestyramine daily, formulary substitute for home Colestid  -added  probiotic, continue  -resumed home imodium prn      CKD  -- baseline creatinine ~ 1.1-1.2     COPD  JUAN F  Asthma  -- CPAP nightly/ prn rest, home O2 at 3L continuously  -- DuoNebs as needed     T2DM  -- A1c 9  -- AM Lantus increased to 14 units daily on 1/28. Continue 25 units nightly.   --Prandial insulin increased from 10 to 12 units TID with meals on 1/28.   -- Continue SSI in addition to mealtime insulin.  -- BG has improved with uptitration of insulins.  -- Continue ACHS and adjust insulins as needed. Monitor for hypoglycemia given adjustments to insulins.      Discharge Follow Up Recommendations for outpatient labs/diagnostics:  --Follow up with facility provider 1-2 days.  --Follow up with PCP one week after discharge.    Day of Discharge     HPI:   Patient resting in bed. Says she wears home O2 at 3L at all times. Denies SOA. Denies concerns. Says she had a BM this morning. Is hoping to hear about rehab soon.       Vital Signs:   Temp:  [97 °F (36.1 °C)-98.2 °F (36.8 °C)] 98.2 °F (36.8 °C)  Heart Rate:  [60-78] 78  Resp:  [16-18] 16  BP: (123-145)/(54-73) 144/70  Flow (L/min) (Oxygen Therapy):  [3] 3      Physical Exam:  Constitutional: No acute distress, awake, alert  HENT: NCAT, mucous membranes moist  Respiratory: Clear to auscultation bilaterally, respiratory effort normal, 3L NC  Cardiovascular: RRR, no murmurs, rubs, or gallops  Gastrointestinal: Positive bowel sounds, soft, nontender, nondistended  Musculoskeletal: No bilateral ankle edema  Psychiatric: Appropriate affect, cooperative  Neurologic: Oriented x 3, moves all extremities, speech clear  Skin: No rashes    Pertinent  and/or Most Recent Results     LAB RESULTS:      Lab 01/25/25  0757   WBC 7.09   HEMOGLOBIN 12.8   HEMATOCRIT 42.2   PLATELETS 170   MCV 96.8         Lab 01/25/25  0757   SODIUM 138   POTASSIUM 4.7   CHLORIDE 98   CO2 29.0   ANION GAP 11.0   BUN 18   CREATININE 0.97   EGFR 59.9*   GLUCOSE 147*   CALCIUM 9.5   MAGNESIUM 1.6          Lab 01/25/25  0757   TOTAL PROTEIN 5.9*   ALBUMIN 3.9   GLOBULIN 2.0   ALT (SGPT) 42*   AST (SGOT) 30   BILIRUBIN 0.6   ALK PHOS 98                     Brief Urine Lab Results  (Last result in the past 365 days)        Color   Clarity   Blood   Leuk Est   Nitrite   Protein   CREAT   Urine HCG        01/13/25 0722 Yellow   Clear   Negative   Trace   Negative   Negative                 Microbiology Results (last 10 days)       ** No results found for the last 240 hours. **            No radiology results for the last 10 days            Results for orders placed during the hospital encounter of 06/25/21    Adult Transthoracic Echo Complete W/ Cont if Necessary Per Protocol    Interpretation Summary  · Estimated left ventricular EF = 70% Left ventricular ejection fraction appears to be 66 - 70%. Left ventricular systolic function is normal.  · Left ventricular diastolic function is consistent with (grade I) impaired relaxation.  · Left ventricular wall thickness is consistent with hypertrophy. Sigmoid-shaped ventricular septum is present.  · LVOT turbulence without gradient.      Plan for Follow-up of Pending Labs/Results:     Discharge Details        Discharge Medications        New Medications        Instructions Start Date   cholestyramine light 4 g packet  Replaces: colestipol 1 g tablet   4 g, Oral, Daily   Start Date: January 30, 2025     Diclofenac Sodium 1 % gel gel  Commonly known as: VOLTAREN   2 g, Topical, 4 Times Daily PRN      Insulin Lispro 100 UNIT/ML injection  Commonly known as: humaLOG  Replaces: insulin lispro 100 UNIT/ML injection   12 Units, Subcutaneous, 3 Times Daily With Meals      Insulin Lispro 100 UNIT/ML injection  Commonly known as: humaLOG   2-9 Units, Subcutaneous, 4 Times Daily Before Meals & Nightly      ipratropium-albuterol 0.5-2.5 mg/3 ml nebulizer  Commonly known as: DUO-NEB   3 mL, Nebulization, Every 6 Hours PRN      lactobacillus acidophilus capsule capsule   1 capsule, Oral,  Daily   Start Date: January 30, 2025     Lidocaine 4 %   2 patches, Transdermal, Every 24 Hours Scheduled, Remove & Discard patch within 12 hours or as directed by MD   Start Date: January 30, 2025     lisinopril 20 MG tablet  Commonly known as: PRINIVIL,ZESTRIL   20 mg, Oral, Every 24 Hours Scheduled   Start Date: January 30, 2025     loperamide 2 MG capsule  Commonly known as: IMODIUM  Replaces: loperamide 2 MG tablet   2 mg, Oral, 3 Times Daily PRN      nystatin 166184 UNIT/GM powder  Commonly known as: MYCOSTATIN  Replaces: nystatin [id number] UNIT/GM cream   Topical, Every 12 Hours Scheduled             Changes to Medications        Instructions Start Date   insulin glargine 100 UNIT/ML injection  Commonly known as: LANTUS, SEMGLEE  What changed: You were already taking a medication with the same name, and this prescription was added. Make sure you understand how and when to take each.   25 Units, Subcutaneous, Nightly      insulin glargine 100 UNIT/ML injection  Commonly known as: LANTUS, SEMGLEE  What changed:   medication strength  how much to take  when to take this   14 Units, Subcutaneous, Daily   Start Date: January 30, 2025            Continue These Medications        Instructions Start Date   albuterol sulfate  (90 Base) MCG/ACT inhaler  Commonly known as: PROVENTIL HFA;VENTOLIN HFA;PROAIR HFA   2 puffs, Every 4 Hours PRN      aspirin 81 MG EC tablet   81 mg, Daily      atorvastatin 20 MG tablet  Commonly known as: LIPITOR   20 mg, Daily      buPROPion  MG 24 hr tablet  Commonly known as: WELLBUTRIN XL   150 mg, Daily      citalopram 40 MG tablet  Commonly known as: CeleXA   40 mg, Every Morning      donepezil 10 MG tablet  Commonly known as: ARICEPT   10 mg, Oral, Daily      fluticasone 50 MCG/ACT nasal spray  Commonly known as: FLONASE   2 sprays, Nasal, Daily      verapamil  MG CR tablet  Commonly known as: CALAN-SR   240 mg, Daily             Stop These Medications      acetaminophen  500 MG tablet  Commonly known as: TYLENOL     colestipol 1 g tablet  Commonly known as: COLESTID  Replaced by: cholestyramine light 4 g packet     furosemide 20 MG tablet  Commonly known as: LASIX     guaifenesin-dextromethorphan 600-30 mg  MG tablet sustained-release 12 hour     insulin lispro 100 UNIT/ML injection  Commonly known as: humaLOG  Replaced by: Insulin Lispro 100 UNIT/ML injection     loperamide 2 MG tablet  Commonly known as: IMODIUM A-D  Replaced by: loperamide 2 MG capsule     magnesium oxide 400 (241.3 Mg) MG tablet tablet  Commonly known as: MAGOX     nystatin 491642 UNIT/GM cream  Commonly known as: MYCOSTATIN  Replaced by: nystatin [id number] UNIT/GM powder              Allergies   Allergen Reactions    Contrast Dye (Echo Or Unknown Ct/Mr) Seizure    Shellfish-Derived Products Nausea And Vomiting    Fluoxetine Hives, Other (See Comments) and Unknown (See Comments)     Other reaction(s): aching    Mushroom Rash    Mushroom Extract Complex (Do Not Select) Rash    Doxycycline Diarrhea    Sulfamethoxazole-Trimethoprim Unknown - Low Severity    Bupropion Rash    Levofloxacin Other (See Comments), Rash and Unknown (See Comments)     Bone Aches    Penicillins Rash     Has tolerated cefdinir    Prednisone & Diphenhydramine Unknown (See Comments)         Discharge Disposition:  Skilled Nursing Facility (DC - External)    Diet:  Hospital:  Diet Order   Procedures    Diet: Cardiac, Diabetic; Healthy Heart (2-3 Na+); Consistent Carbohydrate; Texture: Soft to Chew (NDD 3); Soft to Chew: Chopped Meat; Fluid Consistency: Thin (IDDSI 0)       Diet Instructions       Diet: Cardiac Diets, Diabetic Diets; Healthy Heart (2-3 Na+); Soft to Chew (NDD 3); Chopped Meat; Thin (IDDSI 0); Consistent Carbohydrate      Discharge Diet:  Cardiac Diets  Diabetic Diets       Cardiac Diet: Healthy Heart (2-3 Na+)    Texture: Soft to Chew (NDD 3)    Soft to Chew: Chopped Meat    Fluid Consistency: Thin (IDDSI 0)    Diabetic  Diet: Consistent Carbohydrate             Activity:  Activity Instructions       Activity as Tolerated              Restrictions or Other Recommendations:         CODE STATUS:    Code Status and Medical Interventions: CPR (Attempt to Resuscitate); Full Support   Ordered at: 01/12/25 5869     Level Of Support Discussed With:    Patient     Code Status (Patient has no pulse and is not breathing):    CPR (Attempt to Resuscitate)     Medical Interventions (Patient has pulse or is breathing):    Full Support       Future Appointments   Date Time Provider Department Center   1/29/2025  7:30 PM MED 11 [facility] None       Additional Instructions for the Follow-ups that You Need to Schedule       Discharge Follow-up with PCP   As directed       Currently Documented PCP:    Kary Wu MD    PCP Phone Number:    [phone]     Follow Up Details: One week after discharge from SNF rehab or facility provider if patient transitions to LTC                      Esther Booker, SUNITA  01/29/25      Time Spent on Discharge:  I spent  40  minutes on this discharge activity which included: face-to-face encounter with the patient, reviewing the data in the system, coordination of the care with the nursing staff as well as consultants, documentation, and entering orders.

## 2025-01-29 NOTE — PLAN OF CARE
Goal Outcome Evaluation:              Outcome Evaluation: Vitals stable. Pt on 3L nasal cannula when awake. Pt currently with CPAP on while resting. Pt required insulin coverage this shift. No BM. A&Ox4. Scds in place. No PRNs needed. Plan of care ongoing.

## 2025-02-10 ENCOUNTER — TELEPHONE (OUTPATIENT)
Age: 80
End: 2025-02-10

## 2025-02-10 NOTE — TELEPHONE ENCOUNTER
[facility]    Relationship:     Best call back number: [phone]    Equipment requested:     WHEELCHAIR    Additional information or concerns:     FAX WAS SENT 2/7/25    PLEASE CALL WITH STATUS OF ORDER      .”

## 2025-02-12 ENCOUNTER — NURSING HOME (OUTPATIENT)
Age: 80
End: 2025-02-12
Payer: MEDICARE

## 2025-02-12 DIAGNOSIS — M25.562 ARTHRALGIA OF LEFT KNEE: ICD-10-CM

## 2025-02-12 DIAGNOSIS — J96.11 CHRONIC RESPIRATORY FAILURE WITH HYPOXIA: ICD-10-CM

## 2025-02-12 DIAGNOSIS — Z74.09 IMPAIRED MOBILITY AND ADLS: ICD-10-CM
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
## 2025-02-12 DIAGNOSIS — E78.5 DYSLIPIDEMIA: Chronic | ICD-10-CM

## 2025-02-12 DIAGNOSIS — Z79.4 TYPE 2 DIABETES MELLITUS WITH HYPERGLYCEMIA, WITH LONG-TERM CURRENT USE OF INSULIN: Chronic | ICD-10-CM

## 2025-02-12 DIAGNOSIS — I50.32 CHRONIC DIASTOLIC CHF (CONGESTIVE HEART FAILURE): ICD-10-CM

## 2025-02-12 DIAGNOSIS — I10 ESSENTIAL HYPERTENSION: Chronic | ICD-10-CM

## 2025-02-12 DIAGNOSIS — M17.12 PRIMARY OSTEOARTHRITIS OF LEFT KNEE: ICD-10-CM

## 2025-02-12 DIAGNOSIS — E11.65 TYPE 2 DIABETES MELLITUS WITH HYPERGLYCEMIA, WITH LONG-TERM CURRENT USE OF INSULIN: Chronic | ICD-10-CM

## 2025-02-12 DIAGNOSIS — R41.81 AGE-RELATED COGNITIVE DECLINE: Chronic | ICD-10-CM

## 2025-02-12 DIAGNOSIS — Z78.9 IMPAIRED MOBILITY AND ADLS: ICD-10-CM

## 2025-02-12 DIAGNOSIS — R53.81 PHYSICAL DECONDITIONING: ICD-10-CM

## 2025-02-14 VITALS
BODY MASS INDEX: 56.44 KG/M2 | DIASTOLIC BLOOD PRESSURE: 76 MMHG | HEART RATE: 76 BPM | WEIGHT: 250.9 LBS | RESPIRATION RATE: 18 BRPM | OXYGEN SATURATION: 95 % | TEMPERATURE: 97.7 F | SYSTOLIC BLOOD PRESSURE: 122 MMHG | HEIGHT: 56 IN

## 2025-02-14 NOTE — TELEPHONE ENCOUNTER
[facility]    Relationship:     Best call back number: [phone]    Equipment requested: WALKER    Reason for the request:     DISCHARGING IN TWO-THREE DAYS    Additional information or concerns:     NEEDS ORDER SIGNED AND SENT BACK    [phone]

## 2025-02-17 NOTE — TELEPHONE ENCOUNTER
ROBERT FROM [facility] CALLED AND SAID SHE NEEDS WALKER ORDER SIGNED AND FAXED BACK TODAY PT IS GETTING DISCHARGED TOMORROW

## 2025-02-18 ENCOUNTER — NURSE TRIAGE (OUTPATIENT)
Dept: CALL CENTER | Facility: HOSPITAL | Age: 80
End: 2025-02-18
Payer: MEDICARE

## 2025-02-19 NOTE — TELEPHONE ENCOUNTER
"Reason for Disposition   General information question, no triage required and triager able to answer question    Additional Information   Negative: [1] Caller is not with the adult (patient) AND [2] reporting urgent symptoms   Negative: Lab result questions   Negative: Medication questions   Negative: Caller can't be reached by phone   Negative: Caller has already spoken to PCP or another triager   Negative: RN needs further essential information from caller in order to complete triage   Negative: Requesting regular office appointment   Negative: [1] Caller requesting NON-URGENT health information AND [2] PCP's office is the best resource   Negative: Health Information question, no triage required and triager able to answer question    Answer Assessment - Initial Assessment Questions  1. REASON FOR CALL or QUESTION: \"What is your reason for calling today?\" or \"How can I best help you?\" or \"What question do you have that I can help answer?\"      Daughter called with questions on adjusting Patient's CPAP machine. Daughter has already tried to call manufacture and company that supplied CPAP. Reviewed setting they had questions about. Patient has been at rehab facility since discharge from most recent hospital stay. Advised they should not need to adjust machine.  Settings in place should be what Patient has been on. Advised to call [facility] back with any further questions or concerns.    Protocols used: Information Only Call - No Triage-ADULT-    "

## 2025-03-05 PROBLEM — I50.32 CHRONIC DIASTOLIC CHF (CONGESTIVE HEART FAILURE): Status: ACTIVE | Noted: 2025-03-05

## 2025-03-05 PROBLEM — Z79.4 TYPE 2 DIABETES MELLITUS WITH HYPERGLYCEMIA, WITH LONG-TERM CURRENT USE OF INSULIN: Chronic | Status: ACTIVE | Noted: 2021-06-25

## 2025-03-05 PROBLEM — R41.81 AGE-RELATED COGNITIVE DECLINE: Chronic | Status: ACTIVE | Noted: 2025-03-05

## 2025-03-05 PROBLEM — I10 ESSENTIAL HYPERTENSION: Chronic | Status: ACTIVE | Noted: 2025-03-05

## 2025-03-05 PROBLEM — R53.81 PHYSICAL DECONDITIONING: Status: ACTIVE | Noted: 2025-03-05
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
## 2025-03-05 PROBLEM — E78.5 DYSLIPIDEMIA: Chronic | Status: ACTIVE | Noted: 2025-03-05

## 2025-03-05 PROBLEM — R29.6 FALLS: Status: RESOLVED | Noted: 2025-01-12 | Resolved: 2025-03-05

## 2025-03-05 PROBLEM — R07.9 CHEST PAIN: Status: RESOLVED | Noted: 2021-06-25 | Resolved: 2025-03-05

## 2025-03-05 PROBLEM — I50.9 HEART FAILURE: Status: RESOLVED | Noted: 2021-06-25 | Resolved: 2025-03-05

## 2025-03-05 PROBLEM — M25.562 ARTHRALGIA OF LEFT KNEE: Status: ACTIVE | Noted: 2025-03-05

## 2025-03-05 PROBLEM — Z74.09 IMPAIRED MOBILITY AND ADLS: Status: ACTIVE | Noted: 2025-03-05

## 2025-03-05 PROBLEM — M25.562 LEFT KNEE PAIN: Status: RESOLVED | Noted: 2025-01-12 | Resolved: 2025-03-05

## 2025-03-05 PROBLEM — E11.65 TYPE 2 DIABETES MELLITUS WITH HYPERGLYCEMIA, WITH LONG-TERM CURRENT USE OF INSULIN: Chronic | Status: ACTIVE | Noted: 2021-06-25

## 2025-03-05 PROBLEM — Z78.9 IMPAIRED MOBILITY AND ADLS: Status: ACTIVE | Noted: 2025-03-05

## 2025-03-05 NOTE — PROGRESS NOTES
Nursing Home History/Physical        Harsh DO Yeyo [x]   SUNITA Cooper []  [address] [place] [address]  Phone: [phone]  Fax: [phone] Erika Cortez MD []  Jake Moise DO []  [address] [place] [address]  Phone: [phone]  Fax: [phone]     PATIENT NAME: Masha Chou                                                                          YOB: 1945           DATE OF SERVICE: 02/12/2025  FACILITY:  []  [place]  []  [place]   [x]  [facility]   [] [facility]    [] Other ______________________________________________________________________     CHIEF COMPLAINT:  Fall with significant injury/impaired mobility and ADLs/arthritis of left knee/osteoarthritis of left knee/physical deconditioning/diabetes mellitus/chronic diastolic CHF/hypertension/age-related cognitive decline/dyslipidemia/chronic respiratory failure with hypoxia      HISTORY OF PRESENT ILLNESS:      [x]  Initial visit for coordination of rehabilitative care issues and chronic medical management needs.    I have reviewed labs/imaging/records from this hospitalization, including ER staff and admitting/attending physicians H/P's and progress notes to establish a comprehensive understanding of this patient's clinical hospital course, as well as to establish a transition of care appropriately.    Date of Admission: 1/12/2025  8:00 PM  Date of Discharge:  1/29/2025  Primary Care Physician: Kary Wu MD     Consults         No orders found from 12/14/2024 to 1/13/2025.                Hospital Course      Presenting Problem: Left knee pain           Active Hospital Problems     Diagnosis   POA    **Falls [R29.6]   Not Applicable    Left knee pain [M25.562]   Yes    Osteoarthritis of left knee [M17.12]   Yes    COPD exacerbation [J44.1]   Yes    Type 2 diabetes mellitus [E11.9]   Yes       Resolved Hospital Problems     Diagnosis Date Resolved POA    Acute UTI (urinary tract  infection) [N39.0] 01/29/2025 Yes            Hospital Course:  Masha Chou is a 79 y.o. female with type 2 diabetes, hypertension, COPD, breast cancer, pulmonary hypertension, diastolic CHF who presented to the ED after having a near fall and complaints of dysuria and urinary frequency x 1 week. UA was concerning for UTI patient was treated with 5 days of Ceftin p.o.  Workup of left knee pain showed arthritis.  PT and OT followed and recommended skilled rehab.  Patient will transferred to [facility] today for skilled rehab and possible eventual long-term care.        Left knee pain  Osteoarthritis  Generalized weakness  -- X-ray left knee shows mild to moderate tricompartmental osteoarthritis   -- Tylenol and Voltaren prn for pain  -- Lidoderm for posterior shoulder pain     Acute UTI (POA)  -- UA: Ketones trace, nitrite positive, leukocytes trace, WBC 3-5, bacteria 4+, squamous 3-6  -- Completed Ceftin x 5 days      HTN  HLD  -- continue home verapamil   -- started on lisinopril 1/27  -- BP improved, stable  -- continue statin     Diarrhea, chronic  -intermittent chronic diarrhea associated with dietary intake/ anxiety  -continue cholestyramine daily, formulary substitute for home Colestid  -added probiotic, continue  -resumed home imodium prn      CKD  -- baseline creatinine ~ 1.1-1.2     COPD  JUAN F  Asthma  -- CPAP nightly/ prn rest, home O2 at 3L continuously  -- DuoNebs as needed     T2DM  -- A1c 9  -- AM Lantus increased to 14 units daily on 1/28. Continue 25 units nightly.   --Prandial insulin increased from 10 to 12 units TID with meals on 1/28.   -- Continue SSI in addition to mealtime insulin.  -- BG has improved with uptitration of insulins.  -- Continue ACHS and adjust insulins as needed. Monitor for hypoglycemia given adjustments to insulins.        Discharge Follow Up Recommendations for outpatient labs/diagnostics:  --Follow up with facility provider 1-2 days.  --Follow up with PCP one  week after discharge.    Nursing staff reports the patient has been receptive to supportive care, no new falls or injuries reported.  Tolerating PT/OT.  No nutritional/hydration deficits reported.  Vital signs have been stable, no increased work of breathing.  Pain has appeared clinically well-controlled.    PAST MEDICAL & SURGICAL HISTORY:   Past Medical History:   Diagnosis Date    Asthma     Cancer     BILATERAL BREAST     COPD (chronic obstructive pulmonary disease)     Diabetes mellitus     Heart failure     Hypertension     Pulmonary hypertension       Past Surgical History:   Procedure Laterality Date    BREAST SURGERY      CHOLECYSTECTOMY      HYSTERECTOMY      JOINT REPLACEMENT      RIGHT KNEE    KELOID EXCISION           MEDICATIONS:  I have reviewed and reconciled the patients medication list in the patients chart at the skilled nursing facility today.      ALLERGIES:    Allergies   Allergen Reactions    Contrast Dye (Echo Or Unknown Ct/Mr) Seizure    Shellfish-Derived Products Nausea And Vomiting    Fluoxetine Hives, Other (See Comments) and Unknown (See Comments)     Other reaction(s): aching    Mushroom Rash    Mushroom Extract Complex (Obsolete) Rash    Doxycycline Diarrhea    Sulfamethoxazole-Trimethoprim Unknown - Low Severity    Bupropion Rash    Levofloxacin Other (See Comments), Rash and Unknown (See Comments)     Bone Aches    Penicillins Rash     Has tolerated cefdinir    Prednisone & Diphenhydramine Unknown (See Comments)         SOCIAL HISTORY:    Social History     Socioeconomic History    Marital status:    Tobacco Use    Smoking status: Never    Smokeless tobacco: Never   Vaping Use    Vaping status: Never Used   Substance and Sexual Activity    Alcohol use: Yes     Comment: SOCIALLY    Drug use: Never    Sexual activity: Defer       FAMILY HISTORY:    Family History   Problem Relation Age of Onset    No Known Problems Mother     No Known Problems Father     No Known Problems Sister      No Known Problems Maternal Grandmother     No Known Problems Maternal Grandfather     Cancer Paternal Grandmother     No Known Problems Paternal Grandfather        REVIEW OF SYSTEMS:    Review of Systems  Appetite: Fair []   Good [x]   Poor []   Weight Loss []  [x]  Weight Stable   Unavoidable Weight Loss []  Tolerating Tube Feeding []    Supplements Provided []   Constitutional: Negative for fever, chills, diaphoresis; improving malaise/fatigue.  HENT: No dysphagia; no changes to vision/hearing/smell/taste; no epistaxis  Eyes: Negative for redness and visual disturbance.   Respiratory: Chronic cough, productive of clear/yellow phlegm at times.  No hemoptysis.  Cardiovascular: Negative for chest pain and palpitations.   Gastrointestinal: Negative for abdominal distention, abdominal pain and blood in stool.   Endocrine: Negative for cold intolerance and heat intolerance.   Genitourinary: Negative for difficulty urinating, dysuria and frequency.Negative for hematuria   Musculoskeletal: Chronic myalgias and arthralgias, most significant to left knee.  Integumentary: No open wounds, rash or concerning skin lesions  Neurological: Negative for syncope; improving generalized weakness  Hematological: Negative for adenopathy. Does not bruise/bleed easily.   Immunological: Negative for reported allergies or immunological disorders  Psychological: No acute behavioral changes    PHYSICAL EXAMINATION:   VITAL SIGNS:   Vitals:    02/12/25 1121   BP: 122/76   Pulse: 76   Resp: 18   Temp: 97.7 °F (36.5 °C)   SpO2: 95%         Physical Exam  General Appearance:  [x]  Alert   [x]  Oriented x person  [x]  No acute distress     []  Confused  []  Disoriented   []  Comatose   Head:  Atraumatic and normocephalic, without obvious abnormality.   Eyes:         PERRLA, conjunctivae and sclerae normal, no Icterus. No pallor. Extra-occular movements are within normal limits.   Ears:  Ears appear intact with no abnormalities noted.   Throat:  No oral lesions, no thrush, oral mucosa moist.   Neck: Supple, trachea midline, no thyromegaly, no carotid bruit.   Back:   No kyphoscoliosis. No tenderness to palpation.   Lungs:   Rhonchus, referred, upper airway congestion is partially cleared with cough.  Audible air exchange noted all lung fields.  Prolonged expiratory phase bilaterally.   Heart:  Normal S1 and S2, no murmur, no gallop, no rub. No JVD.   Abdomen:   Normal bowel sounds, no masses, no organomegaly. Soft, non-tender, non-distended, no guarding    Extremities: Moves all extremities; chronic gravity dependent bilateral lower extremity 1+ pitting edema that extends to the distal third of the tibias bilaterally, without cyanosis or clubbing.  Frail build.  1 dorsiflexion/plantarflexion of the feet.  Paulo/Robles sign negative.   Pulses: Pulses palpable and equal bilaterally.   Skin: Stasis dermatitis noted to bilateral lower extremities.  Generalized dry skin noted.  Age-related atrophy of skin.   Neurologic: [x] Normal speech []  Normal mental status    [x] Cranial nerves II through XII intact   [x]  No anosmia [x]  DTR 2+ [x]  Proprioception intact  [x]  No focal motor/sensory deficits      Psych/Mood:                    [x]  No acute changes []  Depressed      Urinary:      [x]  Continent  []  Incontinent  []  Retention  []  F/C     []  UTI w/treatment in progress      ASSESSMENT     Diagnoses and all orders for this visit:    1. Fall with significant injury, initial encounter (Primary)    2. Impaired mobility and ADLs    3. Arthralgia of left knee    4. Primary osteoarthritis of left knee    5. Type 2 diabetes mellitus with hyperglycemia, with long-term current use of insulin    6. Chronic diastolic CHF (congestive heart failure)    7. Essential hypertension    8. Age-related cognitive decline    9. Dyslipidemia    10. Physical deconditioning    11. Chronic respiratory failure with hypoxia        PLAN  Planned utilization of skilled nursing  facility services to aid in mobilization/transfers, assist any ADLs of need.  Fall precautions in place given her arthralgia of the left knee and physical deconditioning.  Continue PT/OT, as well as orthopedic aftercare instructions.  Advance activity as tolerated, including weightbearing activity.    Pain appears clinically well-controlled.    Vital signs demonstrate hemodynamic stability, blood pressure is at goal.  Demonstrates no increased work of breathing or other worrisome findings of unstable angina.    Continue low-sodium/salt dietary intake as best able, frequent weight evaluations.  Demonstrates no findings of acute volume overload.    Continue blood glucose monitoring, changes to treatment regimen can be made if needed to maximize her blood glucose control, as well as minimize hypoglycemic episodes.    Surveillance labs as per routine/need.      [x]  Discussed Patient in detail with nursing/staff, addressed all needs today.     [x]  Plan of Care Reviewed   [x]  PT/OT Reviewed   []  Order Changes  [x]  Discharge Plans Reviewed  []  Code Status Change     I spent 60 minutes caring for Masha on this date of service. This time includes time spent by me in the following activities:preparing for the visit, reviewing tests, obtaining and/or reviewing a separately obtained history, performing a medically appropriate examination and/or evaluation , counseling and educating the patient/family/caregiver, ordering medications, tests, or procedures, documenting information in the medical record, and care coordination    I confirm accuracy of unchanged data/findings which have been carried forward from previous visit, as well as I have updated appropriately those that have changed.      Harsh Orona   02/12/2025